# Patient Record
Sex: FEMALE | Race: WHITE | NOT HISPANIC OR LATINO | Employment: OTHER | URBAN - METROPOLITAN AREA
[De-identification: names, ages, dates, MRNs, and addresses within clinical notes are randomized per-mention and may not be internally consistent; named-entity substitution may affect disease eponyms.]

---

## 2018-07-07 ENCOUNTER — APPOINTMENT (EMERGENCY)
Dept: RADIOLOGY | Facility: HOSPITAL | Age: 68
DRG: 392 | End: 2018-07-07
Attending: EMERGENCY MEDICINE
Payer: COMMERCIAL

## 2018-07-07 ENCOUNTER — HOSPITAL ENCOUNTER (INPATIENT)
Facility: HOSPITAL | Age: 68
LOS: 11 days | Discharge: HOME HEALTH | DRG: 392 | End: 2018-07-18
Attending: EMERGENCY MEDICINE | Admitting: SURGERY
Payer: COMMERCIAL

## 2018-07-07 DIAGNOSIS — K57.20 DIVERTICULITIS OF LARGE INTESTINE WITH ABSCESS WITHOUT BLEEDING: Primary | ICD-10-CM

## 2018-07-07 LAB
ALBUMIN SERPL-MCNC: 4.2 G/DL (ref 3.4–5)
ALP SERPL-CCNC: 50 IU/L (ref 35–126)
ALT SERPL-CCNC: 21 IU/L (ref 11–54)
ANION GAP SERPL CALC-SCNC: 9 MEQ/L (ref 3–15)
AST SERPL-CCNC: 26 IU/L (ref 15–41)
BASOPHILS # BLD: 0.04 K/UL (ref 0.01–0.1)
BASOPHILS NFR BLD: 0.2 %
BILIRUB SERPL-MCNC: 1.3 MG/DL (ref 0.3–1.2)
BUN SERPL-MCNC: 24 MG/DL (ref 8–20)
CALCIUM SERPL-MCNC: 9.4 MG/DL (ref 8.9–10.3)
CHLORIDE SERPL-SCNC: 102 MMOL/L (ref 98–109)
CO2 SERPL-SCNC: 24 MMOL/L (ref 22–32)
CREAT SERPL-MCNC: 0.9 MG/DL (ref 0.6–1.1)
DIFFERENTIAL METHOD BLD: ABNORMAL
EOSINOPHIL # BLD: 0 K/UL (ref 0.04–0.36)
EOSINOPHIL NFR BLD: 0 %
ERYTHROCYTE [DISTWIDTH] IN BLOOD BY AUTOMATED COUNT: 14 % (ref 11.7–14.4)
GFR SERPL CREATININE-BSD FRML MDRD: >60 ML/MIN/1.73M*2
GLUCOSE SERPL-MCNC: 157 MG/DL (ref 70–99)
HCT VFR BLDCO AUTO: 36.6 % (ref 35–45)
HGB BLD-MCNC: 12.4 G/DL (ref 11.8–15.7)
IMM GRANULOCYTES # BLD AUTO: 0.1 K/UL (ref 0–0.08)
IMM GRANULOCYTES NFR BLD AUTO: 0.5 %
LIPASE SERPL-CCNC: 26 U/L (ref 20–51)
LYMPHOCYTES # BLD: 0.68 K/UL (ref 1.2–3.5)
LYMPHOCYTES NFR BLD: 3.7 %
MCH RBC QN AUTO: 29.9 PG (ref 28–33.2)
MCHC RBC AUTO-ENTMCNC: 33.9 G/DL (ref 32.2–35.5)
MCV RBC AUTO: 88.2 FL (ref 83–98)
MONOCYTES # BLD: 0.76 K/UL (ref 0.28–0.8)
MONOCYTES NFR BLD: 4.2 %
NEUTROPHILS # BLD: 16.73 K/UL (ref 1.7–7)
NEUTS SEG NFR BLD: 91.4 %
NRBC BLD-RTO: 0 %
PDW BLD AUTO: 10.6 FL (ref 9.4–12.3)
PLATELET # BLD AUTO: 296 K/UL (ref 150–369)
POTASSIUM SERPL-SCNC: 3 MMOL/L (ref 3.6–5.1)
PROT SERPL-MCNC: 7 G/DL (ref 6–8.2)
RAINBOW HOLD SPECIMEN: NORMAL
RBC # BLD AUTO: 4.15 M/UL (ref 3.93–5.22)
SODIUM SERPL-SCNC: 135 MMOL/L (ref 136–144)
WBC # BLD AUTO: 18.31 K/UL (ref 3.8–10.5)

## 2018-07-07 PROCEDURE — 63700000 HC SELF-ADMINISTRABLE DRUG: Performed by: EMERGENCY MEDICINE

## 2018-07-07 PROCEDURE — 25800000 HC PHARMACY IV SOLUTIONS: Performed by: SURGERY

## 2018-07-07 PROCEDURE — 83690 ASSAY OF LIPASE: CPT | Performed by: EMERGENCY MEDICINE

## 2018-07-07 PROCEDURE — 96365 THER/PROPH/DIAG IV INF INIT: CPT | Mod: 59,XP

## 2018-07-07 PROCEDURE — 85025 COMPLETE CBC W/AUTO DIFF WBC: CPT | Performed by: EMERGENCY MEDICINE

## 2018-07-07 PROCEDURE — 25000000 HC PHARMACY GENERAL: Performed by: SURGERY

## 2018-07-07 PROCEDURE — 87040 BLOOD CULTURE FOR BACTERIA: CPT | Performed by: EMERGENCY MEDICINE

## 2018-07-07 PROCEDURE — 96376 TX/PRO/DX INJ SAME DRUG ADON: CPT | Mod: XP

## 2018-07-07 PROCEDURE — 63600000 HC DRUGS/DETAIL CODE: Performed by: SURGERY

## 2018-07-07 PROCEDURE — 36415 COLL VENOUS BLD VENIPUNCTURE: CPT | Performed by: EMERGENCY MEDICINE

## 2018-07-07 PROCEDURE — 63600105 HC IODINE BASED CONTRAST: Performed by: EMERGENCY MEDICINE

## 2018-07-07 PROCEDURE — 12000000 HC ROOM AND CARE MED/SURG

## 2018-07-07 PROCEDURE — 74177 CT ABD & PELVIS W/CONTRAST: CPT

## 2018-07-07 PROCEDURE — 96375 TX/PRO/DX INJ NEW DRUG ADDON: CPT | Mod: XP

## 2018-07-07 PROCEDURE — 99285 EMERGENCY DEPT VISIT HI MDM: CPT | Mod: 25

## 2018-07-07 PROCEDURE — 25800000 HC PHARMACY IV SOLUTIONS: Performed by: EMERGENCY MEDICINE

## 2018-07-07 PROCEDURE — 80053 COMPREHEN METABOLIC PANEL: CPT | Performed by: EMERGENCY MEDICINE

## 2018-07-07 PROCEDURE — 25000000 HC PHARMACY GENERAL: Performed by: EMERGENCY MEDICINE

## 2018-07-07 PROCEDURE — 63600000 HC DRUGS/DETAIL CODE: Performed by: EMERGENCY MEDICINE

## 2018-07-07 RX ORDER — ASPIRIN 81 MG/1
81 TABLET ORAL DAILY
COMMUNITY
End: 2020-10-23 | Stop reason: ALTCHOICE

## 2018-07-07 RX ORDER — METRONIDAZOLE 500 MG/100ML
500 INJECTION, SOLUTION INTRAVENOUS ONCE
Status: COMPLETED | OUTPATIENT
Start: 2018-07-07 | End: 2018-07-07

## 2018-07-07 RX ORDER — ACETAMINOPHEN 650 MG/1
650 SUPPOSITORY RECTAL ONCE
Status: COMPLETED | OUTPATIENT
Start: 2018-07-07 | End: 2018-07-07

## 2018-07-07 RX ORDER — HYDROMORPHONE HYDROCHLORIDE 1 MG/ML
INJECTION, SOLUTION INTRAMUSCULAR; INTRAVENOUS; SUBCUTANEOUS
Status: DISPENSED
Start: 2018-07-07 | End: 2018-07-07

## 2018-07-07 RX ORDER — ONDANSETRON HYDROCHLORIDE 2 MG/ML
INJECTION, SOLUTION INTRAVENOUS
Status: DISPENSED
Start: 2018-07-07 | End: 2018-07-07

## 2018-07-07 RX ORDER — SODIUM CHLORIDE, SODIUM LACTATE, POTASSIUM CHLORIDE, CALCIUM CHLORIDE 600; 310; 30; 20 MG/100ML; MG/100ML; MG/100ML; MG/100ML
INJECTION, SOLUTION INTRAVENOUS CONTINUOUS
Status: DISCONTINUED | OUTPATIENT
Start: 2018-07-07 | End: 2018-07-12

## 2018-07-07 RX ORDER — PANTOPRAZOLE SODIUM 40 MG/10ML
40 INJECTION, POWDER, LYOPHILIZED, FOR SOLUTION INTRAVENOUS EVERY 24 HOURS
Status: DISCONTINUED | OUTPATIENT
Start: 2018-07-08 | End: 2018-07-09 | Stop reason: ALTCHOICE

## 2018-07-07 RX ORDER — ONDANSETRON HYDROCHLORIDE 2 MG/ML
4 INJECTION, SOLUTION INTRAVENOUS EVERY 6 HOURS PRN
Status: DISCONTINUED | OUTPATIENT
Start: 2018-07-07 | End: 2018-07-18 | Stop reason: HOSPADM

## 2018-07-07 RX ORDER — HYDROMORPHONE HYDROCHLORIDE 1 MG/ML
0.5 INJECTION, SOLUTION INTRAMUSCULAR; INTRAVENOUS; SUBCUTANEOUS ONCE
Status: COMPLETED | OUTPATIENT
Start: 2018-07-07 | End: 2018-07-07

## 2018-07-07 RX ORDER — MORPHINE SULFATE 2 MG/ML
2 INJECTION, SOLUTION INTRAMUSCULAR; INTRAVENOUS ONCE
Status: COMPLETED | OUTPATIENT
Start: 2018-07-07 | End: 2018-07-07

## 2018-07-07 RX ORDER — ONDANSETRON HYDROCHLORIDE 2 MG/ML
4 INJECTION, SOLUTION INTRAVENOUS ONCE
Status: COMPLETED | OUTPATIENT
Start: 2018-07-07 | End: 2018-07-07

## 2018-07-07 RX ORDER — LEVOFLOXACIN 5 MG/ML
500 INJECTION, SOLUTION INTRAVENOUS ONCE
Status: COMPLETED | OUTPATIENT
Start: 2018-07-07 | End: 2018-07-07

## 2018-07-07 RX ORDER — SERTRALINE HYDROCHLORIDE 50 MG/1
50 TABLET, FILM COATED ORAL EVERY MORNING
COMMUNITY
End: 2022-01-24 | Stop reason: ALTCHOICE

## 2018-07-07 RX ORDER — FENOFIBRATE 48 MG/1
48 TABLET, FILM COATED ORAL DAILY
COMMUNITY
End: 2018-10-09

## 2018-07-07 RX ORDER — ESOMEPRAZOLE MAGNESIUM 40 MG/1
40 CAPSULE, DELAYED RELEASE ORAL
COMMUNITY
End: 2018-08-01 | Stop reason: HOSPADM

## 2018-07-07 RX ORDER — LEVOFLOXACIN 5 MG/ML
500 INJECTION, SOLUTION INTRAVENOUS
Status: DISCONTINUED | OUTPATIENT
Start: 2018-07-08 | End: 2018-07-09 | Stop reason: ALTCHOICE

## 2018-07-07 RX ORDER — ROSUVASTATIN CALCIUM 20 MG/1
20 TABLET, COATED ORAL DAILY
COMMUNITY
End: 2018-10-09 | Stop reason: CLARIF

## 2018-07-07 RX ORDER — METRONIDAZOLE 500 MG/100ML
500 INJECTION, SOLUTION INTRAVENOUS
Status: DISCONTINUED | OUTPATIENT
Start: 2018-07-07 | End: 2018-07-09 | Stop reason: ALTCHOICE

## 2018-07-07 RX ORDER — MORPHINE SULFATE 4 MG/ML
4 INJECTION, SOLUTION INTRAMUSCULAR; INTRAVENOUS
Status: DISCONTINUED | OUTPATIENT
Start: 2018-07-07 | End: 2018-07-09

## 2018-07-07 RX ORDER — PANTOPRAZOLE SODIUM 40 MG/10ML
INJECTION, POWDER, LYOPHILIZED, FOR SOLUTION INTRAVENOUS
Status: DISCONTINUED
Start: 2018-07-07 | End: 2018-07-07 | Stop reason: WASHOUT

## 2018-07-07 RX ORDER — HYDROMORPHONE HYDROCHLORIDE 1 MG/ML
1 INJECTION, SOLUTION INTRAMUSCULAR; INTRAVENOUS; SUBCUTANEOUS
Status: DISCONTINUED | OUTPATIENT
Start: 2018-07-07 | End: 2018-07-18 | Stop reason: HOSPADM

## 2018-07-07 RX ADMIN — METRONIDAZOLE 500 MG: 500 INJECTION, SOLUTION INTRAVENOUS at 12:10

## 2018-07-07 RX ADMIN — METRONIDAZOLE 500 MG: 500 INJECTION, SOLUTION INTRAVENOUS at 18:38

## 2018-07-07 RX ADMIN — ONDANSETRON 4 MG: 2 INJECTION, SOLUTION INTRAMUSCULAR; INTRAVENOUS at 07:43

## 2018-07-07 RX ADMIN — SODIUM CHLORIDE, POTASSIUM CHLORIDE, SODIUM LACTATE AND CALCIUM CHLORIDE: 600; 310; 30; 20 INJECTION, SOLUTION INTRAVENOUS at 21:00

## 2018-07-07 RX ADMIN — POTASSIUM CHLORIDE 40 MEQ: 2 INJECTION, SOLUTION, CONCENTRATE INTRAVENOUS at 14:06

## 2018-07-07 RX ADMIN — HYDROMORPHONE HYDROCHLORIDE 1 MG: 1 INJECTION, SOLUTION INTRAMUSCULAR; INTRAVENOUS; SUBCUTANEOUS at 23:47

## 2018-07-07 RX ADMIN — HYDROMORPHONE HYDROCHLORIDE 1 MG: 1 INJECTION, SOLUTION INTRAMUSCULAR; INTRAVENOUS; SUBCUTANEOUS at 17:20

## 2018-07-07 RX ADMIN — ACETAMINOPHEN 650 MG: 650 SUPPOSITORY RECTAL at 11:39

## 2018-07-07 RX ADMIN — HYDROMORPHONE HYDROCHLORIDE 0.5 MG: 1 INJECTION, SOLUTION INTRAMUSCULAR; INTRAVENOUS; SUBCUTANEOUS at 11:22

## 2018-07-07 RX ADMIN — ONDANSETRON 4 MG: 2 INJECTION INTRAMUSCULAR; INTRAVENOUS at 23:48

## 2018-07-07 RX ADMIN — MORPHINE SULFATE 2 MG: 2 INJECTION, SOLUTION INTRAMUSCULAR; INTRAVENOUS at 07:45

## 2018-07-07 RX ADMIN — MORPHINE SULFATE 2 MG: 2 INJECTION, SOLUTION INTRAMUSCULAR; INTRAVENOUS at 09:30

## 2018-07-07 RX ADMIN — HYDROMORPHONE HYDROCHLORIDE 1 MG: 1 INJECTION, SOLUTION INTRAMUSCULAR; INTRAVENOUS; SUBCUTANEOUS at 20:37

## 2018-07-07 RX ADMIN — ONDANSETRON 4 MG: 2 INJECTION INTRAMUSCULAR; INTRAVENOUS at 16:35

## 2018-07-07 RX ADMIN — PROMETHAZINE HYDROCHLORIDE 12.5 MG: 25 INJECTION INTRAMUSCULAR; INTRAVENOUS at 21:00

## 2018-07-07 RX ADMIN — SODIUM CHLORIDE 1000 ML: 9 INJECTION, SOLUTION INTRAVENOUS at 07:45

## 2018-07-07 RX ADMIN — SODIUM CHLORIDE, POTASSIUM CHLORIDE, SODIUM LACTATE AND CALCIUM CHLORIDE: 600; 310; 30; 20 INJECTION, SOLUTION INTRAVENOUS at 13:52

## 2018-07-07 RX ADMIN — ONDANSETRON 4 MG: 2 INJECTION, SOLUTION INTRAMUSCULAR; INTRAVENOUS at 11:21

## 2018-07-07 RX ADMIN — IOHEXOL 140 ML: 350 INJECTION, SOLUTION INTRAVENOUS at 10:08

## 2018-07-07 RX ADMIN — LEVOFLOXACIN 500 MG: 5 INJECTION, SOLUTION INTRAVENOUS at 13:15

## 2018-07-07 ASSESSMENT — COGNITIVE AND FUNCTIONAL STATUS - GENERAL
HELP NEEDED FOR BATHING: 4 - NONE
DRESSING REGULAR LOWER BODY CLOTHING: 4 - NONE
HELP NEEDED FOR PERSONAL GROOMING: 4 - NONE
WALKING IN HOSPITAL ROOM: 4 - NONE
CLIMB 3 TO 5 STEPS WITH RAILING: 4 - NONE
EATING MEALS: 4 - NONE
DO YOU HAVE SERIOUS DIFFICULTY WALKING OR CLIMBING STAIRS: OTHER (SEE COMMENTS)
DRESSING REGULAR UPPER BODY CLOTHING: 4 - NONE
MOVING TO AND FROM BED TO CHAIR: 4 - NONE
TOILETING: 4 - NONE
STANDING UP FROM CHAIR USING ARMS: 4 - NONE

## 2018-07-07 ASSESSMENT — ENCOUNTER SYMPTOMS
DIFFICULTY URINATING: 0
DIARRHEA: 1
ABDOMINAL PAIN: 1
ACTIVITY CHANGE: 0
NECK PAIN: 0
BACK PAIN: 1
CHEST TIGHTNESS: 0
WEAKNESS: 0
VOMITING: 1
TROUBLE SWALLOWING: 0
COLOR CHANGE: 0
NERVOUS/ANXIOUS: 1

## 2018-07-07 NOTE — PROGRESS NOTES
CM met with the patient, , son and daughter.  Pt does not anticipate any home care issues at this time.

## 2018-07-07 NOTE — H&P
General Surgery History and Physical    HPI     Patient is a 68 y.o. female who presents with 2 days of abdominal pain, nausea, vomiting, and diarrhea.  The pain began in the left lower quadrant and spread to the remainder of the abdomen.  Positive subjective fever and chills.  No dysuria or frequency.  Patient had a diverticulitis episode 10 years ago.  Last colonoscopy was 2 years ago which time diverticulosis and colon polyps were found.  Has a history of Lopez's esophagus and a Tran-Juan tear.    Medical History:   Past Medical History:   Diagnosis Date   • Lopez's esophagus    • Diverticulitis    • Hypertension    • Tran-Juan tear    • Pleural effusion        Surgical History:   Past Surgical History:   Procedure Laterality Date   • CHOLECYSTECTOMY     • KNEE ARTHROSCOPY     • TRANSUMBILICAL AUGMENTATION MAMMAPLASTY         Social History:   Social History     Social History Narrative   • No narrative on file       Family History: History reviewed. No pertinent family history.    Allergies: Nsaids (non-steroidal anti-inflammatory drug)    Home Medications:  Not in a hospital admission.    Current Medications:  •  lactated ringer's, , intravenous, Continuous  •  levofloxacin, 500 mg, intravenous, Once  •  [START ON 7/8/2018] levofloxacin, 500 mg, intravenous, q24h INT  •  metronidazole, 500 mg, intravenous, q8h INT  •  morphine, 4 mg, intravenous, q3h PRN  •  ondansetron, 4 mg, intravenous, q6h PRN  •  pantoprazole, 40 mg, intravenous, q24h  •  potassium chloride, 40 mEq, intravenous, Once  •  aspirin  •  esomeprazole  •  fenofibrate  •  multivitamin  •  rosuvastatin  •  sertraline  •  valsartan (DIOVAN ORAL)    Review of Systems  All other systems reviewed and negative except as noted in the HPI.    Objective     Vital Signs for the last 24 hours:  Temp:  [37.8 °C (100 °F)-39 °C (102.2 °F)] 38.5 °C (101.3 °F)  Heart Rate:  [82-95] 90  Resp:  [18] 18  BP: (104-175)/(64-78) 116/64    Physicial  Exam    General appearance: alert, appears stated age, cooperative and morbidly obese  Head: normocephalic, without obvious abnormality, atraumatic  Eyes: conjunctivae/corneas clear. PERRL, EOM's intact. Fundi benign.  Throat: lips, mucosa, and tongue normal; teeth and gums normal  Neck: no adenopathy, no carotid bruit, no JVD, supple, symmetrical, trachea midline and thyroid not enlarged, symmetric, no tenderness/mass/nodules  Lungs: clear to auscultation bilaterally  Heart: regular rate and rhythm, S1, S2 normal, no murmur, click, rub or gallop  Abdomen: Obese, soft, moderate left lower quadrant greater than generalized tenderness to palpation, no masses, no hernias  Rectal: deferred  Extremities: extremities normal, warm and well-perfused; no cyanosis, clubbing, or edema  Neurologic: Alert and oriented X 3, normal strength and tone. Normal symmetric reflexes. Normal coordination and gait      Labs    Results from last 7 days  Lab Units 07/07/18  0722   WBC K/uL 18.31*   HEMOGLOBIN g/dL 12.4   HEMATOCRIT % 36.6   PLATELETS K/uL 296       Results from last 7 days  Lab Units 07/07/18  0722   SODIUM mmol/L 135*   POTASSIUM mmol/L 3.0*   CHLORIDE mmol/L 102   CO2 mmol/L 24   BUN mg/dL 24*   CREATININE mg/dL 0.9   CALCIUM mg/dL 9.4   ALBUMIN g/dL 4.2   BILIRUBIN TOTAL mg/dL 1.3*   ALK PHOS IU/L 50   ALT IU/L 21   AST IU/L 26   GLUCOSE mg/dL 157*           0  Lab Value Date/Time   LIPASE 26 07/07/2018 0722       Results from last 7 days  Lab Units 07/07/18  0722   SODIUM mmol/L 135*   POTASSIUM mmol/L 3.0*   CHLORIDE mmol/L 102   CO2 mmol/L 24   BUN mg/dL 24*   CREATININE mg/dL 0.9   GLUCOSE mg/dL 157*   CALCIUM mg/dL 9.4       Imaging  Ct Abdomen Pelvis With Iv Contrast    Result Date: 7/7/2018  IMPRESSION: Findings most concerning for acute significant sigmoid diverticulitis with complex probably loculated fluid along the right hemipelvis, which may represent an early developing abscess measuring up to 3.5 x 1.2 cm..   There is a adjacent inflammation in these small bowel loops probably secondary to sigmoid inflammation.  Primary small bowel or right adnexal pathology is felt less likely the etiology of inflammation.. There is secondary to diffuse small bowel and colonic ileus.      Assessment: Diverticulitis with abscess  Morbid obesity  Hypertension  Hypercholesterolemia    Plan: N.p.o., IV fluids, and IV antibiotics.  Likely repeat CT in 3-4 days.  Check a.m. labs.    Code Status: Full Code

## 2018-07-07 NOTE — PLAN OF CARE
Problem: Nausea/Vomiting (Adult)  Goal: Symptom Relief  Outcome: Ongoing (interventions implemented as appropriate)

## 2018-07-07 NOTE — ED PROVIDER NOTES
HPI     Chief Complaint   Patient presents with   • Abdominal Pain   • Vomiting   • Diarrhea       68-year-old female comes emergency room complaining of generalized abdominal pain.  She has had the symptoms for the past few days.  They are associated with nausea vomiting.  She has had several watery stools.  The pain radiates to her back.  No fever chills.  No urinary symptoms.  She does not think she ate anything unusual.  She did see her doctor yesterday who started her on 2 antibiotics for suspected diverticulitis.  She states this does not feel like prior diverticulitis.        Abdominal Pain   Associated symptoms: diarrhea and vomiting    Associated symptoms: no chest pain    Vomiting   Associated symptoms: abdominal pain and diarrhea    Diarrhea   Associated symptoms: abdominal pain and vomiting         Patient History     Past Medical History:   Diagnosis Date   • Lopez's esophagus    • Diverticulitis    • Hypertension    • Tran-Juan tear    • Pleural effusion        Past Surgical History:   Procedure Laterality Date   • CHOLECYSTECTOMY     • KNEE ARTHROSCOPY     • TRANSUMBILICAL AUGMENTATION MAMMAPLASTY         History reviewed. No pertinent family history.    Social History   Substance Use Topics   • Smoking status: Former Smoker   • Smokeless tobacco: Never Used      Comment: quit 18 years ago   • Alcohol use Yes      Comment: occasional       Systems Reviewed from Nursing Triage:          Review of Systems     Review of Systems   Constitutional: Negative for activity change.   HENT: Negative for trouble swallowing.    Eyes: Negative for visual disturbance.   Respiratory: Negative for chest tightness.    Cardiovascular: Negative for chest pain.   Gastrointestinal: Positive for abdominal pain, diarrhea and vomiting.   Genitourinary: Negative for difficulty urinating.   Musculoskeletal: Positive for back pain. Negative for neck pain.   Skin: Negative for color change.   Neurological: Negative for  "weakness.   Psychiatric/Behavioral: The patient is nervous/anxious.         Physical Exam     ED Triage Vitals   Temp Heart Rate Resp BP SpO2   07/07/18 0713 07/07/18 0713 07/07/18 0713 07/07/18 0713 07/07/18 0713   37.8 °C (100 °F) 90 18 (!) 154/72 98 %      Temp Source Heart Rate Source Patient Position BP Location FiO2 (%) (Set)   07/07/18 0713 07/07/18 0929 -- -- --   Temporal Monitor                        Patient Vitals for the past 24 hrs:   BP Temp Temp src Pulse Resp SpO2 Height Weight   07/07/18 1153 104/64 - - 95 18 97 % - -   07/07/18 1124 - (!) 39 °C (102.2 °F) Temporal - - - - -   07/07/18 1048 (!) 175/78 - - 91 18 96 % - -   07/07/18 0929 (!) 158/72 - - 82 18 98 % - -   07/07/18 0713 (!) 154/72 37.8 °C (100 °F) Temporal 90 18 98 % 1.575 m (5' 2\") 99.8 kg (220 lb)           Physical Exam   Constitutional: She is oriented to person, place, and time. She appears well-developed and well-nourished. She appears distressed (anxious).   HENT:   Head: Normocephalic.   Mouth/Throat: Oropharynx is clear and moist.   Eyes: EOM are normal. Pupils are equal, round, and reactive to light.   Neck: Normal range of motion. Neck supple.   Cardiovascular: Normal rate, regular rhythm and normal heart sounds.    Pulmonary/Chest: No respiratory distress.   Abdominal: Soft. She exhibits no mass. There is tenderness (mild diffuse). There is no rebound and no guarding.   Musculoskeletal: Normal range of motion.   Neurological: She is alert and oriented to person, place, and time.   Skin: Skin is warm and dry.   Psychiatric: She has a normal mood and affect.            Procedures    ED Course & MDM     Labs Reviewed   COMPREHENSIVE METABOLIC PANEL - Abnormal        Result Value    Sodium 135 (*)     Potassium 3.0 (*)     BUN 24 (*)     Glucose 157 (*)     Bilirubin, Total 1.3 (*)     Chloride 102      CO2 24      Creatinine 0.9      Calcium 9.4      AST (SGOT) 26      ALT (SGPT) 21      Alkaline Phosphatase 50      Total " Protein 7.0      Albumin 4.2      eGFR >60.0      Anion Gap 9      Narrative:     Order only if pain is above umbilicus   CBC - Abnormal     WBC 18.31 (*)     RBC 4.15      Hemoglobin 12.4      Hematocrit 36.6      MCV 88.2      MCH 29.9      MCHC 33.9      RDW 14.0      Platelets 296      MPV 10.6     DIFF COUNT - Abnormal     Immature Granulocytes, Absolute 0.10 (*)     Neutrophils, Absolute 16.73 (*)     Lymphocytes, Absolute 0.68 (*)     Eosinophils, Absolute 0.00 (*)     Differential Type Auto      nRBC 0.0      Immature Granulocytes 0.5      Neutrophils 91.4      Lymphocytes 3.7      Monocytes 4.2      Eosinophils 0.0      Basophils 0.2      Monocytes, Absolute 0.76      Basophils, Absolute 0.04     LIPASE - Normal    Lipase 26      Narrative:     Order only if pain is above umbilicus   BLOOD CULTURE    Narrative:     The following orders were created for panel order Blood culture.  Procedure                               Abnormality         Status                     ---------                               -----------         ------                     Blood Culture[[id number]]                                                                  Please view results for these tests on the individual orders.   BLOOD CULTURE    Narrative:     The following orders were created for panel order Blood culture.  Procedure                               Abnormality         Status                     ---------                               -----------         ------                     Blood Culture[[id number]]                                                                  Please view results for these tests on the individual orders.   BLOOD CULTURE   BLOOD CULTURE   CBC AND DIFFERENTIAL    Narrative:     The following orders were created for panel order CBC and differential.  Procedure                               Abnormality         Status                     ---------                               -----------          ------                     CBC[[id number]]                           Abnormal            Final result               Diff Count[[id number]]                    Abnormal            Final result                 Please view results for these tests on the individual orders.   RAINBOW DRAW PANEL    Narrative:     The following orders were created for panel order [facility] Draw Panel.  Procedure                               Abnormality         Status                     ---------                               -----------         ------                     RAINBOW RED[[id number]]                                       In process                 RAINBOW LT BLUE[[id number]]                                   In process                 RAINBOW LT GREEN[[id number]]                                  In process                 RAINBOW GOLD[[id number]]                                      In process                 [facility] Blood Culture[[id number]]                                                        [facility] Blood Culture[[id number]]                                                          Please view results for these tests on the individual orders.   RAINBOW RED   RAINBOW LT BLUE   RAINBOW LT GREEN   RAINBOW GOLD   RAINBOW BLOOD CULTURE   RAINBOW BLOOD CULTURE       CT ABDOMEN PELVIS WITH IV CONTRAST   Final Result   IMPRESSION:      Findings most concerning for acute significant sigmoid diverticulitis with   complex probably loculated fluid along the right hemipelvis, which may represent   an early developing abscess measuring up to 3.5 x 1.2 cm..  There is a adjacent   inflammation in these small bowel loops probably secondary to sigmoid   inflammation.  Primary small bowel or right adnexal pathology is felt less   likely the etiology of inflammation..      There is secondary to diffuse small bowel and colonic ileus.              MDM         ED Course as of Jul 07 1210   Sat Jul 07, 2018   1126 She does have an elevated white count and CT  indicates a diverticulitis with complications.  Put out a call to general surgery and will check with them to see if they would like to admit the patient for IV antibiotics.  [DN]   1209 I did discuss the case with Dr. Vann who will be over to see the patient however he is currently in another hospital.  In the meantime he did suggest a dose of IV Levaquin and IV Flagyl.  [DN]      ED Course User Index  [DN] Dwayne Conte MD         Clinical Impressions as of Jul 07 1210   Diverticulitis of large intestine with abscess without bleeding        Dwayne Conte MD  07/07/18 1210

## 2018-07-08 LAB
ANION GAP SERPL CALC-SCNC: 10 MEQ/L (ref 3–15)
BUN SERPL-MCNC: 18 MG/DL (ref 8–20)
C DIFF STL QL: NEGATIVE
CALCIUM SERPL-MCNC: 8.8 MG/DL (ref 8.9–10.3)
CHLORIDE SERPL-SCNC: 106 MMOL/L (ref 98–109)
CO2 SERPL-SCNC: 21 MMOL/L (ref 22–32)
CREAT SERPL-MCNC: 0.7 MG/DL (ref 0.6–1.1)
ERYTHROCYTE [DISTWIDTH] IN BLOOD BY AUTOMATED COUNT: 14.1 % (ref 11.7–14.4)
GFR SERPL CREATININE-BSD FRML MDRD: >60 ML/MIN/1.73M*2
GLUCOSE SERPL-MCNC: 122 MG/DL (ref 70–99)
HCT VFR BLDCO AUTO: 33.6 % (ref 35–45)
HGB BLD-MCNC: 11.1 G/DL (ref 11.8–15.7)
MAGNESIUM SERPL-MCNC: 1.7 MG/DL (ref 1.8–2.5)
MCH RBC QN AUTO: 29.7 PG (ref 28–33.2)
MCHC RBC AUTO-ENTMCNC: 33 G/DL (ref 32.2–35.5)
MCV RBC AUTO: 89.8 FL (ref 83–98)
PDW BLD AUTO: 10.5 FL (ref 9.4–12.3)
PHOSPHATE SERPL-MCNC: 2.1 MG/DL (ref 2.4–4.7)
PLATELET # BLD AUTO: 277 K/UL (ref 150–369)
POTASSIUM SERPL-SCNC: 3.3 MMOL/L (ref 3.6–5.1)
RBC # BLD AUTO: 3.74 M/UL (ref 3.93–5.22)
SODIUM SERPL-SCNC: 137 MMOL/L (ref 136–144)
WBC # BLD AUTO: 17.17 K/UL (ref 3.8–10.5)

## 2018-07-08 PROCEDURE — 63600000 HC DRUGS/DETAIL CODE: Performed by: SURGERY

## 2018-07-08 PROCEDURE — 80048 BASIC METABOLIC PNL TOTAL CA: CPT | Performed by: SURGERY

## 2018-07-08 PROCEDURE — 63700000 HC SELF-ADMINISTRABLE DRUG: Performed by: NURSE PRACTITIONER

## 2018-07-08 PROCEDURE — 12000000 HC ROOM AND CARE MED/SURG

## 2018-07-08 PROCEDURE — 83735 ASSAY OF MAGNESIUM: CPT | Performed by: SURGERY

## 2018-07-08 PROCEDURE — 63600000 HC DRUGS/DETAIL CODE: Performed by: NURSE PRACTITIONER

## 2018-07-08 PROCEDURE — 25000000 HC PHARMACY GENERAL: Performed by: SURGERY

## 2018-07-08 PROCEDURE — 36415 COLL VENOUS BLD VENIPUNCTURE: CPT | Performed by: SURGERY

## 2018-07-08 PROCEDURE — 25800000 HC PHARMACY IV SOLUTIONS: Performed by: SURGERY

## 2018-07-08 PROCEDURE — 84100 ASSAY OF PHOSPHORUS: CPT | Performed by: SURGERY

## 2018-07-08 PROCEDURE — 85027 COMPLETE CBC AUTOMATED: CPT | Performed by: SURGERY

## 2018-07-08 PROCEDURE — 87449 NOS EACH ORGANISM AG IA: CPT | Performed by: SURGERY

## 2018-07-08 RX ORDER — HYDROMORPHONE HYDROCHLORIDE 1 MG/ML
0.5 INJECTION, SOLUTION INTRAMUSCULAR; INTRAVENOUS; SUBCUTANEOUS ONCE
Status: COMPLETED | OUTPATIENT
Start: 2018-07-08 | End: 2018-07-08

## 2018-07-08 RX ORDER — MAGNESIUM SULFATE HEPTAHYDRATE 40 MG/ML
2 INJECTION, SOLUTION INTRAVENOUS ONCE
Status: DISPENSED | OUTPATIENT
Start: 2018-07-08 | End: 2018-07-08

## 2018-07-08 RX ORDER — KETOROLAC TROMETHAMINE 15 MG/ML
15 INJECTION, SOLUTION INTRAMUSCULAR; INTRAVENOUS EVERY 8 HOURS PRN
Status: COMPLETED | OUTPATIENT
Start: 2018-07-08 | End: 2018-07-11

## 2018-07-08 RX ORDER — ACETAMINOPHEN 650 MG/1
650 SUPPOSITORY RECTAL EVERY 6 HOURS PRN
Status: DISCONTINUED | OUTPATIENT
Start: 2018-07-08 | End: 2018-07-09

## 2018-07-08 RX ADMIN — METRONIDAZOLE 500 MG: 500 INJECTION, SOLUTION INTRAVENOUS at 18:32

## 2018-07-08 RX ADMIN — POTASSIUM PHOSPHATE, MONOBASIC AND POTASSIUM PHOSPHATE, DIBASIC 20 MMOL: 224; 236 INJECTION, SOLUTION INTRAVENOUS at 09:30

## 2018-07-08 RX ADMIN — HYDROMORPHONE HYDROCHLORIDE 1 MG: 1 INJECTION, SOLUTION INTRAMUSCULAR; INTRAVENOUS; SUBCUTANEOUS at 13:48

## 2018-07-08 RX ADMIN — SODIUM CHLORIDE, POTASSIUM CHLORIDE, SODIUM LACTATE AND CALCIUM CHLORIDE: 600; 310; 30; 20 INJECTION, SOLUTION INTRAVENOUS at 18:25

## 2018-07-08 RX ADMIN — LEVOFLOXACIN 500 MG: 5 INJECTION, SOLUTION INTRAVENOUS at 13:45

## 2018-07-08 RX ADMIN — METRONIDAZOLE 500 MG: 500 INJECTION, SOLUTION INTRAVENOUS at 12:13

## 2018-07-08 RX ADMIN — SODIUM CHLORIDE, POTASSIUM CHLORIDE, SODIUM LACTATE AND CALCIUM CHLORIDE: 600; 310; 30; 20 INJECTION, SOLUTION INTRAVENOUS at 05:30

## 2018-07-08 RX ADMIN — METRONIDAZOLE 500 MG: 500 INJECTION, SOLUTION INTRAVENOUS at 02:05

## 2018-07-08 RX ADMIN — HYDROMORPHONE HYDROCHLORIDE 1 MG: 1 INJECTION, SOLUTION INTRAMUSCULAR; INTRAVENOUS; SUBCUTANEOUS at 04:49

## 2018-07-08 RX ADMIN — HYDROMORPHONE HYDROCHLORIDE 0.5 MG: 1 INJECTION, SOLUTION INTRAMUSCULAR; INTRAVENOUS; SUBCUTANEOUS at 02:08

## 2018-07-08 RX ADMIN — HYDROMORPHONE HYDROCHLORIDE 1 MG: 1 INJECTION, SOLUTION INTRAMUSCULAR; INTRAVENOUS; SUBCUTANEOUS at 22:15

## 2018-07-08 RX ADMIN — ACETAMINOPHEN 650 MG: 650 SUPPOSITORY RECTAL at 01:58

## 2018-07-08 RX ADMIN — ONDANSETRON 4 MG: 2 INJECTION INTRAMUSCULAR; INTRAVENOUS at 09:16

## 2018-07-08 RX ADMIN — PROMETHAZINE HYDROCHLORIDE 12.5 MG: 25 INJECTION INTRAMUSCULAR; INTRAVENOUS at 04:49

## 2018-07-08 RX ADMIN — ONDANSETRON 4 MG: 2 INJECTION INTRAMUSCULAR; INTRAVENOUS at 22:19

## 2018-07-08 RX ADMIN — PANTOPRAZOLE SODIUM 40 MG: 40 INJECTION, POWDER, FOR SOLUTION INTRAVENOUS at 09:16

## 2018-07-08 RX ADMIN — HYDROMORPHONE HYDROCHLORIDE 1 MG: 1 INJECTION, SOLUTION INTRAMUSCULAR; INTRAVENOUS; SUBCUTANEOUS at 09:16

## 2018-07-08 RX ADMIN — HYDROMORPHONE HYDROCHLORIDE 1 MG: 1 INJECTION, SOLUTION INTRAMUSCULAR; INTRAVENOUS; SUBCUTANEOUS at 17:16

## 2018-07-08 RX ADMIN — POTASSIUM CHLORIDE 40 MEQ: 2 INJECTION, SOLUTION, CONCENTRATE INTRAVENOUS at 21:56

## 2018-07-08 NOTE — NURSING NOTE
Pt continues to have intermittent nausea, dry heaves, and diarrhea, as well 9/10 lower abdominal pain/pelvic pain. Same pain pt has been having, discussed with Dr. Vann this am. PRN meds given per chart. Medications help pt rest nd sleep in between doses. Family at bedside. Emotional support provided. Call bell in reach.

## 2018-07-08 NOTE — PROGRESS NOTES
"General Surgery Daily Progress Note    Subjective: Pt seen and examined.  Feels better than yesterday.  No continued nausea.  She has had some dry heaves along with her episodes of diarrhea which occur every 4 hours or so.  Diarrhea is still liquid.  Abdominal pain improved.    Vital signs in last 24 hours:  Temp:  [37.2 °C (99 °F)-39 °C (102.2 °F)] 37.3 °C (99.1 °F)  Heart Rate:  [81-99] 81  Resp:  [16-18] 16  BP: (104-175)/(56-78) 131/75    Intake/Output this shift:    Intake/Output Summary (Last 24 hours) at 07/08/18 0801  Last data filed at 07/07/18 1454   Gross per 24 hour   Intake              100 ml   Output                0 ml   Net              100 ml       Physical Exam    General appearance: alert, appears stated age and cooperative    Abdomen: soft, minimal left lower quadrant greater than generalized tenderness to palpation; bowel sounds normal; no masses, no organomegaly\"      Labs    Results from last 7 days  Lab Units 07/08/18  0228   SODIUM mmol/L 137   POTASSIUM mmol/L 3.3*   CHLORIDE mmol/L 106   CO2 mmol/L 21*   BUN mg/dL 18   CREATININE mg/dL 0.7   GLUCOSE mg/dL 122*   CALCIUM mg/dL 8.8*       Results from last 7 days  Lab Units 07/08/18  0228   WBC K/uL 17.17*   HEMOGLOBIN g/dL 11.1*   HEMATOCRIT % 33.6*   PLATELETS K/uL 277           Results from last 7 days  Lab Units 07/08/18 0228 07/07/18 0722   SODIUM mmol/L 137 135*   POTASSIUM mmol/L 3.3* 3.0*   CHLORIDE mmol/L 106 102   CO2 mmol/L 21* 24   BUN mg/dL 18 24*   CREATININE mg/dL 0.7 0.9   CALCIUM mg/dL 8.8* 9.4   ALBUMIN g/dL  --  4.2   BILIRUBIN TOTAL mg/dL  --  1.3*   ALK PHOS IU/L  --  50   ALT IU/L  --  21   AST IU/L  --  26   GLUCOSE mg/dL 122* 157*       Results from last 7 days  Lab Units 07/08/18  0228   HEMOGLOBIN g/dL 11.1*   HEMATOCRIT % 33.6*       0  Lab Value Date/Time   LIPASE 26 07/07/2018 0722     Pathology Results     ** No results found for the last 720 hours. **              Imaging  Ct Abdomen Pelvis With Iv " Contrast    Result Date: 7/7/2018  IMPRESSION: Findings most concerning for acute significant sigmoid diverticulitis with complex probably loculated fluid along the right hemipelvis, which may represent an early developing abscess measuring up to 3.5 x 1.2 cm..  There is a adjacent inflammation in these small bowel loops probably secondary to sigmoid inflammation.  Primary small bowel or right adnexal pathology is felt less likely the etiology of inflammation.. There is secondary to diffuse small bowel and colonic ileus.        Assessment   Diverticulitis with small pelvic abscess  Hypokalemia  Hypomagnesemia  Hypophosphatemia    Plan   Continue intravenous antibiotics.  Continue antiemetics and antipyretics.  Replete electrolytes.  Check a.m. labs.  Check stool for C. difficile.        Dwayne Vann, DO

## 2018-07-08 NOTE — NURSING NOTE
"After administration of toradol pt states \"this is the best I have felt in 4 days\" Pt currently rating discomfort at a 3/10. Family remains at bedside. Call bell in reach   "

## 2018-07-08 NOTE — PLAN OF CARE
Problem: Nausea/Vomiting (Adult)  Intervention: Promote/Maintain Hydration  IV fluids infusing; K+ rider complete    Goal: Identify Related Risk Factors and Signs and Symptoms  Outcome: Ongoing (interventions implemented as appropriate)    Goal: Symptom Relief  Outcome: Ongoing (interventions implemented as appropriate)    Goal: Adequate Hydration  Outcome: Ongoing (interventions implemented as appropriate)

## 2018-07-09 LAB
ANION GAP SERPL CALC-SCNC: 6 MEQ/L (ref 3–15)
BUN SERPL-MCNC: 20 MG/DL (ref 8–20)
CALCIUM SERPL-MCNC: 8.4 MG/DL (ref 8.9–10.3)
CHLORIDE SERPL-SCNC: 107 MMOL/L (ref 98–109)
CO2 SERPL-SCNC: 24 MMOL/L (ref 22–32)
CREAT SERPL-MCNC: 0.7 MG/DL (ref 0.6–1.1)
ERYTHROCYTE [DISTWIDTH] IN BLOOD BY AUTOMATED COUNT: 14 % (ref 11.7–14.4)
GFR SERPL CREATININE-BSD FRML MDRD: >60 ML/MIN/1.73M*2
GLUCOSE SERPL-MCNC: 99 MG/DL (ref 70–99)
HCT VFR BLDCO AUTO: 29.9 % (ref 35–45)
HGB BLD-MCNC: 9.9 G/DL (ref 11.8–15.7)
MAGNESIUM SERPL-MCNC: 2 MG/DL (ref 1.8–2.5)
MCH RBC QN AUTO: 29.6 PG (ref 28–33.2)
MCHC RBC AUTO-ENTMCNC: 33.1 G/DL (ref 32.2–35.5)
MCV RBC AUTO: 89.5 FL (ref 83–98)
PDW BLD AUTO: 10.9 FL (ref 9.4–12.3)
PHOSPHATE SERPL-MCNC: 2 MG/DL (ref 2.4–4.7)
PLATELET # BLD AUTO: 260 K/UL (ref 150–369)
POTASSIUM SERPL-SCNC: 3.5 MMOL/L (ref 3.6–5.1)
RBC # BLD AUTO: 3.34 M/UL (ref 3.93–5.22)
SODIUM SERPL-SCNC: 137 MMOL/L (ref 136–144)
WBC # BLD AUTO: 12.16 K/UL (ref 3.8–10.5)

## 2018-07-09 PROCEDURE — 82310 ASSAY OF CALCIUM: CPT | Performed by: SURGERY

## 2018-07-09 PROCEDURE — 12000000 HC ROOM AND CARE MED/SURG

## 2018-07-09 PROCEDURE — 84100 ASSAY OF PHOSPHORUS: CPT | Performed by: SURGERY

## 2018-07-09 PROCEDURE — 85027 COMPLETE CBC AUTOMATED: CPT | Performed by: SURGERY

## 2018-07-09 PROCEDURE — 83735 ASSAY OF MAGNESIUM: CPT | Performed by: SURGERY

## 2018-07-09 PROCEDURE — 25000000 HC PHARMACY GENERAL: Performed by: SURGERY

## 2018-07-09 PROCEDURE — 63700000 HC SELF-ADMINISTRABLE DRUG: Performed by: SURGERY

## 2018-07-09 PROCEDURE — 36415 COLL VENOUS BLD VENIPUNCTURE: CPT | Performed by: SURGERY

## 2018-07-09 PROCEDURE — 63600000 HC DRUGS/DETAIL CODE: Performed by: SURGERY

## 2018-07-09 RX ORDER — LEVOFLOXACIN 500 MG/1
500 TABLET, FILM COATED ORAL DAILY
Status: DISCONTINUED | OUTPATIENT
Start: 2018-07-10 | End: 2018-07-10

## 2018-07-09 RX ORDER — ACETAMINOPHEN 325 MG/1
650 TABLET ORAL EVERY 6 HOURS PRN
Status: DISCONTINUED | OUTPATIENT
Start: 2018-07-09 | End: 2018-07-18 | Stop reason: HOSPADM

## 2018-07-09 RX ORDER — FENOFIBRATE 67 MG/1
67 CAPSULE ORAL
Status: DISCONTINUED | OUTPATIENT
Start: 2018-07-10 | End: 2018-07-14

## 2018-07-09 RX ORDER — PANTOPRAZOLE SODIUM 40 MG/1
40 TABLET, DELAYED RELEASE ORAL DAILY
Status: DISCONTINUED | OUTPATIENT
Start: 2018-07-10 | End: 2018-07-10 | Stop reason: SDUPTHER

## 2018-07-09 RX ORDER — OXYCODONE AND ACETAMINOPHEN 5; 325 MG/1; MG/1
1 TABLET ORAL EVERY 6 HOURS PRN
Status: DISCONTINUED | OUTPATIENT
Start: 2018-07-09 | End: 2018-07-18 | Stop reason: HOSPADM

## 2018-07-09 RX ORDER — VALSARTAN 80 MG/1
80 TABLET ORAL DAILY
Status: DISCONTINUED | OUTPATIENT
Start: 2018-07-09 | End: 2018-07-09

## 2018-07-09 RX ORDER — ASPIRIN 81 MG/1
81 TABLET ORAL DAILY
Status: DISCONTINUED | OUTPATIENT
Start: 2018-07-09 | End: 2018-07-18 | Stop reason: HOSPADM

## 2018-07-09 RX ORDER — SERTRALINE HYDROCHLORIDE 50 MG/1
50 TABLET, FILM COATED ORAL DAILY
Status: DISCONTINUED | OUTPATIENT
Start: 2018-07-09 | End: 2018-07-18 | Stop reason: HOSPADM

## 2018-07-09 RX ORDER — VALSARTAN 160 MG/1
160 TABLET ORAL DAILY
Status: DISCONTINUED | OUTPATIENT
Start: 2018-07-10 | End: 2018-07-18 | Stop reason: HOSPADM

## 2018-07-09 RX ORDER — ROSUVASTATIN CALCIUM 20 MG/1
20 TABLET, COATED ORAL DAILY
Status: DISCONTINUED | OUTPATIENT
Start: 2018-07-09 | End: 2018-07-18 | Stop reason: HOSPADM

## 2018-07-09 RX ORDER — OXYCODONE AND ACETAMINOPHEN 5; 325 MG/1; MG/1
2 TABLET ORAL EVERY 6 HOURS PRN
Status: DISCONTINUED | OUTPATIENT
Start: 2018-07-09 | End: 2018-07-18 | Stop reason: HOSPADM

## 2018-07-09 RX ORDER — PANTOPRAZOLE SODIUM 40 MG/1
40 TABLET, DELAYED RELEASE ORAL DAILY
Status: DISCONTINUED | OUTPATIENT
Start: 2018-07-10 | End: 2018-07-18 | Stop reason: HOSPADM

## 2018-07-09 RX ORDER — MAGNESIUM SULFATE HEPTAHYDRATE 40 MG/ML
2 INJECTION, SOLUTION INTRAVENOUS ONCE
Status: COMPLETED | OUTPATIENT
Start: 2018-07-09 | End: 2018-07-09

## 2018-07-09 RX ORDER — POTASSIUM CHLORIDE 750 MG/1
40 TABLET, FILM COATED, EXTENDED RELEASE ORAL ONCE
Status: COMPLETED | OUTPATIENT
Start: 2018-07-09 | End: 2018-07-09

## 2018-07-09 RX ORDER — METRONIDAZOLE 500 MG/1
500 TABLET ORAL EVERY 8 HOURS
Status: DISCONTINUED | OUTPATIENT
Start: 2018-07-09 | End: 2018-07-10

## 2018-07-09 RX ADMIN — ROSUVASTATIN CALCIUM 20 MG: 20 TABLET, FILM COATED ORAL at 16:52

## 2018-07-09 RX ADMIN — ONDANSETRON 4 MG: 2 INJECTION INTRAMUSCULAR; INTRAVENOUS at 15:57

## 2018-07-09 RX ADMIN — KETOROLAC TROMETHAMINE 15 MG: 15 INJECTION, SOLUTION INTRAMUSCULAR; INTRAVENOUS at 14:13

## 2018-07-09 RX ADMIN — PANTOPRAZOLE SODIUM 40 MG: 40 INJECTION, POWDER, FOR SOLUTION INTRAVENOUS at 10:30

## 2018-07-09 RX ADMIN — MAGNESIUM SULFATE HEPTAHYDRATE 2 G: 40 INJECTION, SOLUTION INTRAVENOUS at 02:07

## 2018-07-09 RX ADMIN — HYDROMORPHONE HYDROCHLORIDE 1 MG: 1 INJECTION, SOLUTION INTRAMUSCULAR; INTRAVENOUS; SUBCUTANEOUS at 15:47

## 2018-07-09 RX ADMIN — SODIUM CHLORIDE, POTASSIUM CHLORIDE, SODIUM LACTATE AND CALCIUM CHLORIDE: 600; 310; 30; 20 INJECTION, SOLUTION INTRAVENOUS at 16:51

## 2018-07-09 RX ADMIN — POTASSIUM CHLORIDE 40 MEQ: 750 TABLET, EXTENDED RELEASE ORAL at 15:47

## 2018-07-09 RX ADMIN — METRONIDAZOLE 500 MG: 500 TABLET ORAL at 18:34

## 2018-07-09 RX ADMIN — KETOROLAC TROMETHAMINE 15 MG: 15 INJECTION, SOLUTION INTRAMUSCULAR; INTRAVENOUS at 04:38

## 2018-07-09 RX ADMIN — METRONIDAZOLE 500 MG: 500 INJECTION, SOLUTION INTRAVENOUS at 04:38

## 2018-07-09 RX ADMIN — HYDROMORPHONE HYDROCHLORIDE 1 MG: 1 INJECTION, SOLUTION INTRAMUSCULAR; INTRAVENOUS; SUBCUTANEOUS at 02:07

## 2018-07-09 RX ADMIN — SODIUM CHLORIDE, POTASSIUM CHLORIDE, SODIUM LACTATE AND CALCIUM CHLORIDE: 600; 310; 30; 20 INJECTION, SOLUTION INTRAVENOUS at 02:07

## 2018-07-09 RX ADMIN — MULTIPLE VITAMINS W/ MINERALS TAB 1 TABLET: TAB at 16:53

## 2018-07-09 RX ADMIN — SERTRALINE HYDROCHLORIDE 50 MG: 50 TABLET ORAL at 16:51

## 2018-07-09 RX ADMIN — HYDROMORPHONE HYDROCHLORIDE 1 MG: 1 INJECTION, SOLUTION INTRAMUSCULAR; INTRAVENOUS; SUBCUTANEOUS at 10:30

## 2018-07-09 RX ADMIN — ONDANSETRON 4 MG: 2 INJECTION INTRAMUSCULAR; INTRAVENOUS at 10:30

## 2018-07-09 RX ADMIN — METRONIDAZOLE 500 MG: 500 INJECTION, SOLUTION INTRAVENOUS at 11:52

## 2018-07-09 RX ADMIN — ASPIRIN 81 MG: 81 TABLET, COATED ORAL at 16:51

## 2018-07-09 RX ADMIN — LEVOFLOXACIN 500 MG: 5 INJECTION, SOLUTION INTRAVENOUS at 13:18

## 2018-07-09 RX ADMIN — KETOROLAC TROMETHAMINE 15 MG: 15 INJECTION, SOLUTION INTRAMUSCULAR; INTRAVENOUS at 20:40

## 2018-07-09 RX ADMIN — HYDROMORPHONE HYDROCHLORIDE 1 MG: 1 INJECTION, SOLUTION INTRAMUSCULAR; INTRAVENOUS; SUBCUTANEOUS at 20:24

## 2018-07-09 NOTE — NURSING NOTE
Patient tolerating small amount of clear liquid diet. Patient has had at least 3 episodes of diarrhea. Patient reported that the frequency has reduced since this weekend. Patient refusing bed alarm and chair alarm. Patient ambulating around the unit. Potassium was replaced today. Patient's  at the bedside. Paged Dr. Vann to request PO tylenol and patient's home medications to be ordered.

## 2018-07-09 NOTE — PLAN OF CARE
Problem: Patient Care Overview  Goal: Discharge Needs Assessment  Outcome: Ongoing (interventions implemented as appropriate)   07/09/18 1312   DC Needs Assessment   Concerns To Be Addressed denies needs/concerns at this time   Readmission Within The Last 30 Days no previous admission in last 30 days   Anticipated Discharge Disposition home without services   Equipment Needed After Discharge none   Current Health   Anticipated Changes Related to Illness none   Activity/Self Care ROS   Equipment Currently Used at Home none   Reviewed chart, pt was seen in ED and spoke to ED CC RN and denied any dc needs at this time.

## 2018-07-09 NOTE — PLAN OF CARE
Problem: Patient Care Overview  Goal: Plan of Care Review  Outcome: Ongoing (interventions implemented as appropriate)   07/09/18 2194   Coping/Psychosocial   Plan Of Care Reviewed With patient   Plan of Care Review   Progress progress towards functional goals is fair   Outcome Summary reviewed pain medication and new clear liquid diet with patient, patient ambulating around the unit, tolerating small amounts of clear liquid diet. no vomiting and only slight nausea this morning which was resolved with zofran

## 2018-07-09 NOTE — PLAN OF CARE
Problem: Pain, Acute (Adult)  Goal: Acceptable Pain Control/Comfort Level  Outcome: Ongoing (interventions implemented as appropriate)   07/09/18 0314   Pain, Acute (Adult)   Acceptable Pain Control/Comfort Level making progress toward outcome

## 2018-07-09 NOTE — PROGRESS NOTES
"General Surgery Daily Progress Note    Subjective: Pt seen and examined.  Patient feels much better.  No nausea or vomiting.  Abdominal pain is gone.    Vital signs in last 24 hours:  Temp:  [37.1 °C (98.8 °F)-37.5 °C (99.5 °F)] 37.3 °C (99.2 °F)  Heart Rate:  [79-86] 79  Resp:  [16-17] 17  BP: (131-160)/(62-78) 132/62    Intake/Output this shift:  No intake or output data in the 24 hours ending 07/09/18 0715    Physical Exam    General appearance: alert, appears stated age and cooperative    Abdomen: soft, minimal bilateral lower quadrant tenderness to palpation; bowel sounds normal; no masses, no organomegaly\"      Labs    Results from last 7 days  Lab Units 07/09/18  0602   SODIUM mmol/L 137   POTASSIUM mmol/L 3.5*   CHLORIDE mmol/L 107   CO2 mmol/L 24   BUN mg/dL 20   CREATININE mg/dL 0.7   GLUCOSE mg/dL 99   CALCIUM mg/dL 8.4*       Results from last 7 days  Lab Units 07/09/18  0602   WBC K/uL 12.16*   HEMOGLOBIN g/dL 9.9*   HEMATOCRIT % 29.9*   PLATELETS K/uL 260           Results from last 7 days  Lab Units 07/09/18  0602  07/07/18  0722   SODIUM mmol/L 137  < > 135*   POTASSIUM mmol/L 3.5*  < > 3.0*   CHLORIDE mmol/L 107  < > 102   CO2 mmol/L 24  < > 24   BUN mg/dL 20  < > 24*   CREATININE mg/dL 0.7  < > 0.9   CALCIUM mg/dL 8.4*  < > 9.4   ALBUMIN g/dL  --   --  4.2   BILIRUBIN TOTAL mg/dL  --   --  1.3*   ALK PHOS IU/L  --   --  50   ALT IU/L  --   --  21   AST IU/L  --   --  26   GLUCOSE mg/dL 99  < > 157*   < > = values in this interval not displayed.    Results from last 7 days  Lab Units 07/09/18  0602   HEMOGLOBIN g/dL 9.9*   HEMATOCRIT % 29.9*       0  Lab Value Date/Time   LIPASE 26 07/07/2018 0722     Pathology Results     ** No results found for the last 720 hours. **              Imaging  Ct Abdomen Pelvis With Iv Contrast    Result Date: 7/7/2018  IMPRESSION: Findings most concerning for acute significant sigmoid diverticulitis with complex probably loculated fluid along the right hemipelvis, " which may represent an early developing abscess measuring up to 3.5 x 1.2 cm..  There is a adjacent inflammation in these small bowel loops probably secondary to sigmoid inflammation.  Primary small bowel or right adnexal pathology is felt less likely the etiology of inflammation.. There is secondary to diffuse small bowel and colonic ileus.        Assessment   Diverticulitis with abscess    Plan   Continue intravenous antibiotics, clear liquid diet, ambulation, incentive spirometry, likely repeat CAT scan tomorrow        Dwayne Vann, DO

## 2018-07-09 NOTE — NURSING NOTE
Paged Dr. Vann and requested order for PO potassium replacement since patient's morning lab showed K+ is 3.5.

## 2018-07-10 ENCOUNTER — APPOINTMENT (INPATIENT)
Dept: RADIOLOGY | Facility: HOSPITAL | Age: 68
DRG: 392 | End: 2018-07-10
Attending: SURGERY
Payer: COMMERCIAL

## 2018-07-10 LAB
INR PPP: 1.2 INR
PROTHROMBIN TIME: 14.8 SEC. (ref 12.2–14.5)

## 2018-07-10 PROCEDURE — 25500000 HC DRUGS/INCIDENT RAD: Performed by: SURGERY

## 2018-07-10 PROCEDURE — 36100360 CT GUIDED ABSCESS DRAIN

## 2018-07-10 PROCEDURE — 25000000 HC PHARMACY GENERAL: Performed by: SURGERY

## 2018-07-10 PROCEDURE — 63600000 HC DRUGS/DETAIL CODE: Performed by: RADIOLOGY

## 2018-07-10 PROCEDURE — 63700000 HC SELF-ADMINISTRABLE DRUG: Performed by: SURGERY

## 2018-07-10 PROCEDURE — 87106 FUNGI IDENTIFICATION YEAST: CPT | Performed by: SURGERY

## 2018-07-10 PROCEDURE — 36415 COLL VENOUS BLD VENIPUNCTURE: CPT | Performed by: SURGERY

## 2018-07-10 PROCEDURE — 27200000 IR TECHNICAL ASSISTANCE

## 2018-07-10 PROCEDURE — 63600000 HC DRUGS/DETAIL CODE: Performed by: SURGERY

## 2018-07-10 PROCEDURE — 63600105 HC IODINE BASED CONTRAST: Performed by: SURGERY

## 2018-07-10 PROCEDURE — 63700000 HC SELF-ADMINISTRABLE DRUG: Performed by: PHYSICIAN ASSISTANT

## 2018-07-10 PROCEDURE — 74177 CT ABD & PELVIS W/CONTRAST: CPT

## 2018-07-10 PROCEDURE — 87206 SMEAR FLUORESCENT/ACID STAI: CPT | Performed by: SURGERY

## 2018-07-10 PROCEDURE — 0J9C30Z DRAINAGE OF PELVIC REGION SUBCUTANEOUS TISSUE AND FASCIA WITH DRAINAGE DEVICE, PERCUTANEOUS APPROACH: ICD-10-PCS | Performed by: RADIOLOGY

## 2018-07-10 PROCEDURE — 87075 CULTR BACTERIA EXCEPT BLOOD: CPT | Performed by: SURGERY

## 2018-07-10 PROCEDURE — 85610 PROTHROMBIN TIME: CPT | Performed by: SURGERY

## 2018-07-10 PROCEDURE — 12000000 HC ROOM AND CARE MED/SURG

## 2018-07-10 PROCEDURE — 87015 SPECIMEN INFECT AGNT CONCNTJ: CPT | Performed by: SURGERY

## 2018-07-10 RX ORDER — FENTANYL CITRATE 50 UG/ML
INJECTION, SOLUTION INTRAMUSCULAR; INTRAVENOUS
Status: COMPLETED | OUTPATIENT
Start: 2018-07-10 | End: 2018-07-10

## 2018-07-10 RX ORDER — LEVOFLOXACIN 5 MG/ML
500 INJECTION, SOLUTION INTRAVENOUS
Status: DISCONTINUED | OUTPATIENT
Start: 2018-07-10 | End: 2018-07-10

## 2018-07-10 RX ORDER — METRONIDAZOLE 500 MG/100ML
500 INJECTION, SOLUTION INTRAVENOUS
Status: DISCONTINUED | OUTPATIENT
Start: 2018-07-10 | End: 2018-07-18 | Stop reason: HOSPADM

## 2018-07-10 RX ORDER — LOPERAMIDE HYDROCHLORIDE 2 MG/1
2 CAPSULE ORAL AS NEEDED
Status: DISCONTINUED | OUTPATIENT
Start: 2018-07-10 | End: 2018-07-18 | Stop reason: HOSPADM

## 2018-07-10 RX ADMIN — VALSARTAN 160 MG: 160 TABLET, FILM COATED ORAL at 10:49

## 2018-07-10 RX ADMIN — PANTOPRAZOLE SODIUM 40 MG: 40 TABLET, DELAYED RELEASE ORAL at 10:50

## 2018-07-10 RX ADMIN — HYDROMORPHONE HYDROCHLORIDE 1 MG: 1 INJECTION, SOLUTION INTRAMUSCULAR; INTRAVENOUS; SUBCUTANEOUS at 08:56

## 2018-07-10 RX ADMIN — KETOROLAC TROMETHAMINE 15 MG: 15 INJECTION, SOLUTION INTRAMUSCULAR; INTRAVENOUS at 21:12

## 2018-07-10 RX ADMIN — MULTIPLE VITAMINS W/ MINERALS TAB 1 TABLET: TAB at 10:49

## 2018-07-10 RX ADMIN — IOHEXOL 125 ML: 300 INJECTION, SOLUTION INTRAVENOUS at 09:28

## 2018-07-10 RX ADMIN — KETOROLAC TROMETHAMINE 15 MG: 15 INJECTION, SOLUTION INTRAMUSCULAR; INTRAVENOUS at 04:18

## 2018-07-10 RX ADMIN — FENTANYL CITRATE 50 MCG: 50 INJECTION INTRAMUSCULAR; INTRAVENOUS at 15:28

## 2018-07-10 RX ADMIN — SERTRALINE HYDROCHLORIDE 50 MG: 50 TABLET ORAL at 10:49

## 2018-07-10 RX ADMIN — HYDROMORPHONE HYDROCHLORIDE 1 MG: 1 INJECTION, SOLUTION INTRAMUSCULAR; INTRAVENOUS; SUBCUTANEOUS at 12:17

## 2018-07-10 RX ADMIN — ONDANSETRON 4 MG: 2 INJECTION INTRAMUSCULAR; INTRAVENOUS at 08:54

## 2018-07-10 RX ADMIN — FENOFIBRATE 67 MG: 67 CAPSULE ORAL at 10:49

## 2018-07-10 RX ADMIN — ROSUVASTATIN CALCIUM 20 MG: 20 TABLET, FILM COATED ORAL at 10:49

## 2018-07-10 RX ADMIN — ONDANSETRON 4 MG: 2 INJECTION INTRAMUSCULAR; INTRAVENOUS at 17:21

## 2018-07-10 RX ADMIN — HYDROMORPHONE HYDROCHLORIDE 1 MG: 1 INJECTION, SOLUTION INTRAMUSCULAR; INTRAVENOUS; SUBCUTANEOUS at 00:27

## 2018-07-10 RX ADMIN — CEFTRIAXONE SODIUM 1 G: 1 INJECTION, POWDER, FOR SOLUTION INTRAVENOUS at 17:17

## 2018-07-10 RX ADMIN — HYDROMORPHONE HYDROCHLORIDE 1 MG: 1 INJECTION, SOLUTION INTRAMUSCULAR; INTRAVENOUS; SUBCUTANEOUS at 22:47

## 2018-07-10 RX ADMIN — SODIUM CHLORIDE, POTASSIUM CHLORIDE, SODIUM LACTATE AND CALCIUM CHLORIDE: 600; 310; 30; 20 INJECTION, SOLUTION INTRAVENOUS at 21:04

## 2018-07-10 RX ADMIN — HYDROMORPHONE HYDROCHLORIDE 1 MG: 1 INJECTION, SOLUTION INTRAMUSCULAR; INTRAVENOUS; SUBCUTANEOUS at 17:22

## 2018-07-10 RX ADMIN — METRONIDAZOLE 500 MG: 500 INJECTION, SOLUTION INTRAVENOUS at 21:04

## 2018-07-10 RX ADMIN — ASPIRIN 81 MG: 81 TABLET, COATED ORAL at 10:48

## 2018-07-10 RX ADMIN — BARIUM SULFATE 900 ML: 20 SUSPENSION ORAL at 06:57

## 2018-07-10 RX ADMIN — HYDROMORPHONE HYDROCHLORIDE 1 MG: 1 INJECTION, SOLUTION INTRAMUSCULAR; INTRAVENOUS; SUBCUTANEOUS at 04:18

## 2018-07-10 RX ADMIN — METRONIDAZOLE 500 MG: 500 INJECTION, SOLUTION INTRAVENOUS at 13:31

## 2018-07-10 RX ADMIN — FENTANYL CITRATE 50 MCG: 50 INJECTION INTRAMUSCULAR; INTRAVENOUS at 15:40

## 2018-07-10 RX ADMIN — METRONIDAZOLE 500 MG: 500 TABLET ORAL at 06:57

## 2018-07-10 RX ADMIN — ONDANSETRON 4 MG: 2 INJECTION INTRAMUSCULAR; INTRAVENOUS at 12:17

## 2018-07-10 RX ADMIN — ACETAMINOPHEN 650 MG: 325 TABLET, FILM COATED ORAL at 17:18

## 2018-07-10 NOTE — PROGRESS NOTES
"General Surgery Daily Progress Note    Subjective: Pt seen and examined.  Feels worse w/ more abd pain, cramping, diarrhea, N, and dry heaves.    Vital signs in last 24 hours:  Temp:  [36.4 °C (97.5 °F)-37.8 °C (100 °F)] 36.8 °C (98.3 °F)  Heart Rate:  [68-79] 68  Resp:  [18] 18  BP: (135-167)/(60-78) 152/77    Intake/Output this shift:  No intake or output data in the 24 hours ending 07/10/18 1233    Physical Exam    General appearance: alert, appears stated age and cooperative    Abdomen: soft, non-tender; bowel sounds normal; no masses, no organomegaly\"      Labs    Results from last 7 days  Lab Units 07/09/18  0602   SODIUM mmol/L 137   POTASSIUM mmol/L 3.5*   CHLORIDE mmol/L 107   CO2 mmol/L 24   BUN mg/dL 20   CREATININE mg/dL 0.7   GLUCOSE mg/dL 99   CALCIUM mg/dL 8.4*       Results from last 7 days  Lab Units 07/09/18  0602   WBC K/uL 12.16*   HEMOGLOBIN g/dL 9.9*   HEMATOCRIT % 29.9*   PLATELETS K/uL 260           Results from last 7 days  Lab Units 07/09/18  0602  07/07/18  0722   SODIUM mmol/L 137  < > 135*   POTASSIUM mmol/L 3.5*  < > 3.0*   CHLORIDE mmol/L 107  < > 102   CO2 mmol/L 24  < > 24   BUN mg/dL 20  < > 24*   CREATININE mg/dL 0.7  < > 0.9   CALCIUM mg/dL 8.4*  < > 9.4   ALBUMIN g/dL  --   --  4.2   BILIRUBIN TOTAL mg/dL  --   --  1.3*   ALK PHOS IU/L  --   --  50   ALT IU/L  --   --  21   AST IU/L  --   --  26   GLUCOSE mg/dL 99  < > 157*   < > = values in this interval not displayed.    Results from last 7 days  Lab Units 07/09/18  0602   HEMOGLOBIN g/dL 9.9*   HEMATOCRIT % 29.9*       0  Lab Value Date/Time   LIPASE 26 07/07/2018 0722     Pathology Results     ** No results found for the last 720 hours. **              Imaging  Ct Abdomen Pelvis With Iv Contrast    Result Date: 7/10/2018  IMPRESSION: 1.  Previously identified sigmoid diverticulitis appears worse, now with a probable 4.5 cm early abscess between a loop of ileum and the sigmoid colon. 2.  Chronic gastro-gastric intussusception " in a moderate size hiatal hernia. This intussusception is similar compared with the 7/7/2018 study, but improved compared with the 8/3/2017 calcium scoring study. COMMENT: Transaxial CAT scan imaging of the abdomen and pelvis was performed following administration of oral contrast and during intravenous infusion of 125 cc of Omnipaque 350. No adverse sequela were reported. CT DOSE:  One or more dose reduction techniques (e.g. automated exposure control, adjustment of the mA and/or kV according to patient size, use of iterative reconstruction technique) utilized for this examination. As on the 7/7/2018 study, there are severe inflammatory changes around the distal sigmoid colon consistent most likely due to diverticulitis.  The inflammatory changes are worse than on the previous exam.  There are now a few small contained perforations as well as a presumed developing abscess between a loop of ileum and the sigmoid colon measuring approximately 4 x 4 by 4.5 cm and containing an air-fluid level.  There may be a second tiny early abscess just posterior to the larger abscess.  Secondary enteritis is seen in a few loops of ileum in the low pelvis.  There is no free intraperitoneal air. A moderate size hiatal hernia is present.  There is a chronic gastro-gastric intussusception within the herniated stomach.  This measures approximately 2.5 x 4 cm and is similar in size compared with the 7/7/2018 CAT scan study.  This is improved in size compared with the 8/3/2017 coronary artery calcium scoring study when this measured 4 x 4 0.5 cm.  The liver is possibly mildly fatty infiltrated.  The spleen contains scattered calcified granulomas.  The pancreas is somewhat atrophic.  The kidneys and adrenal glands appear normal. Cholecystectomy has been performed.  A small left pleural effusion is new compared with 7/7/2018 CAT scan.    Ct Abdomen Pelvis With Iv Contrast    Result Date: 7/7/2018  IMPRESSION: Findings most concerning for  acute significant sigmoid diverticulitis with complex probably loculated fluid along the right hemipelvis, which may represent an early developing abscess measuring up to 3.5 x 1.2 cm..  There is a adjacent inflammation in these small bowel loops probably secondary to sigmoid inflammation.  Primary small bowel or right adnexal pathology is felt less likely the etiology of inflammation.. There is secondary to diffuse small bowel and colonic ileus.    CT 10 Jul: worsened sigmoid inflammation and abscess now 4.5 cm      Assessment   Diverticulitis w/ abscess-worsened    Plan   IR consult for drng, back to IV abx, recheck labs AM        Dwayne Vann, DO

## 2018-07-10 NOTE — POST-PROCEDURE NOTE
Interventional Radiology Brief Postprocedure Note    Reyna Castillo     Attending: Javier    Assistant:      Diagnosis: Pelvic diverticular abscess    Description of procedure: CT guided percutaneous drainage of pelvic abscess    Contrast:       Anesthesia:  Local    Medications: Fentanyl 100 mcg IV     Complications: None      Estimated Blood Loss: Estimated Blood Loss: 0-10 ml    Anticoagulation:      Specimens: 15 cc purulent sanguineous aspirate    Findings: Successful CT guided 10 F percutaneous drainage of right pelvic abscess via right transgluteal approach. 15 cc purulent sanguineous aspirate obtained and submitted for C&S.     7/10/2018 4:06 PM

## 2018-07-11 LAB
ANION GAP SERPL CALC-SCNC: 8 MEQ/L (ref 3–15)
BUN SERPL-MCNC: 15 MG/DL (ref 8–20)
CALCIUM SERPL-MCNC: 7.9 MG/DL (ref 8.9–10.3)
CHLORIDE SERPL-SCNC: 100 MMOL/L (ref 98–109)
CO2 SERPL-SCNC: 24 MMOL/L (ref 22–32)
CREAT SERPL-MCNC: 0.6 MG/DL (ref 0.6–1.1)
ERYTHROCYTE [DISTWIDTH] IN BLOOD BY AUTOMATED COUNT: 13.9 % (ref 11.7–14.4)
FUNGUS STAIN: NORMAL
GFR SERPL CREATININE-BSD FRML MDRD: >60 ML/MIN/1.73M*2
GLUCOSE SERPL-MCNC: 109 MG/DL (ref 70–99)
HCT VFR BLDCO AUTO: 28.7 % (ref 35–45)
HGB BLD-MCNC: 9.8 G/DL (ref 11.8–15.7)
MAGNESIUM SERPL-MCNC: 1.6 MG/DL (ref 1.8–2.5)
MCH RBC QN AUTO: 29.8 PG (ref 28–33.2)
MCHC RBC AUTO-ENTMCNC: 34.1 G/DL (ref 32.2–35.5)
MCV RBC AUTO: 87.2 FL (ref 83–98)
PDW BLD AUTO: 10.6 FL (ref 9.4–12.3)
PHOSPHATE SERPL-MCNC: 3.8 MG/DL (ref 2.4–4.7)
PLATELET # BLD AUTO: 374 K/UL (ref 150–369)
POTASSIUM SERPL-SCNC: 3.5 MMOL/L (ref 3.6–5.1)
RBC # BLD AUTO: 3.29 M/UL (ref 3.93–5.22)
RHODAMINE-AURAMINE STN SPEC: NORMAL
SODIUM SERPL-SCNC: 132 MMOL/L (ref 136–144)
WBC # BLD AUTO: 9.54 K/UL (ref 3.8–10.5)

## 2018-07-11 PROCEDURE — 36415 COLL VENOUS BLD VENIPUNCTURE: CPT | Performed by: SURGERY

## 2018-07-11 PROCEDURE — 63600000 HC DRUGS/DETAIL CODE: Performed by: SURGERY

## 2018-07-11 PROCEDURE — 84100 ASSAY OF PHOSPHORUS: CPT | Performed by: SURGERY

## 2018-07-11 PROCEDURE — 25000000 HC PHARMACY GENERAL: Performed by: SURGERY

## 2018-07-11 PROCEDURE — 63600000 HC DRUGS/DETAIL CODE

## 2018-07-11 PROCEDURE — 83735 ASSAY OF MAGNESIUM: CPT | Performed by: SURGERY

## 2018-07-11 PROCEDURE — 63700000 HC SELF-ADMINISTRABLE DRUG: Performed by: PHYSICIAN ASSISTANT

## 2018-07-11 PROCEDURE — 12000000 HC ROOM AND CARE MED/SURG

## 2018-07-11 PROCEDURE — 63700000 HC SELF-ADMINISTRABLE DRUG: Performed by: SURGERY

## 2018-07-11 PROCEDURE — 85027 COMPLETE CBC AUTOMATED: CPT | Performed by: SURGERY

## 2018-07-11 PROCEDURE — 80048 BASIC METABOLIC PNL TOTAL CA: CPT | Performed by: SURGERY

## 2018-07-11 RX ORDER — KETOROLAC TROMETHAMINE 15 MG/ML
INJECTION, SOLUTION INTRAMUSCULAR; INTRAVENOUS
Status: COMPLETED
Start: 2018-07-11 | End: 2018-07-11

## 2018-07-11 RX ORDER — POTASSIUM CHLORIDE 750 MG/1
40 TABLET, FILM COATED, EXTENDED RELEASE ORAL ONCE
Status: COMPLETED | OUTPATIENT
Start: 2018-07-11 | End: 2018-07-11

## 2018-07-11 RX ORDER — POTASSIUM CHLORIDE 750 MG/1
20 TABLET, FILM COATED, EXTENDED RELEASE ORAL DAILY
Status: DISCONTINUED | OUTPATIENT
Start: 2018-07-11 | End: 2018-07-12

## 2018-07-11 RX ADMIN — VALSARTAN 160 MG: 160 TABLET, FILM COATED ORAL at 09:22

## 2018-07-11 RX ADMIN — HYDROMORPHONE HYDROCHLORIDE 1 MG: 1 INJECTION, SOLUTION INTRAMUSCULAR; INTRAVENOUS; SUBCUTANEOUS at 13:54

## 2018-07-11 RX ADMIN — CEFTRIAXONE SODIUM 1 G: 1 INJECTION, POWDER, FOR SOLUTION INTRAVENOUS at 15:49

## 2018-07-11 RX ADMIN — PANTOPRAZOLE SODIUM 40 MG: 40 TABLET, DELAYED RELEASE ORAL at 09:21

## 2018-07-11 RX ADMIN — HYDROMORPHONE HYDROCHLORIDE 1 MG: 1 INJECTION, SOLUTION INTRAMUSCULAR; INTRAVENOUS; SUBCUTANEOUS at 18:09

## 2018-07-11 RX ADMIN — METRONIDAZOLE 500 MG: 500 INJECTION, SOLUTION INTRAVENOUS at 21:25

## 2018-07-11 RX ADMIN — ASPIRIN 81 MG: 81 TABLET, COATED ORAL at 09:21

## 2018-07-11 RX ADMIN — KETOROLAC TROMETHAMINE 15 MG: 15 INJECTION, SOLUTION INTRAMUSCULAR; INTRAVENOUS at 18:58

## 2018-07-11 RX ADMIN — FENOFIBRATE 67 MG: 67 CAPSULE ORAL at 11:24

## 2018-07-11 RX ADMIN — MULTIPLE VITAMINS W/ MINERALS TAB 1 TABLET: TAB at 09:21

## 2018-07-11 RX ADMIN — HYDROMORPHONE HYDROCHLORIDE 1 MG: 1 INJECTION, SOLUTION INTRAMUSCULAR; INTRAVENOUS; SUBCUTANEOUS at 03:35

## 2018-07-11 RX ADMIN — METRONIDAZOLE 500 MG: 500 INJECTION, SOLUTION INTRAVENOUS at 05:13

## 2018-07-11 RX ADMIN — SERTRALINE HYDROCHLORIDE 50 MG: 50 TABLET ORAL at 09:22

## 2018-07-11 RX ADMIN — ROSUVASTATIN CALCIUM 20 MG: 20 TABLET, FILM COATED ORAL at 11:24

## 2018-07-11 RX ADMIN — SODIUM CHLORIDE, POTASSIUM CHLORIDE, SODIUM LACTATE AND CALCIUM CHLORIDE: 600; 310; 30; 20 INJECTION, SOLUTION INTRAVENOUS at 03:34

## 2018-07-11 RX ADMIN — METRONIDAZOLE 500 MG: 500 INJECTION, SOLUTION INTRAVENOUS at 13:54

## 2018-07-11 RX ADMIN — POTASSIUM CHLORIDE 40 MEQ: 750 TABLET, EXTENDED RELEASE ORAL at 09:22

## 2018-07-11 RX ADMIN — POTASSIUM CHLORIDE 20 MEQ: 750 TABLET, EXTENDED RELEASE ORAL at 09:21

## 2018-07-11 RX ADMIN — HYDROMORPHONE HYDROCHLORIDE 1 MG: 1 INJECTION, SOLUTION INTRAMUSCULAR; INTRAVENOUS; SUBCUTANEOUS at 09:24

## 2018-07-11 NOTE — PLAN OF CARE
Problem: Patient Care Overview  Goal: Plan of Care Review  Outcome: Ongoing (interventions implemented as appropriate)   07/11/18 0032   Coping/Psychosocial   Plan Of Care Reviewed With patient   Plan of Care Review   Progress improving   Outcome Summary pain to abd/groin and back with dilaudid and toradol.percutaneous drain draining without difficulty.

## 2018-07-11 NOTE — PROGRESS NOTES
7/11/20189:52 AM reviewed chart, pt with percutaneous drain, pt currently on clear liquid diet, slow to advance, sw will continue to follow for dc planning needs. Sury Waller LSW [id number]

## 2018-07-11 NOTE — PLAN OF CARE
Problem: Patient Care Overview  Goal: Plan of Care Review  Outcome: Ongoing (interventions implemented as appropriate)   07/10/18 9494   Coping/Psychosocial   Plan Of Care Reviewed With patient   Plan of Care Review   Progress progress towards functional goals is fair   Outcome Summary reviewed plan for pain medication and zofran, patient tolerating clears for dinner

## 2018-07-11 NOTE — PROGRESS NOTES
"General Surgery Daily Progress Note    Subjective: Pt seen and examined.  Feels better than yesterday.  Still with some abdominal pain.  Still having some urgency with bowel movements but they are more solid.  Minimal nausea, taking few liquids    Vital signs in last 24 hours:  Temp:  [36.8 °C (98.2 °F)-38.1 °C (100.6 °F)] 36.8 °C (98.2 °F)  Heart Rate:  [] 91  Resp:  [16-18] 17  BP: (110-166)/(50-74) 128/50    Intake/Output this shift:    Intake/Output Summary (Last 24 hours) at 07/11/18 0900  Last data filed at 07/11/18 0539   Gross per 24 hour   Intake                0 ml   Output               80 ml   Net              -80 ml       Physical Exam    General appearance: alert, appears stated age and cooperative    Abdomen: soft, minimal left lower quadrant tenderness to palpation; bowel sounds normal; no masses, no organomegaly\"      Labs    Results from last 7 days  Lab Units 07/11/18  0335   SODIUM mmol/L 132*   POTASSIUM mmol/L 3.5*   CHLORIDE mmol/L 100   CO2 mmol/L 24   BUN mg/dL 15   CREATININE mg/dL 0.6   GLUCOSE mg/dL 109*   CALCIUM mg/dL 7.9*       Results from last 7 days  Lab Units 07/11/18  0335   WBC K/uL 9.54   HEMOGLOBIN g/dL 9.8*   HEMATOCRIT % 28.7*   PLATELETS K/uL 374*       Results from last 7 days  Lab Units 07/10/18  1356   INR INR 1.2       Results from last 7 days  Lab Units 07/11/18  0335  07/07/18  0722   SODIUM mmol/L 132*  < > 135*   POTASSIUM mmol/L 3.5*  < > 3.0*   CHLORIDE mmol/L 100  < > 102   CO2 mmol/L 24  < > 24   BUN mg/dL 15  < > 24*   CREATININE mg/dL 0.6  < > 0.9   CALCIUM mg/dL 7.9*  < > 9.4   ALBUMIN g/dL  --   --  4.2   BILIRUBIN TOTAL mg/dL  --   --  1.3*   ALK PHOS IU/L  --   --  50   ALT IU/L  --   --  21   AST IU/L  --   --  26   GLUCOSE mg/dL 109*  < > 157*   < > = values in this interval not displayed.    Results from last 7 days  Lab Units 07/11/18  0335   HEMOGLOBIN g/dL 9.8*   HEMATOCRIT % 28.7*       0  Lab Value Date/Time   LIPASE 26 07/07/2018 0722 "     Pathology Results     ** No results found for the last 720 hours. **              Imaging  Ct Guided Abscess Drain    Result Date: 7/10/2018  IMPRESSION:   Successful CT-guided 10-Macanese percutaneous drainage of a right pelvic abscess.  15 cc of purulent sanguinous aspirate was obtained.  Specimen was submitted for culture and sensitivity. COMMENT: PROCEDURE:  CT-guided percutaneous drainage of a pelvic abscess. RADIOLOGIST: Artis Herrera MD ANESTHESIA:  Local 1% lidocaine. CONTRAST:   None MEDICATIONS:  Fentanyl 100 mcg IV DESCRIPTION OF PROCEDURE: CT DOSE:  One or more dose reduction techniques (e.g. automated exposure control, adjustment of the mA and/or kV according to patient size, use of iterative reconstruction technique) utilized for this examination. Informed consent was obtained following explanation of risks and benefits of the procedure.  The patient was placed prone on the CT procedure table.  Noncontrast CT evaluation of the pelvis was performed.  A complex fluid collection was noted along the right hemipelvis, along the right pelvic sidewall, similar to recent CT.  However, the thickness of the collection within the lower pelvis was relatively decreased in prone position, as opposed to previous CT in supine position, likely related to fluid redistribution.  A right transgluteal approach was localized and marked.  The area was prepped and draped in the usual sterile fashion.  Anesthesia was provided with 1% lidocaine.  Under CT guidance, a 5-Macanese Yueh needle/catheter was advanced into the collection.  Purulent fluid was aspirated.  An 035 Amplatz wire was advanced and coiled within the complex collection.  Sequential tract dilatation was performed.  A 10-Macanese Resolve percutaneous drainage catheter was then successfully advanced, coiled, and secured within the collection.  15 cc of purulent sanguinous aspirate was obtained.  Specimen was submitted for culture and sensitivity.  The catheter was  secured with a StatLock device and placed to gravity drainage.  Dry sterile dressing was applied.  The patient tolerated the procedure well.     Ct Abdomen Pelvis With Iv Contrast    Result Date: 7/10/2018  IMPRESSION: 1.  Previously identified sigmoid diverticulitis appears worse, now with a probable 4.5 cm early abscess between a loop of ileum and the sigmoid colon. 2.  Chronic gastro-gastric intussusception in a moderate size hiatal hernia. This intussusception is similar compared with the 7/7/2018 study, but improved compared with the 8/3/2017 calcium scoring study. COMMENT: Transaxial CAT scan imaging of the abdomen and pelvis was performed following administration of oral contrast and during intravenous infusion of 125 cc of Omnipaque 350. No adverse sequela were reported. CT DOSE:  One or more dose reduction techniques (e.g. automated exposure control, adjustment of the mA and/or kV according to patient size, use of iterative reconstruction technique) utilized for this examination. As on the 7/7/2018 study, there are severe inflammatory changes around the distal sigmoid colon consistent most likely due to diverticulitis.  The inflammatory changes are worse than on the previous exam.  There are now a few small contained perforations as well as a presumed developing abscess between a loop of ileum and the sigmoid colon measuring approximately 4 x 4 by 4.5 cm and containing an air-fluid level.  There may be a second tiny early abscess just posterior to the larger abscess.  Secondary enteritis is seen in a few loops of ileum in the low pelvis.  There is no free intraperitoneal air. A moderate size hiatal hernia is present.  There is a chronic gastro-gastric intussusception within the herniated stomach.  This measures approximately 2.5 x 4 cm and is similar in size compared with the 7/7/2018 CAT scan study.  This is improved in size compared with the 8/3/2017 coronary artery calcium scoring study when this  measured 4 x 4 0.5 cm.  The liver is possibly mildly fatty infiltrated.  The spleen contains scattered calcified granulomas.  The pancreas is somewhat atrophic.  The kidneys and adrenal glands appear normal. Cholecystectomy has been performed.  A small left pleural effusion is new compared with 7/7/2018 CAT scan.    Ct Abdomen Pelvis With Iv Contrast    Result Date: 7/7/2018  IMPRESSION: Findings most concerning for acute significant sigmoid diverticulitis with complex probably loculated fluid along the right hemipelvis, which may represent an early developing abscess measuring up to 3.5 x 1.2 cm..  There is a adjacent inflammation in these small bowel loops probably secondary to sigmoid inflammation.  Primary small bowel or right adnexal pathology is felt less likely the etiology of inflammation.. There is secondary to diffuse small bowel and colonic ileus.        Assessment   Diverticulitis with abscess status post percutaneous drain    Plan   Continue drain, continue ceftriaxone and Flagyl, out of bed to chair, incentive spirometry, ambulation.  Hopefully can advance diet to low residue tomorrow.        Dwayne Vann, DO

## 2018-07-11 NOTE — CONSULTS
Nutrition Brief Assessment    Clinical Course: Patient is a 68 y.o. female who was admitted on 7/7/2018 with a diagnosis of Diverticulitis of large intestine with abscess without bleeding [K57.20].     Past Medical History:   Diagnosis Date   • Lopez's esophagus    • Diverticulitis    • Hypertension    • Knee pain, bilateral    • Left ankle pain    • Tran-Juan tear    • Pleural effusion      Past Surgical History:   Procedure Laterality Date   • CHOLECYSTECTOMY     • KNEE ARTHROSCOPY     • TRANSUMBILICAL AUGMENTATION MAMMAPLASTY               Adult Nutrition Assessment - 07/11/18 1200        General Information    Reason for Consult RD screening   extended npo/clears       Nutrition/Diet History    Patient Reported Diet general   healthier choices pta    Appetite Prior to Admission Good-50-75%    Intake (%) 50%    Food Preferences --   NKFA    Cultural/Baptism Preferences None    Factors Affecting Nutritional Intake abdominal pain   diarrhea       Physical Appearance    Overall Physical Appearance obese    Gastrointestinal diarrhea   decreasing    Tubes --   percutaeous drain    Oral/Mouth Cavity --   WDL    Skin --   WDL       Nutrition Order Review    Nutrition Order Review does not meet nutritional requirements       Usual Body Weight (UBW)    Usual Body Weight --   218 lbs       Body Mass Index (BMI)    BMI Assessment BMI greater than 40: obesity grade III    BMI/Nutritional Status Morbid Obesity (BMI 40-44.9)       Lab Results    Lab Results Reviewed reviewed       Pertinent Medications    Pertinent Medications Reviewed reviewed          CMP Results       07/11/18 07/09/18 07/08/18                    0335 0602 0228          (L) 137 137         K 3.5 (L) 3.5 (L) 3.3 (L)         Cl 100 107 106         CO2 24 24 21 (L)         Glucose 109 (H) 99 122 (H)         BUN 15 20 18         Creatinine 0.6 0.7 0.7         Calcium 7.9 (L) 8.4 (L) 8.8 (L)         Anion Gap 8 6 10         EGFR &gt;60.0 &gt;60.0  &gt;60.0                     Lab Results   Component Value Date    ALT 21 07/07/2018    AST 26 07/07/2018    ALKPHOS 50 07/07/2018    BILITOT 1.3 (H) 07/07/2018     No results found for: [id number]  Lab Results   Component Value Date    WBC 9.54 07/11/2018    HGB 9.8 (L) 07/11/2018    HCT 28.7 (L) 07/11/2018    MCV 87.2 07/11/2018     (H) 07/11/2018     No results found for: CHOL  No results found for: HDL  No results found for: LDLCALC  No results found for: TRIG  No results found for: CHOLHDL  Lab Results   Component Value Date    CALCIUM 7.9 (L) 07/11/2018    PHOS 3.8 07/11/2018          aspirin 81 mg oral Daily   cefTRIAXone 1 g intravenous q24h INT   fenofibrate micronized 67 mg oral Daily with breakfast   metronidazole 500 mg intravenous q8h INT   multivitamin 1 tablet oral Daily   pantoprazole 40 mg oral Daily   potassium chloride 20 mEq oral Daily   rosuvastatin 20 mg oral Daily   sertraline 50 mg oral Daily   valsartan 160 mg oral Daily       lactated ringer's  Last Rate: 80 mL/hr at 07/11/18 0334            Dietary Orders            Start     Ordered    07/09/18 0715  Adult Diet RD/LDN may adjust order. Diet orders written by PA/CRNPs may not be adjusted by RD/LDNs.; Clear Liquids  Diet effective now     Question Answer Comment   Delegation of Authority. Diet orders written by PA/CRNPs may not be adjusted by RD/LDNs. RD/LDN may adjust order. Diet orders written by PA/CRNPs may not be adjusted by RD/LDNs.    Diet Texture Clear Liquids        07/09/18 0714          Weights (last 7 days)     Date/Time   Weight    07/11/18 0540  103 kg (227 lb 6 oz)    07/10/18 0600  106 kg (234 lb 8 oz)    07/07/18 0713  99.8 kg (220 lb)              Clinical Comments: Chart reviewed.  Pt at nutrition risk r/t pmh, obesity, dx, and npo/clears diet x 4 days.  Pt admitted w/abdominal pain, cramping, and N/V/D for 2 days pta.  Found to have diverticulitis and a pelvic diverticular abscess.  She is s/p IR drainage of the  abscess 7/10 and placement of a percutaneous drain.  Diet advanced on 7/9, but has only been taking small amounts of clears due to diarrhea (15 BM's recorded past 24 hrs?).  C-diff (-) thus far and on IV antibiotics.  Labs reviewed, low K+ likely related to GI losses, replace as indicated.  At visit the patient reports NKFA, good appetite prior to the onset of her symptoms, and that she requested to stay on clears for 1 more day.  Plan is to advance to soft solids 7/12.  She is still having abdominal pain, but is overall feeling better.  No need for nutrition support or supplementation at this point.  Will continue to monitor diet advance and tolerance for need of.       Nutrition Interventions/Recommendations:   1. ADAT to Fulls--> Low Residue goal   2. Monitor intakes and tolerance  3. ? Consider starting Imodium to help w/diarrhea if continues  4. ? Consider addition of Bacid for diarrhea  5. Treat labs/lytes: replete K+ to WNL  6. Weekly weight  7. Monitor I/O    Date: 07/11/18  Signature: Bharti Piper, MARCEN

## 2018-07-12 LAB
BACTERIA BLD CULT: NORMAL
BACTERIA BLD CULT: NORMAL

## 2018-07-12 PROCEDURE — 63700000 HC SELF-ADMINISTRABLE DRUG: Performed by: PHYSICIAN ASSISTANT

## 2018-07-12 PROCEDURE — 63700000 HC SELF-ADMINISTRABLE DRUG: Performed by: SURGERY

## 2018-07-12 PROCEDURE — 25000000 HC PHARMACY GENERAL: Performed by: SURGERY

## 2018-07-12 PROCEDURE — 12000000 HC ROOM AND CARE MED/SURG

## 2018-07-12 PROCEDURE — 63600000 HC DRUGS/DETAIL CODE: Performed by: SURGERY

## 2018-07-12 RX ORDER — POTASSIUM CHLORIDE 750 MG/1
20 TABLET, FILM COATED, EXTENDED RELEASE ORAL 2 TIMES DAILY
Status: DISCONTINUED | OUTPATIENT
Start: 2018-07-12 | End: 2018-07-18 | Stop reason: HOSPADM

## 2018-07-12 RX ORDER — POTASSIUM CHLORIDE 750 MG/1
TABLET, FILM COATED, EXTENDED RELEASE ORAL
Status: DISPENSED
Start: 2018-07-12 | End: 2018-07-13

## 2018-07-12 RX ADMIN — ASPIRIN 81 MG: 81 TABLET, COATED ORAL at 11:17

## 2018-07-12 RX ADMIN — SERTRALINE HYDROCHLORIDE 50 MG: 50 TABLET ORAL at 11:18

## 2018-07-12 RX ADMIN — METRONIDAZOLE 500 MG: 500 INJECTION, SOLUTION INTRAVENOUS at 05:02

## 2018-07-12 RX ADMIN — OXYCODONE HYDROCHLORIDE AND ACETAMINOPHEN 2 TABLET: 5; 325 TABLET ORAL at 14:52

## 2018-07-12 RX ADMIN — PANTOPRAZOLE SODIUM 40 MG: 40 TABLET, DELAYED RELEASE ORAL at 11:18

## 2018-07-12 RX ADMIN — METRONIDAZOLE 500 MG: 500 INJECTION, SOLUTION INTRAVENOUS at 20:47

## 2018-07-12 RX ADMIN — VALSARTAN 160 MG: 160 TABLET, FILM COATED ORAL at 11:17

## 2018-07-12 RX ADMIN — HYDROMORPHONE HYDROCHLORIDE 1 MG: 1 INJECTION, SOLUTION INTRAMUSCULAR; INTRAVENOUS; SUBCUTANEOUS at 07:05

## 2018-07-12 RX ADMIN — ACETAMINOPHEN 650 MG: 325 TABLET, FILM COATED ORAL at 02:09

## 2018-07-12 RX ADMIN — OXYCODONE HYDROCHLORIDE AND ACETAMINOPHEN 2 TABLET: 5; 325 TABLET ORAL at 20:41

## 2018-07-12 RX ADMIN — CEFTRIAXONE SODIUM 1 G: 1 INJECTION, POWDER, FOR SOLUTION INTRAVENOUS at 14:38

## 2018-07-12 RX ADMIN — METRONIDAZOLE 500 MG: 500 INJECTION, SOLUTION INTRAVENOUS at 12:39

## 2018-07-12 RX ADMIN — POTASSIUM CHLORIDE 20 MEQ: 750 TABLET, EXTENDED RELEASE ORAL at 20:41

## 2018-07-12 RX ADMIN — ONDANSETRON 4 MG: 2 INJECTION INTRAMUSCULAR; INTRAVENOUS at 06:59

## 2018-07-12 RX ADMIN — SODIUM CHLORIDE, POTASSIUM CHLORIDE, SODIUM LACTATE AND CALCIUM CHLORIDE: 600; 310; 30; 20 INJECTION, SOLUTION INTRAVENOUS at 02:09

## 2018-07-12 RX ADMIN — MULTIPLE VITAMINS W/ MINERALS TAB 1 TABLET: TAB at 11:22

## 2018-07-12 RX ADMIN — ROSUVASTATIN CALCIUM 20 MG: 20 TABLET, FILM COATED ORAL at 11:22

## 2018-07-12 RX ADMIN — HYDROMORPHONE HYDROCHLORIDE 1 MG: 1 INJECTION, SOLUTION INTRAMUSCULAR; INTRAVENOUS; SUBCUTANEOUS at 11:07

## 2018-07-12 NOTE — PROGRESS NOTES
"General Surgery Daily Progress Note    Subjective: Pt seen and examined.  No new complaints.  Had some nausea this morning but has been tolerating liquids and feeling well since then.  Still with liquid bowel movements but not as much urgency.  Abdominal pain is very minimal.    Vital signs in last 24 hours:  Temp:  [36.8 °C (98.3 °F)-37.7 °C (99.8 °F)] 36.9 °C (98.5 °F)  Heart Rate:  [71-85] 72  Resp:  [16-18] 16  BP: (113-135)/(62-69) 134/69    Intake/Output this shift:    Intake/Output Summary (Last 24 hours) at 07/12/18 1752  Last data filed at 07/12/18 1500   Gross per 24 hour   Intake              360 ml   Output              120 ml   Net              240 ml       Physical Exam    General appearance: alert, appears stated age and cooperative    Abdomen: soft, obese, non-tender; bowel sounds normal; no masses, no organomegaly\"      Labs    Results from last 7 days  Lab Units 07/11/18  0335   SODIUM mmol/L 132*   POTASSIUM mmol/L 3.5*   CHLORIDE mmol/L 100   CO2 mmol/L 24   BUN mg/dL 15   CREATININE mg/dL 0.6   GLUCOSE mg/dL 109*   CALCIUM mg/dL 7.9*       Results from last 7 days  Lab Units 07/11/18  0335   WBC K/uL 9.54   HEMOGLOBIN g/dL 9.8*   HEMATOCRIT % 28.7*   PLATELETS K/uL 374*       Results from last 7 days  Lab Units 07/10/18  1356   INR INR 1.2       Results from last 7 days  Lab Units 07/11/18  0335  07/07/18  0722   SODIUM mmol/L 132*  < > 135*   POTASSIUM mmol/L 3.5*  < > 3.0*   CHLORIDE mmol/L 100  < > 102   CO2 mmol/L 24  < > 24   BUN mg/dL 15  < > 24*   CREATININE mg/dL 0.6  < > 0.9   CALCIUM mg/dL 7.9*  < > 9.4   ALBUMIN g/dL  --   --  4.2   BILIRUBIN TOTAL mg/dL  --   --  1.3*   ALK PHOS IU/L  --   --  50   ALT IU/L  --   --  21   AST IU/L  --   --  26   GLUCOSE mg/dL 109*  < > 157*   < > = values in this interval not displayed.    Results from last 7 days  Lab Units 07/11/18  0335   HEMOGLOBIN g/dL 9.8*   HEMATOCRIT % 28.7*       0  Lab Value Date/Time   LIPASE 26 07/07/2018 0722 "     Pathology Results     ** No results found for the last 720 hours. **              Imaging  Ct Guided Abscess Drain    Result Date: 7/10/2018  IMPRESSION:   Successful CT-guided 10-Jamaican percutaneous drainage of a right pelvic abscess.  15 cc of purulent sanguinous aspirate was obtained.  Specimen was submitted for culture and sensitivity. COMMENT: PROCEDURE:  CT-guided percutaneous drainage of a pelvic abscess. RADIOLOGIST: Artis Herrera MD ANESTHESIA:  Local 1% lidocaine. CONTRAST:   None MEDICATIONS:  Fentanyl 100 mcg IV DESCRIPTION OF PROCEDURE: CT DOSE:  One or more dose reduction techniques (e.g. automated exposure control, adjustment of the mA and/or kV according to patient size, use of iterative reconstruction technique) utilized for this examination. Informed consent was obtained following explanation of risks and benefits of the procedure.  The patient was placed prone on the CT procedure table.  Noncontrast CT evaluation of the pelvis was performed.  A complex fluid collection was noted along the right hemipelvis, along the right pelvic sidewall, similar to recent CT.  However, the thickness of the collection within the lower pelvis was relatively decreased in prone position, as opposed to previous CT in supine position, likely related to fluid redistribution.  A right transgluteal approach was localized and marked.  The area was prepped and draped in the usual sterile fashion.  Anesthesia was provided with 1% lidocaine.  Under CT guidance, a 5-Jamaican Yueh needle/catheter was advanced into the collection.  Purulent fluid was aspirated.  An 035 Amplatz wire was advanced and coiled within the complex collection.  Sequential tract dilatation was performed.  A 10-Jamaican Resolve percutaneous drainage catheter was then successfully advanced, coiled, and secured within the collection.  15 cc of purulent sanguinous aspirate was obtained.  Specimen was submitted for culture and sensitivity.  The catheter was  secured with a StatLock device and placed to gravity drainage.  Dry sterile dressing was applied.  The patient tolerated the procedure well.     Ct Abdomen Pelvis With Iv Contrast    Result Date: 7/10/2018  IMPRESSION: 1.  Previously identified sigmoid diverticulitis appears worse, now with a probable 4.5 cm early abscess between a loop of ileum and the sigmoid colon. 2.  Chronic gastro-gastric intussusception in a moderate size hiatal hernia. This intussusception is similar compared with the 7/7/2018 study, but improved compared with the 8/3/2017 calcium scoring study. COMMENT: Transaxial CAT scan imaging of the abdomen and pelvis was performed following administration of oral contrast and during intravenous infusion of 125 cc of Omnipaque 350. No adverse sequela were reported. CT DOSE:  One or more dose reduction techniques (e.g. automated exposure control, adjustment of the mA and/or kV according to patient size, use of iterative reconstruction technique) utilized for this examination. As on the 7/7/2018 study, there are severe inflammatory changes around the distal sigmoid colon consistent most likely due to diverticulitis.  The inflammatory changes are worse than on the previous exam.  There are now a few small contained perforations as well as a presumed developing abscess between a loop of ileum and the sigmoid colon measuring approximately 4 x 4 by 4.5 cm and containing an air-fluid level.  There may be a second tiny early abscess just posterior to the larger abscess.  Secondary enteritis is seen in a few loops of ileum in the low pelvis.  There is no free intraperitoneal air. A moderate size hiatal hernia is present.  There is a chronic gastro-gastric intussusception within the herniated stomach.  This measures approximately 2.5 x 4 cm and is similar in size compared with the 7/7/2018 CAT scan study.  This is improved in size compared with the 8/3/2017 coronary artery calcium scoring study when this  measured 4 x 4 0.5 cm.  The liver is possibly mildly fatty infiltrated.  The spleen contains scattered calcified granulomas.  The pancreas is somewhat atrophic.  The kidneys and adrenal glands appear normal. Cholecystectomy has been performed.  A small left pleural effusion is new compared with 7/7/2018 CAT scan.    Ct Abdomen Pelvis With Iv Contrast    Result Date: 7/7/2018  IMPRESSION: Findings most concerning for acute significant sigmoid diverticulitis with complex probably loculated fluid along the right hemipelvis, which may represent an early developing abscess measuring up to 3.5 x 1.2 cm..  There is a adjacent inflammation in these small bowel loops probably secondary to sigmoid inflammation.  Primary small bowel or right adnexal pathology is felt less likely the etiology of inflammation.. There is secondary to diffuse small bowel and colonic ileus.    Ir Technical Assistance    Result Date: 7/11/2018  IMPRESSION:  Technical assistance was provided to Dr. Herrera for CT guided abscess drainage.  Please see his report for details.         Assessment   Diverticulitis with abscess status post IR drainage    Plan   We will likely get tube check tomorrow and IR, continue intravenous antibiotics, low residue diet.  Discharge planning pending findings of radiologic study tomorrow        Dwayne Vann,

## 2018-07-12 NOTE — PLAN OF CARE
Problem: Patient Care Overview  Goal: Plan of Care Review  Outcome: Ongoing (interventions implemented as appropriate)   07/12/18 0433   Coping/Psychosocial   Plan Of Care Reviewed With patient   Plan of Care Review   Progress improving   Outcome Summary pain only 3/10 to abd/pelvic/and lower back. having slight temps throughout night. with cold sweats. but otherwise, more stronger and walking around more. less diarrhea episodes and less explosive       Problem: Pain, Acute (Adult)  Goal: Acceptable Pain Control/Comfort Level  Outcome: Ongoing (interventions implemented as appropriate)      Problem: Nausea/Vomiting (Adult)  Goal: Symptom Relief  Outcome: Outcome(s) Achieved Date Met: 07/12/18    Goal: Adequate Hydration  Outcome: Ongoing (interventions implemented as appropriate)      Problem: Fall Risk (Adult)  Goal: Absence of Falls  Outcome: Ongoing (interventions implemented as appropriate)

## 2018-07-13 PROCEDURE — 63600000 HC DRUGS/DETAIL CODE: Performed by: SURGERY

## 2018-07-13 PROCEDURE — 63700000 HC SELF-ADMINISTRABLE DRUG: Performed by: SURGERY

## 2018-07-13 PROCEDURE — 25000000 HC PHARMACY GENERAL: Performed by: SURGERY

## 2018-07-13 PROCEDURE — 63700000 HC SELF-ADMINISTRABLE DRUG: Performed by: PHYSICIAN ASSISTANT

## 2018-07-13 PROCEDURE — 12000000 HC ROOM AND CARE MED/SURG

## 2018-07-13 RX ADMIN — POTASSIUM CHLORIDE 20 MEQ: 750 TABLET, EXTENDED RELEASE ORAL at 20:07

## 2018-07-13 RX ADMIN — OXYCODONE HYDROCHLORIDE AND ACETAMINOPHEN 2 TABLET: 5; 325 TABLET ORAL at 02:30

## 2018-07-13 RX ADMIN — VALSARTAN 160 MG: 160 TABLET, FILM COATED ORAL at 08:48

## 2018-07-13 RX ADMIN — OXYCODONE HYDROCHLORIDE AND ACETAMINOPHEN 2 TABLET: 5; 325 TABLET ORAL at 21:45

## 2018-07-13 RX ADMIN — ROSUVASTATIN CALCIUM 20 MG: 20 TABLET, FILM COATED ORAL at 08:55

## 2018-07-13 RX ADMIN — METRONIDAZOLE 500 MG: 500 INJECTION, SOLUTION INTRAVENOUS at 05:18

## 2018-07-13 RX ADMIN — CEFTRIAXONE SODIUM 1 G: 1 INJECTION, POWDER, FOR SOLUTION INTRAVENOUS at 14:24

## 2018-07-13 RX ADMIN — METRONIDAZOLE 500 MG: 500 INJECTION, SOLUTION INTRAVENOUS at 13:06

## 2018-07-13 RX ADMIN — MULTIPLE VITAMINS W/ MINERALS TAB 1 TABLET: TAB at 08:48

## 2018-07-13 RX ADMIN — ASPIRIN 81 MG: 81 TABLET, COATED ORAL at 08:47

## 2018-07-13 RX ADMIN — OXYCODONE HYDROCHLORIDE AND ACETAMINOPHEN 2 TABLET: 5; 325 TABLET ORAL at 14:22

## 2018-07-13 RX ADMIN — PANTOPRAZOLE SODIUM 40 MG: 40 TABLET, DELAYED RELEASE ORAL at 08:48

## 2018-07-13 RX ADMIN — SERTRALINE HYDROCHLORIDE 50 MG: 50 TABLET ORAL at 08:48

## 2018-07-13 RX ADMIN — POTASSIUM CHLORIDE 20 MEQ: 750 TABLET, EXTENDED RELEASE ORAL at 08:55

## 2018-07-13 RX ADMIN — OXYCODONE HYDROCHLORIDE AND ACETAMINOPHEN 2 TABLET: 5; 325 TABLET ORAL at 08:34

## 2018-07-13 RX ADMIN — METRONIDAZOLE 500 MG: 500 INJECTION, SOLUTION INTRAVENOUS at 21:40

## 2018-07-13 NOTE — PLAN OF CARE
Problem: Pain, Acute (Adult)  Goal: Acceptable Pain Control/Comfort Level  Outcome: Ongoing (interventions implemented as appropriate)   07/12/18 2677   Pain, Acute (Adult)   Acceptable Pain Control/Comfort Level making progress toward outcome     Goal of the night: Patient will state pain to be <5/10 throughout shift.

## 2018-07-13 NOTE — PROGRESS NOTES
"General Surgery Daily Progress Note    Subjective: Pt seen and examined.  No new complaints.  No abdominal pain.  No nausea and vomiting.    Vital signs in last 24 hours:  Temp:  [36.7 °C (98 °F)-36.9 °C (98.5 °F)] 36.7 °C (98 °F)  Heart Rate:  [72-73] 73  Resp:  [16-18] 18  BP: (134-148)/(69-70) 148/70    Intake/Output this shift:    Intake/Output Summary (Last 24 hours) at 07/13/18 0754  Last data filed at 07/13/18 0544   Gross per 24 hour   Intake              380 ml   Output               70 ml   Net              310 ml       Physical Exam    General appearance: alert, appears stated age and cooperative    Abdomen: soft, non-tender; bowel sounds normal; no masses, no organomegaly\"      Labs    Results from last 7 days  Lab Units 07/11/18  0335   SODIUM mmol/L 132*   POTASSIUM mmol/L 3.5*   CHLORIDE mmol/L 100   CO2 mmol/L 24   BUN mg/dL 15   CREATININE mg/dL 0.6   GLUCOSE mg/dL 109*   CALCIUM mg/dL 7.9*       Results from last 7 days  Lab Units 07/11/18  0335   WBC K/uL 9.54   HEMOGLOBIN g/dL 9.8*   HEMATOCRIT % 28.7*   PLATELETS K/uL 374*       Results from last 7 days  Lab Units 07/10/18  1356   INR INR 1.2       Results from last 7 days  Lab Units 07/11/18  0335  07/07/18  0722   SODIUM mmol/L 132*  < > 135*   POTASSIUM mmol/L 3.5*  < > 3.0*   CHLORIDE mmol/L 100  < > 102   CO2 mmol/L 24  < > 24   BUN mg/dL 15  < > 24*   CREATININE mg/dL 0.6  < > 0.9   CALCIUM mg/dL 7.9*  < > 9.4   ALBUMIN g/dL  --   --  4.2   BILIRUBIN TOTAL mg/dL  --   --  1.3*   ALK PHOS IU/L  --   --  50   ALT IU/L  --   --  21   AST IU/L  --   --  26   GLUCOSE mg/dL 109*  < > 157*   < > = values in this interval not displayed.    Results from last 7 days  Lab Units 07/11/18  0335   HEMOGLOBIN g/dL 9.8*   HEMATOCRIT % 28.7*       0  Lab Value Date/Time   LIPASE 26 07/07/2018 0722     Pathology Results     ** No results found for the last 720 hours. **              Imaging  Ct Guided Abscess Drain    Result Date: 7/10/2018  IMPRESSION:   " Successful CT-guided 10-Moldovan percutaneous drainage of a right pelvic abscess.  15 cc of purulent sanguinous aspirate was obtained.  Specimen was submitted for culture and sensitivity. COMMENT: PROCEDURE:  CT-guided percutaneous drainage of a pelvic abscess. RADIOLOGIST: Artis Herrera MD ANESTHESIA:  Local 1% lidocaine. CONTRAST:   None MEDICATIONS:  Fentanyl 100 mcg IV DESCRIPTION OF PROCEDURE: CT DOSE:  One or more dose reduction techniques (e.g. automated exposure control, adjustment of the mA and/or kV according to patient size, use of iterative reconstruction technique) utilized for this examination. Informed consent was obtained following explanation of risks and benefits of the procedure.  The patient was placed prone on the CT procedure table.  Noncontrast CT evaluation of the pelvis was performed.  A complex fluid collection was noted along the right hemipelvis, along the right pelvic sidewall, similar to recent CT.  However, the thickness of the collection within the lower pelvis was relatively decreased in prone position, as opposed to previous CT in supine position, likely related to fluid redistribution.  A right transgluteal approach was localized and marked.  The area was prepped and draped in the usual sterile fashion.  Anesthesia was provided with 1% lidocaine.  Under CT guidance, a 5-Moldovan Yueh needle/catheter was advanced into the collection.  Purulent fluid was aspirated.  An 035 Amplatz wire was advanced and coiled within the complex collection.  Sequential tract dilatation was performed.  A 10-Moldovan Resolve percutaneous drainage catheter was then successfully advanced, coiled, and secured within the collection.  15 cc of purulent sanguinous aspirate was obtained.  Specimen was submitted for culture and sensitivity.  The catheter was secured with a StatLock device and placed to gravity drainage.  Dry sterile dressing was applied.  The patient tolerated the procedure well.     Ct Abdomen  Pelvis With Iv Contrast    Result Date: 7/10/2018  IMPRESSION: 1.  Previously identified sigmoid diverticulitis appears worse, now with a probable 4.5 cm early abscess between a loop of ileum and the sigmoid colon. 2.  Chronic gastro-gastric intussusception in a moderate size hiatal hernia. This intussusception is similar compared with the 7/7/2018 study, but improved compared with the 8/3/2017 calcium scoring study. COMMENT: Transaxial CAT scan imaging of the abdomen and pelvis was performed following administration of oral contrast and during intravenous infusion of 125 cc of Omnipaque 350. No adverse sequela were reported. CT DOSE:  One or more dose reduction techniques (e.g. automated exposure control, adjustment of the mA and/or kV according to patient size, use of iterative reconstruction technique) utilized for this examination. As on the 7/7/2018 study, there are severe inflammatory changes around the distal sigmoid colon consistent most likely due to diverticulitis.  The inflammatory changes are worse than on the previous exam.  There are now a few small contained perforations as well as a presumed developing abscess between a loop of ileum and the sigmoid colon measuring approximately 4 x 4 by 4.5 cm and containing an air-fluid level.  There may be a second tiny early abscess just posterior to the larger abscess.  Secondary enteritis is seen in a few loops of ileum in the low pelvis.  There is no free intraperitoneal air. A moderate size hiatal hernia is present.  There is a chronic gastro-gastric intussusception within the herniated stomach.  This measures approximately 2.5 x 4 cm and is similar in size compared with the 7/7/2018 CAT scan study.  This is improved in size compared with the 8/3/2017 coronary artery calcium scoring study when this measured 4 x 4 0.5 cm.  The liver is possibly mildly fatty infiltrated.  The spleen contains scattered calcified granulomas.  The pancreas is somewhat atrophic.   The kidneys and adrenal glands appear normal. Cholecystectomy has been performed.  A small left pleural effusion is new compared with 7/7/2018 CAT scan.    Ct Abdomen Pelvis With Iv Contrast    Result Date: 7/7/2018  IMPRESSION: Findings most concerning for acute significant sigmoid diverticulitis with complex probably loculated fluid along the right hemipelvis, which may represent an early developing abscess measuring up to 3.5 x 1.2 cm..  There is a adjacent inflammation in these small bowel loops probably secondary to sigmoid inflammation.  Primary small bowel or right adnexal pathology is felt less likely the etiology of inflammation.. There is secondary to diffuse small bowel and colonic ileus.    Ir Technical Assistance    Result Date: 7/11/2018  IMPRESSION:  Technical assistance was provided to Dr. Herrera for CT guided abscess drainage.  Please see his report for details.         Assessment   Diverticulitis with abscess status post IR drainage    Plan   For fistulogram and IR today, continue intravenous antibiotics.  If patient demonstrated to have colonic fistula can either wait this out or contemplate colectomy.        Dwayne Vann, DO

## 2018-07-13 NOTE — PROGRESS NOTES
7/13/201811:38 AM pt discussed in care progression rounds, pt to go to IR today for fistulogram, pt advanced to low fiber diet, sw will continue to follow for dc planning needs. Sury Waller LSW [id number]

## 2018-07-13 NOTE — PROGRESS NOTES
Continuous fluids , patient tolerating diet with no nausea or additional pain, aside from percutaneous drain site.  Verified with evan Booth patient.

## 2018-07-13 NOTE — UM PHYSICIAN REVIEW NOTE
Utilization Secondary Review Note      Patient Name: Reyna Castillo    MRN: [id number]    Insurance:  [facility]  Date of Admission:  7/7/2018  Initial order:    Inpatient  Planned admission: No  Post Discharge Review: No            Inpatient services are appropriate for this 68 y.o. year old female who presents with Diverticulitis of large intestine with abscess without bleeding.  She developed a diverticular abscess and had an IR drain placed.  She is still in the hospital.    Jo Wooten,   07/13/18       A phone call will be placed to payor to request Peer to Peer phone appeal.

## 2018-07-13 NOTE — PLAN OF CARE
Problem: Patient Care Overview  Goal: Plan of Care Review  Outcome: Ongoing (interventions implemented as appropriate)   07/13/18 1800   Coping/Psychosocial   Plan Of Care Reviewed With patient   Plan of Care Review   Progress improving   Outcome Summary patient pain improving. vs stale. no nausea       Problem: Pain, Acute (Adult)  Goal: Acceptable Pain Control/Comfort Level  Outcome: Ongoing (interventions implemented as appropriate)      Problem: Nausea/Vomiting (Adult)  Goal: Adequate Hydration  Outcome: Ongoing (interventions implemented as appropriate)      Problem: Fall Risk (Adult)  Goal: Absence of Falls  Outcome: Ongoing (interventions implemented as appropriate)

## 2018-07-14 LAB
ANION GAP SERPL CALC-SCNC: 6 MEQ/L (ref 3–15)
BUN SERPL-MCNC: 8 MG/DL (ref 8–20)
CALCIUM SERPL-MCNC: 8 MG/DL (ref 8.9–10.3)
CHLORIDE SERPL-SCNC: 105 MMOL/L (ref 98–109)
CO2 SERPL-SCNC: 26 MMOL/L (ref 22–32)
CREAT SERPL-MCNC: 0.5 MG/DL (ref 0.6–1.1)
ERYTHROCYTE [DISTWIDTH] IN BLOOD BY AUTOMATED COUNT: 14.5 % (ref 11.7–14.4)
GFR SERPL CREATININE-BSD FRML MDRD: >60 ML/MIN/1.73M*2
GLUCOSE SERPL-MCNC: 108 MG/DL (ref 70–99)
HCT VFR BLDCO AUTO: 30.6 % (ref 35–45)
HGB BLD-MCNC: 9.9 G/DL (ref 11.8–15.7)
MAGNESIUM SERPL-MCNC: 1.6 MG/DL (ref 1.8–2.5)
MCH RBC QN AUTO: 28.4 PG (ref 28–33.2)
MCHC RBC AUTO-ENTMCNC: 32.4 G/DL (ref 32.2–35.5)
MCV RBC AUTO: 87.9 FL (ref 83–98)
PDW BLD AUTO: 10.4 FL (ref 9.4–12.3)
PHOSPHATE SERPL-MCNC: 4.2 MG/DL (ref 2.4–4.7)
PLATELET # BLD AUTO: 193 K/UL (ref 150–369)
POTASSIUM SERPL-SCNC: 3.9 MMOL/L (ref 3.6–5.1)
RBC # BLD AUTO: 3.48 M/UL (ref 3.93–5.22)
SODIUM SERPL-SCNC: 137 MMOL/L (ref 136–144)
WBC # BLD AUTO: 12.61 K/UL (ref 3.8–10.5)

## 2018-07-14 PROCEDURE — 85027 COMPLETE CBC AUTOMATED: CPT | Performed by: SURGERY

## 2018-07-14 PROCEDURE — 63700000 HC SELF-ADMINISTRABLE DRUG: Performed by: INTERNAL MEDICINE

## 2018-07-14 PROCEDURE — 25000000 HC PHARMACY GENERAL: Performed by: SURGERY

## 2018-07-14 PROCEDURE — 63700000 HC SELF-ADMINISTRABLE DRUG: Performed by: SURGERY

## 2018-07-14 PROCEDURE — 84100 ASSAY OF PHOSPHORUS: CPT | Performed by: SURGERY

## 2018-07-14 PROCEDURE — 36415 COLL VENOUS BLD VENIPUNCTURE: CPT | Performed by: SURGERY

## 2018-07-14 PROCEDURE — 12000000 HC ROOM AND CARE MED/SURG

## 2018-07-14 PROCEDURE — 83735 ASSAY OF MAGNESIUM: CPT | Performed by: SURGERY

## 2018-07-14 PROCEDURE — 63600000 HC DRUGS/DETAIL CODE: Performed by: SURGERY

## 2018-07-14 PROCEDURE — 84132 ASSAY OF SERUM POTASSIUM: CPT | Performed by: SURGERY

## 2018-07-14 PROCEDURE — 63700000 HC SELF-ADMINISTRABLE DRUG: Performed by: PHYSICIAN ASSISTANT

## 2018-07-14 RX ORDER — FENOFIBRATE 67 MG/1
67 CAPSULE ORAL
Status: DISCONTINUED | OUTPATIENT
Start: 2018-07-14 | End: 2018-07-18 | Stop reason: HOSPADM

## 2018-07-14 RX ADMIN — OXYCODONE HYDROCHLORIDE AND ACETAMINOPHEN 2 TABLET: 5; 325 TABLET ORAL at 11:49

## 2018-07-14 RX ADMIN — POTASSIUM CHLORIDE 20 MEQ: 750 TABLET, EXTENDED RELEASE ORAL at 20:58

## 2018-07-14 RX ADMIN — VALSARTAN 160 MG: 160 TABLET, FILM COATED ORAL at 08:59

## 2018-07-14 RX ADMIN — METRONIDAZOLE 500 MG: 500 INJECTION, SOLUTION INTRAVENOUS at 21:00

## 2018-07-14 RX ADMIN — POTASSIUM CHLORIDE 20 MEQ: 750 TABLET, EXTENDED RELEASE ORAL at 08:58

## 2018-07-14 RX ADMIN — ASPIRIN 81 MG: 81 TABLET, COATED ORAL at 08:58

## 2018-07-14 RX ADMIN — METRONIDAZOLE 500 MG: 500 INJECTION, SOLUTION INTRAVENOUS at 04:57

## 2018-07-14 RX ADMIN — OXYCODONE HYDROCHLORIDE AND ACETAMINOPHEN 2 TABLET: 5; 325 TABLET ORAL at 17:32

## 2018-07-14 RX ADMIN — SERTRALINE HYDROCHLORIDE 50 MG: 50 TABLET ORAL at 08:59

## 2018-07-14 RX ADMIN — ROSUVASTATIN CALCIUM 20 MG: 20 TABLET, FILM COATED ORAL at 09:04

## 2018-07-14 RX ADMIN — PANTOPRAZOLE SODIUM 40 MG: 40 TABLET, DELAYED RELEASE ORAL at 08:58

## 2018-07-14 RX ADMIN — OXYCODONE HYDROCHLORIDE AND ACETAMINOPHEN 2 TABLET: 5; 325 TABLET ORAL at 05:00

## 2018-07-14 RX ADMIN — FENOFIBRATE 67 MG: 67 CAPSULE ORAL at 09:03

## 2018-07-14 RX ADMIN — CEFTRIAXONE SODIUM 1 G: 1 INJECTION, POWDER, FOR SOLUTION INTRAVENOUS at 16:06

## 2018-07-14 RX ADMIN — MULTIPLE VITAMINS W/ MINERALS TAB 1 TABLET: TAB at 08:58

## 2018-07-14 RX ADMIN — METRONIDAZOLE 500 MG: 500 INJECTION, SOLUTION INTRAVENOUS at 14:21

## 2018-07-14 NOTE — NURSING NOTE
Drain irrigated with nss as ordered, small amout of greenish drainage, noted with occasional specs in drainage, as before.  Pt ambulating in [place].

## 2018-07-14 NOTE — PROGRESS NOTES
AVSS  WBC up to 12  abd soft nontender  Pt feels great, no pain, best day yet  PLAN to recheck fistulagram/CT in two days on 7/16; if no fistula, can d/c drain and d/c on poabx.  Recheck CBC in am  Cont iv abx

## 2018-07-14 NOTE — PLAN OF CARE
Problem: Pain, Acute (Adult)  Intervention: Support/Optimize Psychosocial Response to Acute Pain   07/14/18 8514   Coping/Psychosocial Interventions   Supportive Measures active listening utilized   Psychosocial Support   Trust Relationship/Rapport care explained;choices provided;emotional support provided

## 2018-07-14 NOTE — PLAN OF CARE
Problem: Pain, Acute (Adult)  Goal: Acceptable Pain Control/Comfort Level  Outcome: Ongoing (interventions implemented as appropriate)   07/14/18 0016   Pain, Acute (Adult)   Acceptable Pain Control/Comfort Level making progress toward outcome

## 2018-07-15 LAB
ANION GAP SERPL CALC-SCNC: 4 MEQ/L (ref 3–15)
BASOPHILS # BLD: 0.07 K/UL (ref 0.01–0.1)
BASOPHILS NFR BLD: 0.6 %
BUN SERPL-MCNC: 6 MG/DL (ref 8–20)
CALCIUM SERPL-MCNC: 8.1 MG/DL (ref 8.9–10.3)
CHLORIDE SERPL-SCNC: 105 MMOL/L (ref 98–109)
CO2 SERPL-SCNC: 27 MMOL/L (ref 22–32)
CREAT SERPL-MCNC: 0.5 MG/DL (ref 0.6–1.1)
DIFFERENTIAL METHOD BLD: ABNORMAL
EOSINOPHIL # BLD: 0.38 K/UL (ref 0.04–0.36)
EOSINOPHIL NFR BLD: 3 %
ERYTHROCYTE [DISTWIDTH] IN BLOOD BY AUTOMATED COUNT: 14.6 % (ref 11.7–14.4)
GFR SERPL CREATININE-BSD FRML MDRD: >60 ML/MIN/1.73M*2
GLUCOSE SERPL-MCNC: 102 MG/DL (ref 70–99)
GRAM STN SPEC: ABNORMAL
GRAM STN SPEC: ABNORMAL
HCT VFR BLDCO AUTO: 29.9 % (ref 35–45)
HGB BLD-MCNC: 9.9 G/DL (ref 11.8–15.7)
IMM GRANULOCYTES # BLD AUTO: 0.67 K/UL (ref 0–0.08)
IMM GRANULOCYTES NFR BLD AUTO: 5.3 %
LYMPHOCYTES # BLD: 1.33 K/UL (ref 1.2–3.5)
LYMPHOCYTES NFR BLD: 10.6 %
MCH RBC QN AUTO: 29.3 PG (ref 28–33.2)
MCHC RBC AUTO-ENTMCNC: 33.1 G/DL (ref 32.2–35.5)
MCV RBC AUTO: 88.5 FL (ref 83–98)
MICROORGANISM SPEC CULT: ABNORMAL
MONOCYTES # BLD: 0.91 K/UL (ref 0.28–0.8)
MONOCYTES NFR BLD: 7.3 %
NEUTROPHILS # BLD: 9.17 K/UL (ref 1.7–7)
NEUTS SEG NFR BLD: 73.2 %
NRBC BLD-RTO: 0.2 %
PDW BLD AUTO: 11 FL (ref 9.4–12.3)
PLATELET # BLD AUTO: 231 K/UL (ref 150–369)
POTASSIUM SERPL-SCNC: 4 MMOL/L (ref 3.6–5.1)
RBC # BLD AUTO: 3.38 M/UL (ref 3.93–5.22)
SODIUM SERPL-SCNC: 136 MMOL/L (ref 136–144)
WBC # BLD AUTO: 12.53 K/UL (ref 3.8–10.5)

## 2018-07-15 PROCEDURE — 36415 COLL VENOUS BLD VENIPUNCTURE: CPT | Performed by: SURGERY

## 2018-07-15 PROCEDURE — 12000000 HC ROOM AND CARE MED/SURG

## 2018-07-15 PROCEDURE — 85025 COMPLETE CBC W/AUTO DIFF WBC: CPT | Performed by: SURGERY

## 2018-07-15 PROCEDURE — 80048 BASIC METABOLIC PNL TOTAL CA: CPT | Performed by: SURGERY

## 2018-07-15 PROCEDURE — 63600000 HC DRUGS/DETAIL CODE: Performed by: SURGERY

## 2018-07-15 PROCEDURE — 63700000 HC SELF-ADMINISTRABLE DRUG: Performed by: PHYSICIAN ASSISTANT

## 2018-07-15 PROCEDURE — 63700000 HC SELF-ADMINISTRABLE DRUG: Performed by: SURGERY

## 2018-07-15 PROCEDURE — 63700000 HC SELF-ADMINISTRABLE DRUG: Performed by: INTERNAL MEDICINE

## 2018-07-15 PROCEDURE — 25000000 HC PHARMACY GENERAL: Performed by: SURGERY

## 2018-07-15 RX ADMIN — OXYCODONE HYDROCHLORIDE AND ACETAMINOPHEN 1 TABLET: 5; 325 TABLET ORAL at 15:33

## 2018-07-15 RX ADMIN — HYDROMORPHONE HYDROCHLORIDE 1 MG: 1 INJECTION, SOLUTION INTRAMUSCULAR; INTRAVENOUS; SUBCUTANEOUS at 01:04

## 2018-07-15 RX ADMIN — CEFTRIAXONE SODIUM 1 G: 1 INJECTION, POWDER, FOR SOLUTION INTRAVENOUS at 16:18

## 2018-07-15 RX ADMIN — SERTRALINE HYDROCHLORIDE 50 MG: 50 TABLET ORAL at 09:26

## 2018-07-15 RX ADMIN — OXYCODONE HYDROCHLORIDE AND ACETAMINOPHEN 2 TABLET: 5; 325 TABLET ORAL at 09:33

## 2018-07-15 RX ADMIN — PANTOPRAZOLE SODIUM 40 MG: 40 TABLET, DELAYED RELEASE ORAL at 09:26

## 2018-07-15 RX ADMIN — METRONIDAZOLE 500 MG: 500 INJECTION, SOLUTION INTRAVENOUS at 20:35

## 2018-07-15 RX ADMIN — METRONIDAZOLE 500 MG: 500 INJECTION, SOLUTION INTRAVENOUS at 05:52

## 2018-07-15 RX ADMIN — POTASSIUM CHLORIDE 20 MEQ: 750 TABLET, EXTENDED RELEASE ORAL at 20:12

## 2018-07-15 RX ADMIN — VALSARTAN 160 MG: 160 TABLET, FILM COATED ORAL at 09:26

## 2018-07-15 RX ADMIN — ASPIRIN 81 MG: 81 TABLET, COATED ORAL at 09:25

## 2018-07-15 RX ADMIN — ROSUVASTATIN CALCIUM 20 MG: 20 TABLET, FILM COATED ORAL at 14:59

## 2018-07-15 RX ADMIN — OXYCODONE HYDROCHLORIDE AND ACETAMINOPHEN 2 TABLET: 5; 325 TABLET ORAL at 00:06

## 2018-07-15 RX ADMIN — FENOFIBRATE 67 MG: 67 CAPSULE ORAL at 09:25

## 2018-07-15 RX ADMIN — METRONIDAZOLE 500 MG: 500 INJECTION, SOLUTION INTRAVENOUS at 14:57

## 2018-07-15 RX ADMIN — POTASSIUM CHLORIDE 20 MEQ: 750 TABLET, EXTENDED RELEASE ORAL at 09:26

## 2018-07-15 RX ADMIN — ONDANSETRON 4 MG: 2 INJECTION INTRAMUSCULAR; INTRAVENOUS at 20:12

## 2018-07-15 RX ADMIN — MULTIPLE VITAMINS W/ MINERALS TAB 1 TABLET: TAB at 09:26

## 2018-07-15 RX ADMIN — OXYCODONE HYDROCHLORIDE AND ACETAMINOPHEN 2 TABLET: 5; 325 TABLET ORAL at 20:15

## 2018-07-15 NOTE — PLAN OF CARE
Problem: Pain, Acute (Adult)  Intervention: Support/Optimize Psychosocial Response to Acute Pain   07/15/18 1502   Coping/Psychosocial Interventions   Supportive Measures active listening utilized   Psychosocial Support   Trust Relationship/Rapport care explained

## 2018-07-15 NOTE — PLAN OF CARE
Problem: Pain, Acute (Adult)  Intervention: Monitor/Manage Analgesia   07/15/18 1922   Manage Acute Burn Pain   Bowel Intervention adequate fluid intake promoted   Safety Interventions   Medication Review/Management medications reviewed

## 2018-07-16 ENCOUNTER — APPOINTMENT (INPATIENT)
Dept: RADIOLOGY | Facility: HOSPITAL | Age: 68
DRG: 392 | End: 2018-07-16
Attending: SURGERY
Payer: COMMERCIAL

## 2018-07-16 PROCEDURE — 63700000 HC SELF-ADMINISTRABLE DRUG: Performed by: PHYSICIAN ASSISTANT

## 2018-07-16 PROCEDURE — 12000000 HC ROOM AND CARE MED/SURG

## 2018-07-16 PROCEDURE — 63700000 HC SELF-ADMINISTRABLE DRUG: Performed by: INTERNAL MEDICINE

## 2018-07-16 PROCEDURE — 63600105 HC IODINE BASED CONTRAST: Performed by: SURGERY

## 2018-07-16 PROCEDURE — BW111ZZ FLUOROSCOPY OF ABDOMEN AND PELVIS USING LOW OSMOLAR CONTRAST: ICD-10-PCS | Performed by: RADIOLOGY

## 2018-07-16 PROCEDURE — 63700000 HC SELF-ADMINISTRABLE DRUG: Performed by: SURGERY

## 2018-07-16 PROCEDURE — 25000000 HC PHARMACY GENERAL: Performed by: SURGERY

## 2018-07-16 PROCEDURE — 27200000 IR FISTULAGRAM / TUBE CHECK

## 2018-07-16 PROCEDURE — 63600000 HC DRUGS/DETAIL CODE: Performed by: SURGERY

## 2018-07-16 RX ADMIN — METRONIDAZOLE 500 MG: 500 INJECTION, SOLUTION INTRAVENOUS at 05:09

## 2018-07-16 RX ADMIN — POTASSIUM CHLORIDE 20 MEQ: 750 TABLET, EXTENDED RELEASE ORAL at 20:31

## 2018-07-16 RX ADMIN — METRONIDAZOLE 500 MG: 500 INJECTION, SOLUTION INTRAVENOUS at 21:39

## 2018-07-16 RX ADMIN — ROSUVASTATIN CALCIUM 20 MG: 20 TABLET, FILM COATED ORAL at 07:49

## 2018-07-16 RX ADMIN — SERTRALINE HYDROCHLORIDE 50 MG: 50 TABLET ORAL at 07:50

## 2018-07-16 RX ADMIN — IOHEXOL 5 ML: 300 INJECTION, SOLUTION INTRAVENOUS at 16:09

## 2018-07-16 RX ADMIN — FENOFIBRATE 67 MG: 67 CAPSULE ORAL at 07:49

## 2018-07-16 RX ADMIN — POTASSIUM CHLORIDE 20 MEQ: 750 TABLET, EXTENDED RELEASE ORAL at 07:50

## 2018-07-16 RX ADMIN — OXYCODONE HYDROCHLORIDE AND ACETAMINOPHEN 2 TABLET: 5; 325 TABLET ORAL at 03:53

## 2018-07-16 RX ADMIN — PANTOPRAZOLE SODIUM 40 MG: 40 TABLET, DELAYED RELEASE ORAL at 07:49

## 2018-07-16 RX ADMIN — CEFTRIAXONE SODIUM 1 G: 1 INJECTION, POWDER, FOR SOLUTION INTRAVENOUS at 14:38

## 2018-07-16 RX ADMIN — VALSARTAN 160 MG: 160 TABLET, FILM COATED ORAL at 07:49

## 2018-07-16 RX ADMIN — OXYCODONE HYDROCHLORIDE AND ACETAMINOPHEN 2 TABLET: 5; 325 TABLET ORAL at 09:39

## 2018-07-16 RX ADMIN — MULTIPLE VITAMINS W/ MINERALS TAB 1 TABLET: TAB at 07:50

## 2018-07-16 RX ADMIN — ASPIRIN 81 MG: 81 TABLET, COATED ORAL at 07:49

## 2018-07-16 RX ADMIN — METRONIDAZOLE 500 MG: 500 INJECTION, SOLUTION INTRAVENOUS at 13:27

## 2018-07-16 RX ADMIN — OXYCODONE HYDROCHLORIDE AND ACETAMINOPHEN 2 TABLET: 5; 325 TABLET ORAL at 17:30

## 2018-07-16 NOTE — POST-PROCEDURE NOTE
Interventional Radiology Brief Postprocedure Note    Reyna Castillo     Attending: Concha    Assistant: none    Diagnosis: Diverticular abscess    Description of procedure: Tube check    Contrast: Omni 350      Anesthesia:  None    Medications: none     Complications: None      Estimated Blood Loss: Estimated Blood Loss: None    Anticoagulation: N/A    Specimens: none    Findings: Small residual collection. No fistula to bowel.  Flush once daily with sterile Ns. Monitor output q shift.    7/16/2018 4:00 PM

## 2018-07-17 PROCEDURE — 63700000 HC SELF-ADMINISTRABLE DRUG: Performed by: INTERNAL MEDICINE

## 2018-07-17 PROCEDURE — 63600000 HC DRUGS/DETAIL CODE: Performed by: SURGERY

## 2018-07-17 PROCEDURE — 12000000 HC ROOM AND CARE MED/SURG

## 2018-07-17 PROCEDURE — 25000000 HC PHARMACY GENERAL: Performed by: SURGERY

## 2018-07-17 PROCEDURE — 63700000 HC SELF-ADMINISTRABLE DRUG: Performed by: SURGERY

## 2018-07-17 PROCEDURE — 63700000 HC SELF-ADMINISTRABLE DRUG: Performed by: PHYSICIAN ASSISTANT

## 2018-07-17 RX ADMIN — FENOFIBRATE 67 MG: 67 CAPSULE ORAL at 09:21

## 2018-07-17 RX ADMIN — MULTIPLE VITAMINS W/ MINERALS TAB 1 TABLET: TAB at 03:19

## 2018-07-17 RX ADMIN — POTASSIUM CHLORIDE 20 MEQ: 750 TABLET, EXTENDED RELEASE ORAL at 20:55

## 2018-07-17 RX ADMIN — POTASSIUM CHLORIDE 20 MEQ: 750 TABLET, EXTENDED RELEASE ORAL at 09:17

## 2018-07-17 RX ADMIN — METRONIDAZOLE 500 MG: 500 INJECTION, SOLUTION INTRAVENOUS at 05:33

## 2018-07-17 RX ADMIN — OXYCODONE HYDROCHLORIDE AND ACETAMINOPHEN 2 TABLET: 5; 325 TABLET ORAL at 03:09

## 2018-07-17 RX ADMIN — VALSARTAN 160 MG: 160 TABLET, FILM COATED ORAL at 09:17

## 2018-07-17 RX ADMIN — SERTRALINE HYDROCHLORIDE 50 MG: 50 TABLET ORAL at 09:17

## 2018-07-17 RX ADMIN — ROSUVASTATIN CALCIUM 20 MG: 20 TABLET, FILM COATED ORAL at 09:17

## 2018-07-17 RX ADMIN — PANTOPRAZOLE SODIUM 40 MG: 40 TABLET, DELAYED RELEASE ORAL at 09:21

## 2018-07-17 RX ADMIN — CEFTRIAXONE SODIUM 1 G: 1 INJECTION, POWDER, FOR SOLUTION INTRAVENOUS at 13:12

## 2018-07-17 RX ADMIN — METRONIDAZOLE 500 MG: 500 INJECTION, SOLUTION INTRAVENOUS at 20:56

## 2018-07-17 RX ADMIN — OXYCODONE HYDROCHLORIDE AND ACETAMINOPHEN 2 TABLET: 5; 325 TABLET ORAL at 09:20

## 2018-07-17 RX ADMIN — METRONIDAZOLE 500 MG: 500 INJECTION, SOLUTION INTRAVENOUS at 14:28

## 2018-07-17 RX ADMIN — ASPIRIN 81 MG: 81 TABLET, COATED ORAL at 09:17

## 2018-07-17 RX ADMIN — OXYCODONE HYDROCHLORIDE AND ACETAMINOPHEN 1 TABLET: 5; 325 TABLET ORAL at 18:08

## 2018-07-17 NOTE — NURSING NOTE
"RN spoke with pt regarding drain care at home.  Pt stated to RN that she was unable to flush her own drain, Her daughter is a \"fainter\" and would not be able to flush drain and her spouse recently had carpel tunnel surgery and would also be unable to flush drain.  RN to follow up with CM as pt feels she could use outside support.  "

## 2018-07-17 NOTE — CONSULTS
Nutrition Brief Assessment    Clinical Course: Patient is a 68 y.o. female who was admitted on 7/7/2018 with a diagnosis of Diverticulitis of large intestine with abscess without bleeding [K57.20].     Past Medical History:   Diagnosis Date   • Lopez's esophagus    • Diverticulitis    • Hypertension    • Knee pain, bilateral    • Left ankle pain    • Tran-Juan tear    • Pleural effusion      Past Surgical History:   Procedure Laterality Date   • CHOLECYSTECTOMY     • KNEE ARTHROSCOPY     • TRANSUMBILICAL AUGMENTATION MAMMAPLASTY               Adult Nutrition Assessment - 07/17/18 1100        General Information    Reason for Consult --   follow up       Nutrition/Diet History    Intake (%) 75%    Factors Affecting Nutritional Intake --   none       Physical Appearance    Overall Physical Appearance obese    Gastrointestinal --   none    Tubes --   perc pelvic drain    Oral/Mouth Cavity --   WDL    Skin --   WDL       Nutrition Order Review    Nutrition Order Review meets nutritional requirements       Body Mass Index (BMI)    BMI Assessment BMI greater than 40: obesity grade III    BMI/Nutritional Status Morbid Obesity (BMI 40-44.9)       Lab Results    Lab Results Reviewed reviewed       Pertinent Medications    Pertinent Medications Reviewed reviewed          CMP Results       07/15/18 07/14/18 07/11/18                    [id number]          137 132 (L)         K 4.0 3.9 3.5 (L)         Cl 105 105 100         CO2 27 26 24         Glucose 102 (H) 108 (H) 109 (H)         BUN 6 (L) 8 15         Creatinine 0.5 (L) 0.5 (L) 0.6         Calcium 8.1 (L) 8.0 (L) 7.9 (L)         Anion Gap 4 6 8         EGFR &gt;60.0 &gt;60.0 &gt;60.0                     Lab Results   Component Value Date    ALT 21 07/07/2018    AST 26 07/07/2018    ALKPHOS 50 07/07/2018    BILITOT 1.3 (H) 07/07/2018     No results found for: [id number]  Lab Results   Component Value Date    WBC 12.53 (H) 07/15/2018    HGB 9.9 (L) 07/15/2018     HCT 29.9 (L) 07/15/2018    MCV 88.5 07/15/2018     07/15/2018     No results found for: CHOL  No results found for: HDL  No results found for: LDLCALC  No results found for: TRIG  No results found for: CHOLHDL  Lab Results   Component Value Date    CALCIUM 8.1 (L) 07/15/2018    PHOS 4.2 07/14/2018          aspirin 81 mg oral Daily   cefTRIAXone 1 g intravenous q24h INT   fenofibrate micronized 67 mg oral Daily with breakfast   metronidazole 500 mg intravenous q8h INT   multivitamin 1 tablet oral Daily   pantoprazole 40 mg oral Daily   potassium chloride 20 mEq oral BID   rosuvastatin 20 mg oral Daily   sertraline 50 mg oral Daily   valsartan 160 mg oral Daily               Dietary Orders            Start     Ordered    07/12/18 1751  Adult Diet RD/LDN may adjust order. Diet orders written by PA/CRNPs may not be adjusted by RD/LDNs.; Regular; Low Fiber/Residue  Diet effective now     Question Answer Comment   Delegation of Authority. Diet orders written by PA/CRNPs may not be adjusted by RD/LDNs. RD/LDN may adjust order. Diet orders written by PA/CRNPs may not be adjusted by RD/LDNs.    Diet Texture Regular    Other Restriction(s): Low Fiber/Residue        07/12/18 1752          Weights (last 7 days)     Date/Time   Weight    07/17/18 0600  108 kg (239 lb)    07/16/18 0943  108 kg (238 lb 3.2 oz)    07/13/18 0526  108 kg (238 lb 8 oz)    07/12/18 0714  108 kg (238 lb 3.2 oz)    07/11/18 0540  103 kg (227 lb 6 oz)    07/10/18 0600  106 kg (234 lb 8 oz)              Clinical Comments: Chart reviewed.  Pt remains at nutrition risk r/t pmh, obesity, dx, and lengthy hospitalization for diverticular abscess.  She is now s/p perc drain placement and is doing better.  No fistula seen on fistulagram.  Diet was advanced to low fiber on 7/12, which she has tolerated without GI complaint.  Intakes generally around 75%, feels she is eating enough and is anxious to go home.  Low fiber diet reviewed with patient, all  questions answered.  No further nutrition intervention indicated, will s/o.  Please call or c/s should the patient have any nutrition needs prior to d/c.    Nutrition Interventions/Recommendations:   1. Continue Low Fiber diet  2. Monitor intakes/diet tolerance  3. Trend/treat labs/ltyes prn  4. Weekly wt  5. Consult if any other nutrition needs prior to d/c    Date: 07/17/18  Signature: Bharti Piper, MARCEN

## 2018-07-17 NOTE — PROGRESS NOTES
7/17/201812:32 PM spoke to Dr. Trujillo, plan for possible dc later this evening, pt go to home with perc drain. sw met with pt at bedside, pt stated that she has strong support system and at this time feels she will be able to manage drain at home, pt stated she will let sw know if she changes her mind regarding the need for homecare. sw will continue to follow. Sury Waller LSW [phone]

## 2018-07-17 NOTE — PROGRESS NOTES
Patient reports drenching sweats overnight and possibly some chills.  She has been afebrile throughout however.  She has no significant nausea or vomiting but admits that she is not eating much and does not have much of an appetite.    Percutaneous drainage catheter study in IR shows a small residual cavity and the catheter is being flushed with very little in the way of clearish output.    She remains afebrile pulse is 87.    On examination today, patient is somewhat damp but not acutely diaphoretic.  She does not appear to be in acute distress.  Her color is good and the lungs are clear.  Heart shows regular rhythm without tachycardia.  The abdomen is soft with no significant tenderness or guarding and the drain output looks serous to me.  Extremities show no calf tenderness.    Drainage culture from 7/10 shows a resistant staph stimulans species-I reviewed this with Dr. Gonsales from infectious disease and he feels this is likely more of a skin contaminant than a true pathogen in a diverticular abscess.    Follow-up interventional radiology study of drainage catheter was reviewed and there is a small cavity without evidence of fistula.    Impression:    Patient does not appear terribly to me and her abdomen is soft without significant tenderness but, she complains of diaphoresis and anorexia and inability.  I have encouraged her to ambulate and improve her oral intake and is conceivably she can go home on oral antibiotics soon.  She can follow-up with interventional radiology as an outpatient and when the catheter fully collapses the drain can be removed.  I will obtain a repeat CBC for tomorrow since her last WBC on 7/15 was 12.5

## 2018-07-18 ENCOUNTER — DOCUMENTATION (OUTPATIENT)
Dept: SOCIAL WORK | Facility: HOSPITAL | Age: 68
End: 2018-07-18

## 2018-07-18 VITALS
OXYGEN SATURATION: 96 % | DIASTOLIC BLOOD PRESSURE: 81 MMHG | HEIGHT: 62 IN | WEIGHT: 233 LBS | TEMPERATURE: 98.3 F | BODY MASS INDEX: 42.88 KG/M2 | SYSTOLIC BLOOD PRESSURE: 181 MMHG | HEART RATE: 61 BPM | RESPIRATION RATE: 17 BRPM

## 2018-07-18 LAB
ERYTHROCYTE [DISTWIDTH] IN BLOOD BY AUTOMATED COUNT: 14.7 % (ref 11.7–14.4)
HCT VFR BLDCO AUTO: 30.5 % (ref 35–45)
HGB BLD-MCNC: 9.9 G/DL (ref 11.8–15.7)
MCH RBC QN AUTO: 29 PG (ref 28–33.2)
MCHC RBC AUTO-ENTMCNC: 32.5 G/DL (ref 32.2–35.5)
MCV RBC AUTO: 89.4 FL (ref 83–98)
PDW BLD AUTO: 10.4 FL (ref 9.4–12.3)
PLATELET # BLD AUTO: 103 K/UL (ref 150–369)
RBC # BLD AUTO: 3.41 M/UL (ref 3.93–5.22)
WBC # BLD AUTO: 12.02 K/UL (ref 3.8–10.5)

## 2018-07-18 PROCEDURE — 63700000 HC SELF-ADMINISTRABLE DRUG: Performed by: INTERNAL MEDICINE

## 2018-07-18 PROCEDURE — 85027 COMPLETE CBC AUTOMATED: CPT | Performed by: SURGERY

## 2018-07-18 PROCEDURE — 63700000 HC SELF-ADMINISTRABLE DRUG: Performed by: SURGERY

## 2018-07-18 PROCEDURE — 36415 COLL VENOUS BLD VENIPUNCTURE: CPT | Performed by: SURGERY

## 2018-07-18 PROCEDURE — 25000000 HC PHARMACY GENERAL: Performed by: SURGERY

## 2018-07-18 PROCEDURE — 63700000 HC SELF-ADMINISTRABLE DRUG: Performed by: PHYSICIAN ASSISTANT

## 2018-07-18 RX ADMIN — METRONIDAZOLE 500 MG: 500 INJECTION, SOLUTION INTRAVENOUS at 05:27

## 2018-07-18 RX ADMIN — SERTRALINE HYDROCHLORIDE 50 MG: 50 TABLET ORAL at 07:43

## 2018-07-18 RX ADMIN — ASPIRIN 81 MG: 81 TABLET, COATED ORAL at 07:42

## 2018-07-18 RX ADMIN — FENOFIBRATE 67 MG: 67 CAPSULE ORAL at 07:42

## 2018-07-18 RX ADMIN — MULTIPLE VITAMINS W/ MINERALS TAB 1 TABLET: TAB at 07:42

## 2018-07-18 RX ADMIN — POTASSIUM CHLORIDE 20 MEQ: 750 TABLET, EXTENDED RELEASE ORAL at 07:42

## 2018-07-18 RX ADMIN — OXYCODONE HYDROCHLORIDE AND ACETAMINOPHEN 2 TABLET: 5; 325 TABLET ORAL at 07:41

## 2018-07-18 RX ADMIN — PANTOPRAZOLE SODIUM 40 MG: 40 TABLET, DELAYED RELEASE ORAL at 07:43

## 2018-07-18 RX ADMIN — OXYCODONE HYDROCHLORIDE AND ACETAMINOPHEN 1 TABLET: 5; 325 TABLET ORAL at 01:40

## 2018-07-18 RX ADMIN — ROSUVASTATIN CALCIUM 20 MG: 20 TABLET, FILM COATED ORAL at 07:42

## 2018-07-18 RX ADMIN — VALSARTAN 160 MG: 160 TABLET, FILM COATED ORAL at 07:42

## 2018-07-18 NOTE — PROGRESS NOTES
Referral received and chart reviewed. Spoke with patient and agreeable to services with [facility]. Referral completed.

## 2018-07-18 NOTE — PROGRESS NOTES
7/18/20188:43 AM   reviewed chart, pt was to go home last night with perc drain but after RN provided teaching pt did not feel comfortable and requested homecare to be set up, pts drain to be flushed once a day. sw met with pt and pts daughter at bedside, discussed homecare options, pt would like referral to [facility], stated to please have homecare call her daughter Michelle's phone [phone] and the pts house phone [phone], pt would like to leave ASAP and is ok if [facility] liaison calls her verses meets pt in the room, updated RN . [facility] information. Sury Waller LSW [phone]

## 2018-07-18 NOTE — DISCHARGE INSTR - OTHER ORDERS
PLEASE CALL INTERVENTIONAL RADIOLOGY TO SCHEDULE YOUR FOLLOW UP APPOINTMENT FOR YOUR DRAIN!  [phone]

## 2018-07-18 NOTE — NURSING NOTE
D/c instructions reviewed with pt and daughter, all questions answered and verbalized understanding Scripts copied and provided Modesta HELTON updated on above

## 2018-07-18 NOTE — NURSING NOTE
Tube flushed without resistance with 10 ML NSS return of yellow drainage, pt tolerated procedure without any complaints

## 2018-07-18 NOTE — NURSING NOTE
XANDER talked to pt to set up drain care/flushing. Dr. Trujillo revised order for D/c to be today.Discharge done by Ros Rn. IV removed. Discharge instructions reviewed with pt and daughter at bedside. Prescriptions for abx provided. Drain flushed once and emptied before d/c by Ros Kerr. Escort notified to bring down to lobby.

## 2018-07-18 NOTE — PLAN OF CARE
Problem: Patient Care Overview  Goal: Plan of Care Review  Outcome: Ongoing (interventions implemented as appropriate)   07/17/18 2000   Coping/Psychosocial   Plan Of Care Reviewed With patient   Plan of Care Review   Progress progress toward functional goals as expected   Outcome Summary Goal: pt will verbalize understanding of POC during shift

## 2018-07-20 ENCOUNTER — HOSPITAL ENCOUNTER (OUTPATIENT)
Dept: RADIOLOGY | Facility: HOSPITAL | Age: 68
Discharge: HOME | End: 2018-07-20
Attending: SURGERY
Payer: COMMERCIAL

## 2018-07-20 VITALS
OXYGEN SATURATION: 96 % | HEART RATE: 71 BPM | SYSTOLIC BLOOD PRESSURE: 144 MMHG | RESPIRATION RATE: 18 BRPM | DIASTOLIC BLOOD PRESSURE: 70 MMHG

## 2018-07-20 DIAGNOSIS — K57.40 DIVERTICULITIS OF BOTH SMALL AND LARGE INTESTINE WITH PERFORATION AND ABSCESS: ICD-10-CM

## 2018-07-20 PROCEDURE — 3E0M3KZ INTRODUCTION OF OTHER DIAGNOSTIC SUBSTANCE INTO PERITONEAL CAVITY, PERCUTANEOUS APPROACH: ICD-10-PCS | Performed by: RADIOLOGY

## 2018-07-20 PROCEDURE — 36100330 HC PROCEDURE ROOM 30MIN

## 2018-07-20 PROCEDURE — 76080 X-RAY EXAM OF FISTULA: CPT

## 2018-07-20 PROCEDURE — 0WPJX0Z REMOVAL OF DRAINAGE DEVICE FROM PELVIC CAVITY, EXTERNAL APPROACH: ICD-10-PCS | Performed by: RADIOLOGY

## 2018-07-20 PROCEDURE — 63600105 HC IODINE BASED CONTRAST: Performed by: SURGERY

## 2018-07-20 RX ADMIN — IOHEXOL 5 ML: 300 INJECTION, SOLUTION INTRAVENOUS at 12:39

## 2018-07-20 NOTE — DISCHARGE INSTRUCTIONS
Drainage Check/Removal    DISCHARGE INSTRUCTIONS  You have had a percutaneous drainage catheter checked and removed today.  You may resume your normal diet.  You may resume your normal medications.    Showering is ok, avoid baths for 1 week    Notify your primary physician and/or Interventional Radiologist IMMEDIATELY if any of the following occur:  • Fever of 101 degrees F or chills   • Swelling or redness, pain or discharge at the previous catheter site      Physician Contact Information  • If you have a problem that you believe requires immediate attention, you should go to the Emergency Room, either at [facility] or the closest hospital. If you believe that your problem can safely wait until you speak to a physician, you should contact your doctor or Interventional Radiologist.   • It is usually easier to contact your own physician by calling the office, or after hours through the answering service. If you do not know the number, the hospital  can connect you by calling [phone].   • If you have concerns that need to be answered by the Interventional Radiologist, you can reach him/ her as follows:   o During regular weekday hours (8AM to 5PM), call [phone] and ask the technologist to connect you with the Interventional Radiologist.   o During off hours for emergencies call [phone] and ask the hospital  to contact the Interventional Radiologist on-call.

## 2018-07-20 NOTE — POST-PROCEDURE NOTE
Interventional Radiology Brief Postprocedure Note    Reyna Castillo     Attending: Javier    Assistant:      Diagnosis: s/p perc drainage of pelvic abscess    Description of procedure: tube check/fistulagram    Contrast:   3 cc, Omni 300    Anesthesia:  None    Medications:       Complications: None      Estimated Blood Loss: Estimated Blood Loss: None    Anticoagulation:      Specimens:      Findings: cavity < 3 cc, no fistula, drain removed     7/20/2018 11:11 AM

## 2018-07-27 ENCOUNTER — TRANSCRIBE ORDERS (OUTPATIENT)
Dept: LAB | Age: 68
End: 2018-07-27

## 2018-07-27 ENCOUNTER — APPOINTMENT (OUTPATIENT)
Dept: LAB | Age: 68
End: 2018-07-27
Attending: INTERNAL MEDICINE
Payer: COMMERCIAL

## 2018-07-27 DIAGNOSIS — I10 ESSENTIAL (PRIMARY) HYPERTENSION: ICD-10-CM

## 2018-07-27 DIAGNOSIS — I10 ESSENTIAL (PRIMARY) HYPERTENSION: Primary | ICD-10-CM

## 2018-07-27 LAB
ALBUMIN SERPL-MCNC: 4 G/DL (ref 3.4–5)
ALP SERPL-CCNC: 38 IU/L (ref 35–126)
ALT SERPL-CCNC: 18 IU/L (ref 11–54)
ANION GAP SERPL CALC-SCNC: 9 MEQ/L (ref 3–15)
AST SERPL-CCNC: 31 IU/L (ref 15–41)
BACTERIA URNS QL MICRO: ABNORMAL /UL
BASOPHILS # BLD: 0.14 K/UL (ref 0.01–0.1)
BASOPHILS NFR BLD: 1.1 %
BILIRUB SERPL-MCNC: 0.5 MG/DL (ref 0.3–1.2)
BILIRUB UR QL STRIP.AUTO: NEGATIVE MG/DL
BUN SERPL-MCNC: 16 MG/DL (ref 8–20)
CALCIUM SERPL-MCNC: 9.9 MG/DL (ref 8.9–10.3)
CHLORIDE SERPL-SCNC: 105 MMOL/L (ref 98–109)
CHOLEST SERPL-MCNC: 127 MG/DL
CLARITY UR REFRACT.AUTO: ABNORMAL
CO2 SERPL-SCNC: 24 MMOL/L (ref 22–32)
COLOR UR AUTO: ABNORMAL
CREAT SERPL-MCNC: 0.7 MG/DL (ref 0.6–1.1)
DIFFERENTIAL METHOD BLD: ABNORMAL
EOSINOPHIL # BLD: 0.58 K/UL (ref 0.04–0.36)
EOSINOPHIL NFR BLD: 4.7 %
ERYTHROCYTE [DISTWIDTH] IN BLOOD BY AUTOMATED COUNT: 15.1 % (ref 11.7–14.4)
GFR SERPL CREATININE-BSD FRML MDRD: >60 ML/MIN/1.73M*2
GLUCOSE SERPL-MCNC: 96 MG/DL (ref 70–99)
GLUCOSE UR STRIP.AUTO-MCNC: NEGATIVE MG/DL
HCT VFR BLDCO AUTO: 38 % (ref 35–45)
HDLC SERPL-MCNC: 37 MG/DL
HDLC SERPL: 3.4 {RATIO}
HGB BLD-MCNC: 12.1 G/DL (ref 11.8–15.7)
HGB UR QL STRIP.AUTO: NEGATIVE
HYALINE CASTS #/AREA URNS LPF: ABNORMAL /LPF
IMM GRANULOCYTES # BLD AUTO: 0.04 K/UL (ref 0–0.08)
IMM GRANULOCYTES NFR BLD AUTO: 0.3 %
KETONES UR STRIP.AUTO-MCNC: NEGATIVE MG/DL
LDLC SERPL CALC-MCNC: 66 MG/DL
LEUKOCYTE ESTERASE UR QL STRIP.AUTO: 2
LYMPHOCYTES # BLD: 1.51 K/UL (ref 1.2–3.5)
LYMPHOCYTES NFR BLD: 12.2 %
MCH RBC QN AUTO: 29.2 PG (ref 28–33.2)
MCHC RBC AUTO-ENTMCNC: 31.8 G/DL (ref 32.2–35.5)
MCV RBC AUTO: 91.6 FL (ref 83–98)
MONOCYTES # BLD: 0.85 K/UL (ref 0.28–0.8)
MONOCYTES NFR BLD: 6.9 %
MUCOUS THREADS URNS QL MICRO: ABNORMAL /LPF
NEUTROPHILS # BLD: 9.26 K/UL (ref 1.7–7)
NEUTS SEG NFR BLD: 74.8 %
NITRITE UR QL STRIP.AUTO: POSITIVE
NONHDLC SERPL-MCNC: 90 MG/DL
NRBC BLD-RTO: 0 %
PDW BLD AUTO: 10.6 FL (ref 9.4–12.3)
PH UR STRIP.AUTO: 5 [PH]
PLAT MORPH BLD: NORMAL
PLATELET # BLD AUTO: 461 K/UL (ref 150–369)
PLATELET # BLD EST: ABNORMAL 10*3/UL
POIKILOCYTOSIS BLD QL SMEAR: ABNORMAL
POLYCHROMASIA BLD QL SMEAR: ABNORMAL
POTASSIUM SERPL-SCNC: 4.4 MMOL/L (ref 3.6–5.1)
PROT SERPL-MCNC: 7.1 G/DL (ref 6–8.2)
PROT UR QL STRIP.AUTO: NEGATIVE
RBC # BLD AUTO: 4.15 M/UL (ref 3.93–5.22)
RBC #/AREA URNS HPF: ABNORMAL /HPF
SODIUM SERPL-SCNC: 138 MMOL/L (ref 136–144)
SP GR UR REFRACT.AUTO: 1.02
SQUAMOUS URNS QL MICRO: 2 /HPF
TRIGL SERPL-MCNC: 119 MG/DL (ref 30–149)
TSH SERPL DL<=0.05 MIU/L-ACNC: 1.96 MIU/ML (ref 0.34–5.6)
UROBILINOGEN UR STRIP-ACNC: 0.2 EU/DL
WBC # BLD AUTO: 12.38 K/UL (ref 3.8–10.5)
WBC #/AREA URNS HPF: ABNORMAL /HPF

## 2018-07-27 PROCEDURE — 84443 ASSAY THYROID STIM HORMONE: CPT

## 2018-07-27 PROCEDURE — 80061 LIPID PANEL: CPT

## 2018-07-27 PROCEDURE — 81001 URINALYSIS AUTO W/SCOPE: CPT

## 2018-07-27 PROCEDURE — 83036 HEMOGLOBIN GLYCOSYLATED A1C: CPT | Mod: GA

## 2018-07-27 PROCEDURE — 85025 COMPLETE CBC W/AUTO DIFF WBC: CPT

## 2018-07-27 PROCEDURE — 80053 COMPREHEN METABOLIC PANEL: CPT

## 2018-07-27 PROCEDURE — 36415 COLL VENOUS BLD VENIPUNCTURE: CPT

## 2018-07-28 LAB
EST. AVERAGE GLUCOSE BLD GHB EST-MCNC: 108 MG/DL
HBA1C MFR BLD HPLC: 5.4 %

## 2018-08-01 ENCOUNTER — TRANSCRIBE ORDERS (OUTPATIENT)
Dept: SCHEDULING | Age: 68
End: 2018-08-01

## 2018-08-01 ENCOUNTER — TELEPHONE (OUTPATIENT)
Dept: TRANSFER UNIT | Age: 68
End: 2018-08-01

## 2018-08-01 DIAGNOSIS — K57.92 DIVERTICULITIS OF INTESTINE WITHOUT PERFORATION OR ABSCESS WITHOUT BLEEDING: Primary | ICD-10-CM

## 2018-08-01 RX ORDER — OXYCODONE AND ACETAMINOPHEN 7.5; 325 MG/1; MG/1
1 TABLET ORAL EVERY 4 HOURS PRN
COMMUNITY
End: 2020-10-23 | Stop reason: ALTCHOICE

## 2018-08-01 RX ORDER — OMEPRAZOLE 40 MG/1
40 CAPSULE, DELAYED RELEASE ORAL
COMMUNITY
End: 2018-10-09

## 2018-08-01 NOTE — DISCHARGE SUMMARY
Discharge Summary    BRIEF OVERVIEW    Admitting Provider: Dwayne Vann DO  AttendingProvider: No att. providers found  Primary Care Physician at Discharge: Lubna Kerr [phone]   H&P Notes 7/2/2018 to 8/1/2018     Date of Service Author Author Type Status Note Type File Time    07/07/18 1340 Dwayne Vann DO Physician Signed H&P 07/07/18 1343        Admission Date: 7/7/2018     Discharge Date: 8/1/2018    Primary Discharge Diagnosis  No Principal Problem: There is no principal problem currently on the Problem List. Please update the Problem List and refresh.    Secondary Discharge Diagnosis  Patient Active Problem List    Diagnosis Date Noted   • Diverticulitis of large intestine with abscess without bleeding 07/07/2018       DETAILS OF HOSPITAL STAY    Operative Procedures Performed      Consults:   Consult Notes 7/2/2018 to 8/1/2018     Date of Service Author Author Type Status Note Type File Time    07/17/18 1139 RICH Dodson Registered Dietitian Signed Consults 07/17/18 1147    07/15/18 1355 Flora Vazquez  Signed Consults 07/15/18 1356    07/11/18 0845 RICH Dodson Registered Dietitian Signed Consults 07/11/18 1226          Consult Orders During Admission:  IP CONSULT TO SOCIAL WORK  IP CONSULT TO SPIRITUAL HEALTH AND EDUCATION     Procedures: Interventional radiology pelvic abscess drainage  Pertinent Test Results: CT scans    Imaging  Ct Guided Abscess Drain    Result Date: 7/10/2018  Narrative: CLINICAL HISTORY:    Enlarging pelvic diverticular abscess..     Impression: IMPRESSION:   Successful CT-guided 10-Turkmen percutaneous drainage of a right pelvic abscess.  15 cc of purulent sanguinous aspirate was obtained.  Specimen was submitted for culture and sensitivity. COMMENT: PROCEDURE:  CT-guided percutaneous drainage of a pelvic abscess. RADIOLOGIST: Artis Herrera MD ANESTHESIA:  Local 1% lidocaine. CONTRAST:   None MEDICATIONS:  Fentanyl 100 mcg IV DESCRIPTION OF  PROCEDURE: CT DOSE:  One or more dose reduction techniques (e.g. automated exposure control, adjustment of the mA and/or kV according to patient size, use of iterative reconstruction technique) utilized for this examination. Informed consent was obtained following explanation of risks and benefits of the procedure.  The patient was placed prone on the CT procedure table.  Noncontrast CT evaluation of the pelvis was performed.  A complex fluid collection was noted along the right hemipelvis, along the right pelvic sidewall, similar to recent CT.  However, the thickness of the collection within the lower pelvis was relatively decreased in prone position, as opposed to previous CT in supine position, likely related to fluid redistribution.  A right transgluteal approach was localized and marked.  The area was prepped and draped in the usual sterile fashion.  Anesthesia was provided with 1% lidocaine.  Under CT guidance, a 5-[place]dean [employer] needle/catheter was advanced into the collection.  Purulent fluid was aspirated.  An 035 Amplatz wire was advanced and coiled within the complex collection.  Sequential tract dilatation was performed.  A 10-C[place] Verdean Resolve percutaneous drainage catheter was then successfully advanced, coiled, and secured within the collection.  15 cc of purulent sanguinous aspirate was obtained.  Specimen was submitted for culture and sensitivity.  The catheter was secured with a StatLock device and placed to gravity drainage.  Dry sterile dressing was applied.  The patient tolerated the procedure well.     Ct Abdomen Pelvis With Iv Contrast    Result Date: 7/10/2018  Narrative: CLINICAL HISTORY: Sigmoid diverticulitis.     Impression: IMPRESSION: 1.  Previously identified sigmoid diverticulitis appears worse, now with a probable 4.5 cm early abscess between a loop of ileum and the sigmoid colon. 2.  Chronic gastro-gastric intussusception in a moderate size hiatal hernia. This intussusception is similar  compared with the 7/7/2018 study, but improved compared with the 8/3/2017 calcium scoring study. COMMENT: Transaxial CAT scan imaging of the abdomen and pelvis was performed following administration of oral contrast and during intravenous infusion of 125 cc of Omnipaque 350. No adverse sequela were reported. CT DOSE:  One or more dose reduction techniques (e.g. automated exposure control, adjustment of the mA and/or kV according to patient size, use of iterative reconstruction technique) utilized for this examination. As on the 7/7/2018 study, there are severe inflammatory changes around the distal sigmoid colon consistent most likely due to diverticulitis.  The inflammatory changes are worse than on the previous exam.  There are now a few small contained perforations as well as a presumed developing abscess between a loop of ileum and the sigmoid colon measuring approximately 4 x 4 by 4.5 cm and containing an air-fluid level.  There may be a second tiny early abscess just posterior to the larger abscess.  Secondary enteritis is seen in a few loops of ileum in the low pelvis.  There is no free intraperitoneal air. A moderate size hiatal hernia is present.  There is a chronic gastro-gastric intussusception within the herniated stomach.  This measures approximately 2.5 x 4 cm and is similar in size compared with the 7/7/2018 CAT scan study.  This is improved in size compared with the 8/3/2017 coronary artery calcium scoring study when this measured 4 x 4 0.5 cm.  The liver is possibly mildly fatty infiltrated.  The spleen contains scattered calcified granulomas.  The pancreas is somewhat atrophic.  The kidneys and adrenal glands appear normal. Cholecystectomy has been performed.  A small left pleural effusion is new compared with 7/7/2018 CAT scan.    Ct Abdomen Pelvis With Iv Contrast    Result Date: 7/7/2018  Narrative: CLINICAL HISTORY:      Abd pain Vomiting COMMENT: Serial axial images of the abdomen and pelvis  are obtained after the uneventful administration of   intravenous contrast.  140 cc Omnipaque 350 given without incident. CT DOSE:  One or more dose reduction techniques (e.g. automated exposure control, adjustment of the mA and/or kV according to patient size, use of iterative reconstruction technique) utilized for this examination. Comparison: None. LUNG BASES/HEART: Moderate size hiatal hernia present LIVER/BILIARY TREE: Small calcified granuloma in the left hepatic lobe. GALL BLADDER: Gallbladder is surgically absent. SPLEEN: Punctate calcified splenic granulomas. PANCREAS:  Normal. ADRENAL GLANDS:  Normal. KIDNEYS:  Normal. GI TRACT: There is scattered air-fluid levels in the small bowel and colon likely representing diffuse ileus. In addition, there is significant stranding noted in the pelvis, around the sigmoid colon, most consistent with the acute sigmoid diverticulitis. There is small amount of fluid seen along the lateral right pelvic wall, with some peripheral lower enhancement.  The fluid measures 3.5 cm AP dimension by 1.2 cm.  There is some enhancement of the peritoneum.  There is fluid tracking along the lateral pelvic wall.  Findings may represent early developing abscess in the lateral pelvic chain.  Close follow-up is recommended.  The likely source of infection appears to be the sigmoid colon though the pelvic small bowel loops appear to be involved probably secondary to the colonic inflammation. There are extensive diverticuli noted.  The appendix measures 6 mm top normal similar in enhancement pattern as the remainder of the colon likely from ileus. VESSELS:No aneurysm.  Significant atherosclerotic changes in the abdominal aorta. PELVIC STRUCTURES: No pelvic masses. ADENOPATHY: No significant adenopathy present. ASCITES: None. OSSEOUS STRUCTURES: No apparent acute findings.  Bilateral L5 pars defects. Degenerative changes throughout.  Grade 1 spondylolisthesis at L5-S1.     Impression:  IMPRESSION: Findings most concerning for acute significant sigmoid diverticulitis with complex probably loculated fluid along the right hemipelvis, which may represent an early developing abscess measuring up to 3.5 x 1.2 cm..  There is a adjacent inflammation in these small bowel loops probably secondary to sigmoid inflammation.  Primary small bowel or right adnexal pathology is felt less likely the etiology of inflammation.. There is secondary to diffuse small bowel and colonic ileus.    Ir Technical Assistance    Result Date: 7/11/2018  Narrative: CLINICAL HISTORY:  Enlarging pelvic diverticular abscess. COMMENT: Technical assistance was provided to Dr. Herrera for CT guided abscess drainage.  Please see his report for details.     Impression: IMPRESSION:  Technical assistance was provided to Dr. Herrera for CT guided abscess drainage.  Please see his report for details.     Ir Fistulagram / Tube Check    Result Date: 7/20/2018  Narrative: CLINICAL HISTORY:    History of pelvic diverticular abscess, status post percutaneous drainage.  Decreased output..     Impression: IMPRESSION:   Successful removal of a right transgluteal pelvic drainage catheter, following demonstration of only a small residual cavity (less than 3 cc) and no fistula. COMMENT: PROCEDURE:  1.  Fistulogram/tube check 2.  Percutaneous drainage catheter removal, under fluoroscopic guidance. RADIOLOGIST: Artis Herrera MD ANESTHESIA:  None.. FLUORO TIME: 8.8 seconds REFERENCE AIR KERMA: 3.24 mGy CONTRAST: 3  cc of Omnipaque 300 MEDICATIONS:  none DESCRIPTION OF PROCEDURE:    Written informed consent was obtained following explanation of risks and benefits of the procedure.  The patient was placed prone on the IR procedure table.  The percutaneous right transgluteal drainage catheter was accessed in a sterile fashion.  Contrast was administered and tube check and fistulogram was performed.  There is only a small residual cavity, measuring less than 3  cc.  No fistula is identified.  Given these findings, the catheter was transected and removed in entirety without complication, under fluoroscopic guidance.  Dry sterile dressing was applied.     Ir Fistulagram / Tube Check    Result Date: 7/16/2018  Narrative: CLINICAL HISTORY: Diverticular abscess.     Impression: IMPRESSION: Small residual abscess.  No communication with bowel. COMMENT: The patient presents for tube check.  Consent was obtained.  The patient is placed prone on the IR table.  The surveyed radiograph demonstrates a drainage catheter in place.  Contrast is injected.  There is a small residual abscess.  There is no fistulous communication to bowel.  A small amount of purulent material is seen in the drainage tubing and bag.  Given these findings we elected to continue external drainage to accurate daily drainage is documented.  The catheter is connected to a drainage catheter.  The each be flushed once daily with 10 cc normal saline.  Once the drainage is below 15 cc per day the catheter could be removed.. REFERENCE AIR KERMA: One mGy.          Presenting Problem/History of Present Illness  Diverticulitis of large intestine with abscess without bleeding     See H&P    Hospital Course  Patient seen and examined on day of discharge.  The patient was admitted with diverticulitis with abscess.  This is relatively small and antibiotic treatment was undertaken.  At 3 days a repeat CAT scan was obtained as the patient did appear clinically worsened.  The abscess had increased in size.  Interventional radiology was consulted and they placed a drain.  5 days after this a fistulogram was obtained which revealed a smaller cavity and no colonic fistula.  Her diet was advanced and she tolerated this.  Discharged on oral antibiotics.    Discharge Orders  Released Discharge Orders  Current as of patient's discharge on: 07/18/18    Order Details Provider Status    aspirin 81 mg enteric coated tablet Take 81 mg by  mouth daily. Evans Trujillo MD Resume at Discharge (Patient Reported)    esomeprazole (NexIUM) 40 mg capsule Take 40 mg by mouth daily before breakfast. Evans Trujillo MD Resume at Discharge (Patient Reported)    fenofibrate (TRICOR) 48 mg tablet Take 48 mg by mouth daily. Evans Trujillo MD Resume at Discharge (Patient Reported)    multivitamin capsule Take 1 capsule by mouth daily. Evans Trujillo MD Resume at Discharge (Patient Reported)    rosuvastatin (CRESTOR) 20 mg tablet Take 20 mg by mouth daily. Evans Trujillo MD Resume at Discharge (Patient Reported)    sertraline (ZOLOFT) 50 mg tablet Take 50 mg by mouth daily. Evans Trujillo MD Resume at Discharge (Patient Reported)    valsartan (DIOVAN ORAL) Take 160 mg by mouth daily with breakfast.   Evans Trujillo MD Resume at Discharge (Patient Reported)          Outpatient Follow-Ups            In 2 days [address] at [facility]        Referrals:  No orders of the defined types were placed in this encounter.      Active Issues Requiring Follow-up  Issue: Diverticulitis with abscess  What is Needed: Follow-up in office, complete oral antibiotic course      Test Results Pending at Discharge        Discharge Disposition  Home   Code Status at Discharge: Prior

## 2018-08-03 ENCOUNTER — TRANSCRIBE ORDERS (OUTPATIENT)
Dept: REGISTRATION | Age: 68
End: 2018-08-03

## 2018-08-03 ENCOUNTER — APPOINTMENT (OUTPATIENT)
Dept: LAB | Age: 68
End: 2018-08-03
Attending: INTERNAL MEDICINE
Payer: COMMERCIAL

## 2018-08-03 ENCOUNTER — HOSPITAL ENCOUNTER (OUTPATIENT)
Dept: RADIOLOGY | Age: 68
Discharge: HOME | End: 2018-08-03
Attending: INTERNAL MEDICINE
Payer: COMMERCIAL

## 2018-08-03 DIAGNOSIS — K57.92 DIVERTICULITIS OF INTESTINE WITHOUT PERFORATION OR ABSCESS WITHOUT BLEEDING: ICD-10-CM

## 2018-08-03 DIAGNOSIS — R79.89 OTHER SPECIFIED ABNORMAL FINDINGS OF BLOOD CHEMISTRY: ICD-10-CM

## 2018-08-03 DIAGNOSIS — R79.89 OTHER SPECIFIED ABNORMAL FINDINGS OF BLOOD CHEMISTRY: Primary | ICD-10-CM

## 2018-08-03 LAB
ANISOCYTOSIS BLD QL SMEAR: ABNORMAL
BASOPHILS # BLD: 0.09 K/UL (ref 0.01–0.1)
BASOPHILS NFR BLD: 1 %
DIFFERENTIAL METHOD BLD: ABNORMAL
EOSINOPHIL # BLD: 0.6 K/UL (ref 0.04–0.36)
EOSINOPHIL NFR BLD: 7 %
ERYTHROCYTE [DISTWIDTH] IN BLOOD BY AUTOMATED COUNT: 15.2 % (ref 11.7–14.4)
HCT VFR BLDCO AUTO: 37.2 % (ref 35–45)
HGB BLD-MCNC: 12.9 G/DL (ref 11.8–15.7)
LYMPHOCYTES # BLD: 1.63 K/UL (ref 1.2–3.5)
LYMPHOCYTES NFR BLD: 19 %
MCH RBC QN AUTO: 30.9 PG (ref 28–33.2)
MCHC RBC AUTO-ENTMCNC: 34.7 G/DL (ref 32.2–35.5)
MCV RBC AUTO: 89.2 FL (ref 83–98)
MONOCYTES # BLD: 0.26 K/UL (ref 0.28–0.8)
MONOCYTES NFR BLD: 3 %
NEUTS BAND # BLD: 6 K/UL (ref 1.7–7)
NEUTS SEG NFR BLD: 70 %
OVALOCYTES BLD QL SMEAR: ABNORMAL
PDW BLD AUTO: 11.7 FL (ref 9.4–12.3)
PLAT MORPH BLD: NORMAL
PLATELET # BLD AUTO: 418 K/UL (ref 150–369)
PLATELET # BLD EST: ABNORMAL 10*3/UL
RBC # BLD AUTO: 4.17 M/UL (ref 3.93–5.22)
WBC # BLD AUTO: 8.57 K/UL (ref 3.8–10.5)

## 2018-08-03 PROCEDURE — 85025 COMPLETE CBC W/AUTO DIFF WBC: CPT

## 2018-08-03 PROCEDURE — 63600105 HC IODINE BASED CONTRAST

## 2018-08-03 PROCEDURE — 74178 CT ABD&PLV WO CNTR FLWD CNTR: CPT

## 2018-08-03 PROCEDURE — 36415 COLL VENOUS BLD VENIPUNCTURE: CPT

## 2018-08-03 RX ADMIN — IOHEXOL 145 ML: 350 INJECTION, SOLUTION INTRAVENOUS at 14:46

## 2018-08-07 ENCOUNTER — TRANSCRIBE ORDERS (OUTPATIENT)
Dept: SCHEDULING | Age: 68
End: 2018-08-07

## 2018-08-07 DIAGNOSIS — K57.20 DIVERTICULITIS OF LARGE INTESTINE WITH PERFORATION AND ABSCESS WITHOUT BLEEDING: Primary | ICD-10-CM

## 2018-08-08 LAB
FUNGUS SPEC CULT: ABNORMAL
FUNGUS SPEC CULT: ABNORMAL

## 2018-08-21 ENCOUNTER — HOSPITAL ENCOUNTER (OUTPATIENT)
Dept: RADIOLOGY | Age: 68
Discharge: HOME | End: 2018-08-21
Attending: SURGERY
Payer: COMMERCIAL

## 2018-08-21 DIAGNOSIS — K57.20 DIVERTICULITIS OF LARGE INTESTINE WITH PERFORATION AND ABSCESS WITHOUT BLEEDING: ICD-10-CM

## 2018-08-21 PROCEDURE — 63600105 HC IODINE BASED CONTRAST: Performed by: SURGERY

## 2018-08-21 PROCEDURE — 74177 CT ABD & PELVIS W/CONTRAST: CPT

## 2018-08-21 PROCEDURE — 63600000 HC DRUGS/DETAIL CODE: Performed by: SURGERY

## 2018-08-21 RX ADMIN — DIATRIZOATE MEGLUMINE AND DIATRIZOATE SODIUM 30 ML: 660; 100 LIQUID ORAL; RECTAL at 11:15

## 2018-08-21 RX ADMIN — IOHEXOL 135 ML: 350 INJECTION, SOLUTION INTRAVENOUS at 10:39

## 2018-08-22 LAB — MYCOBACTERIUM SPEC CULT: NORMAL

## 2018-08-22 RX ORDER — SODIUM CHLORIDE 9 MG/ML
40 INJECTION, SOLUTION INTRAVENOUS CONTINUOUS
Status: CANCELLED | OUTPATIENT
Start: 2018-08-23 | End: 2018-08-30

## 2018-08-23 ENCOUNTER — HOSPITAL ENCOUNTER (INPATIENT)
Facility: HOSPITAL | Age: 68
LOS: 4 days | Discharge: HOME HEALTH | DRG: 392 | End: 2018-08-27
Attending: EMERGENCY MEDICINE | Admitting: SURGERY
Payer: COMMERCIAL

## 2018-08-23 ENCOUNTER — HOSPITAL ENCOUNTER (OUTPATIENT)
Dept: RADIOLOGY | Facility: HOSPITAL | Age: 68
Discharge: HOME | DRG: 392 | End: 2018-08-23
Attending: SURGERY | Admitting: RADIOLOGY
Payer: COMMERCIAL

## 2018-08-23 ENCOUNTER — APPOINTMENT (OUTPATIENT)
Dept: RADIOLOGY | Facility: HOSPITAL | Age: 68
DRG: 392 | End: 2018-08-23
Attending: SURGERY
Payer: COMMERCIAL

## 2018-08-23 VITALS
RESPIRATION RATE: 18 BRPM | DIASTOLIC BLOOD PRESSURE: 89 MMHG | TEMPERATURE: 100.5 F | BODY MASS INDEX: 39.56 KG/M2 | OXYGEN SATURATION: 97 % | SYSTOLIC BLOOD PRESSURE: 138 MMHG | HEIGHT: 62 IN | HEART RATE: 84 BPM | WEIGHT: 215 LBS

## 2018-08-23 DIAGNOSIS — K57.20 DIVERTICULITIS OF LARGE INTESTINE WITH ABSCESS WITHOUT BLEEDING: Primary | ICD-10-CM

## 2018-08-23 DIAGNOSIS — K57.20 ABSCESS OF SIGMOID COLON DUE TO DIVERTICULITIS: ICD-10-CM

## 2018-08-23 LAB
ALBUMIN SERPL-MCNC: 2.7 G/DL (ref 3.4–5)
ALP SERPL-CCNC: 54 IU/L (ref 35–126)
ALT SERPL-CCNC: 16 IU/L (ref 11–54)
ANION GAP SERPL CALC-SCNC: 10 MEQ/L (ref 3–15)
AST SERPL-CCNC: 21 IU/L (ref 15–41)
BASOPHILS # BLD: 0.04 K/UL (ref 0.01–0.1)
BASOPHILS NFR BLD: 0.3 %
BILIRUB SERPL-MCNC: 0.8 MG/DL (ref 0.3–1.2)
BUN SERPL-MCNC: 15 MG/DL (ref 8–20)
CALCIUM SERPL-MCNC: 8.5 MG/DL (ref 8.9–10.3)
CHLORIDE SERPL-SCNC: 103 MMOL/L (ref 98–109)
CO2 SERPL-SCNC: 22 MMOL/L (ref 22–32)
CREAT SERPL-MCNC: 0.5 MG/DL (ref 0.6–1.1)
DIFFERENTIAL METHOD BLD: ABNORMAL
EOSINOPHIL # BLD: 0.12 K/UL (ref 0.04–0.36)
EOSINOPHIL NFR BLD: 0.9 %
ERYTHROCYTE [DISTWIDTH] IN BLOOD BY AUTOMATED COUNT: 14.6 % (ref 11.7–14.4)
GFR SERPL CREATININE-BSD FRML MDRD: >60 ML/MIN/1.73M*2
GLUCOSE SERPL-MCNC: 120 MG/DL (ref 70–99)
HCT VFR BLDCO AUTO: 30.7 % (ref 35–45)
HGB BLD-MCNC: 10 G/DL (ref 11.8–15.7)
IMM GRANULOCYTES # BLD AUTO: 0.08 K/UL (ref 0–0.08)
IMM GRANULOCYTES NFR BLD AUTO: 0.6 %
INR PPP: 1.6 INR
LACTATE SERPL-SCNC: 0.8 MMOL/L (ref 0.4–2)
LYMPHOCYTES # BLD: 0.84 K/UL (ref 1.2–3.5)
LYMPHOCYTES NFR BLD: 6.2 %
MCH RBC QN AUTO: 28.2 PG (ref 28–33.2)
MCHC RBC AUTO-ENTMCNC: 32.6 G/DL (ref 32.2–35.5)
MCV RBC AUTO: 86.7 FL (ref 83–98)
MONOCYTES # BLD: 0.75 K/UL (ref 0.28–0.8)
MONOCYTES NFR BLD: 5.6 %
NEUTROPHILS # BLD: 11.63 K/UL (ref 1.7–7)
NEUTS SEG NFR BLD: 86.4 %
NRBC BLD-RTO: 0 %
PDW BLD AUTO: 9.3 FL (ref 9.4–12.3)
PLATELET # BLD AUTO: 581 K/UL (ref 150–369)
POTASSIUM SERPL-SCNC: 3.3 MMOL/L (ref 3.6–5.1)
PROT SERPL-MCNC: 6.6 G/DL (ref 6–8.2)
PROTHROMBIN TIME: 18.7 SEC. (ref 12.2–14.5)
RBC # BLD AUTO: 3.54 M/UL (ref 3.93–5.22)
SODIUM SERPL-SCNC: 135 MMOL/L (ref 136–144)
WBC # BLD AUTO: 13.46 K/UL (ref 3.8–10.5)

## 2018-08-23 PROCEDURE — 63600000 HC DRUGS/DETAIL CODE

## 2018-08-23 PROCEDURE — 85610 PROTHROMBIN TIME: CPT | Performed by: NURSE PRACTITIONER

## 2018-08-23 PROCEDURE — 63600000 HC DRUGS/DETAIL CODE: Performed by: PHYSICIAN ASSISTANT

## 2018-08-23 PROCEDURE — 83605 ASSAY OF LACTIC ACID: CPT | Performed by: PHYSICIAN ASSISTANT

## 2018-08-23 PROCEDURE — 25000000 HC PHARMACY GENERAL: Performed by: PHYSICIAN ASSISTANT

## 2018-08-23 PROCEDURE — 99285 EMERGENCY DEPT VISIT HI MDM: CPT | Mod: 25

## 2018-08-23 PROCEDURE — 87077 CULTURE AEROBIC IDENTIFY: CPT | Mod: 59 | Performed by: SURGERY

## 2018-08-23 PROCEDURE — 96375 TX/PRO/DX INJ NEW DRUG ADDON: CPT

## 2018-08-23 PROCEDURE — 87040 BLOOD CULTURE FOR BACTERIA: CPT | Performed by: PHYSICIAN ASSISTANT

## 2018-08-23 PROCEDURE — 96374 THER/PROPH/DIAG INJ IV PUSH: CPT

## 2018-08-23 PROCEDURE — 25800000 HC PHARMACY IV SOLUTIONS: Performed by: PHYSICIAN ASSISTANT

## 2018-08-23 PROCEDURE — 63600000 HC DRUGS/DETAIL CODE: Performed by: RADIOLOGY

## 2018-08-23 PROCEDURE — 25000000 HC PHARMACY GENERAL: Performed by: RADIOLOGY

## 2018-08-23 PROCEDURE — 12000000 HC ROOM AND CARE MED/SURG

## 2018-08-23 PROCEDURE — 0W9G3ZZ DRAINAGE OF PERITONEAL CAVITY, PERCUTANEOUS APPROACH: ICD-10-PCS | Performed by: RADIOLOGY

## 2018-08-23 PROCEDURE — 80053 COMPREHEN METABOLIC PANEL: CPT | Performed by: PHYSICIAN ASSISTANT

## 2018-08-23 PROCEDURE — 25800000 HC PHARMACY IV SOLUTIONS: Performed by: NURSE PRACTITIONER

## 2018-08-23 PROCEDURE — 63700000 HC SELF-ADMINISTRABLE DRUG: Performed by: SURGERY

## 2018-08-23 PROCEDURE — 27200000 IR TECHNICAL ASSISTANCE

## 2018-08-23 PROCEDURE — 36415 COLL VENOUS BLD VENIPUNCTURE: CPT | Performed by: NURSE PRACTITIONER

## 2018-08-23 PROCEDURE — 85025 COMPLETE CBC W/AUTO DIFF WBC: CPT | Performed by: PHYSICIAN ASSISTANT

## 2018-08-23 PROCEDURE — 36100360 CT GUIDED ABSCESS DRAIN

## 2018-08-23 RX ORDER — IBUPROFEN 200 MG
16-32 TABLET ORAL AS NEEDED
Status: DISCONTINUED | OUTPATIENT
Start: 2018-08-23 | End: 2018-08-27 | Stop reason: HOSPADM

## 2018-08-23 RX ORDER — HYDROMORPHONE HYDROCHLORIDE 1 MG/ML
.5-1 INJECTION, SOLUTION INTRAMUSCULAR; INTRAVENOUS; SUBCUTANEOUS
Status: DISCONTINUED | OUTPATIENT
Start: 2018-08-23 | End: 2018-08-27 | Stop reason: HOSPADM

## 2018-08-23 RX ORDER — DEXTROSE 40 %
15-30 GEL (GRAM) ORAL AS NEEDED
Status: DISCONTINUED | OUTPATIENT
Start: 2018-08-23 | End: 2018-08-27 | Stop reason: HOSPADM

## 2018-08-23 RX ORDER — METRONIDAZOLE 500 MG/100ML
500 INJECTION, SOLUTION INTRAVENOUS ONCE
Status: COMPLETED | OUTPATIENT
Start: 2018-08-23 | End: 2018-08-23

## 2018-08-23 RX ORDER — MEPERIDINE HYDROCHLORIDE 25 MG/ML
25 INJECTION INTRAMUSCULAR; INTRAVENOUS; SUBCUTANEOUS ONCE
Status: DISCONTINUED | OUTPATIENT
Start: 2018-08-23 | End: 2018-08-23

## 2018-08-23 RX ORDER — ONDANSETRON HYDROCHLORIDE 2 MG/ML
4 INJECTION, SOLUTION INTRAVENOUS ONCE
Status: COMPLETED | OUTPATIENT
Start: 2018-08-23 | End: 2018-08-23

## 2018-08-23 RX ORDER — ACETAMINOPHEN 650 MG/1
650 SUPPOSITORY RECTAL EVERY 4 HOURS PRN
Status: DISCONTINUED | OUTPATIENT
Start: 2018-08-23 | End: 2018-08-27 | Stop reason: HOSPADM

## 2018-08-23 RX ORDER — MAGNESIUM SULFATE HEPTAHYDRATE 40 MG/ML
2 INJECTION, SOLUTION INTRAVENOUS AS NEEDED
Status: DISCONTINUED | OUTPATIENT
Start: 2018-08-23 | End: 2018-08-27 | Stop reason: HOSPADM

## 2018-08-23 RX ORDER — POTASSIUM CHLORIDE 14.9 MG/ML
20 INJECTION INTRAVENOUS AS NEEDED
Status: DISCONTINUED | OUTPATIENT
Start: 2018-08-23 | End: 2018-08-27 | Stop reason: HOSPADM

## 2018-08-23 RX ORDER — HYDROMORPHONE HYDROCHLORIDE 1 MG/ML
.25-.5 INJECTION, SOLUTION INTRAMUSCULAR; INTRAVENOUS; SUBCUTANEOUS
Status: DISCONTINUED | OUTPATIENT
Start: 2018-08-23 | End: 2018-08-23

## 2018-08-23 RX ORDER — HYDROMORPHONE HYDROCHLORIDE 1 MG/ML
INJECTION, SOLUTION INTRAMUSCULAR; INTRAVENOUS; SUBCUTANEOUS
Status: COMPLETED
Start: 2018-08-23 | End: 2018-08-23

## 2018-08-23 RX ORDER — DEXTROSE 50 % IN WATER (D50W) INTRAVENOUS SYRINGE
25 AS NEEDED
Status: DISCONTINUED | OUTPATIENT
Start: 2018-08-23 | End: 2018-08-27 | Stop reason: HOSPADM

## 2018-08-23 RX ORDER — MORPHINE SULFATE 4 MG/ML
4 INJECTION, SOLUTION INTRAMUSCULAR; INTRAVENOUS ONCE
Status: COMPLETED | OUTPATIENT
Start: 2018-08-23 | End: 2018-08-23

## 2018-08-23 RX ORDER — LIDOCAINE HYDROCHLORIDE 10 MG/ML
INJECTION, SOLUTION INFILTRATION; PERINEURAL
Status: COMPLETED | OUTPATIENT
Start: 2018-08-23 | End: 2018-08-23

## 2018-08-23 RX ORDER — BISACODYL 10 MG/1
10 SUPPOSITORY RECTAL DAILY PRN
Status: DISCONTINUED | OUTPATIENT
Start: 2018-08-23 | End: 2018-08-27 | Stop reason: HOSPADM

## 2018-08-23 RX ORDER — IRBESARTAN 150 MG/1
150 TABLET ORAL 2 TIMES DAILY
COMMUNITY

## 2018-08-23 RX ORDER — DEXTROSE MONOHYDRATE, SODIUM CHLORIDE, AND POTASSIUM CHLORIDE 50; 1.49; 4.5 G/1000ML; G/1000ML; G/1000ML
INJECTION, SOLUTION INTRAVENOUS CONTINUOUS
Status: ACTIVE | OUTPATIENT
Start: 2018-08-23 | End: 2018-08-24

## 2018-08-23 RX ORDER — HYDROMORPHONE HYDROCHLORIDE 2 MG/1
2 TABLET ORAL
Status: DISCONTINUED | OUTPATIENT
Start: 2018-08-23 | End: 2018-08-23 | Stop reason: HOSPADM

## 2018-08-23 RX ORDER — SODIUM CHLORIDE 9 MG/ML
40 INJECTION, SOLUTION INTRAVENOUS CONTINUOUS
Status: DISCONTINUED | OUTPATIENT
Start: 2018-08-23 | End: 2018-08-23 | Stop reason: HOSPADM

## 2018-08-23 RX ORDER — POTASSIUM CHLORIDE 750 MG/1
20 TABLET, FILM COATED, EXTENDED RELEASE ORAL AS NEEDED
Status: DISCONTINUED | OUTPATIENT
Start: 2018-08-23 | End: 2018-08-27 | Stop reason: HOSPADM

## 2018-08-23 RX ORDER — CIPROFLOXACIN 2 MG/ML
400 INJECTION, SOLUTION INTRAVENOUS ONCE
Status: COMPLETED | OUTPATIENT
Start: 2018-08-23 | End: 2018-08-23

## 2018-08-23 RX ORDER — MEPERIDINE HYDROCHLORIDE 25 MG/ML
25 INJECTION INTRAMUSCULAR; INTRAVENOUS; SUBCUTANEOUS ONCE
Status: COMPLETED | OUTPATIENT
Start: 2018-08-23 | End: 2018-08-23

## 2018-08-23 RX ORDER — ONDANSETRON HYDROCHLORIDE 2 MG/ML
4 INJECTION, SOLUTION INTRAVENOUS EVERY 8 HOURS PRN
Status: DISCONTINUED | OUTPATIENT
Start: 2018-08-23 | End: 2018-08-27 | Stop reason: HOSPADM

## 2018-08-23 RX ORDER — DIPHENHYDRAMINE HYDROCHLORIDE 50 MG/ML
25 INJECTION INTRAMUSCULAR; INTRAVENOUS EVERY 6 HOURS PRN
Status: DISCONTINUED | OUTPATIENT
Start: 2018-08-23 | End: 2018-08-27 | Stop reason: HOSPADM

## 2018-08-23 RX ORDER — POTASSIUM CHLORIDE 750 MG/1
40 TABLET, FILM COATED, EXTENDED RELEASE ORAL AS NEEDED
Status: DISCONTINUED | OUTPATIENT
Start: 2018-08-23 | End: 2018-08-27 | Stop reason: HOSPADM

## 2018-08-23 RX ORDER — FENTANYL CITRATE 50 UG/ML
INJECTION, SOLUTION INTRAMUSCULAR; INTRAVENOUS
Status: COMPLETED | OUTPATIENT
Start: 2018-08-23 | End: 2018-08-23

## 2018-08-23 RX ADMIN — METRONIDAZOLE 500 MG: 500 INJECTION, SOLUTION INTRAVENOUS at 19:26

## 2018-08-23 RX ADMIN — ONDANSETRON 4 MG: 2 INJECTION INTRAMUSCULAR; INTRAVENOUS at 19:21

## 2018-08-23 RX ADMIN — FENTANYL CITRATE 50 MCG: 50 INJECTION INTRAMUSCULAR; INTRAVENOUS at 12:52

## 2018-08-23 RX ADMIN — LIDOCAINE HYDROCHLORIDE 5 ML: 10 INJECTION, SOLUTION INFILTRATION; PERINEURAL at 13:04

## 2018-08-23 RX ADMIN — MORPHINE SULFATE 4 MG: 4 INJECTION, SOLUTION INTRAMUSCULAR; INTRAVENOUS at 19:21

## 2018-08-23 RX ADMIN — FENTANYL CITRATE 50 MCG: 50 INJECTION INTRAMUSCULAR; INTRAVENOUS at 13:15

## 2018-08-23 RX ADMIN — SODIUM CHLORIDE 40 ML/HR: 9 INJECTION, SOLUTION INTRAVENOUS at 10:51

## 2018-08-23 RX ADMIN — PIPERACILLIN AND TAZOBACTAM 3.38 G: 3; .375 INJECTION, POWDER, LYOPHILIZED, FOR SOLUTION INTRAVENOUS; PARENTERAL at 22:42

## 2018-08-23 RX ADMIN — HYDROMORPHONE HYDROCHLORIDE 2 MG: 2 TABLET ORAL at 15:29

## 2018-08-23 RX ADMIN — CIPROFLOXACIN 400 MG: 2 INJECTION, SOLUTION INTRAVENOUS at 20:37

## 2018-08-23 RX ADMIN — MEPERIDINE HYDROCHLORIDE 25 MG: 25 INJECTION, SOLUTION INTRAMUSCULAR; INTRAVENOUS; SUBCUTANEOUS at 16:34

## 2018-08-23 RX ADMIN — HYDROMORPHONE HYDROCHLORIDE 0.5 MG: 1 INJECTION, SOLUTION INTRAMUSCULAR; INTRAVENOUS; SUBCUTANEOUS at 20:40

## 2018-08-23 RX ADMIN — LIDOCAINE HYDROCHLORIDE 10 ML: 10 INJECTION, SOLUTION INFILTRATION; PERINEURAL at 12:54

## 2018-08-23 RX ADMIN — HYDROMORPHONE HYDROCHLORIDE 1 MG: 1 INJECTION, SOLUTION INTRAMUSCULAR; INTRAVENOUS; SUBCUTANEOUS at 22:59

## 2018-08-23 RX ADMIN — SODIUM CHLORIDE 1000 ML: 9 INJECTION, SOLUTION INTRAVENOUS at 19:26

## 2018-08-23 RX ADMIN — FENTANYL CITRATE 50 MCG: 50 INJECTION INTRAMUSCULAR; INTRAVENOUS at 13:00

## 2018-08-23 RX ADMIN — POTASSIUM CHLORIDE, DEXTROSE MONOHYDRATE AND SODIUM CHLORIDE: 150; 5; 450 INJECTION, SOLUTION INTRAVENOUS at 22:42

## 2018-08-23 ASSESSMENT — PAIN SCALES - GENERAL: PAINLEVEL_OUTOF10: 7

## 2018-08-23 ASSESSMENT — ENCOUNTER SYMPTOMS
WEAKNESS: 0
LIGHT-HEADEDNESS: 0
SHORTNESS OF BREATH: 0
VOMITING: 0
DIZZINESS: 0
DIAPHORESIS: 1
COLOR CHANGE: 0
APPETITE CHANGE: 1
CONFUSION: 0
ABDOMINAL PAIN: 0
HEADACHES: 0
CHILLS: 1
NAUSEA: 1
FEVER: 1

## 2018-08-23 ASSESSMENT — COGNITIVE AND FUNCTIONAL STATUS - GENERAL
STANDING UP FROM CHAIR USING ARMS: 4 - NONE
DRESSING REGULAR LOWER BODY CLOTHING: 4 - NONE
MOVING TO AND FROM BED TO CHAIR: 3 - A LITTLE
EATING MEALS: 4 - NONE
CLIMB 3 TO 5 STEPS WITH RAILING: 4 - NONE
WALKING IN HOSPITAL ROOM: 4 - NONE
DRESSING REGULAR UPPER BODY CLOTHING: 4 - NONE
HELP NEEDED FOR BATHING: 4 - NONE
TOILETING: 4 - NONE
HELP NEEDED FOR PERSONAL GROOMING: 4 - NONE

## 2018-08-23 NOTE — PROGRESS NOTES
Pt seen bedside for fevers, chills, and rigors. Fevers ran up to 102 F, 3 hours post procedure. Demerol was given with mild relief however fevers and rigors returned. Given time frame of symptoms outside of normal window of transient bacteremia, without any antiobiotic course currently, and with risk for potential sepsis these findings were d/w primary Dr. Trujillo and plan was made to send pt to ED for admission and likely IV ABx treatment.

## 2018-08-23 NOTE — PERIOPERATIVE NURSING NOTE
Dr. Real in to see pt and will reassess in next half hour, will talk to Dr. Jeronimo Trujillo and discuss plan and option

## 2018-08-23 NOTE — NURSING NOTE
Dr. Real in to see patient having shaking chills, temp 102.5 Temporal, Demerol 12.5mg IV given slowly, will repeat 12.5mg IV slowly for a total of 25mg IV via left 20gml

## 2018-08-23 NOTE — ED PROVIDER NOTES
HPI     Chief Complaint   Patient presents with   • Fever     68 y.o. female presents to ED c/o fever x today.  Pt states she was in for diverticulitis 7/7/18 had drained placed but it was removed 7/21/18. Pt was constipated 5 days ago and had CT done 2 days ago. Pt  had drain placed today for  abscess that is draining pus. Dr. Trujillo wants pt to have IV abx. Pt states since  Surgery in July she gets night sweats and regularly has a fever between 100 and 102. Admits to nausea, fever, chills, and  decreased appetite. Pt last prescribed abx were Fluconazol and Doxy. Denies cp, SOB, diarrhea, back/neck pain, HA, dizziness, light headedness.    Surgeon - Dr. Trujillo          History provided by:  Patient   used: No    Fever   Severity:  Mild  Onset quality:  Sudden  Timing:  Constant  Progression:  Unchanged  Chronicity:  New  Relieved by:  Nothing  Worsened by:  Nothing  Ineffective treatments:  None tried  Associated symptoms: chills and nausea    Associated symptoms: no chest pain, no confusion, no congestion, no headaches, no rash and no vomiting         Patient History     Past Medical History:   Diagnosis Date   • Lopez's esophagus    • Diverticulitis    • Diverticulitis of colon     [facility], drain removed 7/22/19   • Hypertension    • Knee pain, bilateral    • Left ankle pain    • Tran-Juan tear    • Pleural effusion        Past Surgical History:   Procedure Laterality Date   • CHOLECYSTECTOMY     • KNEE ARTHROSCOPY     • TRANSUMBILICAL AUGMENTATION MAMMAPLASTY         History reviewed. No pertinent family history.    Social History   Substance Use Topics   • Smoking status: Former Smoker   • Smokeless tobacco: Never Used      Comment: quit 18 years ago   • Alcohol use No      Comment: occasional       Systems Reviewed from Nursing Triage:          Review of Systems     Review of Systems   Constitutional: Positive for appetite change, chills,  "diaphoresis and fever.   HENT: Negative for congestion.    Eyes: Negative for visual disturbance.   Respiratory: Negative for shortness of breath.    Cardiovascular: Negative for chest pain.   Gastrointestinal: Positive for nausea. Negative for abdominal pain and vomiting.   Skin: Negative for color change and rash.   Neurological: Negative for dizziness, weakness, light-headedness and headaches.   Psychiatric/Behavioral: Negative for confusion.        Physical Exam     ED Triage Vitals   Temp Heart Rate Resp BP SpO2   08/23/18 1814 08/23/18 1814 08/23/18 1814 08/23/18 1814 08/23/18 1814   37.8 °C (100 °F) 89 18 (!) 177/76 96 %      Temp Source Heart Rate Source Patient Position BP Location FiO2 (%) (Set)   08/23/18 1814 08/23/18 1814 08/23/18 1931 08/23/18 1931 --   Temporal Monitor Lying Right upper arm                      Patient Vitals for the past 24 hrs:   BP Temp Temp src Pulse Resp SpO2 Height Weight   08/23/18 1931 (!) 159/69 - - 84 18 99 % - -   08/23/18 1814 (!) 177/76 37.8 °C (100 °F) Temporal 89 18 96 % 1.575 m (5' 2\") 97.5 kg (215 lb)           Physical Exam   Constitutional: She is oriented to person, place, and time. She appears well-developed and well-nourished. No distress.   HENT:   Head: Normocephalic and atraumatic.   Eyes: Conjunctivae and EOM are normal.   Neck: Normal range of motion. Neck supple.   Cardiovascular: Normal rate and regular rhythm.    Pulmonary/Chest: Effort normal and breath sounds normal.   Abdominal: Soft. There is tenderness (generalized). There is no rebound and no guarding.   Musculoskeletal: Normal range of motion.   Neurological: She is alert and oriented to person, place, and time. No cranial nerve deficit.   Skin: Skin is warm and dry.   Psychiatric: She has a normal mood and affect. Her behavior is normal.   Nursing note and vitals reviewed.           Procedures    ED Course & MDM     Labs Reviewed   COMPREHENSIVE METABOLIC PANEL - Abnormal        Result Value    " Sodium 135 (*)     Potassium 3.3 (*)     Chloride 103      CO2 22      BUN 15      Creatinine 0.5 (*)     Glucose 120 (*)     Calcium 8.5 (*)     AST (SGOT) 21      ALT (SGPT) 16      Alkaline Phosphatase 54      Total Protein 6.6      Albumin 2.7 (*)     Bilirubin, Total 0.8      eGFR >60.0      Anion Gap 10     CBC - Abnormal     WBC 13.46 (*)     RBC 3.54 (*)     Hemoglobin 10.0 (*)     Hematocrit 30.7 (*)     MCV 86.7      MCH 28.2      MCHC 32.6      RDW 14.6 (*)     Platelets 581 (*)     MPV 9.3 (*)    DIFF COUNT - Abnormal     Differential Type Auto      nRBC 0.0      Immature Granulocytes 0.6      Neutrophils 86.4      Lymphocytes 6.2      Monocytes 5.6      Eosinophils 0.9      Basophils 0.3      Immature Granulocytes, Absolute 0.08      Neutrophils, Absolute 11.63 (*)     Lymphocytes, Absolute 0.84 (*)     Monocytes, Absolute 0.75      Eosinophils, Absolute 0.12      Basophils, Absolute 0.04     LACTATE, VENOUS - Normal    Lactate 0.8     BLOOD CULTURE    Narrative:     The following orders were created for panel order Blood culture.  Procedure                               Abnormality         Status                     ---------                               -----------         ------                     Blood Culture[[id number]]                                     In process                   Please view results for these tests on the individual orders.   BLOOD CULTURE    Narrative:     The following orders were created for panel order Blood culture.  Procedure                               Abnormality         Status                     ---------                               -----------         ------                     Blood Culture[[id number]]                                     In process                   Please view results for these tests on the individual orders.   BLOOD CULTURE   BLOOD CULTURE   CBC AND DIFFERENTIAL    Narrative:     The following orders were created for panel order CBC and  differential.  Procedure                               Abnormality         Status                     ---------                               -----------         ------                     CBC[[id number]]                           Abnormal            Final result               Diff Count[[id number]]                    Abnormal            Final result                 Please view results for these tests on the individual orders.   RAINBOW DRAW PANEL    Narrative:     The following orders were created for panel order [place] Draw Panel.  Procedure                               Abnormality         Status                     ---------                               -----------         ------                     RAINBOW RED[[id number]]                                       In process                 RAINBOW LT BLUE[[id number]]                                   In process                 RAINBOW GOLD[[id number]]                                      In process                   Please view results for these tests on the individual orders.   RAINBOW RED   RAINBOW LT BLUE   RAINBOW GOLD       No orders to display           MDM         ED Course as of Aug 23 2001   Thu Aug 23, 2018   1958 IV cipro and flagyl given in ED.  Dr. Brooks spoke with Dr. Sal who agrees with treatment plan. Discussed case with trauma PA for admission.  [KK]      ED Course User Index  [KK] Hannah Ramirez PA         Clinical Impressions as of Aug 23 2001   Diverticulitis of large intestine with abscess without bleeding                By signing my name below, ILilliana, attest that this documentation has been prepared under the direction and in the presence of BHARATH Ramirez PA-C.    8/23/2018 6:18 PM      Hannah VILLARREAL, personally performed the services described in this documentation, as documented by the scribe in my presence, and it is both accurate and complete.  8/23/2018 8:01 PM         Lilliana Coburn  08/23/18 1844       Lilliana Coburn  08/23/18  1848       Hannah Ramirez, PA  08/23/18 2003

## 2018-08-23 NOTE — NURSING NOTE
Dr. Real in to see patient. Pt to be admitted via ED. Report called to ED and given to Octavia, clinical lead, Dr. Real called and asked to call ED and give report to ED

## 2018-08-23 NOTE — ED PROVIDER NOTES
"HPI     Chief Complaint   Patient presents with   • Fever       HPI     Patient History     Past Medical History:   Diagnosis Date   • Lopez's esophagus    • Diverticulitis    • Diverticulitis of colon     [facility], drain removed 7/22/19   • Hypertension    • Knee pain, bilateral    • Left ankle pain    • Tran-Juan tear    • Pleural effusion        Past Surgical History:   Procedure Laterality Date   • CHOLECYSTECTOMY     • KNEE ARTHROSCOPY     • TRANSUMBILICAL AUGMENTATION MAMMAPLASTY         History reviewed. No pertinent family history.    Social History   Substance Use Topics   • Smoking status: Former Smoker   • Smokeless tobacco: Never Used      Comment: quit 18 years ago   • Alcohol use No      Comment: occasional       Systems Reviewed from Nursing Triage:          Review of Systems     Review of Systems     Physical Exam     ED Triage Vitals [08/23/18 1814]   Temp Heart Rate Resp BP SpO2   37.8 °C (100 °F) 89 18 (!) 177/76 96 %      Temp Source Heart Rate Source Patient Position BP Location FiO2 (%) (Set)   Temporal Monitor -- -- --                     Patient Vitals for the past 24 hrs:   BP Temp Temp src Pulse Resp SpO2 Height Weight   08/23/18 1814 (!) 177/76 37.8 °C (100 °F) Temporal 89 18 96 % 1.575 m (5' 2\") 97.5 kg (215 lb)           Physical Exam         Procedures    ED Course & MDM     Labs Reviewed   CBC - Abnormal        Result Value    WBC 13.46 (*)     RBC 3.54 (*)     Hemoglobin 10.0 (*)     Hematocrit 30.7 (*)     MCV 86.7      MCH 28.2      MCHC 32.6      RDW 14.6 (*)     Platelets 581 (*)     MPV 9.3 (*)    DIFF COUNT - Abnormal     Differential Type Auto      nRBC 0.0      Immature Granulocytes 0.6      Neutrophils 86.4      Lymphocytes 6.2      Monocytes 5.6      Eosinophils 0.9      Basophils 0.3      Immature Granulocytes, Absolute 0.08      Neutrophils, Absolute 11.63 (*)     Lymphocytes, Absolute 0.84 (*)     Monocytes, Absolute " 0.75      Eosinophils, Absolute 0.12      Basophils, Absolute 0.04     LACTATE, VENOUS - Normal    Lactate 0.8     BLOOD CULTURE    Narrative:     The following orders were created for panel order Blood culture.  Procedure                               Abnormality         Status                     ---------                               -----------         ------                     Blood Culture[[id number]]                                     In process                   Please view results for these tests on the individual orders.   BLOOD CULTURE    Narrative:     The following orders were created for panel order Blood culture.  Procedure                               Abnormality         Status                     ---------                               -----------         ------                     Blood Culture[[id number]]                                                                  Please view results for these tests on the individual orders.   BLOOD CULTURE   BLOOD CULTURE   CBC AND DIFFERENTIAL    Narrative:     The following orders were created for panel order CBC and differential.  Procedure                               Abnormality         Status                     ---------                               -----------         ------                     CBC[[id number]]                           Abnormal            Final result               Diff Count[[id number]]                    Abnormal            Final result                 Please view results for these tests on the individual orders.   COMPREHENSIVE METABOLIC PANEL   RAINBOW DRAW PANEL    Narrative:     The following orders were created for panel order [place] Draw Panel.  Procedure                               Abnormality         Status                     ---------                               -----------         ------                     RAINBOW RED[[id number]]                                       In process                 RAINBOW LT BLUE[[id number]]                                    In process                 RAINBOW GOLD[[id number]]                                      In process                   Please view results for these tests on the individual orders.   RAINBOW RED   RAINBOW LT BLUE   RAINBOW GOLD       No orders to display           MDM

## 2018-08-23 NOTE — ED ATTESTATION NOTE
Procedures  Physical Exam  Review of Systems    8/23/20186:53 PM  I have personally seen and examined the patient. I have reviewed and agree with the PA/NP/Resident's assessment and ED plan of care. My examination, assessment and plan of care of Reyna Castillo is as follows:    Patient is a 68-year-old female who presents with fever and abdominal pain, patient has a history of diverticulitis with abscess formation, patient was in IR today for placement of IR drainage catheter when she was noted to have a high fever, the catheter was able to be placed and the patient has no draining abscess fluid, patient reports she has been running fevers for the last several days    Exam:   Heart: RRR, normal S1/S2  Lungs: CTA bilaterally  Abdo: soft, mild diffuse tenderness    Plan: Patient is a 68-year-old female who presents with fever and abdominal discomfort, checking labs and will reevaluate afterwards          Godshall, Duane K, MD  08/23/18 7393

## 2018-08-24 LAB
ANION GAP SERPL CALC-SCNC: 8 MEQ/L (ref 3–15)
BASOPHILS # BLD: 0.04 K/UL (ref 0.01–0.1)
BASOPHILS NFR BLD: 0.3 %
BUN SERPL-MCNC: 11 MG/DL (ref 8–20)
CALCIUM SERPL-MCNC: 8.4 MG/DL (ref 8.9–10.3)
CHLORIDE SERPL-SCNC: 104 MMOL/L (ref 98–109)
CO2 SERPL-SCNC: 23 MMOL/L (ref 22–32)
CREAT SERPL-MCNC: 0.6 MG/DL (ref 0.6–1.1)
CRP SERPL-MCNC: 86.8 MG/DL
DIFFERENTIAL METHOD BLD: ABNORMAL
EOSINOPHIL # BLD: 0.21 K/UL (ref 0.04–0.36)
EOSINOPHIL NFR BLD: 1.7 %
ERYTHROCYTE [DISTWIDTH] IN BLOOD BY AUTOMATED COUNT: 14.6 % (ref 11.7–14.4)
ERYTHROCYTE [SEDIMENTATION RATE] IN BLOOD BY WESTERGREN METHOD: 35 MM/HR
GFR SERPL CREATININE-BSD FRML MDRD: >60 ML/MIN/1.73M*2
GLUCOSE SERPL-MCNC: 119 MG/DL (ref 70–99)
HCT VFR BLDCO AUTO: 27.5 % (ref 35–45)
HGB BLD-MCNC: 8.8 G/DL (ref 11.8–15.7)
IMM GRANULOCYTES # BLD AUTO: 0.06 K/UL (ref 0–0.08)
IMM GRANULOCYTES NFR BLD AUTO: 0.5 %
LYMPHOCYTES # BLD: 1.02 K/UL (ref 1.2–3.5)
LYMPHOCYTES NFR BLD: 8.4 %
MCH RBC QN AUTO: 28.2 PG (ref 28–33.2)
MCHC RBC AUTO-ENTMCNC: 32 G/DL (ref 32.2–35.5)
MCV RBC AUTO: 88.1 FL (ref 83–98)
MONOCYTES # BLD: 0.72 K/UL (ref 0.28–0.8)
MONOCYTES NFR BLD: 5.9 %
NEUTROPHILS # BLD: 10.09 K/UL (ref 1.7–7)
NEUTS SEG NFR BLD: 83.2 %
NRBC BLD-RTO: 0 %
PDW BLD AUTO: 9.3 FL (ref 9.4–12.3)
PLATELET # BLD AUTO: 492 K/UL (ref 150–369)
POTASSIUM SERPL-SCNC: 3.9 MMOL/L (ref 3.6–5.1)
RAINBOW HOLD SPECIMEN: NORMAL
RBC # BLD AUTO: 3.12 M/UL (ref 3.93–5.22)
SODIUM SERPL-SCNC: 135 MMOL/L (ref 136–144)
WBC # BLD AUTO: 12.14 K/UL (ref 3.8–10.5)

## 2018-08-24 PROCEDURE — 36415 COLL VENOUS BLD VENIPUNCTURE: CPT | Performed by: PHYSICIAN ASSISTANT

## 2018-08-24 PROCEDURE — 25000000 HC PHARMACY GENERAL: Performed by: SURGERY

## 2018-08-24 PROCEDURE — 63700000 HC SELF-ADMINISTRABLE DRUG: Performed by: SURGERY

## 2018-08-24 PROCEDURE — 80048 BASIC METABOLIC PNL TOTAL CA: CPT | Performed by: PHYSICIAN ASSISTANT

## 2018-08-24 PROCEDURE — 63600000 HC DRUGS/DETAIL CODE: Performed by: INTERNAL MEDICINE

## 2018-08-24 PROCEDURE — 85652 RBC SED RATE AUTOMATED: CPT | Performed by: INTERNAL MEDICINE

## 2018-08-24 PROCEDURE — 25800000 HC PHARMACY IV SOLUTIONS: Performed by: PHYSICIAN ASSISTANT

## 2018-08-24 PROCEDURE — 12000000 HC ROOM AND CARE MED/SURG

## 2018-08-24 PROCEDURE — 63600000 HC DRUGS/DETAIL CODE: Performed by: SURGERY

## 2018-08-24 PROCEDURE — 85025 COMPLETE CBC W/AUTO DIFF WBC: CPT | Performed by: PHYSICIAN ASSISTANT

## 2018-08-24 PROCEDURE — 63600000 HC DRUGS/DETAIL CODE: Performed by: PHYSICIAN ASSISTANT

## 2018-08-24 PROCEDURE — 86140 C-REACTIVE PROTEIN: CPT | Performed by: INTERNAL MEDICINE

## 2018-08-24 RX ORDER — ROSUVASTATIN CALCIUM 20 MG/1
20 TABLET, COATED ORAL DAILY
Status: DISCONTINUED | OUTPATIENT
Start: 2018-08-24 | End: 2018-08-27 | Stop reason: HOSPADM

## 2018-08-24 RX ORDER — VALSARTAN 80 MG/1
160 TABLET ORAL
Status: DISCONTINUED | OUTPATIENT
Start: 2018-08-24 | End: 2018-08-24

## 2018-08-24 RX ORDER — VANCOMYCIN HYDROCHLORIDE
1.25
Status: DISCONTINUED | OUTPATIENT
Start: 2018-08-24 | End: 2018-08-26

## 2018-08-24 RX ORDER — ASPIRIN 81 MG/1
81 TABLET ORAL DAILY
Status: DISCONTINUED | OUTPATIENT
Start: 2018-08-24 | End: 2018-08-27 | Stop reason: HOSPADM

## 2018-08-24 RX ORDER — FENOFIBRATE 67 MG/1
67 CAPSULE ORAL
Status: DISCONTINUED | OUTPATIENT
Start: 2018-08-24 | End: 2018-08-27 | Stop reason: HOSPADM

## 2018-08-24 RX ORDER — PANTOPRAZOLE SODIUM 40 MG/1
40 TABLET, DELAYED RELEASE ORAL DAILY
Status: DISCONTINUED | OUTPATIENT
Start: 2018-08-24 | End: 2018-08-27 | Stop reason: HOSPADM

## 2018-08-24 RX ORDER — LOSARTAN POTASSIUM 100 MG/1
100 TABLET ORAL DAILY
Status: DISCONTINUED | OUTPATIENT
Start: 2018-08-24 | End: 2018-08-24 | Stop reason: CLARIF

## 2018-08-24 RX ORDER — KETOROLAC TROMETHAMINE 30 MG/ML
30 INJECTION, SOLUTION INTRAMUSCULAR; INTRAVENOUS EVERY 6 HOURS PRN
Status: DISCONTINUED | OUTPATIENT
Start: 2018-08-24 | End: 2018-08-24 | Stop reason: DRUGHIGH

## 2018-08-24 RX ORDER — LOSARTAN POTASSIUM 50 MG/1
50 TABLET ORAL DAILY
Status: DISCONTINUED | OUTPATIENT
Start: 2018-08-24 | End: 2018-08-27 | Stop reason: HOSPADM

## 2018-08-24 RX ORDER — SERTRALINE HYDROCHLORIDE 50 MG/1
50 TABLET, FILM COATED ORAL DAILY
Status: DISCONTINUED | OUTPATIENT
Start: 2018-08-24 | End: 2018-08-27 | Stop reason: HOSPADM

## 2018-08-24 RX ORDER — KETOROLAC TROMETHAMINE 15 MG/ML
15 INJECTION, SOLUTION INTRAMUSCULAR; INTRAVENOUS EVERY 6 HOURS PRN
Status: DISPENSED | OUTPATIENT
Start: 2018-08-24 | End: 2018-08-25

## 2018-08-24 RX ADMIN — Medication 1250 MG: at 20:34

## 2018-08-24 RX ADMIN — PIPERACILLIN AND TAZOBACTAM 3.38 G: 3; .375 INJECTION, POWDER, LYOPHILIZED, FOR SOLUTION INTRAVENOUS; PARENTERAL at 10:27

## 2018-08-24 RX ADMIN — ONDANSETRON 4 MG: 2 INJECTION INTRAMUSCULAR; INTRAVENOUS at 09:33

## 2018-08-24 RX ADMIN — Medication 1250 MG: at 09:33

## 2018-08-24 RX ADMIN — HYDROMORPHONE HYDROCHLORIDE 1 MG: 1 INJECTION, SOLUTION INTRAMUSCULAR; INTRAVENOUS; SUBCUTANEOUS at 09:34

## 2018-08-24 RX ADMIN — ASPIRIN 81 MG: 81 TABLET, COATED ORAL at 10:29

## 2018-08-24 RX ADMIN — SERTRALINE HYDROCHLORIDE 50 MG: 50 TABLET ORAL at 10:29

## 2018-08-24 RX ADMIN — PIPERACILLIN AND TAZOBACTAM 3.38 G: 3; .375 INJECTION, POWDER, LYOPHILIZED, FOR SOLUTION INTRAVENOUS; PARENTERAL at 15:51

## 2018-08-24 RX ADMIN — HYDROMORPHONE HYDROCHLORIDE 1 MG: 1 INJECTION, SOLUTION INTRAMUSCULAR; INTRAVENOUS; SUBCUTANEOUS at 01:47

## 2018-08-24 RX ADMIN — KETOROLAC TROMETHAMINE 15 MG: 15 INJECTION, SOLUTION INTRAMUSCULAR; INTRAVENOUS at 12:06

## 2018-08-24 RX ADMIN — FENOFIBRATE 67 MG: 67 CAPSULE ORAL at 10:28

## 2018-08-24 RX ADMIN — ROSUVASTATIN CALCIUM 20 MG: 20 TABLET, FILM COATED ORAL at 10:28

## 2018-08-24 RX ADMIN — PIPERACILLIN AND TAZOBACTAM 3.38 G: 3; .375 INJECTION, POWDER, LYOPHILIZED, FOR SOLUTION INTRAVENOUS; PARENTERAL at 23:12

## 2018-08-24 RX ADMIN — POTASSIUM CHLORIDE, DEXTROSE MONOHYDRATE AND SODIUM CHLORIDE: 150; 5; 450 INJECTION, SOLUTION INTRAVENOUS at 15:51

## 2018-08-24 RX ADMIN — HYDROMORPHONE HYDROCHLORIDE 1 MG: 1 INJECTION, SOLUTION INTRAMUSCULAR; INTRAVENOUS; SUBCUTANEOUS at 06:23

## 2018-08-24 RX ADMIN — PIPERACILLIN AND TAZOBACTAM 3.38 G: 3; .375 INJECTION, POWDER, LYOPHILIZED, FOR SOLUTION INTRAVENOUS; PARENTERAL at 04:21

## 2018-08-24 RX ADMIN — HYDROMORPHONE HYDROCHLORIDE 1 MG: 1 INJECTION, SOLUTION INTRAMUSCULAR; INTRAVENOUS; SUBCUTANEOUS at 19:39

## 2018-08-24 RX ADMIN — MULTIPLE VITAMINS W/ MINERALS TAB 1 TABLET: TAB at 10:28

## 2018-08-24 RX ADMIN — LOSARTAN POTASSIUM 50 MG: 50 TABLET, FILM COATED ORAL at 10:29

## 2018-08-24 RX ADMIN — HYDROMORPHONE HYDROCHLORIDE 1 MG: 1 INJECTION, SOLUTION INTRAMUSCULAR; INTRAVENOUS; SUBCUTANEOUS at 15:56

## 2018-08-24 RX ADMIN — KETOROLAC TROMETHAMINE 15 MG: 15 INJECTION, SOLUTION INTRAMUSCULAR; INTRAVENOUS at 19:39

## 2018-08-24 RX ADMIN — HYDROMORPHONE HYDROCHLORIDE 1 MG: 1 INJECTION, SOLUTION INTRAMUSCULAR; INTRAVENOUS; SUBCUTANEOUS at 23:14

## 2018-08-24 RX ADMIN — PANTOPRAZOLE SODIUM 40 MG: 40 TABLET, DELAYED RELEASE ORAL at 10:28

## 2018-08-24 NOTE — PLAN OF CARE
Problem: Patient Care Overview  Goal: Plan of Care Review  Outcome: Ongoing (interventions implemented as appropriate)   08/24/18 5295   Coping/Psychosocial   Plan Of Care Reviewed With patient   Plan of Care Review   Progress progress toward functional goals as expected   Outcome Summary Pt reassessment done. Pt complains of 2/10 R buttock pain near drainage site and lower abdomen/groin pain. Pt comfortable at this time. Pt drain draining small amount of creamy brown and serosang drainage. Pt had a BM this shift formed and loose. pt has ambulated around unit with her . No other changes in previous assessment.. Call bell within reach. Will continue to monitor.        Problem: Pain, Acute (Adult)  Goal: Identify Related Risk Factors and Signs and Symptoms  Outcome: Outcome(s) Achieved Date Met: 08/24/18    Goal: Acceptable Pain Control/Comfort Level  Outcome: Ongoing (interventions implemented as appropriate)      Problem: Fall Risk (Adult)  Goal: Identify Related Risk Factors and Signs and Symptoms  Outcome: Outcome(s) Achieved Date Met: 08/24/18    Goal: Absence of Falls  Outcome: Ongoing (interventions implemented as appropriate)

## 2018-08-24 NOTE — PROGRESS NOTES
Patient arrived on floor from [facility].  Percutaneous drain in right glute site is CDI, with cloudy, serosanguinous output.  Pain rated at 4/10. In bed with alarm on and call bell within reach.  Will continue to monitor.

## 2018-08-24 NOTE — H&P
General Surgery History and Physical    HPI     Patient is a 68 y.o. female known well to me from prior admission. She was admitted 2 Jul w/ complicated diverticulitis and abscess requiring IR drng.  Drain removed 22 Jul, cont on po abx.  Had f/u CT earlier this week pursuant to continued fevers and night sweats that showed recurrent abscess.  Had rpt IR drain yesterday and brisk chills and rigors thereafter-admitted for same.  No fever since admission.  Min abd pain, some constipation.    Medical History:   Past Medical History:   Diagnosis Date   • Lopez's esophagus    • Diverticulitis    • Diverticulitis of colon     [facility], drain removed 7/22/19   • Hypertension    • Knee pain, bilateral    • Left ankle pain    • Tran-Juan tear    • Pleural effusion    Hiatal Hernia    Surgical History:   Past Surgical History:   Procedure Laterality Date   • CHOLECYSTECTOMY     • KNEE ARTHROSCOPY     • TRANSUMBILICAL AUGMENTATION MAMMAPLASTY         Social History:   Social History     Social History Narrative   • No narrative on file       Family History: History reviewed. No pertinent family history.    Allergies: Nsaids (non-steroidal anti-inflammatory drug); Adhesive tape-silicones; and Latex    Home Medications:  •  aspirin, Take 81 mg by mouth daily.  •  fenofibrate, Take 48 mg by mouth daily.  •  irbesartan, Take 150 mg by mouth daily.  •  multivitamin, Take 1 capsule by mouth daily.  •  omeprazole, Take 40 mg by mouth daily before breakfast.  •  oxyCODONE-acetaminophen, Take 1 tablet by mouth every 4 (four) hours as needed.  •  rosuvastatin, Take 20 mg by mouth daily.  •  sertraline, Take 50 mg by mouth daily.  •  valsartan (DIOVAN ORAL), Take 160 mg by mouth daily with breakfast.      Current Medications:  •  acetaminophen, 650 mg, rectal, q4h PRN  •  aspirin, 81 mg, oral, Daily  •  bisacodyl, 10 mg, rectal, Daily PRN  •  calcium gluconate, 1 g, intravenous, q6h  PRN  •  glucose, 16-32 g of dextrose, oral, PRN **OR** dextrose, 15-30 g of dextrose, oral, PRN **OR** glucagon, 1 mg, intramuscular, PRN **OR** dextrose in water, 25 mL, intravenous, PRN  •  potassium chloride-D5-0.45%NaCl, , intravenous, Continuous  •  diphenhydrAMINE, 25 mg, intravenous, q6h PRN  •  fenofibrate micronized, 67 mg, oral, Daily with breakfast  •  HYDROmorphone, 0.5-1 mg, intravenous, q3h PRN  •  losartan, 100 mg, oral, Daily  •  magnesium sulfate, 2 g, intravenous, PRN  •  multivitamin, 1 tablet, oral, Daily  •  ondansetron, 4 mg, intravenous, q8h PRN  •  pantoprazole, 40 mg, oral, Daily  •  piperacillin-tazobactam, 3.375 g, intravenous, q6h INT  •  potassium chloride, 20 mEq, oral, PRN **OR** potassium chloride, 40 mEq, oral, PRN **OR** potassium chloride, 20 mEq, intravenous, PRN **OR** potassium chloride, 40 mEq, intravenous, PRN  •  rosuvastatin, 20 mg, oral, Daily  •  sertraline, 50 mg, oral, Daily  •  valsartan, 160 mg, oral, Daily with breakfast  •  vancomycin, 1.25 g, intravenous, q12h INT    Review of Systems  All other systems reviewed and negative except as noted in the HPI.    Objective     Vital Signs for the last 24 hours:  Temp:  [36.3 °C (97.3 °F)-39.2 °C (102.5 °F)] 37.2 °C (99 °F)  Heart Rate:  [52-90] 52  Resp:  [16-18] 17  BP: (117-177)/(59-89) 132/59    Physicial Exam    General appearance: alert, appears stated age and cooperative  Head: normocephalic, without obvious abnormality, atraumatic  Eyes: conjunctivae/corneas clear. PERRL, EOM's intact. Fundi benign.  Throat: lips, mucosa, and tongue normal; teeth and gums normal  Neck: no adenopathy, no carotid bruit, no JVD, supple, symmetrical, trachea midline and thyroid not enlarged, symmetric, no tenderness/mass/nodules  Lungs: clear to auscultation bilaterally  Heart: regular rate and rhythm, S1, S2 normal, no murmur, click, rub or gallop  Abdomen: soft, obese, min epigastric ttp, no masses  Rectal: deferred  Extremities:  extremities normal, warm and well-perfused; no cyanosis, clubbing, or edema  Neurologic: Alert and oriented X 3, normal strength and tone. Normal symmetric reflexes. Normal coordination and gait      Labs    Results from last 7 days  Lab Units 08/24/18  0300   WBC K/uL 12.14*   HEMOGLOBIN g/dL 8.8*   HEMATOCRIT % 27.5*   PLATELETS K/uL 492*       Results from last 7 days  Lab Units 08/24/18  0300 08/23/18  1829   SODIUM mmol/L 135* 135*   POTASSIUM mmol/L 3.9 3.3*   CHLORIDE mmol/L 104 103   CO2 mmol/L 23 22   BUN mg/dL 11 15   CREATININE mg/dL 0.6 0.5*   CALCIUM mg/dL 8.4* 8.5*   ALBUMIN g/dL  --  2.7*   BILIRUBIN TOTAL mg/dL  --  0.8   ALK PHOS IU/L  --  54   ALT IU/L  --  16   AST IU/L  --  21   GLUCOSE mg/dL 119* 120*       Results from last 7 days  Lab Units 08/23/18  1048   INR INR 1.6       0  Lab Value Date/Time   LIPASE 26 07/07/2018 0722       Results from last 7 days  Lab Units 08/24/18  0300   SODIUM mmol/L 135*   POTASSIUM mmol/L 3.9   CHLORIDE mmol/L 104   CO2 mmol/L 23   BUN mg/dL 11   CREATININE mg/dL 0.6   GLUCOSE mg/dL 119*   CALCIUM mg/dL 8.4*       Imaging  Ct Abdomen Pelvis With And Without Iv Contrast    Result Date: 8/3/2018  IMPRESSION: There has been significant improvement in the inflammation in the pelvis.  Small collection with extraluminal air and/or trace amount of contrast still seen measuring up to 2 cm previously 4.5cm.  Please see above comments. Other incidental findings as discussed.    Ct Guided Abscess Drain    Result Date: 8/23/2018  IMPRESSION: Technically successful transgluteal drainage of pelvic collection and placement of 10-Syriac Resolve drain.     Ct Abdomen Pelvis With Iv Contrast    Result Date: 8/21/2018  IMPRESSION: Progression of pelvic abscess from sigmoid diverticulitis.  Superimposed suspected partial small bowel obstruction. Discussed with Taryn Frias RN, at 11:20 AM on 8/21/2018.       Assessment: Continued diverticulitis w/ abscess s/p IR drain x  2    Plan: IV abx, CLs, cont IR drain for now.  Pt will need sigmoid colectomy, doesn't need emergent Mireles's.  Hopefully we can clear the abscess w/ IR drain and IV abx.  Discussed timing of elective sigmoid colectomy w/ pt.  Options are couple weeks IV abx and then OR....or IV then oral abx and then OR but pt has failed this once.    Code Status: Full [place]

## 2018-08-24 NOTE — PLAN OF CARE
Problem: Pain, Acute (Adult)  Intervention: Mutually Develop/Implement Acute Pain Management Plan   08/24/18 0018   Manage Acute Burn Pain   Pain Management Interventions pain management plan reviewed with patient/caregiver   Cognitive Interventions   Sensory Stimulation Regulation care clustered;quiet environment promoted

## 2018-08-24 NOTE — NURSING NOTE
Paged Dr. Vann about pt requesting more pain medication for pain 6/10 r buttock. Pt not due for dilaudid yet.

## 2018-08-24 NOTE — CONSULTS
Infectious Disease Consult Note    Patient Name: Reyna Castillo  MR#: [id number]  : 1950  Admission Date: 2018  Consult Date: 18 8:46 AM   Consultant: Dwayne Gonsales MD    Reason for Consult: reoccurance of diverticular abcess  Referring Provider: Sebastien Teague PA-C      History of Present Illness     Reyna Castillo is a 68 y.o. female who was admitted on 2018 with recurrent sigmoid diverticulitis with pelvic abscess.  An ID consultation was requested by Sebastien Teague PA-C.  The patient's history is significant for the fact that she had previously been admitted to [facility] on 2018 and was discharged home on 2018.  During that hospitalization she was found to have a pelvic abscess due to acute diverticulitis.  She underwent successful IR drain placement and was discharged home on doxycycline and fluconazole which she took for 1 month.  During that hospitalization she was treated with ciprofloxacin and metronidazole.  The patient pelvic abscess culture at that time grew Staphylococcus simulans which is a coagulase-negative Staphylococcus.  This is an unusual organism that would cause diverticulitis.  The sensitivities did prove to be susceptible to doxycycline.  While she was at home she was doing well and then eventually the drainage tube was removed on 2018 in the IR department.  Over the next several days she then started having fevers and chills at home associated with drenching night sweats.  The symptoms lasted for 3 weeks.  Her temperatures varied between 100.3-102.7°.  She had another CT scan of the abdomen and pelvis done on 2018 and this time she was found to have progression of pelvic abscess from sigmoid diverticulitis.  There was also a superimposed suspected partial small bowel obstruction.  She was brought back to the hospital yesterday and IR placed another drainage tube and the specimen was sent for cultures.  The patient notes that in July  "the fluid was clear and on 8/21/2018 the fluid was cloudy and thick.  So for the culture is pending but the Gram stain does show gram-positive cocci.  The patient has been started on piperacillin-tazobactam 3.375 g IV every 6 hours and I am adding vancomycin 1.25 g every 12 hours today.    Outside records were reviewed.    Allergies:   Allergies   Allergen Reactions   • Nsaids (Non-Steroidal Anti-Inflammatory Drug) GI intolerance     Per RN during rounds, pt NSAID \"allergy\" is upset stomach.   • Adhesive Tape-Silicones Rash   • Latex Rash       Medical History:   Past Medical History:   Diagnosis Date   • Lopez's esophagus    • Diverticulitis    • Diverticulitis of colon     admitted into [facility] 0707/18 for abscess drain placement, drain removed 7/22/19   • Hypertension    • Knee pain, bilateral    • Left ankle pain    • Tran-Juan tear    • Pleural effusion        Surgical History:   Past Surgical History:   Procedure Laterality Date   • CHOLECYSTECTOMY     • KNEE ARTHROSCOPY     • TRANSUMBILICAL AUGMENTATION MAMMAPLASTY         Social History:   Social History     Social History   • Marital status:      Spouse name: N/A   • Number of children: N/A   • Years of education: N/A     Social History Main Topics   • Smoking status: Former Smoker   • Smokeless tobacco: Never Used      Comment: quit 18 years ago   • Alcohol use No      Comment: occasional   • Drug use: No   • Sexual activity: Not Asked     Other Topics Concern   • None     Social History Narrative   • None          Travel Exposure:   Travel and Exposure Screening      Most Recent Value   Travel Screening   Traveled outside the [place] in the last month?  No Filed On: 08/23/2018 1820   Exposure Screening   Contact with someone with a communicable disease in the last month?  No Filed On: 08/23/2018 1820   Symptoms          Animal Exposure: None    Other Exposure: None    Family History: History reviewed. No pertinent family history.    Review of " Systems    All other systems reviewed and negative except as noted in the HPI.    Medications:    Current IP Meds         Frequency     vancomycin IVPB 1250 mg/250 mL NSS      Every 12 hours interval     HYDROmorphone (DILAUDID) 1 mg/mL injection 0.5-1 mg  (Analgesics - IV Opiates)      Every 3 hours PRN     dextrose 5 % and sodium chloride 0.45 % with KCl 20 mEq/L infusion      Continuous     piperacillin-tazobactam (ZOSYN) 3.375 g in sodium chloride 0.9 % 100 mL IVPB      Every 6 hours interval     glucose chewable tablet 16-32 g of dextrose  (Hypoglycemia Treatment Protocol and Hyperglycemia Validation Protocol)      As needed     dextrose 40 % oral gel 15-30 g of dextrose  (Hypoglycemia Treatment Protocol and Hyperglycemia Validation Protocol)      As needed     glucagon (GLUCAGEN) injection 1 mg  (Hypoglycemia Treatment Protocol and Hyperglycemia Validation Protocol)      As needed     dextrose in water injection 12.5 g  (Hypoglycemia Treatment Protocol and Hyperglycemia Validation Protocol)      As needed     potassium chloride (KLOR-CON) tablet extended release 20 mEq  (Potassium Replacement)      As needed     potassium chloride (KLOR-CON) tablet extended release 40 mEq  (Potassium Replacement)      As needed     potassium chloride 20 mEq in 100 mL IVPB  (premix)  (Potassium Replacement)      As needed     potassium chloride 40 mEq in sodium chloride 0.9 % 250 mL IVPB  (Potassium Replacement)      As needed     ondansetron (ZOFRAN) injection 4 mg  (Nausea/Vomiting)      Every 8 hours PRN     bisacodyl (DULCOLAX) 10 mg suppository 10 mg  (Constipation)      Daily PRN     diphenhydrAMINE (BENADRYL) injection 25 mg  (Itching/Pruritis)      Every 6 hours PRN     acetaminophen (TYLENOL) suppository 650 mg      Every 4 hours PRN     magnesium sulfate in water IVPB premix 2 g      As needed     calcium gluconate 1,000 mg in sodium chloride 0.9 % 50 mL IVPB      Every 6 hours PRN     HYDROmorphone (DILAUDID) 1 mg/mL  "injection 0.25-0.5 mg  (Analgesics - IV Opiates)  Status:  Discontinued      Every 3 hours PRN     ciprofloxacin (CIPRO) IVPB 400 mg  (IV Antibiotics Panel)      Once     metroNIDAZOLE in NaCl (iso-os) (FLAGYL) IVPB 500 mg  (IV Antibiotics Panel)      Once     morphine injection 4 mg      Once     ondansetron (ZOFRAN) injection 4 mg      Once     sodium chloride 0.9 % bolus 1,000 mL      Once     meperidine (DEMEROL) injection 25 mg  Status:  Discontinued      Once     meperidine (DEMEROL) injection 25 mg      Once     HYDROmorphone (DILAUDID) tablet 2 mg  Status:  Discontinued      Every 3 hours PRN     lidocaine (XYLOCAINE) 10 mg/mL (1 %) injection      Once via One Step medication administration     fentaNYL (PF) (SUBLIMAZE) injection      Once via One Step medication administration     sodium chloride 0.9 % infusion  Status:  Discontinued      Continuous          Anti-infectives     Start     Dose/Rate Route Frequency Ordered Stop    18 0915  vancomycin IVPB 1250 mg/250 mL NSS      1.25 g  over 90 Minutes intravenous Every 12 hours interval 18 0818 0914    18 2215  piperacillin-tazobactam (ZOSYN) 3.375 g in sodium chloride 0.9 % 100 mL IVPB      3.375 g  200 mL/hr over 0.5 Hours intravenous Every 6 hours interval 18 2214            Objective     Vital Signs:    Patient Vitals for the past 72 hrs:   BP Temp Temp src Pulse Resp SpO2 Height Weight   18 0700 (!) 132/59 37.2 °C (99 °F) Oral (!) 52 17 96 % - -   18 2330 (!) 148/74 37.3 °C (99.1 °F) Oral 75 18 96 % - -   182 - - - - - - 1.6 m (5' 3\") 97.5 kg (215 lb)   18 2100 (!) 169/73 37.2 °C (99 °F) Oral 84 18 96 % - -   184 - 36.9 °C (98.5 °F) Oral - - - - -   183 (!) 153/65 - - 77 18 99 % - -   18 (!) 159/69 - - 84 18 99 % - -   18 (!) 177/76 37.8 °C (100 °F) Temporal 89 18 96 % 1.575 m (5' 2\") 97.5 kg (215 lb)       Temp (72hrs), Av.5 °C (99.5 " "°F), Min:36.3 °C (97.3 °F), Max:39.2 °C (102.5 °F)      Physical Exam:    BP (!) 132/59 (BP Location: Right upper arm, Patient Position: Lying)   Pulse (!) 52   Temp 37.2 °C (99 °F) (Oral)   Resp 17   Ht 1.6 m (5' 3\")   Wt 97.5 kg (215 lb)   SpO2 96%   BMI 38.09 kg/m²     General Appearance:    Alert, cooperative, no distress, appears stated age   Head:    Normocephalic, without obvious abnormality, atraumatic   Eyes:    PERRL, conjunctiva/corneas clear, EOM's intact, fundi     benign, both eyes   Ears:    Normal TM's and external ear canals, both ears   Nose:   Nares normal, septum midline, mucosa normal, no drainage     or     sinus tenderness   Throat:   Lips, mucosa, and tongue normal; teeth and gums normal   Neck:   Supple, symmetrical, trachea midline, no adenopathy;     thyroid:  no enlargement/tenderness/nodules; no carotid    bruit or JVD   Back:     Symmetric, no curvature, ROM normal, no CVA tenderness   Lungs:     Clear to auscultation bilaterally, respirations unlabored   Chest Wall:    No tenderness or deformity   Heart:    Regular rate and rhythm, S1 and S2 normal, no murmur, rub or          gallop   Breast Exam:    No tenderness, masses, or nipple abnormality   Abdomen:     Soft, tender in LLQ and less in RLQ.  No rebound or rigidity, bowel sounds active all four quadrants, Drainage tube in R buttock.    no masses, no organomegaly   Genitalia:    Normal female without lesion, discharge or tenderness   Rectal:    Normal tone, no masses or tenderness; guaiac negative stool   Extremities:   Extremities normal, atraumatic, no cyanosis or edema   Musculoskeletal:  Pulses:   No injury or deformity    2+ and symmetric all extremities   Skin:   Skin color, texture, turgor normal, no rashes or lesions   Lymph nodes:   Cervical, supraclavicular, and axillary nodes normal   Neurologic:    Behavior/  Emotional:   CNII-XII intact, normal strength, sensation and reflexes     throughout    Appropriate, " cooperative       Lines, Drains, Airways, Wounds:  Peripheral IV 08/23/18 Left Hand (Active)   Number of days: 1       Peripheral IV 08/23/18 Right Forearm (Active)   Number of days: 1       Closed/Suction Drain Right Buttock 10 Fr. (Active)   Number of days: 1       Labs:    CBC Results       08/24/18 08/23/18 08/03/18                    0300 1829 1328         WBC 12.14 (H) 13.46 (H) 8.57         RBC 3.12 (L) 3.54 (L) 4.17         HGB 8.8 (L) 10.0 (L) 12.9         HCT 27.5 (L) 30.7 (L) 37.2         MCV 88.1 86.7 89.2         MCH 28.2 28.2 30.9         MCHC 32.0 (L) 32.6 34.7          (H) 581 (H) 418 (H)         Comment for PLT at 1328 on 08/03/18:  SPECIMEN QUALITY CHECKED. RESULTS OBTAINED AFTER VORTEXING TO ELIMINATE PLT CLUMPS      BMP Results       08/24/18 08/23/18 07/27/18                    0300 1829 1010          (L) 135 (L) 138         K 3.9 3.3 (L) 4.4         Cl 104 103 105         CO2 23 22 24         Glucose 119 (H) 120 (H) 96         BUN 11 15 16         Creatinine 0.6 0.5 (L) 0.7         Calcium 8.4 (L) 8.5 (L) 9.9         Anion Gap 8 10 9         EGFR &gt;60.0 &gt;60.0 &gt;60.0         Comment for K at 1829 on 08/23/18:    Results obtained on plasma. Plasma Potassium values may be up to 0.4 mEQ/L less than serum values. The differences may be greater for patients with high platelet or white cell counts.      PT/PTT Results       08/23/18 07/10/18                       1048 1356          PT 18.7 (H) 14.8 (H)          INR 1.6 1.2          Comment for INR at 1048 on 08/23/18:    Moderate Intensity Anticoagulation = 2.0 to 3.0, High Intensity = 2.5 to 3.5    Comment for INR at 1356 on 07/10/18:    Moderate Intensity Anticoagulation = 2.0 to 3.0, High Intensity = 2.5 to 3.5      UA Results       07/27/18                          1010           Color Enedelia (A)           Clarity Turbid (A)           Glucose Negative           Bilirubin Negative           Ketones Negative           Sp Grav  1.025           Blood Negative           Ph 5.0           Protein Negative           Urobilinogen 0.2           Nitrite Positive (A)           Leuk Est +2 (A)           WBC 20 TO 35 (A)           RBC 0 TO 4           Bacteria None Seen           Comment for Blood at 1010 on 07/27/18:    The sensitivity of the occult blood test is equivalent to approximately 4 intact RBC/HPF.      Lactate Results       08/23/18                          1829           Lactate 0.8                           Microbiology Results     Procedure Component Value Units Date/Time    Bacterial culture / smear, aerobic and anaerobic [[id number]] Collected:  08/23/18 1325    Specimen:  Abscess Fluid from Abscess Fluid Updated:  08/23/18 2006     Gram Stain Result 4+ WBC      4+ Gram positive cocci          Pathology Results     ** No results found for the last 720 hours. **          Echo:         Imaging:    Radiology Imaging   XR FOOT 3+ VW LEFT    Narrative DIAGNOSTIC RADIOLOGY - Jan 25 2018  7:02PM  - L FOOT COMPLETE  CLINICAL HISTORY: Left foot pain and swelling and bruising  IMPRESSION: Previously identified soft tissue swelling over the dorsum of  the midfoot/forefoot has resolved.  No other interval change.  COMMENT: Four views of the left foot are compared to a prior study from  12/14/2017.  Previously identified soft tissue swelling over the dorsum of the  midfoot/forefoot has resolved.  No fracture or dislocation is seen.  Deformity of the heads of the 2nd and 3rd proximal phalanges are likely  postsurgical.  Flattening of the head of the 2nd metatarsal may be  postsurgical or perhaps due to Freiberg's infraction.  Mild plantar and posterior calcaneal spurring is again noted.  Transcribed by: n/a :Jan 26 2018  7:37A  Dictated by: ARIEL JOHNSON M.D.:Jan 26 2018  7:37A  Dictation signed by:ARIEL JOHNSON M.D.:Jan 26 2018  7:38A  CPT Code: 48063       Assessment     This is a pleasant 68-year-old female that was admitted to [facility]  yesterday with a recurrent pelvic abscess secondary to sigmoid diverticulitis.  She had previously been treated for the same thing on 7/7/2018 and discharged home on 7/18/2018.  The abdominal fluid culture at that time grew Staphylococcus simulans.  The new culture is pending.  I agree with the piperacillin-tazobactam that was started and I have added vancomycin pending the results of her new culture.        Plan     Thank you very much for this interesting consultation.   I will be following the patient with you.

## 2018-08-24 NOTE — H&P
Chief complaint:   Chief Complaint   Patient presents with   • Fever     HPI     Patient is a 68 y.o. female who presents with past medical history of Lopez's esophagus, HTN, Tran-Juan Tear, left knee meniscal tear s/p repair, diverticulitis who was admitted to [facility] in early July with an acute diverticulitis flare with abscess underwent successful IR drain placement and was treated with a course of IV antibiotics (cipro/flagyl) and was transitioned to a PO course of doxycycline and fluconazole which finished on 8/20. Her initial drain was d/c'd on 7/22 however she has continued with lower abdominal pain, fevers to 102.7F, chills, and night sweats. She underwent an outpatient follow-up CT earlier this week which showed an enlarging diverticular abscess. She underwent successful placement of a 2nd IR drain today and due to her continued fever to 102F, persistant pain and CT findings she was referred to the ED for admission. At present she c/o 6/10 lower abdominal pain, intermittent nausea, anorexia, along with off and on night sweats. She says that her pain is worse today than its been in the previous few weeks. C/o constipation over the last week with her last BM being Tuesday.   Medical History:   Past Medical History:   Diagnosis Date   • Lopez's esophagus    • Diverticulitis    • Diverticulitis of colon     [facility], drain removed 7/22/19   • Hypertension    • Knee pain, bilateral    • Left ankle pain    • Tran-Juan tear    • Pleural effusion        Surgical History:   Past Surgical History:   Procedure Laterality Date   • CHOLECYSTECTOMY     • KNEE ARTHROSCOPY     • TRANSUMBILICAL AUGMENTATION MAMMAPLASTY     bilateral ear surgery, tonsillectomy, left knee meniscal repair     Social History:   Social History     Social History Narrative   • No narrative on file   former smoker, quit 20 years ago; occasional etoh use, negative illicit drug  "use    Family History: History reviewed. No pertinent family history. Reviewed and non-contributory to case.    Allergies: Nsaids (non-steroidal anti-inflammatory drug); Adhesive tape-silicones; and Latex       Medication List      CONTINUE taking these medications    aspirin 81 mg enteric coated tablet  Take 81 mg by mouth daily.     DIOVAN ORAL  Take 160 mg by mouth daily with breakfast.     fenofibrate 48 mg tablet  Commonly known as:  TRICOR  Take 48 mg by mouth daily.     irbesartan 150 mg tablet  Commonly known as:  AVAPRO  Take 150 mg by mouth daily.     multivitamin capsule  Take 1 capsule by mouth daily.     omeprazole 40 mg capsule  Commonly known as:  PriLOSEC  Take 40 mg by mouth daily before breakfast.     oxyCODONE-acetaminophen 7.5-325 mg per tablet  Commonly known as:  PERCOCET  Take 1 tablet by mouth every 4 (four) hours as needed.     rosuvastatin 20 mg tablet  Commonly known as:  CRESTOR  Take 20 mg by mouth daily.     sertraline 50 mg tablet  Commonly known as:  ZOLOFT  Take 50 mg by mouth daily.          Review of Systems  All other systems reviewed and negative except as noted in the HPI.    Objective     Vital Signs for the last 24 hours:  Temp:  [36.3 °C (97.3 °F)-39.2 °C (102.5 °F)] 37.2 °C (99 °F)  Heart Rate:  [66-90] 84  Resp:  [16-18] 18  BP: (117-177)/(60-89) 169/73    Physical Exam  BP (!) 169/73 (Patient Position: Lying)   Pulse 84   Temp 37.2 °C (99 °F) (Oral)   Resp 18   Ht 1.6 m (5' 3\")   Wt 97.5 kg (215 lb)   SpO2 96%   BMI 38.09 kg/m²   General appearance: Negative Findings; comfortable, no apparent distress and resting comfortably  Neurologic: Negative Findings; awake, alert and oriented to person, place, and time and GCS 15  Lungs: Negative Findings; clear to auscultation b/l, no rales/rhonci/wheezing and breath sounds equal b/l  Heart: Negative Findings; RRR and +S1S2  Abdomen: abnormal findings:  obese and tender in; RLQ and LLQ. RLQ drain actively draining purulent " drainage. No rebound or guarding.  Extremities: negative findings; no apparent injury and no clubbing/cyanosis/edema    Labs  I have reviewed the patients labs until the time of note; WBC 13  Lactate 0.8    Imaging  I have indepedently reviewed patient's imaging; any concerning findings adressed below    CT 8/21    IMPRESSION:     Progression of pelvic abscess from sigmoid diverticulitis.  Superimposed  suspected partial small bowel obstruction.      VTE Assessment  Increased risk from baseline; VTE Prophylaxis as ordered- SCDs in place; chemical prophylaxis TBD                 Assessment/Plan    1. Recurrent/Failure of Outpatient Mgmt of Diverticular Abscess  This is a 68 year old female who had follow-up outpatient CT scan earlier this week after she was admitted in June for a diverticular abscess requiring IR guided drain placement and IV abx. Unfortunately she continued at home, despite PO antibiotics which finished on 8/20, with continued abdominal pain, nausea, fever, chills, and night sweats. Her most recent CT this week demonstrated a increased pelvic collection c/w abscess, today she underwent successful replacement of IR drain and will be admitted to the hospital for further evaluation and IV antibiotics given her continued systemic infectious symptoms.   At present she is resting comfortably in bed and certainly appears nontoxic. She is Hemodynamically stable with a low grade temp of 100.0F.   Will admit to Med-Surg, Dr. Sal service (Dr Trujillo is away this weekend)  One dose of IV Cipro/Flagyl was ordered by and given in ED (patient states she was on these same antibiotics when she was admitted in June). Due to increased pelvic collection and failure of outpatient mgmt, I have broadened her antibiotic coverage to include IV Zosyn and have ordered an ID Consult to assist in her mgmt and greatly appreciate their input.   Keep NPO for now  IVF  Blood Cultures pending  Check AM labs- WBC 13 on admission  IV  Dilaudid/Zofran for pain and nausea prn  Serial Abdominal exams  Attending Niki in AM    2. HTN  NPO for now, will monitor BP and treat with IV meds as needed until she is cleared for a diet.     Code Status: Full Code

## 2018-08-24 NOTE — PROGRESS NOTES
CM met with the patient who states she is unclear of the ultimate timeframe of when she is going to be discharged.  Patient states that she is anticipating a repeat CT scan and if discharged, she would prefer to continue with [facility] and home IV infusion.  She would like to reiterate that her 's insurance [employer] is the primary insurance and Medicare is secondary. Marvin Allan RN

## 2018-08-25 LAB
DATE+TIME DOSE: NORMAL
DATE+TIME DOSE: NORMAL
VANCOMYCIN TROUGH SERPL-MCNC: 11.7 UG/ML (ref 10–15)

## 2018-08-25 PROCEDURE — 63700000 HC SELF-ADMINISTRABLE DRUG: Performed by: SURGERY

## 2018-08-25 PROCEDURE — 12000000 HC ROOM AND CARE MED/SURG

## 2018-08-25 PROCEDURE — 36415 COLL VENOUS BLD VENIPUNCTURE: CPT | Performed by: INTERNAL MEDICINE

## 2018-08-25 PROCEDURE — 63600000 HC DRUGS/DETAIL CODE: Performed by: SURGERY

## 2018-08-25 PROCEDURE — 80202 ASSAY OF VANCOMYCIN: CPT | Performed by: INTERNAL MEDICINE

## 2018-08-25 PROCEDURE — 25000000 HC PHARMACY GENERAL: Performed by: SURGERY

## 2018-08-25 PROCEDURE — 25800000 HC PHARMACY IV SOLUTIONS: Performed by: PHYSICIAN ASSISTANT

## 2018-08-25 PROCEDURE — 63600000 HC DRUGS/DETAIL CODE: Performed by: INTERNAL MEDICINE

## 2018-08-25 PROCEDURE — 63600000 HC DRUGS/DETAIL CODE: Performed by: PHYSICIAN ASSISTANT

## 2018-08-25 RX ADMIN — PIPERACILLIN AND TAZOBACTAM 3.38 G: 3; .375 INJECTION, POWDER, LYOPHILIZED, FOR SOLUTION INTRAVENOUS; PARENTERAL at 15:44

## 2018-08-25 RX ADMIN — ASPIRIN 81 MG: 81 TABLET, COATED ORAL at 09:37

## 2018-08-25 RX ADMIN — Medication 1250 MG: at 09:12

## 2018-08-25 RX ADMIN — PIPERACILLIN AND TAZOBACTAM 3.38 G: 3; .375 INJECTION, POWDER, LYOPHILIZED, FOR SOLUTION INTRAVENOUS; PARENTERAL at 10:44

## 2018-08-25 RX ADMIN — ONDANSETRON 4 MG: 2 INJECTION INTRAMUSCULAR; INTRAVENOUS at 20:49

## 2018-08-25 RX ADMIN — KETOROLAC TROMETHAMINE 15 MG: 15 INJECTION, SOLUTION INTRAMUSCULAR; INTRAVENOUS at 10:43

## 2018-08-25 RX ADMIN — HYDROMORPHONE HYDROCHLORIDE 1 MG: 1 INJECTION, SOLUTION INTRAMUSCULAR; INTRAVENOUS; SUBCUTANEOUS at 04:27

## 2018-08-25 RX ADMIN — PIPERACILLIN AND TAZOBACTAM 3.38 G: 3; .375 INJECTION, POWDER, LYOPHILIZED, FOR SOLUTION INTRAVENOUS; PARENTERAL at 22:33

## 2018-08-25 RX ADMIN — HYDROMORPHONE HYDROCHLORIDE 1 MG: 1 INJECTION, SOLUTION INTRAMUSCULAR; INTRAVENOUS; SUBCUTANEOUS at 20:48

## 2018-08-25 RX ADMIN — HYDROMORPHONE HYDROCHLORIDE 1 MG: 1 INJECTION, SOLUTION INTRAMUSCULAR; INTRAVENOUS; SUBCUTANEOUS at 09:03

## 2018-08-25 RX ADMIN — PANTOPRAZOLE SODIUM 40 MG: 40 TABLET, DELAYED RELEASE ORAL at 09:38

## 2018-08-25 RX ADMIN — FENOFIBRATE 67 MG: 67 CAPSULE ORAL at 09:38

## 2018-08-25 RX ADMIN — KETOROLAC TROMETHAMINE 15 MG: 15 INJECTION, SOLUTION INTRAMUSCULAR; INTRAVENOUS at 22:37

## 2018-08-25 RX ADMIN — HYDROMORPHONE HYDROCHLORIDE 1 MG: 1 INJECTION, SOLUTION INTRAMUSCULAR; INTRAVENOUS; SUBCUTANEOUS at 15:43

## 2018-08-25 RX ADMIN — ROSUVASTATIN CALCIUM 20 MG: 20 TABLET, FILM COATED ORAL at 09:38

## 2018-08-25 RX ADMIN — LOSARTAN POTASSIUM 50 MG: 50 TABLET, FILM COATED ORAL at 09:38

## 2018-08-25 RX ADMIN — PIPERACILLIN AND TAZOBACTAM 3.38 G: 3; .375 INJECTION, POWDER, LYOPHILIZED, FOR SOLUTION INTRAVENOUS; PARENTERAL at 04:23

## 2018-08-25 RX ADMIN — Medication 1250 MG: at 23:10

## 2018-08-25 RX ADMIN — SERTRALINE HYDROCHLORIDE 50 MG: 50 TABLET ORAL at 09:38

## 2018-08-25 RX ADMIN — MULTIPLE VITAMINS W/ MINERALS TAB 1 TABLET: TAB at 09:38

## 2018-08-25 NOTE — PLAN OF CARE
Problem: Pain, Acute (Adult)  Goal: Acceptable Pain Control/Comfort Level  Outcome: Ongoing (interventions implemented as appropriate)      Problem: Fall Risk (Adult)  Goal: Absence of Falls  Outcome: Ongoing (interventions implemented as appropriate)   08/25/18 3740   Fall Risk (Adult)   Absence of Falls making progress toward outcome

## 2018-08-25 NOTE — PROGRESS NOTES
"Infectious Disease Progress Note    Patient Name: Reyna Castillo  MR#: [id number]  : 1950  Admission Date: 2018  Date: 18   Time: 11:40 AM   Author: Dwayne Gonsales MD    Major Events: The patient is feeling better this morning.  When Dr. Vann saw her earlier today she was not feeling as good but she thinks she is turning the corner.  She is tolerating Zosyn and vancomycin without any side effects.  Microbiology lab was called and there is a gram-negative lucia and a Streptococcus species growing from the abscess fluid but the results will not be available until tomorrow or may be Monday.  I would continue her on the same antibiotics over the weekend and reevaluate Monday.  I will check a vancomycin trough level with the fourth dose.    Antibiotics:    Anti-infectives     Start     Dose/Rate Route Frequency Ordered Stop    18 0915  vancomycin IVPB 1250 mg/250 mL NSS      1.25 g  over 90 Minutes intravenous Every 12 hours interval 18 0829 18 0914    18 2215  piperacillin-tazobactam (ZOSYN) 3.375 g in sodium chloride 0.9 % 100 mL IVPB      3.375 g  200 mL/hr over 0.5 Hours intravenous Every 6 hours interval 18 2214          Subjective     Review of Systems    All other systems reviewed and negative except as noted in the HPI.    Objective     Vital Signs:    Patient Vitals for the past 72 hrs:   BP Temp Temp src Pulse Resp SpO2 Height Weight   18 0843 (!) 170/77 36.7 °C (98.1 °F) Oral 70 18 99 % - -   18 0015 - - - - - 97 % - -   18 2330 140/62 36.9 °C (98.5 °F) Oral 63 18 96 % - -   18 1500 138/64 36.9 °C (98.5 °F) Oral 60 17 95 % - -   18 1027 (!) 151/69 - - 70 - - - -   18 0700 (!) 132/59 37.2 °C (99 °F) Oral (!) 52 17 96 % - -   18 2330 (!) 148/74 37.3 °C (99.1 °F) Oral 75 18 96 % - -   182 - - - - - - 1.6 m (5' 3\") 97.5 kg (215 lb)   18 2100 (!) 169/73 37.2 °C (99 °F) Oral 84 18 96 % - - " "  18 - 36.9 °C (98.5 °F) Oral - - - - -   18 (!) 153/65 - - 77 18 99 % - -   18 1931 (!) 159/69 - - 84 18 99 % - -   18 181 (!) 177/76 37.8 °C (100 °F) Temporal 89 18 96 % 1.575 m (5' 2\") 97.5 kg (215 lb)       Temp (72hrs), Av.4 °C (99.3 °F), Min:36.3 °C (97.3 °F), Max:39.2 °C (102.5 °F)      Physical Exam:    BP (!) 170/77 (BP Location: Right upper arm, Patient Position: Lying)   Pulse 70   Temp 36.7 °C (98.1 °F) (Oral)   Resp 18   Ht 1.6 m (5' 3\")   Wt 97.5 kg (215 lb)   SpO2 99%   BMI 38.09 kg/m²     General Appearance:    Alert, cooperative, no distress, appears stated age   Head:    Normocephalic, without obvious abnormality, atraumatic   Eyes:    PERRL, conjunctiva/corneas clear, EOM's intact, fundi     benign, both eyes   Ears:    Normal TM's and external ear canals, both ears   Nose:   Nares normal, septum midline, mucosa normal, no drainage     or     sinus tenderness   Throat:   Lips, mucosa, and tongue normal; teeth and gums normal   Neck:   Supple, symmetrical, trachea midline, no adenopathy;     thyroid:  no enlargement/tenderness/nodules; no carotid    bruit or JVD   Back:     Symmetric, no curvature, ROM normal, no CVA tenderness   Lungs:     Clear to auscultation bilaterally, respirations unlabored   Chest Wall:    No tenderness or deformity   Heart:    Regular rate and rhythm, S1 and S2 normal, no murmur, rub or          gallop   Breast Exam:    No tenderness, masses, or nipple abnormality   Abdomen:     Soft, non-tender, bowel sounds active all four quadrants,     no masses, no organomegaly   Genitalia:    Normal female without lesion, discharge or tenderness   Rectal:    Normal tone, no masses or tenderness; guaiac negative stool   Extremities:   Extremities normal, atraumatic, no cyanosis or edema   Musculoskeletal:  Pulses:   No injury or deformity    2+ and symmetric all extremities   Skin:   Skin color, texture, turgor normal, no rashes or lesions "   Lymph nodes:   Cervical, supraclavicular, and axillary nodes normal   Neurologic:    Behavior/  Emotional:   CNII-XII intact, normal strength, sensation and reflexes     throughout    Appropriate, cooperative       Lines, Drains, Airways, Wounds:  Peripheral IV 08/23/18 Left Hand (Active)   Number of days: 2       Closed/Suction Drain Right Buttock 10 Fr. (Active)   Number of days: 2       Labs:    CBC Results       08/24/18 08/23/18 08/03/18                    0300 1829 1328         WBC 12.14 (H) 13.46 (H) 8.57         RBC 3.12 (L) 3.54 (L) 4.17         HGB 8.8 (L) 10.0 (L) 12.9         HCT 27.5 (L) 30.7 (L) 37.2         MCV 88.1 86.7 89.2         MCH 28.2 28.2 30.9         MCHC 32.0 (L) 32.6 34.7          (H) 581 (H) 418 (H)         Comment for PLT at 1328 on 08/03/18:  SPECIMEN QUALITY CHECKED. RESULTS OBTAINED AFTER VORTEXING TO ELIMINATE PLT CLUMPS      BMP Results       08/24/18 08/23/18 07/27/18                    0300 1829 1010          (L) 135 (L) 138         K 3.9 3.3 (L) 4.4         Cl 104 103 105         CO2 23 22 24         Glucose 119 (H) 120 (H) 96         BUN 11 15 16         Creatinine 0.6 0.5 (L) 0.7         Calcium 8.4 (L) 8.5 (L) 9.9         Anion Gap 8 10 9         EGFR &gt;60.0 &gt;60.0 &gt;60.0         Comment for K at 1829 on 08/23/18:    Results obtained on plasma. Plasma Potassium values may be up to 0.4 mEQ/L less than serum values. The differences may be greater for patients with high platelet or white cell counts.      PT/PTT Results       08/23/18 07/10/18                       1048 1356          PT 18.7 (H) 14.8 (H)          INR 1.6 1.2          Comment for INR at 1048 on 08/23/18:    Moderate Intensity Anticoagulation = 2.0 to 3.0, High Intensity = 2.5 to 3.5    Comment for INR at 1356 on 07/10/18:    Moderate Intensity Anticoagulation = 2.0 to 3.0, High Intensity = 2.5 to 3.5      UA Results       07/27/18                          1010           Color Enedelia (A)            Clarity Turbid (A)           Glucose Negative           Bilirubin Negative           Ketones Negative           Sp Grav 1.025           Blood Negative           Ph 5.0           Protein Negative           Urobilinogen 0.2           Nitrite Positive (A)           Leuk Est +2 (A)           WBC 20 TO 35 (A)           RBC 0 TO 4           Bacteria None Seen           Comment for Blood at 1010 on 07/27/18:    The sensitivity of the occult blood test is equivalent to approximately 4 intact RBC/HPF.      Lactate Results       08/23/18 1829           Lactate 0.8                           Microbiology Results     Procedure Component Value Units Date/Time    Blood Culture [[id number]]  (Normal) Collected:  08/23/18 1829    Specimen:  Blood from Blood, Venous Updated:  08/24/18 2200     Culture No growth at 18-24 hours    Blood Culture [[id number]]  (Normal) Collected:  08/23/18 1829    Specimen:  Blood from Blood, Venous Updated:  08/24/18 2300     Culture No growth at 18-24 hours    Bacterial culture / smear, aerobic and anaerobic [[id number]]  (Abnormal) Collected:  08/23/18 1325    Specimen:  Abscess Fluid from Abscess Fluid Updated:  08/25/18 0847     Culture **Positive Culture** (A)      4+ Streptococcus species      2+ Gram Negative Rods     Gram Stain Result 4+ WBC      4+ Gram positive cocci    Narrative:       Isolation in progress          Pathology Results     ** No results found for the last 720 hours. **          Echo:         Imaging:    Radiology Imaging   XR FOOT 3+ VW LEFT    Narrative DIAGNOSTIC RADIOLOGY - Jan 25 2018  7:02PM  - L FOOT COMPLETE  CLINICAL HISTORY: Left foot pain and swelling and bruising  IMPRESSION: Previously identified soft tissue swelling over the dorsum of  the midfoot/forefoot has resolved.  No other interval change.  COMMENT: Four views of the left foot are compared to a prior study from  12/14/2017.  Previously identified soft tissue swelling over the dorsum of  the  midfoot/forefoot has resolved.  No fracture or dislocation is seen.  Deformity of the heads of the 2nd and 3rd proximal phalanges are likely  postsurgical.  Flattening of the head of the 2nd metatarsal may be  postsurgical or perhaps due to Freiberg's infraction.  Mild plantar and posterior calcaneal spurring is again noted.  Transcribed by: n/a :Jan 26 2018  7:37A  Dictated by: ARIEL JOHNSON M.D.:Jan 26 2018  7:37A  Dictation signed by:ARIEL JOHNSON M.D.:Jan 26 2018  7:38A  CPT Code: 03745       Assessment     Continue Zosyn/Vanco over the weekend  Check abscess culture results tomorrow.  Repeat CBC in AM.  Check Vanco trough level tonight         Plan      As above  Continue same AB  Will follow.

## 2018-08-25 NOTE — PLAN OF CARE
Problem: Pain, Acute (Adult)  Goal: Acceptable Pain Control/Comfort Level  Outcome: Ongoing (interventions implemented as appropriate)   08/24/18 9837   Pain, Acute (Adult)   Acceptable Pain Control/Comfort Level making progress toward outcome

## 2018-08-25 NOTE — PROGRESS NOTES
"General Surgery Daily Progress Note    Subjective: Pt seen and examined.  No new c/o.  Still w/ buttock and R groin/RLQ pain.     Vital signs in last 24 hours:  Temp:  [36.7 °C (98.1 °F)-36.9 °C (98.5 °F)] 36.7 °C (98.1 °F)  Heart Rate:  [60-70] 70  Resp:  [17-18] 18  BP: (138-170)/(62-77) 170/77    Intake/Output this shift:    Intake/Output Summary (Last 24 hours) at 08/25/18 0849  Last data filed at 08/25/18 0706   Gross per 24 hour   Intake             1544 ml   Output              476 ml   Net             1068 ml       Physical Exam    General appearance: alert, appears stated age and cooperative    Abdomen: soft, min RLQ ttp; bowel sounds normal; no masses, no organomegaly\"      Labs    Results from last 7 days  Lab Units 08/24/18  0300   SODIUM mmol/L 135*   POTASSIUM mmol/L 3.9   CHLORIDE mmol/L 104   CO2 mmol/L 23   BUN mg/dL 11   CREATININE mg/dL 0.6   GLUCOSE mg/dL 119*   CALCIUM mg/dL 8.4*       Results from last 7 days  Lab Units 08/24/18  0300   WBC K/uL 12.14*   HEMOGLOBIN g/dL 8.8*   HEMATOCRIT % 27.5*   PLATELETS K/uL 492*       Results from last 7 days  Lab Units 08/23/18  1048   INR INR 1.6       Results from last 7 days  Lab Units 08/24/18  0300 08/23/18  1829   SODIUM mmol/L 135* 135*   POTASSIUM mmol/L 3.9 3.3*   CHLORIDE mmol/L 104 103   CO2 mmol/L 23 22   BUN mg/dL 11 15   CREATININE mg/dL 0.6 0.5*   CALCIUM mg/dL 8.4* 8.5*   ALBUMIN g/dL  --  2.7*   BILIRUBIN TOTAL mg/dL  --  0.8   ALK PHOS IU/L  --  54   ALT IU/L  --  16   AST IU/L  --  21   GLUCOSE mg/dL 119* 120*       Results from last 7 days  Lab Units 08/24/18  0300   HEMOGLOBIN g/dL 8.8*   HEMATOCRIT % 27.5*       0  Lab Value Date/Time   LIPASE 26 07/07/2018 0722     Pathology Results     ** No results found for the last 720 hours. **              Imaging  Ct Abdomen Pelvis With And Without Iv Contrast    Result Date: 8/3/2018  IMPRESSION: There has been significant improvement in the inflammation in the pelvis.  Small collection with " extraluminal air and/or trace amount of contrast still seen measuring up to 2 cm previously 4.5cm.  Please see above comments. Other incidental findings as discussed.    Ct Guided Abscess Drain    Result Date: 8/23/2018  IMPRESSION: Technically successful transgluteal drainage of pelvic collection and placement of 10-Swiss Resolve drain.     Ct Abdomen Pelvis With Iv Contrast    Result Date: 8/21/2018  IMPRESSION: Progression of pelvic abscess from sigmoid diverticulitis.  Superimposed suspected partial small bowel obstruction. Discussed with Taryn Frias RN, at 11:20 AM on 8/21/2018.     Ir Technical Assistance    Result Date: 8/24/2018  IMPRESSION:  Technical assistance was provided to Dr. Paul Real for CT guided drainage of this patient's pelvic collection and placement of a drain. Please see his report for details.         Assessment   Recurrent/continued diverticulitis w/ recurrent abscess    Plan   Cont IV abx, question will be timing of sigmoid colectomy.  Would ideally like to have 2 wks of IV abx and then elective colectomy.  Rpt CT Mon/Tues.        Dwayne Vann, DO

## 2018-08-26 PROCEDURE — 25000000 HC PHARMACY GENERAL: Performed by: SURGERY

## 2018-08-26 PROCEDURE — 63600000 HC DRUGS/DETAIL CODE: Performed by: INTERNAL MEDICINE

## 2018-08-26 PROCEDURE — 63700000 HC SELF-ADMINISTRABLE DRUG: Performed by: PHYSICIAN ASSISTANT

## 2018-08-26 PROCEDURE — 63700000 HC SELF-ADMINISTRABLE DRUG: Performed by: SURGERY

## 2018-08-26 PROCEDURE — 63600000 HC DRUGS/DETAIL CODE: Performed by: PHYSICIAN ASSISTANT

## 2018-08-26 PROCEDURE — 25800000 HC PHARMACY IV SOLUTIONS: Performed by: PHYSICIAN ASSISTANT

## 2018-08-26 PROCEDURE — 63700000 HC SELF-ADMINISTRABLE DRUG: Performed by: INTERNAL MEDICINE

## 2018-08-26 PROCEDURE — 12000000 HC ROOM AND CARE MED/SURG

## 2018-08-26 RX ORDER — OXYCODONE AND ACETAMINOPHEN 5; 325 MG/1; MG/1
2 TABLET ORAL EVERY 6 HOURS PRN
Status: DISCONTINUED | OUTPATIENT
Start: 2018-08-26 | End: 2018-08-27 | Stop reason: HOSPADM

## 2018-08-26 RX ORDER — OXYCODONE AND ACETAMINOPHEN 5; 325 MG/1; MG/1
1 TABLET ORAL EVERY 6 HOURS PRN
Status: DISCONTINUED | OUTPATIENT
Start: 2018-08-26 | End: 2018-08-27 | Stop reason: HOSPADM

## 2018-08-26 RX ORDER — METRONIDAZOLE 500 MG/1
500 TABLET ORAL 3 TIMES DAILY
Status: DISCONTINUED | OUTPATIENT
Start: 2018-08-26 | End: 2018-08-27 | Stop reason: HOSPADM

## 2018-08-26 RX ADMIN — ROSUVASTATIN CALCIUM 20 MG: 20 TABLET, FILM COATED ORAL at 08:53

## 2018-08-26 RX ADMIN — CEFTRIAXONE SODIUM 2 G: 2 INJECTION, POWDER, FOR SOLUTION INTRAVENOUS at 11:50

## 2018-08-26 RX ADMIN — MULTIPLE VITAMINS W/ MINERALS TAB 1 TABLET: TAB at 08:50

## 2018-08-26 RX ADMIN — OXYCODONE HYDROCHLORIDE AND ACETAMINOPHEN 2 TABLET: 5; 325 TABLET ORAL at 18:56

## 2018-08-26 RX ADMIN — METRONIDAZOLE 500 MG: 500 TABLET ORAL at 20:52

## 2018-08-26 RX ADMIN — BISACODYL 10 MG: 10 SUPPOSITORY RECTAL at 10:53

## 2018-08-26 RX ADMIN — PIPERACILLIN AND TAZOBACTAM 3.38 G: 3; .375 INJECTION, POWDER, LYOPHILIZED, FOR SOLUTION INTRAVENOUS; PARENTERAL at 04:57

## 2018-08-26 RX ADMIN — OXYCODONE HYDROCHLORIDE AND ACETAMINOPHEN 2 TABLET: 5; 325 TABLET ORAL at 10:52

## 2018-08-26 RX ADMIN — PANTOPRAZOLE SODIUM 40 MG: 40 TABLET, DELAYED RELEASE ORAL at 08:50

## 2018-08-26 RX ADMIN — SERTRALINE HYDROCHLORIDE 50 MG: 50 TABLET ORAL at 08:50

## 2018-08-26 RX ADMIN — METRONIDAZOLE 500 MG: 500 TABLET ORAL at 15:20

## 2018-08-26 RX ADMIN — ONDANSETRON 4 MG: 2 INJECTION INTRAMUSCULAR; INTRAVENOUS at 23:52

## 2018-08-26 RX ADMIN — FENOFIBRATE 67 MG: 67 CAPSULE ORAL at 08:53

## 2018-08-26 RX ADMIN — METRONIDAZOLE 500 MG: 500 TABLET ORAL at 11:50

## 2018-08-26 RX ADMIN — HYDROMORPHONE HYDROCHLORIDE 1 MG: 1 INJECTION, SOLUTION INTRAMUSCULAR; INTRAVENOUS; SUBCUTANEOUS at 23:52

## 2018-08-26 RX ADMIN — HYDROMORPHONE HYDROCHLORIDE 1 MG: 1 INJECTION, SOLUTION INTRAMUSCULAR; INTRAVENOUS; SUBCUTANEOUS at 05:52

## 2018-08-26 RX ADMIN — ASPIRIN 81 MG: 81 TABLET, COATED ORAL at 08:50

## 2018-08-26 RX ADMIN — LOSARTAN POTASSIUM 50 MG: 50 TABLET, FILM COATED ORAL at 08:50

## 2018-08-26 NOTE — PROGRESS NOTES
Infectious Disease Progress Note    Patient Name: Reyna Castillo  MR#: [id number]  : 1950  Admission Date: 2018  Date: 18   Time: 9:32 AM   Author: Dwayne Gonsales MD    Major Events: The patient is clinically stable.  The case was discussed with Dr. Dwayne Vann this morning.  The peritoneal abscess culture grew 4+ Streptococcus anginosis and 2+ Citrobacter freundii complex.  The Streptococcus should be susceptible to ceftriaxone as should the Citrobacter.  I will therefore DC vancomycin and Zosyn and start ceftriaxone 2 g IV daily.  I will also start her on p.o. metronidazole for anaerobic coverage.  The sensitivities of the Citrobacter should be available tomorrow to make sure that she is on the appropriate therapy.  She will likely go home tomorrow with PICC line for 10-14 days of IV antibiotics.    Antibiotics:    Anti-infectives     Start     Dose/Rate Route Frequency Ordered Stop    18 1030  cefTRIAXone (ROCEPHIN) IVPB 2 g in 100 mL NSS vial in bag      2 g  200 mL/hr over 30 Minutes intravenous Every 24 hours interval 18 0931 18 1029    18 1030  metroNIDAZOLE (FLAGYL) tablet 500 mg      500 mg oral 3 times daily 18 0931 18 0859          Subjective     Review of Systems    All other systems reviewed and negative except as noted in the HPI.    Objective     Vital Signs:    Patient Vitals for the past 72 hrs:   BP Temp Temp src Pulse Resp SpO2 Height Weight   18 0751 (!) 180/84 36.9 °C (98.4 °F) Oral 60 18 97 % - -   18 2300 (!) 158/65 36.9 °C (98.5 °F) Oral 61 18 96 % - -   18 2045 - - - - - 98 % - -   18 1746 (!) 156/70 36.9 °C (98.4 °F) Oral 64 18 97 % - -   18 0843 (!) 170/77 36.7 °C (98.1 °F) Oral 70 18 99 % - -   18 0015 - - - - - 97 % - -   18 2330 140/62 36.9 °C (98.5 °F) Oral 63 18 96 % - -   18 1500 138/64 36.9 °C (98.5 °F) Oral 60 17 95 % - -   18 1027 (!) 151/69 - - 70 - - - -   18  "0700 (!) 132/59 37.2 °C (99 °F) Oral (!) 52 17 96 % - -   18 2330 (!) 148/74 37.3 °C (99.1 °F) Oral 75 18 96 % - -   18 2252 - - - - - - 1.6 m (5' 3\") 97.5 kg (215 lb)   18 2100 (!) 169/73 37.2 °C (99 °F) Oral 84 18 96 % - -   184 - 36.9 °C (98.5 °F) Oral - - - - -   18 2043 (!) 153/65 - - 77 18 99 % - -   18 1931 (!) 159/69 - - 84 18 99 % - -   18 1814 (!) 177/76 37.8 °C (100 °F) Temporal 89 18 96 % 1.575 m (5' 2\") 97.5 kg (215 lb)       Temp (72hrs), Av.3 °C (99.1 °F), Min:36.3 °C (97.3 °F), Max:39.2 °C (102.5 °F)      Physical Exam:    BP (!) 180/84 (BP Location: Right upper arm, Patient Position: Lying)   Pulse 60   Temp 36.9 °C (98.4 °F) (Oral)   Resp 18   Ht 1.6 m (5' 3\")   Wt 97.5 kg (215 lb)   SpO2 97%   BMI 38.09 kg/m²     General Appearance:    Alert, cooperative, no distress, appears stated age   Head:    Normocephalic, without obvious abnormality, atraumatic   Eyes:    PERRL, conjunctiva/corneas clear, EOM's intact, fundi     benign, both eyes   Ears:    Normal TM's and external ear canals, both ears   Nose:   Nares normal, septum midline, mucosa normal, no drainage     or     sinus tenderness   Throat:   Lips, mucosa, and tongue normal; teeth and gums normal   Neck:   Supple, symmetrical, trachea midline, no adenopathy;     thyroid:  no enlargement/tenderness/nodules; no carotid    bruit or JVD   Back:     Symmetric, no curvature, ROM normal, no CVA tenderness   Lungs:     Clear to auscultation bilaterally, respirations unlabored   Chest Wall:    No tenderness or deformity   Heart:    Regular rate and rhythm, S1 and S2 normal, no murmur, rub or          gallop   Breast Exam:    No tenderness, masses, or nipple abnormality   Abdomen:     Soft, non-tender, bowel sounds active all four quadrants,     no masses, no organomegaly   Genitalia:    Normal female without lesion, discharge or tenderness   Rectal:    Normal tone, no masses or tenderness; " guaiac negative stool   Extremities:   Extremities normal, atraumatic, no cyanosis or edema   Musculoskeletal:  Pulses:   No injury or deformity    2+ and symmetric all extremities   Skin:   Skin color, texture, turgor normal, no rashes or lesions   Lymph nodes:   Cervical, supraclavicular, and axillary nodes normal   Neurologic:    Behavior/  Emotional:   CNII-XII intact, normal strength, sensation and reflexes     throughout    Appropriate, cooperative       Lines, Drains, Airways, Wounds:  Peripheral IV 08/25/18 Left;Lower Forearm (Active)   Number of days: 1       Closed/Suction Drain Right Buttock 10 Fr. (Active)   Number of days: 3       Labs:    CBC Results       08/24/18 08/23/18 08/03/18                    0300 1829 1328         WBC 12.14 (H) 13.46 (H) 8.57         RBC 3.12 (L) 3.54 (L) 4.17         HGB 8.8 (L) 10.0 (L) 12.9         HCT 27.5 (L) 30.7 (L) 37.2         MCV 88.1 86.7 89.2         MCH 28.2 28.2 30.9         MCHC 32.0 (L) 32.6 34.7          (H) 581 (H) 418 (H)         Comment for PLT at 1328 on 08/03/18:  SPECIMEN QUALITY CHECKED. RESULTS OBTAINED AFTER VORTEXING TO ELIMINATE PLT CLUMPS      BMP Results       08/24/18 08/23/18 07/27/18                    0300 1829 1010          (L) 135 (L) 138         K 3.9 3.3 (L) 4.4         Cl 104 103 105         CO2 23 22 24         Glucose 119 (H) 120 (H) 96         BUN 11 15 16         Creatinine 0.6 0.5 (L) 0.7         Calcium 8.4 (L) 8.5 (L) 9.9         Anion Gap 8 10 9         EGFR &gt;60.0 &gt;60.0 &gt;60.0         Comment for K at 1829 on 08/23/18:    Results obtained on plasma. Plasma Potassium values may be up to 0.4 mEQ/L less than serum values. The differences may be greater for patients with high platelet or white cell counts.      PT/PTT Results       08/23/18 07/10/18                       1048 1356          PT 18.7 (H) 14.8 (H)          INR 1.6 1.2          Comment for INR at 1048 on 08/23/18:    Moderate Intensity Anticoagulation  = 2.0 to 3.0, High Intensity = 2.5 to 3.5    Comment for INR at 1356 on 07/10/18:    Moderate Intensity Anticoagulation = 2.0 to 3.0, High Intensity = 2.5 to 3.5      UA Results       07/27/18                          1010           Color Enedelia (A)           Clarity Turbid (A)           Glucose Negative           Bilirubin Negative           Ketones Negative           Sp Grav 1.025           Blood Negative           Ph 5.0           Protein Negative           Urobilinogen 0.2           Nitrite Positive (A)           Leuk Est +2 (A)           WBC 20 TO 35 (A)           RBC 0 TO 4           Bacteria None Seen           Comment for Blood at 1010 on 07/27/18:    The sensitivity of the occult blood test is equivalent to approximately 4 intact RBC/HPF.      Lactate Results       08/23/18 1829           Lactate 0.8                           Microbiology Results     Procedure Component Value Units Date/Time    Blood Culture [[id number]]  (Normal) Collected:  08/23/18 1829    Specimen:  Blood from Blood, Venous Updated:  08/25/18 2200     Culture No growth at 48 hours    Blood Culture [[id number]]  (Normal) Collected:  08/23/18 1829    Specimen:  Blood from Blood, Venous Updated:  08/25/18 2300     Culture No growth at 48 hours    Bacterial culture / smear, aerobic and anaerobic [[id number]]  (Abnormal)  (Susceptibility) Collected:  08/23/18 1325    Specimen:  Abscess Fluid from Abscess Fluid Updated:  08/26/18 0917     Culture **Positive Culture** (A)      4+ Streptococcus anginosus      2+ Citrobacter freundii complex     Gram Stain Result 4+ WBC      4+ Gram positive cocci          Pathology Results     ** No results found for the last 720 hours. **          Echo:         Imaging:    Radiology Imaging   XR FOOT 3+ VW LEFT    Narrative DIAGNOSTIC RADIOLOGY - Jan 25 2018  7:02PM  - L FOOT COMPLETE  CLINICAL HISTORY: Left foot pain and swelling and bruising  IMPRESSION: Previously identified soft tissue  swelling over the dorsum of  the midfoot/forefoot has resolved.  No other interval change.  COMMENT: Four views of the left foot are compared to a prior study from  12/14/2017.  Previously identified soft tissue swelling over the dorsum of the  midfoot/forefoot has resolved.  No fracture or dislocation is seen.  Deformity of the heads of the 2nd and 3rd proximal phalanges are likely  postsurgical.  Flattening of the head of the 2nd metatarsal may be  postsurgical or perhaps due to Freiberg's infraction.  Mild plantar and posterior calcaneal spurring is again noted.  Transcribed by: n/a :Jan 26 2018  7:37A  Dictated by: ARIEL JOHNSON M.D.:Jan 26 2018  7:37A  Dictation signed by:ARIEL JOHNSON M.D.:Jan 26 2018  7:38A  CPT Code: 70977       Assessment     The patient is doing well.  The peritoneal abscess culture has grown 4+ Streptococcus anginosus and 2+ Citrobacter freundii complex.  The Streptococcus anginosus has a propensity to form abscesses but should be very susceptible to ceftriaxone.  Vancomycin has been covering this organism.  Quinolones are generally resistant which is probably why she did not respond to outpatient oral antibiotics the last time.  Her antibiotics have been changed to ceftriaxone 2 g IV daily and p.o. metronidazole.        Plan     Possible discharge home in a.m. on IV ceftriaxone and p.o. Flagyl.  Case management is following.  Patient will need a PICC line tomorrow.

## 2018-08-26 NOTE — PLAN OF CARE
Problem: Patient Care Overview  Goal: Plan of Care Review  Outcome: Ongoing (interventions implemented as appropriate)   08/26/18 0438   Coping/Psychosocial   Plan Of Care Reviewed With patient   Plan of Care Review   Progress improving   Outcome Summary pain to right groin/back controlled with dilaudid and toradol. toradol seems to help better. zofran given earlier but no complaints after that. hypoactive BS. drain draining tea/darnell fluid       Problem: Pain, Acute (Adult)  Goal: Acceptable Pain Control/Comfort Level  Outcome: Ongoing (interventions implemented as appropriate)      Problem: Fall Risk (Adult)  Goal: Absence of Falls  Outcome: Ongoing (interventions implemented as appropriate)

## 2018-08-26 NOTE — PLAN OF CARE
Problem: Patient Care Overview  Goal: Plan of Care Review  Outcome: Ongoing (interventions implemented as appropriate)      Problem: Pain, Acute (Adult)  Goal: Acceptable Pain Control/Comfort Level  Outcome: Ongoing (interventions implemented as appropriate)      Problem: Fall Risk (Adult)  Goal: Absence of Falls  Outcome: Ongoing (interventions implemented as appropriate)

## 2018-08-26 NOTE — PROGRESS NOTES
"General Surgery Daily Progress Note    Subjective: Pt seen and examined.  Still w/ nightsweats.  Mod R groin and buttock pain.  Horace diet, +BM    Vital signs in last 24 hours:  Temp:  [36.9 °C (98.4 °F)-36.9 °C (98.5 °F)] 36.9 °C (98.4 °F)  Heart Rate:  [60-64] 60  Resp:  [18] 18  BP: (156-180)/(65-84) 180/84    Intake/Output this shift:    Intake/Output Summary (Last 24 hours) at 08/26/18 0925  Last data filed at 08/26/18 0700   Gross per 24 hour   Intake             1160 ml   Output              905 ml   Net              255 ml       Physical Exam    General appearance: alert, appears stated age and cooperative    Abdomen: soft, scant RLQ ttp; bowel sounds normal; no masses, no organomegaly\"      Labs    Results from last 7 days  Lab Units 08/24/18  0300   SODIUM mmol/L 135*   POTASSIUM mmol/L 3.9   CHLORIDE mmol/L 104   CO2 mmol/L 23   BUN mg/dL 11   CREATININE mg/dL 0.6   GLUCOSE mg/dL 119*   CALCIUM mg/dL 8.4*       Results from last 7 days  Lab Units 08/24/18  0300   WBC K/uL 12.14*   HEMOGLOBIN g/dL 8.8*   HEMATOCRIT % 27.5*   PLATELETS K/uL 492*       Results from last 7 days  Lab Units 08/23/18  1048   INR INR 1.6       Results from last 7 days  Lab Units 08/24/18  0300 08/23/18  1829   SODIUM mmol/L 135* 135*   POTASSIUM mmol/L 3.9 3.3*   CHLORIDE mmol/L 104 103   CO2 mmol/L 23 22   BUN mg/dL 11 15   CREATININE mg/dL 0.6 0.5*   CALCIUM mg/dL 8.4* 8.5*   ALBUMIN g/dL  --  2.7*   BILIRUBIN TOTAL mg/dL  --  0.8   ALK PHOS IU/L  --  54   ALT IU/L  --  16   AST IU/L  --  21   GLUCOSE mg/dL 119* 120*       Results from last 7 days  Lab Units 08/24/18  0300   HEMOGLOBIN g/dL 8.8*   HEMATOCRIT % 27.5*       0  Lab Value Date/Time   LIPASE 26 07/07/2018 0722     Pathology Results     ** No results found for the last 720 hours. **              Imaging  Ct Abdomen Pelvis With And Without Iv Contrast    Result Date: 8/3/2018  IMPRESSION: There has been significant improvement in the inflammation in the pelvis.  Small " collection with extraluminal air and/or trace amount of contrast still seen measuring up to 2 cm previously 4.5cm.  Please see above comments. Other incidental findings as discussed.    Ct Guided Abscess Drain    Result Date: 8/23/2018  IMPRESSION: Technically successful transgluteal drainage of pelvic collection and placement of 10-[place] Resolve drain.     Ct Abdomen Pelvis With Iv Contrast    Result Date: 8/21/2018  IMPRESSION: Progression of pelvic abscess from sigmoid diverticulitis.  Superimposed suspected partial small bowel obstruction. Discussed with Taryn Frias RN, at 11:20 AM on 8/21/2018.     Ir Technical Assistance    Result Date: 8/24/2018  IMPRESSION:  Technical assistance was provided to Dr. Paul Real for CT guided drainage of this patient's pelvic collection and placement of a drain. Please see his report for details.     Cx: Strep panS, Citrobacter S pending      Assessment   Diverticulitis w/ abscess    Plan   Awaiting sensitivities, cont IV abx per ID, will discuss w/ IR tomorrow re timing of abscessogram, still leaning toward d/c on 2 wks IV abx and then elective sigmoid colectomy        Dwayne Vann, DO

## 2018-08-27 ENCOUNTER — APPOINTMENT (INPATIENT)
Dept: RADIOLOGY | Facility: HOSPITAL | Age: 68
DRG: 392 | End: 2018-08-27
Attending: SURGERY
Payer: COMMERCIAL

## 2018-08-27 ENCOUNTER — APPOINTMENT (INPATIENT)
Dept: RADIOLOGY | Facility: HOSPITAL | Age: 68
DRG: 392 | End: 2018-08-27
Attending: INTERNAL MEDICINE
Payer: COMMERCIAL

## 2018-08-27 VITALS
SYSTOLIC BLOOD PRESSURE: 144 MMHG | DIASTOLIC BLOOD PRESSURE: 70 MMHG | HEIGHT: 63 IN | OXYGEN SATURATION: 96 % | HEART RATE: 63 BPM | WEIGHT: 215 LBS | RESPIRATION RATE: 17 BRPM | TEMPERATURE: 97.6 F | BODY MASS INDEX: 38.09 KG/M2

## 2018-08-27 LAB
ANION GAP SERPL CALC-SCNC: 8 MEQ/L (ref 3–15)
BUN SERPL-MCNC: 8 MG/DL (ref 8–20)
C DIFF STL QL: NEGATIVE
CALCIUM SERPL-MCNC: 8.9 MG/DL (ref 8.9–10.3)
CHLORIDE SERPL-SCNC: 104 MMOL/L (ref 98–109)
CO2 SERPL-SCNC: 28 MMOL/L (ref 22–32)
CREAT SERPL-MCNC: 0.6 MG/DL (ref 0.6–1.1)
ERYTHROCYTE [DISTWIDTH] IN BLOOD BY AUTOMATED COUNT: 14.6 % (ref 11.7–14.4)
GFR SERPL CREATININE-BSD FRML MDRD: >60 ML/MIN/1.73M*2
GLUCOSE SERPL-MCNC: 100 MG/DL (ref 70–99)
HCT VFR BLDCO AUTO: 29.1 % (ref 35–45)
HGB BLD-MCNC: 9.2 G/DL (ref 11.8–15.7)
MAGNESIUM SERPL-MCNC: 1.8 MG/DL (ref 1.8–2.5)
MCH RBC QN AUTO: 28 PG (ref 28–33.2)
MCHC RBC AUTO-ENTMCNC: 31.6 G/DL (ref 32.2–35.5)
MCV RBC AUTO: 88.4 FL (ref 83–98)
PDW BLD AUTO: 8.9 FL (ref 9.4–12.3)
PHOSPHATE SERPL-MCNC: 3.9 MG/DL (ref 2.4–4.7)
PLATELET # BLD AUTO: 517 K/UL (ref 150–369)
POTASSIUM SERPL-SCNC: 3.6 MMOL/L (ref 3.6–5.1)
RBC # BLD AUTO: 3.29 M/UL (ref 3.93–5.22)
SODIUM SERPL-SCNC: 140 MMOL/L (ref 136–144)
WBC # BLD AUTO: 9.02 K/UL (ref 3.8–10.5)

## 2018-08-27 PROCEDURE — 63700000 HC SELF-ADMINISTRABLE DRUG: Performed by: INTERNAL MEDICINE

## 2018-08-27 PROCEDURE — 63600000 HC DRUGS/DETAIL CODE: Performed by: PHYSICIAN ASSISTANT

## 2018-08-27 PROCEDURE — 85027 COMPLETE CBC AUTOMATED: CPT | Performed by: SURGERY

## 2018-08-27 PROCEDURE — 25000000 HC PHARMACY GENERAL: Performed by: SURGERY

## 2018-08-27 PROCEDURE — 02HV33Z INSERTION OF INFUSION DEVICE INTO SUPERIOR VENA CAVA, PERCUTANEOUS APPROACH: ICD-10-PCS | Performed by: RADIOLOGY

## 2018-08-27 PROCEDURE — 27200000 IR FISTULAGRAM / TUBE CHECK

## 2018-08-27 PROCEDURE — 36569 INSJ PICC 5 YR+ W/O IMAGING: CPT

## 2018-08-27 PROCEDURE — 63600105 HC IODINE BASED CONTRAST: Mod: JW | Performed by: SURGERY

## 2018-08-27 PROCEDURE — 36415 COLL VENOUS BLD VENIPUNCTURE: CPT | Performed by: SURGERY

## 2018-08-27 PROCEDURE — 87324 CLOSTRIDIUM AG IA: CPT | Performed by: SURGERY

## 2018-08-27 PROCEDURE — C1751 CATH, INF, PER/CENT/MIDLINE: HCPCS

## 2018-08-27 PROCEDURE — 80048 BASIC METABOLIC PNL TOTAL CA: CPT | Performed by: SURGERY

## 2018-08-27 PROCEDURE — 71045 X-RAY EXAM CHEST 1 VIEW: CPT

## 2018-08-27 PROCEDURE — BW111ZZ FLUOROSCOPY OF ABDOMEN AND PELVIS USING LOW OSMOLAR CONTRAST: ICD-10-PCS | Performed by: RADIOLOGY

## 2018-08-27 PROCEDURE — 63700000 HC SELF-ADMINISTRABLE DRUG: Performed by: SURGERY

## 2018-08-27 PROCEDURE — 83735 ASSAY OF MAGNESIUM: CPT | Performed by: SURGERY

## 2018-08-27 PROCEDURE — 84100 ASSAY OF PHOSPHORUS: CPT | Performed by: SURGERY

## 2018-08-27 PROCEDURE — 63600000 HC DRUGS/DETAIL CODE: Performed by: INTERNAL MEDICINE

## 2018-08-27 RX ORDER — SODIUM CHLORIDE 0.9 % (FLUSH) 0.9 %
10 SYRINGE (ML) INJECTION AS NEEDED
Status: DISCONTINUED | OUTPATIENT
Start: 2018-08-27 | End: 2018-08-27 | Stop reason: HOSPADM

## 2018-08-27 RX ORDER — SODIUM CHLORIDE 0.9 % (FLUSH) 0.9 %
10 SYRINGE (ML) INJECTION EVERY 8 HOURS
Status: DISCONTINUED | OUTPATIENT
Start: 2018-08-27 | End: 2018-08-27 | Stop reason: HOSPADM

## 2018-08-27 RX ADMIN — OXYCODONE HYDROCHLORIDE AND ACETAMINOPHEN 2 TABLET: 5; 325 TABLET ORAL at 18:05

## 2018-08-27 RX ADMIN — HYDROMORPHONE HYDROCHLORIDE 1 MG: 1 INJECTION, SOLUTION INTRAMUSCULAR; INTRAVENOUS; SUBCUTANEOUS at 09:01

## 2018-08-27 RX ADMIN — ROSUVASTATIN CALCIUM 20 MG: 20 TABLET, FILM COATED ORAL at 08:49

## 2018-08-27 RX ADMIN — MULTIPLE VITAMINS W/ MINERALS TAB 1 TABLET: TAB at 08:49

## 2018-08-27 RX ADMIN — LOSARTAN POTASSIUM 50 MG: 50 TABLET, FILM COATED ORAL at 08:50

## 2018-08-27 RX ADMIN — METRONIDAZOLE 500 MG: 500 TABLET ORAL at 17:14

## 2018-08-27 RX ADMIN — METRONIDAZOLE 500 MG: 500 TABLET ORAL at 08:49

## 2018-08-27 RX ADMIN — FENOFIBRATE 67 MG: 67 CAPSULE ORAL at 08:50

## 2018-08-27 RX ADMIN — HYDROMORPHONE HYDROCHLORIDE 1 MG: 1 INJECTION, SOLUTION INTRAMUSCULAR; INTRAVENOUS; SUBCUTANEOUS at 02:41

## 2018-08-27 RX ADMIN — ASPIRIN 81 MG: 81 TABLET, COATED ORAL at 08:50

## 2018-08-27 RX ADMIN — CEFTRIAXONE SODIUM 2 G: 2 INJECTION, POWDER, FOR SOLUTION INTRAVENOUS at 12:59

## 2018-08-27 RX ADMIN — IOHEXOL 10 ML: 300 INJECTION, SOLUTION INTRAVENOUS at 09:57

## 2018-08-27 RX ADMIN — PANTOPRAZOLE SODIUM 40 MG: 40 TABLET, DELAYED RELEASE ORAL at 08:49

## 2018-08-27 RX ADMIN — Medication 10 ML: at 17:15

## 2018-08-27 RX ADMIN — ONDANSETRON 4 MG: 2 INJECTION INTRAMUSCULAR; INTRAVENOUS at 09:05

## 2018-08-27 RX ADMIN — SERTRALINE HYDROCHLORIDE 50 MG: 50 TABLET ORAL at 08:50

## 2018-08-27 RX ADMIN — HYDROMORPHONE HYDROCHLORIDE 1 MG: 1 INJECTION, SOLUTION INTRAMUSCULAR; INTRAVENOUS; SUBCUTANEOUS at 14:38

## 2018-08-27 NOTE — DISCHARGE INSTRUCTIONS
PICC Insertion, Care After  Refer to this sheet in the next few weeks. These instructions provide you with information on caring for yourself after your procedure. Your health care provider may also give you more specific instructions. Your treatment has been planned according to current medical practices, but problems sometimes occur. Call your health care provider if you have any problems or questions after your procedure.  What can I expect after the procedure?  After your procedure, it is typical to have the following:  · Mild discomfort at the insertion site. This should not last more than a day.  Follow these instructions at home:  · Rest at home for the remainder of the day after the procedure.  · You may bend your arm and move it freely. If your PICC is near or at the bend of your elbow, avoid activity with repeated motion at the elbow.  · Avoid lifting heavy objects as instructed by your health care provider.  · Avoid using a crutch with the arm on the same side as your PICC. You may need to use a walker.  Bandage Care  · Keep your PICC bandage (dressing) clean and dry to prevent infection.  · Ask your health care provider when you may shower. To keep the dressing dry, cover the PICC with plastic wrap and tape before showering. If the dressing does become wet, replace it right after the shower.  · Do not soak in the bath, swim, or use hot tubs when you have a PICC.  · Change the PICC dressing as instructed by your health care provider.  · Change your PICC dressing if it becomes loose or wet.  General PICC Care  · Check the PICC insertion site daily for leakage, redness, swelling, or pain.  · Flush the PICC as directed by your health care provider. Let your health care provider know right away if the PICC is difficult to flush or does not flush. Do not use force to flush the PICC.  · Do not use a syringe that is less than 10 mL to flush the PICC.  · Never pull or tug on the PICC.  · Avoid blood pressure  "checks on the arm with the PICC.  · Keep your PICC identification card with you at all times.  · Do not take the PICC out yourself. Only a trained health care professional should remove the PICC.  Contact a health care provider if:  · You have pain in your arm, ear, face, or teeth.  · You have fever or chills.  · You have drainage from the PICC insertion site.  · You have redness or palpate a \"cord\" around the PICC insertion site.  · You cannot flush the catheter.  Get help right away if:  · You have swelling in the arm in which the PICC is inserted.  This information is not intended to replace advice given to you by your health care provider. Make sure you discuss any questions you have with your health care provider.  Document Released: 10/08/2014 Document Revised: 05/25/2017 Document Reviewed: 10/10/2014  [employer] Interactive Patient Education © 2017 [employer]  PICC Home Guide  A peripherally inserted central catheter (PICC) is a long, thin, flexible tube that is inserted into a vein in the upper arm. It is a form of intravenous (IV) access. It is considered to be a \"central\" line because the tip of the PICC ends in a large vein in your chest. This large vein is called the superior vena cava (SVC). The PICC tip ends in the SVC because there is a lot of blood flow in the SVC. This allows medicines and IV fluids to be quickly distributed throughout the body. The PICC is inserted using a sterile technique by a specially trained nurse or physician. After the PICC is inserted, a chest X-ray exam is done to be sure it is in the correct place.  A PICC may be placed for different reasons, such as:  · To give medicines and liquid nutrition that can only be given through a central line. Examples are:  ¨ Certain antibiotic treatments.  ¨ Chemotherapy.  ¨ Total parenteral nutrition (TPN).  · To take frequent blood samples.  · To give IV fluids and blood products.  · If there is difficulty placing a peripheral intravenous " "(PIV) catheter.  If taken care of properly, a PICC can remain in place for several months. A PICC can also allow a person to go home from the hospital early. Medicine and PICC care can be managed at home by a family member or home health care team.  What problems can happen when I have a PICC?  Problems with a PICC can occasionally occur. These may include the following:  · A blood clot (thrombus) forming in or at the tip of the PICC. This can cause the PICC to become clogged. A clot-dissolving medicine called tissue plasminogen activator (tPA) can be given through the PICC to help break up the clot.  · Inflammation of the vein (phlebitis) in which the PICC is placed. Signs of inflammation may include redness, pain at the insertion site, red streaks, or being able to feel a \"cord\" in the vein where the PICC is located.  · Infection in the PICC or at the insertion site. Signs of infection may include fever, chills, redness, swelling, or pus drainage from the PICC insertion site.  · PICC movement (malposition). The PICC tip may move from its original position due to excessive physical activity, forceful coughing, sneezing, or vomiting.  · A break or cut in the PICC. It is important to not use scissors near the PICC.  · Nerve or tendon irritation or injury during PICC insertion.  What should I keep in mind about activities when I have a PICC?  · You may bend your arm and move it freely. If your PICC is near or at the bend of your elbow, avoid activity with repeated motion at the elbow.  · Rest at home for the remainder of the day following PICC line insertion.  · Avoid lifting heavy objects as instructed by your health care provider.  · Avoid using a crutch with the arm on the same side as your PICC. You may need to use a walker.  What should I know about my PICC dressing?  · Keep your PICC bandage (dressing) clean and dry to prevent infection.  ¨ Ask your health care provider when you may shower. Ask your health care " "provider to teach you how to wrap the PICC when you do take a shower.  · Change the PICC dressing as instructed by your health care provider.  · Change your PICC dressing if it becomes loose or wet.  What should I know about PICC care?  · Check the PICC insertion site daily for leakage, redness, swelling, or pain.  · Do not take a bath, swim, or use hot tubs when you have a PICC. Cover PICC line with clear plastic wrap and tape to keep it dry while showering.  · Flush the PICC as directed by your health care provider. Let your health care provider know right away if the PICC is difficult to flush or does not flush. Do not use force to flush the PICC.  · Do not use a syringe that is less than 10 mL to flush the PICC.  · Never pull or tug on the PICC.  · Avoid blood pressure checks on the arm with the PICC.  · Keep your PICC identification card with you at all times.  · Do not take the PICC out yourself. Only a trained clinical professional should remove the PICC.  Get help right away if:  · Your PICC is accidentally pulled all the way out. If this happens, cover the insertion site with a bandage or gauze dressing. Do not throw the PICC away. Your health care provider will need to inspect it.  · Your PICC was tugged or pulled and has partially come out. Do not  push the PICC back in.  · There is any type of drainage, redness, or swelling where the PICC enters the skin.  · You cannot flush the PICC, it is difficult to flush, or the PICC leaks around the insertion site when it is flushed.  · You hear a \"flushing\" sound when the PICC is flushed.  · You have pain, discomfort, or numbness in your arm, shoulder, or jaw on the same side as the PICC.  · You feel your heart \"racing\" or skipping beats.  · You notice a hole or tear in the PICC.  · You develop chills or a fever.  This information is not intended to replace advice given to you by your health care provider. Make sure you discuss any questions you have with your " health care provider.  Document Released: 06/23/2004 Document Revised: 07/07/2017 Document Reviewed: 10/10/2014  [employer] Interactive Patient Education © 2017 [employer]

## 2018-08-27 NOTE — PROGRESS NOTES
Infectious Disease Progress Note    Patient Name: Reyna Castillo  MR#: [id number]  : 1950  Admission Date: 2018  Date: 18   Time: 1:20 PM   Author: Dwayne Gonsales MD    Major Events: The patient is clinically stable.  The peritoneal abscess culture grew 4+ Streptococcus anginosis and 2+ Citrobacter freundii complex.  Both organisms are susceptible to ceftriaxone .  I will therefore D/C pt on ceftriaxone 2 g IV daily for 10 to 14 days. I will also start her on p.o. metronidazole for anaerobic coverage.  A PICC line is being inserted at this time. I have called the Rx for metronidazole to [facility] [phone] today..    Antibiotics:    Anti-infectives     Start     Dose/Rate Route Frequency Ordered Stop    18 1030  cefTRIAXone (ROCEPHIN) IVPB 2 g in 100 mL NSS vial in bag      2 g  200 mL/hr over 30 Minutes intravenous Every 24 hours interval 18 0931 18 1029    18 1030  metroNIDAZOLE (FLAGYL) tablet 500 mg      500 mg oral 3 times daily 18 0931 18 0859          Subjective     Review of Systems    All other systems reviewed and negative except as noted in the HPI.    Objective     Vital Signs:    Patient Vitals for the past 72 hrs:   BP Temp Temp src Pulse Resp SpO2   18 1055 (!) 144/70 - - 63 - -   18 1032 (!) 178/70 - - 81 - -   18 1000 (!) 176/84 36.4 °C (97.6 °F) - 78 17 96 %   18 0950 (!) 155/121 - - 75 18 96 %   18 2300 (!) 163/75 36.8 °C (98.2 °F) - 71 - 98 %   18 - - - - - 97 %   18 1604 (!) 152/72 36.7 °C (98.1 °F) Oral 68 18 97 %   18 0751 (!) 180/84 36.9 °C (98.4 °F) Oral 60 18 97 %   18 2300 (!) 158/65 36.9 °C (98.5 °F) Oral 61 18 96 %   18 2045 - - - - - 98 %   18 1746 (!) 156/70 36.9 °C (98.4 °F) Oral 64 18 97 %   18 0843 (!) 170/77 36.7 °C (98.1 °F) Oral 70 18 99 %   18 0015 - - - - - 97 %   18 2330 140/62 36.9 °C (98.5 °F) Oral 63 18 96  "%   18 1500 138/64 36.9 °C (98.5 °F) Oral 60 17 95 %       Temp (72hrs), Av.8 °C (98.3 °F), Min:36.4 °C (97.6 °F), Max:36.9 °C (98.5 °F)      Physical Exam:    BP (!) 144/70 (BP Location: Right upper arm, Patient Position: Lying)   Pulse 63   Temp 36.4 °C (97.6 °F)   Resp 17   Ht 1.6 m (5' 3\")   Wt 97.5 kg (215 lb)   SpO2 96%   BMI 38.09 kg/m²     General Appearance:    Alert, cooperative, no distress, appears stated age   Head:    Normocephalic, without obvious abnormality, atraumatic   Eyes:    PERRL, conjunctiva/corneas clear, EOM's intact, fundi     benign, both eyes   Ears:    Normal TM's and external ear canals, both ears   Nose:   Nares normal, septum midline, mucosa normal, no drainage     or     sinus tenderness   Throat:   Lips, mucosa, and tongue normal; teeth and gums normal   Neck:   Supple, symmetrical, trachea midline, no adenopathy;     thyroid:  no enlargement/tenderness/nodules; no carotid    bruit or JVD   Back:     Symmetric, no curvature, ROM normal, no CVA tenderness   Lungs:     Clear to auscultation bilaterally, respirations unlabored   Chest Wall:    No tenderness or deformity   Heart:    Regular rate and rhythm, S1 and S2 normal, no murmur, rub or          gallop   Breast Exam:    No tenderness, masses, or nipple abnormality   Abdomen:     Soft, non-tender, bowel sounds active all four quadrants,     no masses, no organomegaly   Genitalia:    Normal female without lesion, discharge or tenderness   Rectal:    Normal tone, no masses or tenderness; guaiac negative stool   Extremities:   Extremities normal, atraumatic, no cyanosis or edema   Musculoskeletal:  Pulses:   No injury or deformity    2+ and symmetric all extremities   Skin:   Skin color, texture, turgor normal, no rashes or lesions   Lymph nodes:   Cervical, supraclavicular, and axillary nodes normal   Neurologic:    Behavior/  Emotional:   CNII-XII intact, normal strength, sensation and reflexes     throughout    " Appropriate, cooperative       Lines, Drains, Airways, Wounds:  Peripheral IV 08/25/18 Left;Lower Forearm (Active)   Number of days: 1       Closed/Suction Drain Right Buttock 10 Fr. (Active)   Number of days: 3       Labs:    CBC Results       08/27/18 08/24/18 08/23/18                    0309 0300 1829         WBC 9.02 12.14 (H) 13.46 (H)         RBC 3.29 (L) 3.12 (L) 3.54 (L)         HGB 9.2 (L) 8.8 (L) 10.0 (L)         HCT 29.1 (L) 27.5 (L) 30.7 (L)         MCV 88.4 88.1 86.7         MCH 28.0 28.2 28.2         MCHC 31.6 (L) 32.0 (L) 32.6          (H) 492 (H) 581 (H)                   BMP Results       08/27/18 08/24/18 08/23/18                    0309 0300 1829          135 (L) 135 (L)         K 3.6 3.9 3.3 (L)         Cl 104 104 103         CO2 28 23 22         Glucose 100 (H) 119 (H) 120 (H)         BUN 8 11 15         Creatinine 0.6 0.6 0.5 (L)         Calcium 8.9 8.4 (L) 8.5 (L)         Anion Gap 8 8 10         EGFR >60.0 >60.0 >60.0         Comment for K at 1829 on 08/23/18:    Results obtained on plasma. Plasma Potassium values may be up to 0.4 mEQ/L less than serum values. The differences may be greater for patients with high platelet or white cell counts.      PT/PTT Results       08/23/18 07/10/18                       1048 1356          PT 18.7 (H) 14.8 (H)          INR 1.6 1.2          Comment for INR at 1048 on 08/23/18:    Moderate Intensity Anticoagulation = 2.0 to 3.0, High Intensity = 2.5 to 3.5    Comment for INR at 1356 on 07/10/18:    Moderate Intensity Anticoagulation = 2.0 to 3.0, High Intensity = 2.5 to 3.5      UA Results       07/27/18                          1010           Color Enedelia (A)           Clarity Turbid (A)           Glucose Negative           Bilirubin Negative           Ketones Negative           Sp Grav 1.025           Blood Negative           Ph 5.0           Protein Negative           Urobilinogen 0.2           Nitrite Positive (A)           Leuk Est +2 (A)            WBC 20 TO 35 (A)           RBC 0 TO 4           Bacteria None Seen           Comment for Blood at 1010 on 07/27/18:    The sensitivity of the occult blood test is equivalent to approximately 4 intact RBC/HPF.      Lactate Results       08/23/18 1829           Lactate 0.8                           Microbiology Results     Procedure Component Value Units Date/Time    Blood Culture [[id number]]  (Normal) Collected:  08/23/18 1829    Specimen:  Blood from Blood, Venous Updated:  08/25/18 2200     Culture No growth at 48 hours    Blood Culture [[id number]]  (Normal) Collected:  08/23/18 1829    Specimen:  Blood from Blood, Venous Updated:  08/25/18 2300     Culture No growth at 48 hours    Bacterial culture / smear, aerobic and anaerobic [[id number]]  (Abnormal)  (Susceptibility) Collected:  08/23/18 1325    Specimen:  Abscess Fluid from Abscess Fluid Updated:  08/26/18 0917     Culture **Positive Culture** (A)      4+ Streptococcus anginosus      2+ Citrobacter freundii complex     Gram Stain Result 4+ WBC      4+ Gram positive cocci          Pathology Results     ** No results found for the last 720 hours. **          Echo:         Imaging:    Radiology Imaging   XR FOOT 3+ VW LEFT    Narrative DIAGNOSTIC RADIOLOGY - Jan 25 2018  7:02PM  - L FOOT COMPLETE  CLINICAL HISTORY: Left foot pain and swelling and bruising  IMPRESSION: Previously identified soft tissue swelling over the dorsum of  the midfoot/forefoot has resolved.  No other interval change.  COMMENT: Four views of the left foot are compared to a prior study from  12/14/2017.  Previously identified soft tissue swelling over the dorsum of the  midfoot/forefoot has resolved.  No fracture or dislocation is seen.  Deformity of the heads of the 2nd and 3rd proximal phalanges are likely  postsurgical.  Flattening of the head of the 2nd metatarsal may be  postsurgical or perhaps due to Freiberg's infraction.  Mild plantar and posterior  calcaneal spurring is again noted.  Transcribed by: n/a :Jan 26 2018  7:37A  Dictated by: ARIEL JOHNSON M.D.:Jan 26 2018  7:37A  Dictation signed by:ARIEL JOHNSON M.D.:Jan 26 2018  7:38A  CPT Code: 03034       Assessment     The patient is doing well.  The peritoneal abscess culture has grown 4+ Streptococcus anginosus and 2+ Citrobacter freundii complex, both susceptible to Ceftriaxone.  The Streptococcus anginosus has a propensity to form abscesses.  Quinolones are generally resistant which is probably why she did not respond to outpatient oral antibiotics the last time.  She will be going home today on ceftriaxone 2 g IV daily and p.o. metronidazole.        Plan     D/C today on IV ceftriaxone and p.o. Flagyl.  Case management is following and as soon as an infusion company is chosen, I will call the orders them.   The PICC line is being inserted today.  She will need weekly labs while on antibiotics.   Pt knows to call MD if she gets fever/chills, diarrhea, rash.

## 2018-08-27 NOTE — POST-PROCEDURE NOTE
Interventional Radiology Brief Postprocedure Note    Reyna Castillo     Attending: Javier    Assistant:      Diagnosis: Diverticular abscess, s/p perc drainange; eval for fistula    Description of procedure: Tube check/fistulagram    Contrast:  10 cc ,Omni 300     Anesthesia:  None    Medications:       Complications: None      Estimated Blood Loss: Estimated Blood Loss: None    Anticoagulation:      Specimens:      Findings:  Cavity ~ 8 cc, No fistula identified    8/27/2018 9:56 AM

## 2018-08-27 NOTE — PROGRESS NOTES
Referral received and chart reviewed. Spoke with patient and agreeable to services with [facility] and [facility]. [facility] SET UP TO START iv atx ON 8/28 AS WELL AS A VISITING NURSE FOR [facility]. pATIENT AGREEEABLE TO cost of $12 per day and $15.55 per nursing visit. Referrals completed.

## 2018-08-27 NOTE — PROGRESS NOTES
Pt wants to go to home with [facility] and said she would use whatever home infusion they would use. She said she has used [facility] in the past and prefers to work through them. IAN Moreno contacted with that information and Dr Gonsales contacting [facility]. [facility]IE [facility] following. Pt just got her PICC line in.    Addendum- 3:21 PM-IAN Moreno was in to see pt. Pt is awaiting to see Dr Vann to see if she is medically cleared for dc from the hospital today. [facility] following to assist further, as needed.    Addendum- 4:27 PM-Dr Vann came and discharged pt. MSW CC contacted IAN Moreno and updated her on progress. There are no additional needs at this time and pt is medically cleared for dc from the hospital.

## 2018-08-27 NOTE — DISCHARGE INSTR - ACTIVITY
PICC Information:    Insertion Date: {8/27/2018}    Length: 39cm    Chest X-Ray Results: {cxr interpretation:89720}    External Catheter Length: 0    Last Dressing Change: {8/27/2018}    Power Inject PICC Line? : yes  Last Cap Change: {8/27/2018}    Keep this information in a safe place and share with your provider  and/or infusion nurse as needed.

## 2018-08-27 NOTE — PLAN OF CARE
Problem: Patient Care Overview  Goal: Plan of Care Review  Outcome: Ongoing (interventions implemented as appropriate)   08/26/18 2104   Coping/Psychosocial   Plan Of Care Reviewed With patient   Plan of Care Review   Progress improving   Outcome Summary had 3liquid BM. hypoactive BS. pain tolerable to groin/buttock       Problem: Pain, Acute (Adult)  Goal: Acceptable Pain Control/Comfort Level  Outcome: Ongoing (interventions implemented as appropriate)      Problem: Fall Risk (Adult)  Goal: Absence of Falls  Outcome: Ongoing (interventions implemented as appropriate)

## 2018-08-27 NOTE — PLAN OF CARE
Problem: Patient Care Overview  Goal: Plan of Care Review  Outcome: Ongoing (interventions implemented as appropriate)   08/27/18 0391   Coping/Psychosocial   Plan Of Care Reviewed With patient   Plan of Care Review   Progress no change   Outcome Summary positive BS to abd. slight nausea overnight with pain increased. dilaudid 1mg iv helped control pain down. zofran given. drain draining red/maroon fluid.       Problem: Pain, Acute (Adult)  Goal: Acceptable Pain Control/Comfort Level  Outcome: Ongoing (interventions implemented as appropriate)      Problem: Fall Risk (Adult)  Goal: Absence of Falls  Outcome: Ongoing (interventions implemented as appropriate)

## 2018-08-27 NOTE — NURSING NOTE
Discharge instructions given to patient, reviewed, verbalized understanding, questions answered. Pt medicated and assisted to wheelchair, to lobby  here. Picc line intact, home care set up.

## 2018-08-28 LAB
BACTERIA BLD CULT: NORMAL
BACTERIA BLD CULT: NORMAL
GRAM STN SPEC: ABNORMAL
GRAM STN SPEC: ABNORMAL
MICROORGANISM SPEC CULT: ABNORMAL

## 2018-08-29 NOTE — DISCHARGE SUMMARY
Discharge Summary    BRIEF OVERVIEW    Admitting Provider: Karolina Sal MD  AttendingProvider: No att. providers found  Primary Care Physician at Discharge: Lubna Kerr [phone]   H&P Notes 7/29/2018 to 8/29/2018     Date of Service Author Author Type Status Note Type File Time    08/24/18 0858 Dwayne Vann DO Physician Signed H&P 08/24/18 0911    08/23/18 2243 Sebastien Teague PA C Physician Assistant Signed H&P 08/23/18 2311        Admission Date: 8/23/2018     Discharge Date: 8/28/2018    Primary Discharge Diagnosis  No Principal Problem: There is no principal problem currently on the Problem List. Please update the Problem List and refresh.    Secondary Discharge Diagnosis  Patient Active Problem List    Diagnosis Date Noted   • Diverticulitis of large intestine with abscess without bleeding 07/07/2018       DETAILS OF HOSPITAL STAY    Operative Procedures Performed      Consults:   Consult Notes 7/29/2018 to 8/29/2018     Date of Service Author Author Type Status Note Type File Time    08/24/18 0846 Dwayne Gonsales MD Physician Signed Consults 08/24/18 0856          Consult Orders During Admission:  IP CONSULT TO INFECTIOUS DISEASE  IP CONSULT TO IV TEAM     Procedures: interventional radiology abscess drainage placement of percutaneous drain  Pertinent Test Results: CT showed recurrent pelvic diverticularabscess    Imaging  Ct Abdomen Pelvis With And Without Iv Contrast    Result Date: 8/3/2018  Narrative: CLINICAL HISTORY:      k57.92 , diverticulitis of the intestine with bowel perforation or abscess without bleeding COMMENT: Serial axial images of the abdomen and pelvis are obtained without and after the uneventful administration of   intravenous contrast. 145 cc Omnipaque 350 given without incident. CT DOSE:  One or more dose reduction techniques (e.g. automated exposure control, adjustment of the mA and/or kV according to patient size, use of iterative reconstruction technique)  utilized for this examination. Comparison: 7/10/2018 LUNG BASES/HEART: Large portion of the stomach is intrathoracic. LIVER/BILIARY TREE: Stable in appearance. GALL BLADDER: There  are cholecystectomy changes. SPLEEN: Calcified splenic granulomas PANCREAS:  Normal. ADRENAL GLANDS:  Normal. KIDNEYS: There is probable hyperdense right interpolar renal cyst measuring 15 mm. GI TRACT: Diverticulosis coli with the chronic diverticular thickening. VESSELS: Atherosclerotic changes. PELVIC STRUCTURES: The sigmoid colonic inflammation from diverticulitis has significantly improved. There is a persistent the peripheral enhancing collection with some high density surrounding these collection, which could be a small amount of residual contrast from prior CTs in the area for a small localized perforation.  There is punctate extraluminal air are seen.  The collection measures 2 cm x 11 mm.  This has significantly improved since prior study where it measured approximately 4.5 cm on the 7/10/2018 study ADENOPATHY: No significant adenopathy present. ASCITES: None. OSSEOUS STRUCTURES: No apparent acute findings.  Grade 1 spondylolisthesis at L5-S1.  Bilateral pars defects at the L5.     Impression: IMPRESSION: There has been significant improvement in the inflammation in the pelvis.  Small collection with extraluminal air and/or trace amount of contrast still seen measuring up to 2 cm previously 4.5cm.  Please see above comments. Other incidental findings as discussed.    Ct Guided Abscess Drain    Result Date: 8/23/2018  Narrative: CLINICAL HISTORY:  Recurrent right pelvic diverticular abscess. : Paul Real MD CT DOSE:  One or more dose reduction techniques (e.g. automated exposure control, adjustment of the mA and/or kV according to patient size, use of iterative reconstruction technique) utilized for this examination. COMMENT: After obtaining informed consent from the patient, the patient was brought to the CT suite  and placed prone on the CT table.  Localizing grid was placed and a preliminary limited CT scan was performed demonstrating pelvic fluid collection.  An appropriate skin entry site was marked and the right buttocks was prepped and draped in normal sterile fashion.  10 cc of 1% lidocaine was used subcutaneous for local anesthesia. Under CT guidance, a Yueh needle was used to access the collection via a right transgluteal approach. Upon access, purulent material was seen draining from the catheter.  An Amplatz wire was advanced through the catheter into the collection to maintain access, and over this an 10-Marshallese Resolve locking pigtail catheter was advanced into the collection.  Position was confirmed with aspiration and postplacement CT scan. Post procedure scan showed drain within the fluid collection.  Drain was secured with sutures and connected to gravity bag drainage.  Sterile dressing was applied. The number of guidewires used, and their integrity, was confirmed at the end of the procedure. The patient tolerated the procedure well without immediate complication. Medications: 10 cc lidocaine 1% subcutaneous, 150 mcg Fentanyl Contrast Dose: None     Impression: IMPRESSION: Technically successful transgluteal drainage of pelvic collection and placement of 10-Marshallese Resolve drain.     Ct Abdomen Pelvis With Iv Contrast    Result Date: 8/21/2018  Narrative: CLINICAL HISTORY: Diverticulitis. TECHNIQUE: CT abdomen and pelvis with intravenous and oral contrast.  135 mL Omnipaque 350 CT DOSE:  One or more dose reduction techniques (e.g. automated exposure control, adjustment of the mA and/or kV according to patient size, use of iterative reconstruction technique) utilized for this examination. COMPARISON: CT from 8/3/2018 and CT from 7/10/2018. COMMENT: --------------------------------------------------------------------------- Lower chest: Lung bases are clear.  Moderate size hiatal hernia.  Heart size is normal.  --------------------------------------------------------------------------- Upper abdomen: Calcified granulomas are noted in the liver.  Status post cholecystectomy.  The pancreas is unremarkable.  Calcified granulomas throughout the spleen. --------------------------------------------------------------------------- Adrenals and kidneys: Adrenal glands are normal.  There is an incidental note made of left renal low attenuation lesion, too small to characterize, statistically likely cyst, measuring equal to/less than 1.0 cm. --------------------------------------------------------------------------- Abdominal pelvic vasculature and lymph nodes: Abdominal aorta is normal in course and caliber.  Vascular calcifications are present.  No pathologic retroperitoneal, mesenteric or pelvic lymphadenopathy. --------------------------------------------------------------------------- Bowel: Hiatal hernia.  Small bowel loops are dilated.  Contrast does not pass into the distal ileal loops.  There are air-fluid levels throughout the small bowel. Air-fluid levels are present throughout the colon as well. Again seen are findings of acute diverticulitis of the sigmoid colon.  There is rim-enhancing collection in the right hemipelvis which has foci of gas within it.  This is concerning for abscess and has progressed.  This does abut loops of small bowel as well as the colon.  Posterior extension to the right piriformis muscle is present with suspected intramuscular involvement.  Suspected abscess measures 5.3 x 3.6 x 5.5 cm (AP x TRV x CC).  This abscess is suspected to be causing a degree of partial small bowel obstruction. --------------------------------------------------------------------------- Lower abdomen and pelvis: Bladder is decompressed.  Rectum itself is unremarkable.  Mild fat stranding in the pelvis.  Please see above regarding right hemipelvic suspected abscess.  --------------------------------------------------------------------------- Abdominal pelvic wall and osseous structures: Abdominal and pelvic wall soft tissues are unremarkable.  The osseous structures are intact noting degenerative changes and chronic bilateral L5 spondylolysis. ---------------------------------------------------------------------------     Impression: IMPRESSION: Progression of pelvic abscess from sigmoid diverticulitis.  Superimposed suspected partial small bowel obstruction. Discussed with Taryn Frias RN, at 11:20 AM on 8/21/2018.     X-ray Chest 1 View    Result Date: 8/27/2018  Narrative: CLINICAL HISTORY:    68-year-old female status post PICC insertion.     Impression: IMPRESSION: 1.  Interval placement of a right-sided PICC which ends at the cavoatrial junction. 2.  Interval development of cardiomegaly. 3.  Clear lungs bilaterally. COMMENT:    A single portable AP erect chest radiograph is compared to a prior study dated 10/17/2006.  The projection is lordotic.  A right-sided PICC has been placed which ends at the cavoatrial junction. The cardiomediastinal contour shows interval development of cardiomegaly.. The lungs remain clear bilaterally without pneumothorax, noting interval development of an eventration of the medial right hemidiaphragm. The osseous thorax and surrounding soft tissues shows interval development of right greater than left inferior glenohumeral osteoarthritis.     Ir Technical Assistance    Result Date: 8/24/2018  Narrative: CLINICAL HISTORY:  Recurrent right pelvic diverticular abscess. COMMENT:  Technical assistance was provided to Dr. Paul Real for CT guided drainage of this patient's pelvic collection and placement of a drain.  Please see his report for details.     Impression: IMPRESSION:  Technical assistance was provided to Dr. Paul Real for CT guided drainage of this patient's pelvic collection and placement of a drain. Please see his report for  details.     Ir Fistulagram / Tube Check    Result Date: 8/27/2018  Narrative: CLINICAL HISTORY:    Recurrent right pelvic diverticular abscess.  Persistent output.  Evaluate for fistula..     Impression: IMPRESSION:   Fistulogram via the indwelling right transgluteal percutaneous drainage catheter demonstrates a residual cavity measuring approximately 8 cc. No discrete fistula is identified. COMMENT: PROCEDURE:  Tube check/fistulogram, under fluoroscopic guidance RADIOLOGIST: Artis Herrera MD ANESTHESIA:  None.. FLUORO TIME: 0.3 min REFERENCE AIR KERMA: 29 mGy CONTRAST: 10  cc of Omnipaque 300 MEDICATIONS:  none DESCRIPTION OF PROCEDURE: The indwelling right percutaneous drainage catheter was accessed in a sterile fashion.  Contrast was administered and tube check and fistulogram was performed.  This demonstrates a residual cavity measuring approximately 8 cc.  No discrete fistula is identified. The number of guidewires used, and their integrity, was confirmed at the end of the procedure.          Presenting Problem/History of Present Illness  Diverticulitis of large intestine with abscess without bleeding     See H&P    Hospital Course  Patient seen and examined on day of discharge.  Patient was admitted after having IR drain placed as an outpatient.  She had had chills and fever.  She was replaced on intravenous antibiotics and ID was consulted.  She complained of moderate right lower quadrantand buttock pain from her drain which did defervesce during her hospitalization.  Her white count did defervesce as well.  Once the cultures and sensitivities were obtained antibiotics were adjusted and the patient was discharged on IV ceftriaxone and oral Flagyl.    Discharge Orders  Released Discharge Orders  Current as of patient's discharge on: 08/27/18    Order Details Provider Status    aspirin 81 mg enteric coated tablet Take 81 mg by mouth daily. Dwayne Vann, DO Resume at Discharge (Patient Reported)     fenofibrate (TRICOR) 48 mg tablet Take 48 mg by mouth daily. Dwayne Vann, DO Resume at Discharge (Patient Reported)    irbesartan (AVAPRO) 150 mg tablet Take 150 mg by mouth daily. Dwayne Vann, DO Resume at Discharge (Patient Reported)    multivitamin capsule Take 1 capsule by mouth daily. Dwayne Vann, DO Resume at Discharge (Patient Reported)    omeprazole (PriLOSEC) 40 mg capsule Take 40 mg by mouth daily before breakfast. Dwayne Vann, DO Resume at Discharge (Patient Reported)    oxyCODONE-acetaminophen (PERCOCET) 7.5-325 mg per tablet Take 1 tablet by mouth every 4 (four) hours as needed. Dwayne Vann, DO Resume at Discharge (Patient Reported)    rosuvastatin (CRESTOR) 20 mg tablet Take 20 mg by mouth daily. Dwayne Vann, DO Resume at Discharge (Patient Reported)    sertraline (ZOLOFT) 50 mg tablet Take 50 mg by mouth daily. Dwayne Vann, DO Resume at Discharge (Patient Reported)    Follow-up with Provider: Referral By - DWAYNE VANN 1 visitInstructions for follow-up: 1 week Dwayne Vann DO New at Discharge    Diet Routine, Normal Dwayne Vann DO New at Discharge    Post-Discharge Activity: Normal activity as tolerated. RoutineNormal activity as tolerated. Dwayne Vann DO New at Discharge    Post-Discharge Activity: Sponge bathe only until clinic visit. RoutineSponge bathe only until clinic visit. Dwayne Vann DO New at Discharge          Outpatient Follow-Ups  Encounter Information     You do not currently have any appointments scheduled.        Referrals:  No orders of the defined types were placed in this encounter.      Active Issues Requiring Follow-up  Issue: Diverticular abscess  What is Needed: Follow-up in office in one week, for elective surgery in 2-3 weeks      Test Results Pending at Discharge    None    Discharge Disposition  [facility]  Code Status at Discharge: Prior

## 2018-09-04 ENCOUNTER — TRANSCRIBE ORDERS (OUTPATIENT)
Dept: SCHEDULING | Age: 68
End: 2018-09-04

## 2018-09-04 ENCOUNTER — LAB REQUISITION (OUTPATIENT)
Dept: LAB | Facility: HOSPITAL | Age: 68
End: 2018-09-04
Attending: INTERNAL MEDICINE
Payer: COMMERCIAL

## 2018-09-04 DIAGNOSIS — B95.5 UNSPECIFIED STREPTOCOCCUS AS THE CAUSE OF DISEASES CLASSIFIED ELSEWHERE: ICD-10-CM

## 2018-09-04 DIAGNOSIS — K57.20 DIVERTICULITIS OF LARGE INTESTINE WITH PERFORATION AND ABSCESS WITHOUT BLEEDING: Primary | ICD-10-CM

## 2018-09-04 DIAGNOSIS — K57.20 DIVERTICULITIS OF LARGE INTESTINE WITH PERFORATION AND ABSCESS WITHOUT BLEEDING: ICD-10-CM

## 2018-09-04 LAB
ALBUMIN SERPL-MCNC: 3.6 G/DL (ref 3.4–5)
ALP SERPL-CCNC: 50 IU/L (ref 35–126)
ALT SERPL-CCNC: 14 IU/L (ref 11–54)
ANION GAP SERPL CALC-SCNC: 17 MEQ/L (ref 3–15)
AST SERPL-CCNC: 26 IU/L (ref 15–41)
BASOPHILS # BLD: 0.12 K/UL (ref 0.01–0.1)
BASOPHILS NFR BLD: 1.3 %
BILIRUB SERPL-MCNC: 0.3 MG/DL (ref 0.3–1.2)
BUN SERPL-MCNC: 14 MG/DL (ref 8–20)
BUN SERPL-MCNC: 14 MG/DL (ref 8–20)
CALCIUM SERPL-MCNC: 9.6 MG/DL (ref 8.9–10.3)
CHLORIDE SERPL-SCNC: 100 MMOL/L (ref 98–109)
CO2 SERPL-SCNC: 26 MMOL/L (ref 22–32)
CREAT SERPL-MCNC: 0.8 MG/DL (ref 0.6–1.1)
CRP SERPL-MCNC: 10.83 MG/L
DIFFERENTIAL METHOD BLD: ABNORMAL
EOSINOPHIL # BLD: 1.32 K/UL (ref 0.04–0.36)
EOSINOPHIL NFR BLD: 13.9 %
ERYTHROCYTE [DISTWIDTH] IN BLOOD BY AUTOMATED COUNT: 16.4 % (ref 11.7–14.4)
ERYTHROCYTE [SEDIMENTATION RATE] IN BLOOD BY WESTERGREN METHOD: 19 MM/HR
GFR SERPL CREATININE-BSD FRML MDRD: >60 ML/MIN/1.73M*2
GLUCOSE SERPL-MCNC: 85 MG/DL (ref 70–99)
HCT VFR BLDCO AUTO: 34.2 % (ref 35–45)
HGB BLD-MCNC: 10.7 G/DL (ref 11.8–15.7)
IMM GRANULOCYTES # BLD AUTO: 0.02 K/UL (ref 0–0.08)
IMM GRANULOCYTES NFR BLD AUTO: 0.2 %
LYMPHOCYTES # BLD: 1.84 K/UL (ref 1.2–3.5)
LYMPHOCYTES NFR BLD: 19.4 %
MCH RBC QN AUTO: 28.4 PG (ref 28–33.2)
MCHC RBC AUTO-ENTMCNC: 31.3 G/DL (ref 32.2–35.5)
MCV RBC AUTO: 90.7 FL (ref 83–98)
MONOCYTES # BLD: 0.67 K/UL (ref 0.28–0.8)
MONOCYTES NFR BLD: 7.1 %
NEUTROPHILS # BLD: 5.51 K/UL (ref 1.7–7)
NEUTS SEG NFR BLD: 58.1 %
NRBC BLD-RTO: 0 %
PDW BLD AUTO: 9.8 FL (ref 9.4–12.3)
PLATELET # BLD AUTO: 500 K/UL (ref 150–369)
POTASSIUM SERPL-SCNC: 3.6 MMOL/L (ref 3.6–5.1)
PROT SERPL-MCNC: 6.4 G/DL (ref 6–8.2)
RBC # BLD AUTO: 3.77 M/UL (ref 3.93–5.22)
SODIUM SERPL-SCNC: 143 MMOL/L (ref 136–144)
WBC # BLD AUTO: 9.48 K/UL (ref 3.8–10.5)

## 2018-09-04 PROCEDURE — 85652 RBC SED RATE AUTOMATED: CPT | Performed by: INTERNAL MEDICINE

## 2018-09-04 PROCEDURE — 36415 COLL VENOUS BLD VENIPUNCTURE: CPT | Performed by: INTERNAL MEDICINE

## 2018-09-04 PROCEDURE — 85025 COMPLETE CBC W/AUTO DIFF WBC: CPT | Performed by: INTERNAL MEDICINE

## 2018-09-04 PROCEDURE — 84520 ASSAY OF UREA NITROGEN: CPT | Mod: 59 | Performed by: INTERNAL MEDICINE

## 2018-09-04 PROCEDURE — 80053 COMPREHEN METABOLIC PANEL: CPT | Performed by: INTERNAL MEDICINE

## 2018-09-04 PROCEDURE — 86140 C-REACTIVE PROTEIN: CPT | Performed by: INTERNAL MEDICINE

## 2018-09-05 LAB — EXTRA TUBES HOLD SPECIMEN: NORMAL

## 2018-09-06 ENCOUNTER — HOSPITAL ENCOUNTER (OUTPATIENT)
Dept: RADIOLOGY | Facility: HOSPITAL | Age: 68
Discharge: HOME | End: 2018-09-06
Attending: SURGERY
Payer: COMMERCIAL

## 2018-09-06 VITALS
SYSTOLIC BLOOD PRESSURE: 186 MMHG | RESPIRATION RATE: 18 BRPM | OXYGEN SATURATION: 95 % | DIASTOLIC BLOOD PRESSURE: 101 MMHG | HEART RATE: 85 BPM

## 2018-09-06 DIAGNOSIS — K57.20 DIVERTICULITIS OF LARGE INTESTINE WITH PERFORATION AND ABSCESS WITHOUT BLEEDING: ICD-10-CM

## 2018-09-06 DIAGNOSIS — K57.20 ABSCESS OF SIGMOID COLON DUE TO DIVERTICULITIS: ICD-10-CM

## 2018-09-06 PROCEDURE — 3E0M3KZ INTRODUCTION OF OTHER DIAGNOSTIC SUBSTANCE INTO PERITONEAL CAVITY, PERCUTANEOUS APPROACH: ICD-10-PCS | Performed by: RADIOLOGY

## 2018-09-06 PROCEDURE — 63600105 HC IODINE BASED CONTRAST: Performed by: SURGERY

## 2018-09-06 PROCEDURE — 72193 CT PELVIS W/DYE: CPT

## 2018-09-06 PROCEDURE — 27200000 IR FISTULAGRAM / TUBE CHECK

## 2018-09-06 RX ADMIN — IOHEXOL 130 ML: 350 INJECTION, SOLUTION INTRAVENOUS at 16:46

## 2018-09-06 RX ADMIN — IOHEXOL 3 ML: 300 INJECTION, SOLUTION INTRAVENOUS at 16:28

## 2018-09-06 NOTE — POST-PROCEDURE NOTE
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    9/6/2018 5:42 PM

## 2018-10-05 ENCOUNTER — TRANSCRIBE ORDERS (OUTPATIENT)
Dept: REGISTRATION | Facility: HOSPITAL | Age: 68
End: 2018-10-05

## 2018-10-05 ENCOUNTER — HOSPITAL ENCOUNTER (OUTPATIENT)
Dept: CARDIOLOGY | Facility: HOSPITAL | Age: 68
Discharge: HOME | End: 2018-10-05
Attending: SURGERY
Payer: COMMERCIAL

## 2018-10-05 ENCOUNTER — APPOINTMENT (OUTPATIENT)
Dept: LAB | Facility: HOSPITAL | Age: 68
End: 2018-10-05
Attending: SURGERY
Payer: COMMERCIAL

## 2018-10-05 DIAGNOSIS — K57.20 DIVERTICULITIS OF LARGE INTESTINE WITH PERFORATION AND ABSCESS WITHOUT BLEEDING: ICD-10-CM

## 2018-10-05 DIAGNOSIS — K57.20 DIVERTICULITIS OF LARGE INTESTINE WITH PERFORATION AND ABSCESS WITHOUT BLEEDING: Primary | ICD-10-CM

## 2018-10-05 LAB
ALBUMIN SERPL-MCNC: 4 G/DL (ref 3.4–5)
ALP SERPL-CCNC: 60 IU/L (ref 35–126)
ALT SERPL-CCNC: 20 IU/L (ref 11–54)
ANION GAP SERPL CALC-SCNC: 9 MEQ/L (ref 3–15)
AST SERPL-CCNC: 24 IU/L (ref 15–41)
BASOPHILS # BLD: 0.08 K/UL (ref 0.01–0.1)
BASOPHILS NFR BLD: 1.1 %
BILIRUB SERPL-MCNC: 0.3 MG/DL (ref 0.3–1.2)
BUN SERPL-MCNC: 21 MG/DL (ref 8–20)
CALCIUM SERPL-MCNC: 9.8 MG/DL (ref 8.9–10.3)
CHLORIDE SERPL-SCNC: 102 MEQ/L (ref 98–109)
CO2 SERPL-SCNC: 27 MEQ/L (ref 22–32)
CREAT SERPL-MCNC: 0.7 MG/DL (ref 0.6–1.1)
DIFFERENTIAL METHOD BLD: ABNORMAL
EOSINOPHIL # BLD: 0.41 K/UL (ref 0.04–0.36)
EOSINOPHIL NFR BLD: 5.8 %
ERYTHROCYTE [DISTWIDTH] IN BLOOD BY AUTOMATED COUNT: 15.4 % (ref 11.7–14.4)
GFR SERPL CREATININE-BSD FRML MDRD: >60 ML/MIN/1.73M*2
GLUCOSE SERPL-MCNC: 111 MG/DL (ref 70–99)
HCT VFR BLDCO AUTO: 38.2 % (ref 35–45)
HGB BLD-MCNC: 12.5 G/DL (ref 11.8–15.7)
IMM GRANULOCYTES # BLD AUTO: 0.02 K/UL (ref 0–0.08)
IMM GRANULOCYTES NFR BLD AUTO: 0.3 %
LYMPHOCYTES # BLD: 2.28 K/UL (ref 1.2–3.5)
LYMPHOCYTES NFR BLD: 32.2 %
MCH RBC QN AUTO: 29.5 PG (ref 28–33.2)
MCHC RBC AUTO-ENTMCNC: 32.7 G/DL (ref 32.2–35.5)
MCV RBC AUTO: 90.1 FL (ref 83–98)
MONOCYTES # BLD: 0.48 K/UL (ref 0.28–0.8)
MONOCYTES NFR BLD: 6.8 %
NEUTROPHILS # BLD: 3.81 K/UL (ref 1.7–7)
NEUTS SEG NFR BLD: 53.8 %
NRBC BLD-RTO: 0 %
PDW BLD AUTO: 10.7 FL (ref 9.4–12.3)
PLATELET # BLD AUTO: 370 K/UL (ref 150–369)
POTASSIUM SERPL-SCNC: 4.6 MEQ/L (ref 3.6–5.1)
PROT SERPL-MCNC: 6.9 G/DL (ref 6–8.2)
RBC # BLD AUTO: 4.24 M/UL (ref 3.93–5.22)
SODIUM SERPL-SCNC: 138 MEQ/L (ref 136–144)
WBC # BLD AUTO: 7.08 K/UL (ref 3.8–10.5)

## 2018-10-05 PROCEDURE — 93005 ELECTROCARDIOGRAM TRACING: CPT

## 2018-10-05 PROCEDURE — 85025 COMPLETE CBC W/AUTO DIFF WBC: CPT

## 2018-10-05 PROCEDURE — 36415 COLL VENOUS BLD VENIPUNCTURE: CPT

## 2018-10-05 PROCEDURE — 80053 COMPREHEN METABOLIC PANEL: CPT

## 2018-10-06 LAB
ATRIAL RATE: 73
P AXIS: 41
PR INTERVAL: 132
QRS DURATION: 82
QT INTERVAL: 394
QTC CALCULATION(BAZETT): 434
R AXIS: -22
T WAVE AXIS: 16
VENTRICULAR RATE: 73

## 2018-10-08 ENCOUNTER — TRANSCRIBE ORDERS (OUTPATIENT)
Dept: CARDIOLOGY | Facility: CLINIC | Age: 68
End: 2018-10-08

## 2018-10-08 DIAGNOSIS — Z01.818 PRE-OP TESTING: Primary | ICD-10-CM

## 2018-10-09 ENCOUNTER — OFFICE VISIT (OUTPATIENT)
Dept: CARDIOLOGY | Facility: CLINIC | Age: 68
End: 2018-10-09
Payer: COMMERCIAL

## 2018-10-09 VITALS
WEIGHT: 206 LBS | BODY MASS INDEX: 36.49 KG/M2 | OXYGEN SATURATION: 97 % | SYSTOLIC BLOOD PRESSURE: 130 MMHG | DIASTOLIC BLOOD PRESSURE: 78 MMHG | HEART RATE: 84 BPM

## 2018-10-09 DIAGNOSIS — Z01.810 PREOPERATIVE CARDIOVASCULAR EXAMINATION: Primary | ICD-10-CM

## 2018-10-09 DIAGNOSIS — R93.1 AGATSTON CAC SCORE 100-199: ICD-10-CM

## 2018-10-09 DIAGNOSIS — R94.31 ABNORMAL ECG: ICD-10-CM

## 2018-10-09 DIAGNOSIS — K57.20 DIVERTICULITIS OF LARGE INTESTINE WITH ABSCESS WITHOUT BLEEDING: ICD-10-CM

## 2018-10-09 DIAGNOSIS — E66.812 CLASS 2 SEVERE OBESITY DUE TO EXCESS CALORIES WITH SERIOUS COMORBIDITY AND BODY MASS INDEX (BMI) OF 36.0 TO 36.9 IN ADULT (CMS/HCC): ICD-10-CM

## 2018-10-09 DIAGNOSIS — E66.01 CLASS 2 SEVERE OBESITY DUE TO EXCESS CALORIES WITH SERIOUS COMORBIDITY AND BODY MASS INDEX (BMI) OF 36.0 TO 36.9 IN ADULT (CMS/HCC): ICD-10-CM

## 2018-10-09 DIAGNOSIS — I10 ESSENTIAL HYPERTENSION: ICD-10-CM

## 2018-10-09 DIAGNOSIS — Z82.49 FAMILY HISTORY OF EARLY CAD: ICD-10-CM

## 2018-10-09 DIAGNOSIS — E78.2 MIXED HYPERLIPIDEMIA: ICD-10-CM

## 2018-10-09 PROBLEM — K22.6 MALLORY-WEISS TEAR: Status: RESOLVED | Noted: 2018-10-09 | Resolved: 2018-10-09

## 2018-10-09 PROBLEM — E78.5 HYPERLIPIDEMIA: Status: ACTIVE | Noted: 2018-10-09

## 2018-10-09 PROBLEM — I25.10 CORONARY ARTERY CALCIFICATION SEEN ON CT SCAN: Status: ACTIVE | Noted: 2018-10-09

## 2018-10-09 PROBLEM — Z01.818 PREOPERATIVE CLEARANCE: Status: ACTIVE | Noted: 2018-10-09

## 2018-10-09 PROBLEM — K22.6 MALLORY-WEISS TEAR: Status: ACTIVE | Noted: 2018-10-09

## 2018-10-09 PROCEDURE — 99214 OFFICE O/P EST MOD 30 MIN: CPT | Performed by: INTERNAL MEDICINE

## 2018-10-09 RX ORDER — ESOMEPRAZOLE MAGNESIUM 40 MG/1
CAPSULE, DELAYED RELEASE ORAL
COMMUNITY
Start: 2007-11-05 | End: 2020-10-23 | Stop reason: ALTCHOICE

## 2018-10-09 RX ORDER — OMEGA-3-ACID ETHYL ESTERS 1 G/1
CAPSULE, LIQUID FILLED ORAL
Status: ON HOLD | COMMUNITY
Start: 2007-11-05 | End: 2018-10-10 | Stop reason: ALTCHOICE

## 2018-10-09 RX ORDER — EZETIMIBE AND SIMVASTATIN 10; 20 MG/1; MG/1
TABLET ORAL
COMMUNITY
Start: 2007-11-05 | End: 2020-10-23 | Stop reason: ALTCHOICE

## 2018-10-09 RX ORDER — SERTRALINE HYDROCHLORIDE 50 MG/1
TABLET, FILM COATED ORAL
COMMUNITY
End: 2018-10-09 | Stop reason: SDUPTHER

## 2018-10-09 RX ORDER — NAPROXEN SODIUM 220 MG/1
TABLET, FILM COATED ORAL
COMMUNITY
End: 2018-10-09 | Stop reason: SDUPTHER

## 2018-10-09 ASSESSMENT — ENCOUNTER SYMPTOMS
DIAPHORESIS: 0
LIGHT-HEADEDNESS: 0
HEMOPTYSIS: 0
HEMATEMESIS: 0
HEMATOCHEZIA: 0
BLURRED VISION: 0
DYSURIA: 0
SHORTNESS OF BREATH: 0
BRIEF PARALYSIS: 0
MYALGIAS: 0
FALLS: 0
JAUNDICE: 0
POLYDIPSIA: 0
HOARSE VOICE: 0
TREMORS: 0
ALTERED MENTAL STATUS: 0
HALLUCINATIONS: 0
FEVER: 0

## 2018-10-09 NOTE — ASSESSMENT & PLAN NOTE
Recommend to continue healthy lifestyles weight loss and exercise statin therapy and eventually a stress test.

## 2018-10-09 NOTE — LETTER
October 9, 2018                                                                                                                                                                                                                                                                                                               anesthesia    Patient: Reyna Castillo   YOB: 1950   Date of Visit: 10/9/2018       Dear  Anesthesia:    Thank you for referring Reyna Castillo to me for evaluation. Below are the relevant portions of my assessment and plan of care.    If you have questions, please do not hesitate to call me. I look forward to following Reyna MARTINEZ along with you.         Sincerely,        Bruce Casey, DO        CC: No Recipients        Assessment and Plan:  Problem List Items Addressed This Visit        Cardiology Problems    Mixed hyperlipidemia    Relevant Medications    omega-3 acid ethyl esters (LOVAZA) 1 gram capsule    ezetimibe-simvastatin (VYTORIN 10-20) 10-20 mg per tablet       Other    Diverticulitis of large intestine with abscess without bleeding     She has had a drain since July 2 and now is having a sigmoid colon resection which is the procedure planned for tomorrow.         Preoperative cardiovascular examination - Primary     She has no high risk markers for perioperative complications, no history of infarction, known coronary disease, heart failure,  stroke or kidney failure.  She has good functional capacity as described above.    Therefore, there are no cardiac contraindications to proceeding with the planned surgery without further testing.  I did discuss that all surgery entails cardiac risk which cannot be predicted or prevented.         Agatston CAC score 100-199     Recommend to continue healthy lifestyles weight loss and exercise statin therapy and eventually a stress test.         Family history of early CAD    Essential hypertension     Reasonably well-controlled.   No change.         Class 2 severe obesity due to excess calories with serious comorbidity and body mass index (BMI) of 36.0 to 36.9 in adult (CMS/[facility])     By the above number she has lost 26 pounds in about 14 months which is great and she is continuing to modify her diet and exercise.  Congratulated continue her changes.         Abnormal ECG     These are nonspecific changes and no preoperative evaluation or other testing is needed.

## 2018-10-09 NOTE — ASSESSMENT & PLAN NOTE
She has no high risk markers for perioperative complications, no history of infarction, known coronary disease, heart failure,  stroke or kidney failure.  She has good functional capacity as described above.    Therefore, there are no cardiac contraindications to proceeding with the planned surgery without further testing.  I did discuss that all surgery entails cardiac risk which cannot be predicted or prevented.

## 2018-10-09 NOTE — ASSESSMENT & PLAN NOTE
By the above number she has lost 26 pounds in about 14 months which is great and she is continuing to modify her diet and exercise.  Congratulated continue her changes.

## 2018-10-09 NOTE — ASSESSMENT & PLAN NOTE
She has had a drain since July 2 and now is having a sigmoid colon resection which is the procedure planned for tomorrow.

## 2018-10-09 NOTE — PROGRESS NOTES
Cardiology Outpatient  Progress note    Reyna Castillo is a 68 y.o. female  who presents for preoperative evaluation of her ECG changes as she is scheduled for sigmoid colon resection tomorrow.    She has known coronary atherosclerosis only based on a coronary calcium score 101, which is quite low, August 2017.  She has no known history of angina, infarction or any other atherosclerotic events or symptoms.  She has never had heart failure or stroke.  She exercises, she walks between 1.5 and 3 miles usually at least 2 times but more often 3 or more times a week without any angina.  Her only symptom is knee pain when she is doing this.    Her past medical history, interval history, is she had a diverticular abscess and had a drain placed July 2 which is still in place.  No elective surgery for sigmoid resection is planned.    Past medical, family, and social history were reviewed and has been no significant interval change.       Past Medical History:   Diagnosis Date   • Tran-Juan tear        :   Past Surgical History:   Procedure Laterality Date   • CHOLECYSTECTOMY     • COLONOSCOPY     • KNEE ARTHROSCOPY     • TRANSUMBILICAL AUGMENTATION MAMMAPLASTY     • UPPER GASTROINTESTINAL ENDOSCOPY     • WISDOM TOOTH EXTRACTION         Allergies:   Nsaids (non-steroidal anti-inflammatory drug); Adhesive tape-silicones; and Latex    Current Outpatient Prescriptions   Medication Sig Dispense Refill   • aspirin 81 mg enteric coated tablet Take 81 mg by mouth daily.     • ezetimibe-simvastatin (VYTORIN 10-20) 10-20 mg per tablet Take by mouth.     • irbesartan (AVAPRO) 150 mg tablet Take 150 mg by mouth every morning.       • multivitamin capsule Take 1 capsule by mouth daily.     • omega-3 acid ethyl esters (LOVAZA) 1 gram capsule Take by mouth.     • pantoprazole (PROTONIX) 40 mg EC tablet Take 40 mg by mouth daily.     • sertraline (ZOLOFT) 50 mg tablet Take 50 mg by mouth every morning.       • erythromycin (PCE) 500 mg  EC tablet Take 1,000 mg by mouth 3 (three) times a day. 1400, 1500, 2200     • esomeprazole (NexIUM) 40 mg capsule Take by mouth.     • neomycin (MYCIFRADIN) 500 mg tablet Take 1,000 mg by mouth 3 (three) times a day. Take 2 tablets (1000 mg) by mouth at 1pm, 2pm, and 11pm for bowel prep     • oxyCODONE-acetaminophen (PERCOCET) 7.5-325 mg per tablet Take 1 tablet by mouth every 4 (four) hours as needed.       No current facility-administered medications for this visit.           Social History     Social History   • Marital status:      Spouse name: N/A   • Number of children: N/A   • Years of education: N/A     Social History Main Topics   • Smoking status: Former Smoker     Quit date: 2000   • Smokeless tobacco: Never Used      Comment: quit 18 years ago   • Alcohol use Yes      Comment: occasional   • Drug use: No   • Sexual activity: Defer     Other Topics Concern   • None     Social History Narrative   • None        History reviewed. No pertinent family history.    Review of Systems   Constitution: Negative for diaphoresis and fever.   HENT: Negative for hoarse voice and nosebleeds.    Eyes: Negative for blurred vision and visual disturbance.   Respiratory: Negative for hemoptysis and shortness of breath.    Endocrine: Negative for cold intolerance, heat intolerance and polydipsia.   Skin: Negative for rash.   Musculoskeletal: Negative for falls, muscle weakness and myalgias.   Gastrointestinal: Negative for hematemesis, hematochezia and jaundice.   Genitourinary: Negative for dysuria.   Neurological: Negative for brief paralysis, light-headedness and tremors.   Psychiatric/Behavioral: Negative for altered mental status and hallucinations.       Objective     Vitals:    10/09/18 1452   BP: 130/78   Pulse: 84   SpO2: 97%       Physical Exam   Constitutional: She is oriented to person, place, and time. She appears well-developed.   HENT:   Head: Atraumatic.   Mouth/Throat: Oropharynx is clear and moist.    Eyes: Conjunctivae are normal. No scleral icterus.   Neck: No JVD present. Carotid bruit is not present. No tracheal deviation present. No thyromegaly present.   Cardiovascular: Normal rate, regular rhythm and normal heart sounds.  Exam reveals no gallop and no friction rub.    No murmur heard.  Pulmonary/Chest: Effort normal and breath sounds normal. No respiratory distress. She has no wheezes. She has no rales.   Abdominal: Soft. Bowel sounds are normal. She exhibits no distension.   Musculoskeletal: She exhibits no edema or tenderness.   Neurological: She is alert and oriented to person, place, and time. No cranial nerve deficit.   Skin: Skin is warm and dry. She is not diaphoretic.   Psychiatric: She has a normal mood and affect.   Vitals reviewed.       Labs   Lab Results   Component Value Date    WBC 7.08 10/05/2018    HGB 12.5 10/05/2018    HCT 38.2 10/05/2018     (H) 10/05/2018    CHOL 127 07/27/2018    TRIG 119 07/27/2018    HDL 37 (L) 07/27/2018    ALT 20 10/05/2018    AST 24 10/05/2018     10/05/2018    K 4.6 10/05/2018     10/05/2018    CREATININE 0.7 10/05/2018    BUN 21 (H) 10/05/2018    CO2 27 10/05/2018    TSH 1.96 07/27/2018    INR 1.6 08/23/2018    HGBA1C 5.4 07/27/2018       Imaging  Coronary calcium score July 2017 101    ECG EKG:  ECG from October 5 sinus rhythm rate 73 with minor ST and T wave changes    Problem List Items Addressed This Visit        Cardiology Problems    Mixed hyperlipidemia    Overview     Overview:          Relevant Medications    omega-3 acid ethyl esters (LOVAZA) 1 gram capsule    ezetimibe-simvastatin (VYTORIN 10-20) 10-20 mg per tablet       Other    Diverticulitis of large intestine with abscess without bleeding    Current Assessment & Plan     She has had a drain since July 2 and now is having a sigmoid colon resection which is the procedure planned for tomorrow.         Preoperative cardiovascular examination - Primary    Current Assessment &  Plan     She has no high risk markers for perioperative complications, no history of infarction, known coronary disease, heart failure,  stroke or kidney failure.  She has good functional capacity as described above.    Therefore, there are no cardiac contraindications to proceeding with the planned surgery without further testing.  I did discuss that all surgery entails cardiac risk which cannot be predicted or prevented.         Agatston CAC score 100-199    Overview     8/17 coronary calcium score 101 LAD 13, circumflex 68, RCA 20         Current Assessment & Plan     Recommend to continue healthy lifestyles weight loss and exercise statin therapy and eventually a stress test.         Family history of early CAD    Overview     Overview:          Essential hypertension    Overview     Overview:          Current Assessment & Plan     Reasonably well-controlled.  No change.         Class 2 severe obesity due to excess calories with serious comorbidity and body mass index (BMI) of 36.0 to 36.9 in adult (CMS/[facility])    Overview     8/2017 weight 232 pounds         Current Assessment & Plan     By the above number she has lost 26 pounds in about 14 months which is great and she is continuing to modify her diet and exercise.  Congratulated continue her changes.         Abnormal ECG    Overview     Nonspecific T wave flattening inferiorly minimal ST depression laterally and anterolateral T wave inversion         Current Assessment & Plan     These are nonspecific changes and no preoperative evaluation or other testing is needed.                 This patient note has been dictated using speech recognition software. Inadvertent speech recognition errors should be disregarded. Please do not hesitate to call my office for clarifications.    Bruce Casey,   10/9/2018 3:36 PM

## 2018-10-10 ENCOUNTER — ANESTHESIA EVENT (OUTPATIENT)
Dept: OPERATING ROOM | Facility: HOSPITAL | Age: 68
Setting detail: SURGERY ADMIT
DRG: 330 | End: 2018-10-10
Payer: COMMERCIAL

## 2018-10-10 ENCOUNTER — HOSPITAL ENCOUNTER (INPATIENT)
Facility: HOSPITAL | Age: 68
LOS: 3 days | Discharge: HOME | DRG: 330 | End: 2018-10-13
Attending: SURGERY | Admitting: SURGERY
Payer: COMMERCIAL

## 2018-10-10 DIAGNOSIS — K57.20 DIVERTICULITIS OF LARGE INTESTINE WITH ABSCESS, UNSPECIFIED BLEEDING STATUS: ICD-10-CM

## 2018-10-10 PROCEDURE — 25800000 HC PHARMACY IV SOLUTIONS: Performed by: SURGERY

## 2018-10-10 PROCEDURE — 63600000 HC DRUGS/DETAIL CODE: Performed by: NURSE ANESTHETIST, CERTIFIED REGISTERED

## 2018-10-10 PROCEDURE — 25000000 HC PHARMACY GENERAL: Performed by: SURGERY

## 2018-10-10 PROCEDURE — 63600000 HC DRUGS/DETAIL CODE: Performed by: ANESTHESIOLOGY

## 2018-10-10 PROCEDURE — 0DTJ0ZZ RESECTION OF APPENDIX, OPEN APPROACH: ICD-10-PCS | Performed by: SURGERY

## 2018-10-10 PROCEDURE — 88307 TISSUE EXAM BY PATHOLOGIST: CPT | Performed by: SURGERY

## 2018-10-10 PROCEDURE — 0DBP0ZZ EXCISION OF RECTUM, OPEN APPROACH: ICD-10-PCS | Performed by: SURGERY

## 2018-10-10 PROCEDURE — 63700000 HC SELF-ADMINISTRABLE DRUG: Performed by: SURGERY

## 2018-10-10 PROCEDURE — 0DBB0ZZ EXCISION OF ILEUM, OPEN APPROACH: ICD-10-PCS | Performed by: SURGERY

## 2018-10-10 PROCEDURE — 27200000 HC STERILE SUPPLY: Performed by: SURGERY

## 2018-10-10 PROCEDURE — 71000011 HC PACU PHASE 1 EA ADDL MIN: Performed by: SURGERY

## 2018-10-10 PROCEDURE — 63600000 HC DRUGS/DETAIL CODE: Performed by: SURGERY

## 2018-10-10 PROCEDURE — 25000000 HC PHARMACY GENERAL: Performed by: NURSE ANESTHETIST, CERTIFIED REGISTERED

## 2018-10-10 PROCEDURE — 37000001 HC ANESTHESIA GENERAL: Performed by: SURGERY

## 2018-10-10 PROCEDURE — 12000000 HC ROOM AND CARE MED/SURG

## 2018-10-10 PROCEDURE — 0DBN0ZZ EXCISION OF SIGMOID COLON, OPEN APPROACH: ICD-10-PCS | Performed by: SURGERY

## 2018-10-10 PROCEDURE — 36000003 HC OR LEVEL 3 INITIAL 30MIN: Performed by: SURGERY

## 2018-10-10 PROCEDURE — 36000013 HC OR LEVEL 3 EA ADDL MIN: Performed by: SURGERY

## 2018-10-10 PROCEDURE — 71000001 HC PACU PHASE 1 INITIAL 30MIN: Performed by: SURGERY

## 2018-10-10 PROCEDURE — 0DNN0ZZ RELEASE SIGMOID COLON, OPEN APPROACH: ICD-10-PCS | Performed by: SURGERY

## 2018-10-10 PROCEDURE — 25800000 HC PHARMACY IV SOLUTIONS: Performed by: NURSE ANESTHETIST, CERTIFIED REGISTERED

## 2018-10-10 RX ORDER — LIDOCAINE HYDROCHLORIDE 10 MG/ML
INJECTION, SOLUTION INFILTRATION; PERINEURAL AS NEEDED
Status: DISCONTINUED | OUTPATIENT
Start: 2018-10-10 | End: 2018-10-10 | Stop reason: SURG

## 2018-10-10 RX ORDER — MEPERIDINE HYDROCHLORIDE 25 MG/ML
12.5 INJECTION INTRAMUSCULAR; INTRAVENOUS; SUBCUTANEOUS EVERY 10 MIN PRN
Status: DISCONTINUED | OUTPATIENT
Start: 2018-10-10 | End: 2018-10-10 | Stop reason: HOSPADM

## 2018-10-10 RX ORDER — HYDROMORPHONE HYDROCHLORIDE 1 MG/ML
INJECTION, SOLUTION INTRAMUSCULAR; INTRAVENOUS; SUBCUTANEOUS AS NEEDED
Status: DISCONTINUED | OUTPATIENT
Start: 2018-10-10 | End: 2018-10-10 | Stop reason: SURG

## 2018-10-10 RX ORDER — SODIUM CHLORIDE 9 MG/ML
INJECTION, SOLUTION INTRAVENOUS CONTINUOUS
Status: ACTIVE | OUTPATIENT
Start: 2018-10-10 | End: 2018-10-11

## 2018-10-10 RX ORDER — FENTANYL CITRATE 50 UG/ML
50 INJECTION, SOLUTION INTRAMUSCULAR; INTRAVENOUS
Status: DISCONTINUED | OUTPATIENT
Start: 2018-10-10 | End: 2018-10-10 | Stop reason: HOSPADM

## 2018-10-10 RX ORDER — FENTANYL CITRATE 50 UG/ML
INJECTION, SOLUTION INTRAMUSCULAR; INTRAVENOUS AS NEEDED
Status: DISCONTINUED | OUTPATIENT
Start: 2018-10-10 | End: 2018-10-10 | Stop reason: SURG

## 2018-10-10 RX ORDER — FENTANYL CITRATE 50 UG/ML
INJECTION, SOLUTION INTRAMUSCULAR; INTRAVENOUS AS NEEDED
Status: DISCONTINUED | OUTPATIENT
Start: 2018-10-10 | End: 2018-10-10

## 2018-10-10 RX ORDER — LIDOCAINE HYDROCHLORIDE 10 MG/ML
INJECTION, SOLUTION INFILTRATION; PERINEURAL AS NEEDED
Status: DISCONTINUED | OUTPATIENT
Start: 2018-10-10 | End: 2018-10-10

## 2018-10-10 RX ORDER — DEXAMETHASONE SODIUM PHOSPHATE 4 MG/ML
INJECTION, SOLUTION INTRA-ARTICULAR; INTRALESIONAL; INTRAMUSCULAR; INTRAVENOUS; SOFT TISSUE AS NEEDED
Status: DISCONTINUED | OUTPATIENT
Start: 2018-10-10 | End: 2018-10-10 | Stop reason: SURG

## 2018-10-10 RX ORDER — MIDAZOLAM HYDROCHLORIDE 2 MG/2ML
INJECTION, SOLUTION INTRAMUSCULAR; INTRAVENOUS AS NEEDED
Status: DISCONTINUED | OUTPATIENT
Start: 2018-10-10 | End: 2018-10-10 | Stop reason: SURG

## 2018-10-10 RX ORDER — DEXTROSE MONOHYDRATE AND SODIUM CHLORIDE 5; .45 G/100ML; G/100ML
INJECTION, SOLUTION INTRAVENOUS CONTINUOUS
Status: DISCONTINUED | OUTPATIENT
Start: 2018-10-10 | End: 2018-10-12

## 2018-10-10 RX ORDER — HYDROMORPHONE HYDROCHLORIDE 1 MG/ML
0.5 INJECTION, SOLUTION INTRAMUSCULAR; INTRAVENOUS; SUBCUTANEOUS
Status: DISCONTINUED | OUTPATIENT
Start: 2018-10-10 | End: 2018-10-10 | Stop reason: HOSPADM

## 2018-10-10 RX ORDER — ONDANSETRON HYDROCHLORIDE 2 MG/ML
INJECTION, SOLUTION INTRAVENOUS AS NEEDED
Status: DISCONTINUED | OUTPATIENT
Start: 2018-10-10 | End: 2018-10-10 | Stop reason: SURG

## 2018-10-10 RX ORDER — ONDANSETRON HYDROCHLORIDE 2 MG/ML
4 INJECTION, SOLUTION INTRAVENOUS
Status: DISCONTINUED | OUTPATIENT
Start: 2018-10-10 | End: 2018-10-10 | Stop reason: HOSPADM

## 2018-10-10 RX ORDER — KETOROLAC TROMETHAMINE 15 MG/ML
15 INJECTION, SOLUTION INTRAMUSCULAR; INTRAVENOUS EVERY 6 HOURS
Status: DISCONTINUED | OUTPATIENT
Start: 2018-10-10 | End: 2018-10-12 | Stop reason: SDUPTHER

## 2018-10-10 RX ORDER — CEFAZOLIN SODIUM/WATER 1 G/10 ML
1 SYRINGE (ML) INTRAVENOUS ONCE
Status: DISCONTINUED | OUTPATIENT
Start: 2018-10-10 | End: 2018-10-10 | Stop reason: ENTERED-IN-ERROR

## 2018-10-10 RX ORDER — FAMOTIDINE 10 MG/ML
20 INJECTION INTRAVENOUS EVERY 12 HOURS
Status: DISCONTINUED | OUTPATIENT
Start: 2018-10-10 | End: 2018-10-12

## 2018-10-10 RX ORDER — GLYCOPYRROLATE 0.6MG/3ML
SYRINGE (ML) INTRAVENOUS AS NEEDED
Status: DISCONTINUED | OUTPATIENT
Start: 2018-10-10 | End: 2018-10-10 | Stop reason: SURG

## 2018-10-10 RX ORDER — HEPARIN SODIUM 5000 [USP'U]/ML
5000 INJECTION, SOLUTION INTRAVENOUS; SUBCUTANEOUS
Status: DISCONTINUED | OUTPATIENT
Start: 2018-10-10 | End: 2018-10-13 | Stop reason: HOSPADM

## 2018-10-10 RX ORDER — SODIUM CHLORIDE, SODIUM GLUCONATE, SODIUM ACETATE, POTASSIUM CHLORIDE AND MAGNESIUM CHLORIDE 30; 37; 368; 526; 502 MG/100ML; MG/100ML; MG/100ML; MG/100ML; MG/100ML
INJECTION, SOLUTION INTRAVENOUS CONTINUOUS PRN
Status: DISCONTINUED | OUTPATIENT
Start: 2018-10-10 | End: 2018-10-10 | Stop reason: SURG

## 2018-10-10 RX ORDER — FENOFIBRATE 67 MG/1
67 CAPSULE ORAL
COMMUNITY
End: 2021-04-23 | Stop reason: ALTCHOICE

## 2018-10-10 RX ORDER — BUPIVACAINE HYDROCHLORIDE AND EPINEPHRINE 5; 5 MG/ML; UG/ML
INJECTION, SOLUTION EPIDURAL; INTRACAUDAL; PERINEURAL AS NEEDED
Status: DISCONTINUED | OUTPATIENT
Start: 2018-10-10 | End: 2018-10-10 | Stop reason: HOSPADM

## 2018-10-10 RX ORDER — HYDROMORPHONE HYDROCHLORIDE 1 MG/ML
.5-1 INJECTION, SOLUTION INTRAMUSCULAR; INTRAVENOUS; SUBCUTANEOUS EVERY 2 HOUR PRN
Status: DISCONTINUED | OUTPATIENT
Start: 2018-10-10 | End: 2018-10-12

## 2018-10-10 RX ORDER — NEOSTIGMINE METHYLSULFATE 1 MG/ML
INJECTION INTRAVENOUS AS NEEDED
Status: DISCONTINUED | OUTPATIENT
Start: 2018-10-10 | End: 2018-10-10 | Stop reason: SURG

## 2018-10-10 RX ORDER — FAMOTIDINE 10 MG/ML
INJECTION INTRAVENOUS
Status: DISPENSED
Start: 2018-10-10 | End: 2018-10-11

## 2018-10-10 RX ORDER — MORPHINE SULFATE 4 MG/ML
3-4 INJECTION, SOLUTION INTRAMUSCULAR; INTRAVENOUS EVERY 2 HOUR PRN
Status: DISCONTINUED | OUTPATIENT
Start: 2018-10-10 | End: 2018-10-10

## 2018-10-10 RX ORDER — KETOROLAC TROMETHAMINE 15 MG/ML
15 INJECTION, SOLUTION INTRAMUSCULAR; INTRAVENOUS
Status: DISPENSED | OUTPATIENT
Start: 2018-10-10 | End: 2018-10-12

## 2018-10-10 RX ORDER — PROPOFOL 10 MG/ML
INJECTION, EMULSION INTRAVENOUS AS NEEDED
Status: DISCONTINUED | OUTPATIENT
Start: 2018-10-10 | End: 2018-10-10 | Stop reason: SURG

## 2018-10-10 RX ORDER — ROSUVASTATIN CALCIUM 20 MG/1
20 TABLET, COATED ORAL DAILY
COMMUNITY

## 2018-10-10 RX ORDER — MORPHINE SULFATE 2 MG/ML
1-2 INJECTION, SOLUTION INTRAMUSCULAR; INTRAVENOUS
Status: DISCONTINUED | OUTPATIENT
Start: 2018-10-10 | End: 2018-10-10

## 2018-10-10 RX ORDER — ROCURONIUM BROMIDE 10 MG/ML
INJECTION, SOLUTION INTRAVENOUS AS NEEDED
Status: DISCONTINUED | OUTPATIENT
Start: 2018-10-10 | End: 2018-10-10 | Stop reason: SURG

## 2018-10-10 RX ADMIN — KETOROLAC TROMETHAMINE 15 MG: 15 INJECTION, SOLUTION INTRAMUSCULAR; INTRAVENOUS at 10:36

## 2018-10-10 RX ADMIN — HYDROMORPHONE HYDROCHLORIDE 0.5 MG: 1 INJECTION, SOLUTION INTRAMUSCULAR; INTRAVENOUS; SUBCUTANEOUS at 11:02

## 2018-10-10 RX ADMIN — FENTANYL CITRATE 50 MCG: 50 INJECTION INTRAMUSCULAR; INTRAVENOUS at 08:16

## 2018-10-10 RX ADMIN — SODIUM CHLORIDE, SODIUM GLUCONATE, SODIUM ACETATE, POTASSIUM CHLORIDE AND MAGNESIUM CHLORIDE: 526; 502; 368; 37; 30 INJECTION, SOLUTION INTRAVENOUS at 09:20

## 2018-10-10 RX ADMIN — SODIUM CHLORIDE: 9 INJECTION, SOLUTION INTRAVENOUS at 06:48

## 2018-10-10 RX ADMIN — FAMOTIDINE 20 MG: 10 INJECTION, SOLUTION INTRAVENOUS at 19:30

## 2018-10-10 RX ADMIN — ROCURONIUM BROMIDE 10 MG: 10 INJECTION INTRAVENOUS at 08:08

## 2018-10-10 RX ADMIN — ROCURONIUM BROMIDE 10 MG: 10 INJECTION INTRAVENOUS at 09:33

## 2018-10-10 RX ADMIN — ROCURONIUM BROMIDE 40 MG: 10 INJECTION INTRAVENOUS at 07:40

## 2018-10-10 RX ADMIN — DEXTROSE AND SODIUM CHLORIDE: 5; 450 INJECTION, SOLUTION INTRAVENOUS at 21:15

## 2018-10-10 RX ADMIN — MIDAZOLAM HYDROCHLORIDE 2 MG: 1 INJECTION, SOLUTION INTRAMUSCULAR; INTRAVENOUS at 07:34

## 2018-10-10 RX ADMIN — PROPOFOL 200 MG: 10 INJECTION, EMULSION INTRAVENOUS at 07:42

## 2018-10-10 RX ADMIN — Medication 3 MG: at 09:58

## 2018-10-10 RX ADMIN — ROCURONIUM BROMIDE 10 MG: 10 INJECTION INTRAVENOUS at 08:25

## 2018-10-10 RX ADMIN — HYDROMORPHONE HYDROCHLORIDE 0.5 MG: 1 INJECTION, SOLUTION INTRAMUSCULAR; INTRAVENOUS; SUBCUTANEOUS at 09:43

## 2018-10-10 RX ADMIN — HYDROMORPHONE HYDROCHLORIDE 0.5 MG: 1 INJECTION, SOLUTION INTRAMUSCULAR; INTRAVENOUS; SUBCUTANEOUS at 10:45

## 2018-10-10 RX ADMIN — HYDROMORPHONE HYDROCHLORIDE 1 MG: 1 INJECTION, SOLUTION INTRAMUSCULAR; INTRAVENOUS; SUBCUTANEOUS at 19:30

## 2018-10-10 RX ADMIN — FENTANYL CITRATE 50 MCG: 50 INJECTION, SOLUTION INTRAMUSCULAR; INTRAVENOUS at 11:20

## 2018-10-10 RX ADMIN — ONDANSETRON 4 MG: 2 INJECTION INTRAMUSCULAR; INTRAVENOUS at 10:30

## 2018-10-10 RX ADMIN — FENTANYL CITRATE 50 MCG: 50 INJECTION INTRAMUSCULAR; INTRAVENOUS at 08:20

## 2018-10-10 RX ADMIN — PROPOFOL 100 MG: 10 INJECTION, EMULSION INTRAVENOUS at 08:25

## 2018-10-10 RX ADMIN — HEPARIN SODIUM 5000 UNITS: 5000 INJECTION, SOLUTION INTRAVENOUS; SUBCUTANEOUS at 17:54

## 2018-10-10 RX ADMIN — ONDANSETRON 4 MG: 2 INJECTION INTRAMUSCULAR; INTRAVENOUS at 09:58

## 2018-10-10 RX ADMIN — DEXTROSE AND SODIUM CHLORIDE: 5; 450 INJECTION, SOLUTION INTRAVENOUS at 11:39

## 2018-10-10 RX ADMIN — ROCURONIUM BROMIDE 10 MG: 10 INJECTION INTRAVENOUS at 08:56

## 2018-10-10 RX ADMIN — KETOROLAC TROMETHAMINE 15 MG: 15 INJECTION, SOLUTION INTRAMUSCULAR; INTRAVENOUS at 19:29

## 2018-10-10 RX ADMIN — DEXAMETHASONE SODIUM PHOSPHATE 4 MG: 4 INJECTION, SOLUTION INTRAMUSCULAR; INTRAVENOUS at 08:05

## 2018-10-10 RX ADMIN — HYDROMORPHONE HYDROCHLORIDE 1 MG: 1 INJECTION, SOLUTION INTRAMUSCULAR; INTRAVENOUS; SUBCUTANEOUS at 23:33

## 2018-10-10 RX ADMIN — NEOMYCIN SULFATE 500 ML: 500 TABLET ORAL at 07:17

## 2018-10-10 RX ADMIN — FENTANYL CITRATE 100 MCG: 50 INJECTION INTRAMUSCULAR; INTRAVENOUS at 07:40

## 2018-10-10 RX ADMIN — CEFOTETAN DISODIUM 2 G: 2 INJECTION, POWDER, FOR SOLUTION INTRAMUSCULAR; INTRAVENOUS at 07:26

## 2018-10-10 RX ADMIN — FENTANYL CITRATE 50 MCG: 50 INJECTION, SOLUTION INTRAMUSCULAR; INTRAVENOUS at 10:22

## 2018-10-10 RX ADMIN — FENTANYL CITRATE 50 MCG: 50 INJECTION INTRAMUSCULAR; INTRAVENOUS at 08:48

## 2018-10-10 RX ADMIN — LIDOCAINE HYDROCHLORIDE 5 ML: 10 INJECTION, SOLUTION INFILTRATION; PERINEURAL at 07:40

## 2018-10-10 RX ADMIN — GLYCOPYRROLATE 0.4 MG: 0.2 INJECTION, SOLUTION INTRAMUSCULAR; INTRAVENOUS at 09:58

## 2018-10-10 RX ADMIN — FENTANYL CITRATE 50 MCG: 50 INJECTION INTRAMUSCULAR; INTRAVENOUS at 08:45

## 2018-10-10 RX ADMIN — HYDROMORPHONE HYDROCHLORIDE 1 MG: 1 INJECTION, SOLUTION INTRAMUSCULAR; INTRAVENOUS; SUBCUTANEOUS at 15:49

## 2018-10-10 ASSESSMENT — PAIN - FUNCTIONAL ASSESSMENT
PAIN_FUNCTIONAL_ASSESSMENT: 0-10

## 2018-10-10 ASSESSMENT — COGNITIVE AND FUNCTIONAL STATUS - GENERAL
MOVING TO AND FROM BED TO CHAIR: 4 - NONE
HELP NEEDED FOR BATHING: 4 - NONE
TOILETING: 4 - NONE
DRESSING REGULAR LOWER BODY CLOTHING: 4 - NONE
HELP NEEDED FOR PERSONAL GROOMING: 4 - NONE
EATING MEALS: 4 - NONE
STANDING UP FROM CHAIR USING ARMS: 4 - NONE
WALKING IN HOSPITAL ROOM: 4 - NONE
DRESSING REGULAR UPPER BODY CLOTHING: 4 - NONE
CLIMB 3 TO 5 STEPS WITH RAILING: 4 - NONE

## 2018-10-10 NOTE — ANESTHESIA PROCEDURE NOTES
Airway  Urgency: elective    Start Time: 10/10/2018 7:40 AM  Airway not difficult    General Information and Staff    Patient location during procedure: OR  Anesthesiologist: GILLIAN WONG  Resident/CRNA: AGUSTO LEBLANC  Performed: resident/CRNA     Indications and Patient Condition  Indications for airway management: anesthesia  Sedation level: general  Preoxygenated: yes  Patient position: sniffing  MILS maintained throughout  Mask difficulty assessment: 1 - vent by mask    Final Airway Details  Final airway type: endotracheal airway      Successful airway: ETT  Cuffed: yes   Successful intubation technique: direct laryngoscopy  Facilitating devices/methods: intubating stylet  Blade: Be  Blade size: #3  ETT size: 7.0 mm  Cormack-Lehane Classification: grade I - full view of glottis  Placement verified by: chest auscultation and capnometry   Inital cuff pressure (cm H2O): 22  Measured from: lips  Number of attempts at approach: 1  Ventilation between attempts: none  Atraumatic airway insertion

## 2018-10-10 NOTE — PLAN OF CARE
Problem: Patient Care Overview  Goal: Plan of Care Review  Outcome: Ongoing (interventions implemented as appropriate)   10/10/18 1424   Coping/Psychosocial   Plan Of Care Reviewed With patient;daughter   Plan of Care Review   Progress progress toward functional goals as expected   Outcome Summary Patient is progressing with plan of care by verbalizing understanding of post-op instructions. Pt continues to deny N/V and reports mild abd pain at this time. Pt daughter at bedside at this time.        Problem: Surgery Nonspecified (Adult)  Goal: Signs and Symptoms of Listed Potential Problems Will be Absent, Minimized or Managed (Surgery Nonspecified)  Outcome: Outcome(s) Achieved Date Met: 10/10/18   10/10/18 1424   Surgery Nonspecified   Problems Assessed (Surgery) bleeding/anemia;bowel motility decreased;postoperative nausea and vomiting;pain;infection;situational response;wound healing impaired   Problems Present (Surgery) pain;bowel motility decreased

## 2018-10-10 NOTE — ANESTHESIA PREPROCEDURE EVALUATION
Anesthesia ROS/MED HX      Cardiovascular   hypertension   Cardiac clearance reviewed  Musculoskeletal   Arthritis  Endo/Other  History of cancer and colon cancer      Past Surgical History:   Procedure Laterality Date   • CHOLECYSTECTOMY     • COLONOSCOPY     • KNEE ARTHROSCOPY     • TRANSUMBILICAL AUGMENTATION MAMMAPLASTY     • UPPER GASTROINTESTINAL ENDOSCOPY     • WISDOM TOOTH EXTRACTION       Patient Active Problem List   Diagnosis   • Diverticulitis of large intestine with abscess without bleeding   • Preoperative cardiovascular examination   • Agatston CAC score 100-199   • Mixed hyperlipidemia   • Family history of early CAD   • Essential hypertension   • Tran-Juan tear   • Class 2 severe obesity due to excess calories with serious comorbidity and body mass index (BMI) of 36.0 to 36.9 in adult (CMS/[facility])   • Abnormal ECG       Past Surgical History:   Procedure Laterality Date   • CHOLECYSTECTOMY     • COLONOSCOPY     • KNEE ARTHROSCOPY     • TRANSUMBILICAL AUGMENTATION MAMMAPLASTY     • UPPER GASTROINTESTINAL ENDOSCOPY     • WISDOM TOOTH EXTRACTION         Current Facility-Administered Medications   Medication Dose Route Frequency   • cefoTEtan  2 g intravenous Once   • sodium chloride 0.9 %   intravenous Continuous       Prior to Admission medications    Medication Sig Start Date End Date Taking? Authorizing Provider   aspirin 81 mg enteric coated tablet Take 81 mg by mouth daily.   Yes Brandon Page MD   erythromycin (PCE) 500 mg EC tablet Take 1,000 mg by mouth 3 (three) times a day. 1400, 1500, 2200   Yes Brandon Page MD   ezetimibe-simvastatin (VYTORIN 10-20) 10-20 mg per tablet Take by mouth. 11/5/07  Yes Brandon Page MD   fenofibrate micronized (LOFIBRA) 67 mg capsule Take 67 mg by mouth daily before breakfast.   Yes Brandon Page MD   irbesartan (AVAPRO) 150 mg tablet Take 150 mg by mouth every morning.     Yes Brandon Page MD   multivitamin capsule Take  1 capsule by mouth daily.   Yes Brandon Page MD   oxyCODONE-acetaminophen (PERCOCET) 7.5-325 mg per tablet Take 1 tablet by mouth every 4 (four) hours as needed.   Yes Brandon Page MD   pantoprazole (PROTONIX) 40 mg EC tablet Take 40 mg by mouth daily.   Yes Brandon Page MD   rosuvastatin (CRESTOR) 20 mg tablet Take 20 mg by mouth daily.   Yes Brandon Page MD   sertraline (ZOLOFT) 50 mg tablet Take 50 mg by mouth every morning.     Yes Brandon Page MD   esomeprazole (NexIUM) 40 mg capsule Take by mouth. 11/5/07   Brandon Page MD   neomycin (MYCIFRADIN) 500 mg tablet Take 1,000 mg by mouth 3 (three) times a day. Take 2 tablets (1000 mg) by mouth at 1pm, 2pm, and 11pm for bowel prep    Brandon Page MD   omega-3 acid ethyl esters (LOVAZA) 1 gram capsule Take by mouth. 11/5/07 10/10/18  Brandon Page MD       CBC Results       10/05/18 09/04/18 08/27/18                    1532 [id number] [id number]         WBC 7.08 9.48 9.02         RBC 4.24 3.77 (L) 3.29 (L)         HGB 12.5 10.7 (L) 9.2 (L)         HCT 38.2 34.2 (L) 29.1 (L)         MCV 90.1 90.7 88.4         MCH 29.5 28.4 28.0         MCHC 32.7 31.3 (L) 31.6 (L)          (H) 500 (H) 517 (H)                       BMP Results       10/05/18 09/04/18 08/27/18                    1532 2259 0309          143 140         K 4.6 3.6 3.6         Cl 102 100 104         CO2 27 26 28         Glucose 111 (H) 85 100 (H)         BUN 21 (H) 14 8           14          Creatinine 0.7 0.8 0.6         Calcium 9.8 9.6 8.9         Anion Gap 9 17 (H) 8         EGFR &gt;60.0 &gt;60.0 &gt;60.0                       No results found for: HCGPREGUR, PREGSERUM, HCG, HCGQUANT          No orders to display       Physical Exam    Airway   Mallampati: I  Cardiovascular    Friction rub: 16 caps.  Dental    Teeth Problems: caps        Anesthesia Plan    Plan: general    Technique: general endotracheal     Lines and Monitors:  additional IV     Airway: oral intubation and natural airway / supplemental oxygen   ASA 2  Induction:    intravenous

## 2018-10-10 NOTE — OP NOTE
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    Surgeon:  Surgeon(s) and Role:     * Evans Trujillo MD - Primary    Assistant: ROBERTO Moreno    Anesthesia: General    Findings: Dense adhesions throughout the pelvis with adherent small bowel in the right lower pelvis at point of fistulization to  drainage catheter please by interventional radiology tensive pelvic adhesions bilaterally.  No residual abscess.  Extensive inflammation of the sigmoid colon    Specimens: Segment of distal ileum at point of fistula, sigmoid colon with additional proximal margin, appendix, additional distal moist and proximal rectum with anastomotic ring    Complications: None    EBL: 100-125 cc    Technique: The patient was placed on the operating room table in the supine position and rolled onto her left side.  The sutures holding the percutaneous drain that was inserted by way of the right gluteus muscle were divided and the catheter was divided and the catheter removed.  The suture within the catheter was removed separately.  Following this, the patient was given general anesthesia and positioned in the modified lithotomy position.  Brantley catheter and sequential teds were placed.  Patient was quite obese with a large pannus.  The abdomen was prepped with ChloraPrep and the perineum with Betadine.  The area was then draped in a sterile fashion.  A subumbilical/suprapubic incision was made and continued through the subcutaneous fat to the fascia which was incised using cautery and the peritoneal cavity was entered  with care.  Upon reaching the peritoneal cavity there was no significant ascites or pus apparent.  There were multiple adhesions throughout the pelvis involving the sigmoid colon and small bowel loops.  There were adhesions of the sigmoid to the lateral pelvic sidewall on the left also involving the tube and ovary.  There were extensive adhesions between the posterior uterus and the sigmoid as well.  Ultimately, the adhesions were lysed and the sigmoid was mobilized along the white line of Toldt.  The left ureter was identified and preserved throughout the case.  Eventually with all colonic adhesions mobilized, there was a loop of bowel adherent into the right pelvis.  This was densely adherent at the site of the abscess cavity where the fistula had been identified by way of the percutaneous catheter.  This area was dissected free and when it was delivered, there was a hole of the small bowel as expected.  This tore somewhat and therefore the area was resected after clearing about the bowel of its mesentery proximally and distally and firing NORMA stapler.  Mesentery was taken down with LigaSure and the specimen removed.  This area was not anastomosed at this time but instead packed into the upper abdomen.  There is continued dissection carried out along the posterior uterus and eventually, the majority of the rectosigmoid was mobilized.  The proximal line of resection was selected where the bowel was cleared of its mesentery and divided with a NORMA-75 stapler.  The mesentery of the rectosigmoid was taken down using the LigaSure device down to the proximal rectum at which point the bowel was transected.  The 29 ILS stapler was selected and the anvil was placed in the proximal sigmoid and closed with a TA 60 stapler bringing the pointed obturator through the staple line.  I    At this point attention was directed to the perineum and the anus was dilated to 3 fingers.  The sizers were placed transrectally but there was  considerable scarring and redundancy of the proximal rectum that was folded upon itself.  The anastomosis was therefore abandoned and attention was directed to the pelvis where, using a LigaSure device additional mid or distal sigmoid and mid rectum was mobilized and eventually the rectum was uncoiled and additional distal sigmoid and proximal rectum were resected.  Following this a standard I last stapled anastomosis was carried out in a standard fashion.  The staple was removed and the donuts were noted to be intact.  Patient was then placed in reverse Trendelenburg and the pelvis filled with antibiotic solution.  Air was insufflated into the rectum by way of Brantley catheter and there is no bubbling from the anastomosis at all.  Following this, the small bowel continuity was really established using a NORMA-75 and TA 60 stapler performing a standard side-to-side enteroenterostomy.  Silk sutures were used to reinforce the crotch of the anastomosis as well as the cross staple lines.  Incidental appendectomy was performed using LigaSure for the mesoappendix and the base appendix was clamped and excised ligating it with 0 chromic.  Following this with all counts correct, the abdominal cavity was irrigated with antibiotic solution and the midline fascia was closed with 2 segments of running #1 PDS.  After infiltrating the deep and superficial tissues with half percent Marcaine with epinephrine, a flat Royce drain was placed in the depths of the subcutaneous tissues and brought out through a stab wound inferiorly and secured with nylon.  The skin was closed with clips.  At the close of procedure all counts were correct.  The patient tolerated procedure well went to recovery in satisfactory condition.

## 2018-10-10 NOTE — ANESTHESIA POSTPROCEDURE EVALUATION
Patient: Reyna Castillo    Procedure Summary     Date:  10/10/18 Room / Location:   OR 3 / [facility] OR    Anesthesia Start:  0735 Anesthesia Stop:  1019    Procedure:  COLECTOMY SIGMOID (N/A Abdomen) Diagnosis:       Diverticulitis of large intestine with abscess, unspecified bleeding status      (Diverticulitis)    Surgeon:  Evans Trujillo MD Responsible Provider:  Brigido Ricardo MD    Anesthesia Type:  general ASA Status:  2          Anesthesia Type: general  PACU Vitals  10/10/2018 1009 - 10/10/2018 1049      10/10/2018 1020 10/10/2018 1030 10/10/2018 1045       BP: 137/62 140/63 133/62     Temp: 37.2 °C (99 °F) - -     Pulse: 86 78 78     Resp: 15 14 14     SpO2: 96 % 99 % 98 %             Anesthesia Post Evaluation    Pain management: adequate  Patient participation: complete - patient participated  Level of consciousness: awake and alert  Cardiovascular status: acceptable  Airway Patency: adequate  Respiratory status: acceptable  Hydration status: acceptable  Anesthetic complications: no

## 2018-10-10 NOTE — NURSING NOTE
Dr. Trujillo in to see patient; discussed pain medication regimen with MD; per pt, Dilaudid works better for pain relief. Per MD- will change orders for pain medications. Assessment unchanged; VSS. Pt daughter at bedside. Will continue to make hourly rounding.

## 2018-10-10 NOTE — PERIOPERATIVE NURSING NOTE
Spoke with Best at the OR desk regarding the patient has a latex allergy that isn't on the OR schedule.

## 2018-10-10 NOTE — NURSING NOTE
Received patient from PACU. Pt AOx3 upon arrival to floor; VSS; abd dressing remains dry and intact; dickey catheter present on arrival to floor; clear yellow urine draining. Pt denies N/V and pain at this time. Pt oriented to room; bed alarm on, call bell within reach. Pt daughter at bedside at this time as well. Will continue to monitor.

## 2018-10-11 LAB
CASE RPRT: NORMAL
CLINICAL INFO: NORMAL
PATH REPORT.FINAL DX SPEC: NORMAL
PATH REPORT.FINAL DX SPEC: NORMAL
PATH REPORT.GROSS SPEC: NORMAL

## 2018-10-11 PROCEDURE — 63600000 HC DRUGS/DETAIL CODE: Performed by: SURGERY

## 2018-10-11 PROCEDURE — 25800000 HC PHARMACY IV SOLUTIONS: Performed by: SURGERY

## 2018-10-11 PROCEDURE — 63700000 HC SELF-ADMINISTRABLE DRUG: Performed by: SURGERY

## 2018-10-11 PROCEDURE — 25000000 HC PHARMACY GENERAL: Performed by: SURGERY

## 2018-10-11 PROCEDURE — 12000000 HC ROOM AND CARE MED/SURG

## 2018-10-11 RX ORDER — ALVIMOPAN 12 MG/1
12 CAPSULE ORAL 2 TIMES DAILY
Status: DISCONTINUED | OUTPATIENT
Start: 2018-10-11 | End: 2018-10-13 | Stop reason: HOSPADM

## 2018-10-11 RX ADMIN — KETOROLAC TROMETHAMINE 15 MG: 15 INJECTION, SOLUTION INTRAMUSCULAR; INTRAVENOUS at 08:28

## 2018-10-11 RX ADMIN — DEXTROSE AND SODIUM CHLORIDE: 5; 450 INJECTION, SOLUTION INTRAVENOUS at 17:46

## 2018-10-11 RX ADMIN — KETOROLAC TROMETHAMINE 15 MG: 15 INJECTION, SOLUTION INTRAMUSCULAR; INTRAVENOUS at 00:50

## 2018-10-11 RX ADMIN — HEPARIN SODIUM 5000 UNITS: 5000 INJECTION, SOLUTION INTRAVENOUS; SUBCUTANEOUS at 17:47

## 2018-10-11 RX ADMIN — KETOROLAC TROMETHAMINE 15 MG: 15 INJECTION, SOLUTION INTRAMUSCULAR; INTRAVENOUS at 14:51

## 2018-10-11 RX ADMIN — KETOROLAC TROMETHAMINE 15 MG: 15 INJECTION, SOLUTION INTRAMUSCULAR; INTRAVENOUS at 19:29

## 2018-10-11 RX ADMIN — HEPARIN SODIUM 5000 UNITS: 5000 INJECTION, SOLUTION INTRAVENOUS; SUBCUTANEOUS at 06:09

## 2018-10-11 RX ADMIN — FAMOTIDINE 20 MG: 10 INJECTION, SOLUTION INTRAVENOUS at 20:40

## 2018-10-11 RX ADMIN — HYDROMORPHONE HYDROCHLORIDE 1 MG: 1 INJECTION, SOLUTION INTRAMUSCULAR; INTRAVENOUS; SUBCUTANEOUS at 19:29

## 2018-10-11 RX ADMIN — HYDROMORPHONE HYDROCHLORIDE 1 MG: 1 INJECTION, SOLUTION INTRAMUSCULAR; INTRAVENOUS; SUBCUTANEOUS at 08:29

## 2018-10-11 RX ADMIN — FAMOTIDINE 20 MG: 10 INJECTION, SOLUTION INTRAVENOUS at 08:29

## 2018-10-11 RX ADMIN — ALVIMOPAN 12 MG: 12 CAPSULE ORAL at 08:52

## 2018-10-11 RX ADMIN — DEXTROSE AND SODIUM CHLORIDE: 5; 450 INJECTION, SOLUTION INTRAVENOUS at 06:10

## 2018-10-11 RX ADMIN — ALVIMOPAN 12 MG: 12 CAPSULE ORAL at 20:37

## 2018-10-11 RX ADMIN — HYDROMORPHONE HYDROCHLORIDE 1 MG: 1 INJECTION, SOLUTION INTRAMUSCULAR; INTRAVENOUS; SUBCUTANEOUS at 14:51

## 2018-10-11 RX ADMIN — HYDROMORPHONE HYDROCHLORIDE 1 MG: 1 INJECTION, SOLUTION INTRAMUSCULAR; INTRAVENOUS; SUBCUTANEOUS at 02:59

## 2018-10-11 RX ADMIN — CEFOTETAN DISODIUM 2 G: 2 INJECTION, POWDER, FOR SOLUTION INTRAMUSCULAR; INTRAVENOUS at 08:27

## 2018-10-11 NOTE — PROGRESS NOTES
Pt is ambulating independently around the room and states she is going to complete a phone call and may take a walk through the unit shortly. MSW CC to meet up with her at a later time. MSRITO CC following.

## 2018-10-11 NOTE — PLAN OF CARE
Problem: Patient Care Overview  Goal: Plan of Care Review  Outcome: Ongoing (interventions implemented as appropriate)   10/10/18 2010   Coping/Psychosocial   Plan Of Care Reviewed With patient   Plan of Care Review   Progress progress toward functional goals as expected   Outcome Summary Pt. amb in ivey, participating in POC.       Problem: Fall Risk (Adult)  Goal: Identify Related Risk Factors and Signs and Symptoms  Outcome: Ongoing (interventions implemented as appropriate)

## 2018-10-11 NOTE — NURSING NOTE
Patient tolerating clears. Went for 3 walks around unit today. Brantley draining clear yellow urine.  Drain site incision reinforced with gauze and tape.

## 2018-10-11 NOTE — PROGRESS NOTES
Patient reports she is doing very well.  She has mild incisional pain but, has been ambulating about the hallway.  She denies nausea or vomiting.  She is passed no flatus and had no bowel movement.  There is no chest pain or shortness of breath.    Patient's been afebrile pulse is 80 blood pressure 120/60.    On examination, patient looks terrific.  Her color is good lungs are clear.  Heart is regular rhythm.  The abdomen is soft with no audible bowel sounds.  The dressing is dry.  Extremities show no calf tenderness.    Impression:    Patient appears to be doing very well.  I have encouraged her to continue to ambulate.  Will start Entereg and allow sips of liquids later today.

## 2018-10-11 NOTE — STUDENT
Medical Student Note: For educational purposes only.  Do not use for coding or billing.     Medical Student General Surgery Daily Progress Note    Subjective     Interval History: Pt is a 69 y/o F presenting post-op day #1 after a colectomy of the rectosigmoid and small bowel. Today the pt is in good spirits and has no complaints. She states she has mild pain at the surgical site, but states pain is well controlled. She has been up and ambulating out of bed since yesterday without trouble or concern. She continues to be NPO, only eating ice chips occasionally and reports no appetite. The pt states she has not had a bowel movement or flatus since awakening from surgery yesterday.      Objective     Vital signs in last 24 hours:  Temp:  [36.6 °C (97.8 °F)-37.5 °C (99.5 °F)] 36.6 °C (97.8 °F)  Heart Rate:  [77-98] 77  Resp:  [12-17] 16  BP: ()/(53-71) 122/60      Intake/Output Summary (Last 24 hours) at 10/11/18 0645  Last data filed at 10/11/18 0551   Gross per 24 hour   Intake             1600 ml   Output              850 ml   Net              750 ml     Intake/Output this shift:  Deferred - not recorded at time of seeing pt    Physical Exam    Vitals - deferred    General appearance: alert, comfortable, and in good spirits    Eyes: conjunctiva pink, sclera white, non-icteric    Neck: negative JVD, negative lymphadenopathy appreciated    Lungs: clear to auscultation bilaterally     Chest wall: no tenderness     Heart: regular rate and rhythm, S1, S2 normal, no murmur, click, rub or gallop     Abdomen: soft and non-tender to palpation, pain around site of incision, incision looks good with no signs of infection or dehiscence, normal tympany on percussion, mass palpated in lower left quadrant, hypoactive bowel sounds noted in all four quadrants, negative hepatosplenomegaly appreciated    Labs  No new labs to review    Imaging  No new imaging to review      Assessment/Plan   Pt is post-op colectomy of  rectosigmoid and small bowel day #1  -Continue Pain control as needed  -Continue pt ambulation  -Consider moving pt to clear liquids    Expected Discharge Date:   10/14/2018    Yohannes Sorensen

## 2018-10-12 PROCEDURE — 12000000 HC ROOM AND CARE MED/SURG

## 2018-10-12 PROCEDURE — 25800000 HC PHARMACY IV SOLUTIONS: Performed by: SURGERY

## 2018-10-12 PROCEDURE — 63600000 HC DRUGS/DETAIL CODE: Performed by: SURGERY

## 2018-10-12 PROCEDURE — 25000000 HC PHARMACY GENERAL: Performed by: SURGERY

## 2018-10-12 PROCEDURE — 63700000 HC SELF-ADMINISTRABLE DRUG: Performed by: PHYSICIAN ASSISTANT

## 2018-10-12 PROCEDURE — 63700000 HC SELF-ADMINISTRABLE DRUG: Performed by: SURGERY

## 2018-10-12 RX ORDER — DIPHENHYDRAMINE HCL 25 MG
25 CAPSULE ORAL NIGHTLY PRN
Status: DISCONTINUED | OUTPATIENT
Start: 2018-10-12 | End: 2018-10-13 | Stop reason: HOSPADM

## 2018-10-12 RX ORDER — OXYCODONE AND ACETAMINOPHEN 5; 325 MG/1; MG/1
1-2 TABLET ORAL EVERY 4 HOURS PRN
Status: DISCONTINUED | OUTPATIENT
Start: 2018-10-12 | End: 2018-10-12

## 2018-10-12 RX ORDER — NEOMYCIN SULFATE 500 MG/1
1000 TABLET ORAL 3 TIMES DAILY
Status: DISCONTINUED | OUTPATIENT
Start: 2018-10-12 | End: 2018-10-12

## 2018-10-12 RX ORDER — KETOROLAC TROMETHAMINE 15 MG/ML
15 INJECTION, SOLUTION INTRAMUSCULAR; INTRAVENOUS EVERY 6 HOURS PRN
Status: DISCONTINUED | OUTPATIENT
Start: 2018-10-12 | End: 2018-10-12

## 2018-10-12 RX ORDER — FENOFIBRATE 67 MG/1
67 CAPSULE ORAL
Status: DISCONTINUED | OUTPATIENT
Start: 2018-10-12 | End: 2018-10-13 | Stop reason: HOSPADM

## 2018-10-12 RX ORDER — LOSARTAN POTASSIUM 50 MG/1
50 TABLET ORAL DAILY
Status: DISCONTINUED | OUTPATIENT
Start: 2018-10-12 | End: 2018-10-13 | Stop reason: HOSPADM

## 2018-10-12 RX ORDER — KETOROLAC TROMETHAMINE 15 MG/ML
15 INJECTION, SOLUTION INTRAMUSCULAR; INTRAVENOUS EVERY 6 HOURS
Status: COMPLETED | OUTPATIENT
Start: 2018-10-12 | End: 2018-10-13

## 2018-10-12 RX ORDER — ASPIRIN 81 MG/1
81 TABLET ORAL DAILY
Status: DISCONTINUED | OUTPATIENT
Start: 2018-10-12 | End: 2018-10-13 | Stop reason: HOSPADM

## 2018-10-12 RX ORDER — HYDROMORPHONE HYDROCHLORIDE 2 MG/1
1-2 TABLET ORAL EVERY 4 HOURS PRN
Status: DISCONTINUED | OUTPATIENT
Start: 2018-10-12 | End: 2018-10-13 | Stop reason: HOSPADM

## 2018-10-12 RX ORDER — PANTOPRAZOLE SODIUM 40 MG/1
40 TABLET, DELAYED RELEASE ORAL DAILY
Status: DISCONTINUED | OUTPATIENT
Start: 2018-10-12 | End: 2018-10-13 | Stop reason: HOSPADM

## 2018-10-12 RX ORDER — LOSARTAN POTASSIUM 25 MG/1
25 TABLET ORAL DAILY
Status: DISCONTINUED | OUTPATIENT
Start: 2018-10-12 | End: 2018-10-12

## 2018-10-12 RX ORDER — PANTOPRAZOLE SODIUM 40 MG/1
40 TABLET, DELAYED RELEASE ORAL DAILY
Status: DISCONTINUED | OUTPATIENT
Start: 2018-10-12 | End: 2018-10-12 | Stop reason: SDUPTHER

## 2018-10-12 RX ORDER — ROSUVASTATIN CALCIUM 20 MG/1
20 TABLET, COATED ORAL DAILY
Status: DISCONTINUED | OUTPATIENT
Start: 2018-10-12 | End: 2018-10-13 | Stop reason: HOSPADM

## 2018-10-12 RX ORDER — SERTRALINE HYDROCHLORIDE 50 MG/1
50 TABLET, FILM COATED ORAL EVERY MORNING
Status: DISCONTINUED | OUTPATIENT
Start: 2018-10-12 | End: 2018-10-13 | Stop reason: HOSPADM

## 2018-10-12 RX ADMIN — HYDROMORPHONE HYDROCHLORIDE 2 MG: 2 TABLET ORAL at 23:43

## 2018-10-12 RX ADMIN — HEPARIN SODIUM 5000 UNITS: 5000 INJECTION, SOLUTION INTRAVENOUS; SUBCUTANEOUS at 06:57

## 2018-10-12 RX ADMIN — HYDROMORPHONE HYDROCHLORIDE 2 MG: 2 TABLET ORAL at 18:46

## 2018-10-12 RX ADMIN — KETOROLAC TROMETHAMINE 15 MG: 15 INJECTION, SOLUTION INTRAMUSCULAR; INTRAVENOUS at 18:46

## 2018-10-12 RX ADMIN — KETOROLAC TROMETHAMINE 15 MG: 15 INJECTION, SOLUTION INTRAMUSCULAR; INTRAVENOUS at 01:11

## 2018-10-12 RX ADMIN — HEPARIN SODIUM 5000 UNITS: 5000 INJECTION, SOLUTION INTRAVENOUS; SUBCUTANEOUS at 18:46

## 2018-10-12 RX ADMIN — ALVIMOPAN 12 MG: 12 CAPSULE ORAL at 09:11

## 2018-10-12 RX ADMIN — KETOROLAC TROMETHAMINE 15 MG: 15 INJECTION, SOLUTION INTRAMUSCULAR; INTRAVENOUS at 09:01

## 2018-10-12 RX ADMIN — MULTIPLE VITAMINS W/ MINERALS TAB 1 TABLET: TAB at 09:05

## 2018-10-12 RX ADMIN — ALVIMOPAN 12 MG: 12 CAPSULE ORAL at 19:33

## 2018-10-12 RX ADMIN — HYDROMORPHONE HYDROCHLORIDE 1 MG: 1 INJECTION, SOLUTION INTRAMUSCULAR; INTRAVENOUS; SUBCUTANEOUS at 01:12

## 2018-10-12 RX ADMIN — DIPHENHYDRAMINE HYDROCHLORIDE 25 MG: 25 CAPSULE ORAL at 23:40

## 2018-10-12 RX ADMIN — CEFOTETAN DISODIUM 2 G: 2 INJECTION, POWDER, FOR SOLUTION INTRAMUSCULAR; INTRAVENOUS at 08:58

## 2018-10-12 RX ADMIN — HYDROMORPHONE HYDROCHLORIDE 1 MG: 1 INJECTION, SOLUTION INTRAMUSCULAR; INTRAVENOUS; SUBCUTANEOUS at 06:55

## 2018-10-12 RX ADMIN — FENOFIBRATE 67 MG: 67 CAPSULE ORAL at 09:05

## 2018-10-12 RX ADMIN — SERTRALINE HYDROCHLORIDE 50 MG: 50 TABLET ORAL at 09:06

## 2018-10-12 RX ADMIN — DEXTROSE AND SODIUM CHLORIDE: 5; 450 INJECTION, SOLUTION INTRAVENOUS at 03:29

## 2018-10-12 RX ADMIN — PANTOPRAZOLE SODIUM 40 MG: 40 TABLET, DELAYED RELEASE ORAL at 09:05

## 2018-10-12 RX ADMIN — ASPIRIN 81 MG: 81 TABLET, COATED ORAL at 09:06

## 2018-10-12 RX ADMIN — LOSARTAN POTASSIUM 50 MG: 50 TABLET, FILM COATED ORAL at 09:06

## 2018-10-12 RX ADMIN — OXYCODONE HYDROCHLORIDE AND ACETAMINOPHEN 2 TABLET: 5; 325 TABLET ORAL at 11:41

## 2018-10-12 RX ADMIN — ROSUVASTATIN CALCIUM 20 MG: 20 TABLET, FILM COATED ORAL at 09:06

## 2018-10-12 NOTE — PLAN OF CARE
Problem: Patient Care Overview  Goal: Plan of Care Review  Outcome: Ongoing (interventions implemented as appropriate)   10/12/18 1944   Coping/Psychosocial   Plan Of Care Reviewed With patient   Plan of Care Review   Progress improving   Outcome Summary tolerating food. had 3 loose stools. positive BS. incision CDI.        Problem: Surgery Nonspecified (Adult)  Goal: Anesthesia/Sedation Recovery  Outcome: Outcome(s) Achieved Date Met: 10/12/18      Problem: Fall Risk (Adult)  Goal: Identify Related Risk Factors and Signs and Symptoms  Outcome: Ongoing (interventions implemented as appropriate)    Goal: Absence of Falls  Outcome: Ongoing (interventions implemented as appropriate)      Problem: Pain, Acute (Adult)  Goal: Identify Related Risk Factors and Signs and Symptoms  Outcome: Ongoing (interventions implemented as appropriate)    Goal: Acceptable Pain Control/Comfort Level  Outcome: Ongoing (interventions implemented as appropriate)

## 2018-10-12 NOTE — NURSING NOTE
Patient voided twice post catheter removal.  Has had two small, loose, dark/bloody BM. Dr. Trujillo is aware.  Per patient, Dr. Trujillo removed drain from abd when saw her this afternoon. Dressing intact with small amount of drainage noted.  Patient tolerated full liquids this AM and low fiber/low residue diet this evening.  Per Dr. Trujillo, he wrote script for percocet and put in patient's chart.  Asked RN to pass on in report to oncoming RN in morning to have Dr. Dutta write new script for PO dilaudid when he sees her. Will pass on to night shift RN.

## 2018-10-12 NOTE — DISCHARGE INSTRUCTIONS
[facility]  [address], [address]  [place] [address]  [phone]  [email or web address]     PAIN MEDICATION  You will be given a prescription for pain medication.    This medication may be constipating so:  If you become constipated (no bowel movement in 3 days), over-the-counter stool softeners or a mild laxative Miralax or Milk of Magnesia can be taken.    DO NOT DRIVE while taking the prescription pain medication.  When the pain decreases, you can change to EXTRA-STRENGTH TYLENOL or ADVIL for pain relief.    If you experience nausea and vomiting after taking the pain mediation:  Stop taking the pain medication and try EXTRA-STRENGTH TYLENOL or ADVIL or call the doctor for another prescription pain medication.    If you experience nausea and vomiting not associated with the pain medication:  Maintain your diet on clear liquids.    If the nausea and vomiting becomes persistent, call the doctor’s office.  DIET    Low residue (low fiber) diet for 1 week or until your post-op visit.  ACTIVITY  Ambulate normally.  No vigorous activity, sports or heavy lifting for 4 weeks postop.    DRESSINGS  Dressings should be changed daily and should remain in place until you are comfortable with the incision rubbing against your clothes.  You can shower after discharge. Remove the dressings and wash the incision site with soap and water. Pat the area dry and apply a new dressing, if necessary.  If a drain was placed you can shower 24 hours after the drain is removed.    CALL THE DOCTOR IF YOU EXPERIENCE:   Unusual discharge from the incision.  A temperature over 101 degrees for 24 hours.  A dramatic increase in your pain.  Persistent nausea and vomiting.    TO SCHEDULE A FOLLOW-UP APPOINTMENT FOR 7-10 DAYS FOLLOWING YOUR DISCHARGE, LOG ONTO [email or web address], Click on Patient Portal, next login (first time users will need to register), then click on Contact Your Doctor's Office to send us a message. If  you are unable to login, please call [phone] for assistance.  Tylenol # 3 for pain as needed

## 2018-10-12 NOTE — NURSING NOTE
Spoke to Dr. Trujillo on the phone. Pt requesting PO dilaudid rather than percocet. States that the percocet is not working as well for the pain.  Verbal order taken to discontinue PO percocet. Verbal order for 1-2mg PO dilaudid q4h prn for pain.  Dr. Trujillo also gave verbal order for 3 more doses of toradol.  Toradol previously discontinued because patient had maximum of 8 doses.  Dr. Trujillo aware and states patient okay to have.  Readback confirmation done with all orders.

## 2018-10-12 NOTE — PROGRESS NOTES
Reports she is doing very well.  She has mild incisional pain but is ambulating around the hospital.  She tolerated clear liquids and passed some flatus but no bowel movement yet.  She feels quite comfortable.    T-max was 99 1 and she is been essentially afebrile otherwise.  All signs are stable.    On exam, the patient looks comfortable sitting up in the chair.  Her color is good and the lungs are clear.  Heart shows regular rhythm without tachycardia.  The abdomen is soft with diminished bowel sounds.  There is minimal drainage from the surgical drain at the inferior aspect and this was removed and the dressing replaced at the drain site.  Extremities show no calf tenderness or edema.    Impression:    Patient appears to be doing very well.  We will DC her [place], advance her diet and change over to p.o. meds I think there is a good chance she could go home tomorrow.

## 2018-10-13 VITALS
RESPIRATION RATE: 14 BRPM | WEIGHT: 204 LBS | OXYGEN SATURATION: 98 % | HEART RATE: 74 BPM | TEMPERATURE: 97.6 F | DIASTOLIC BLOOD PRESSURE: 87 MMHG | BODY MASS INDEX: 36.14 KG/M2 | SYSTOLIC BLOOD PRESSURE: 171 MMHG | HEIGHT: 63 IN

## 2018-10-13 PROCEDURE — 25800000 HC PHARMACY IV SOLUTIONS: Performed by: SURGERY

## 2018-10-13 PROCEDURE — 25000000 HC PHARMACY GENERAL: Performed by: SURGERY

## 2018-10-13 PROCEDURE — 63700000 HC SELF-ADMINISTRABLE DRUG: Performed by: SURGERY

## 2018-10-13 PROCEDURE — 63600000 HC DRUGS/DETAIL CODE: Performed by: SURGERY

## 2018-10-13 RX ADMIN — KETOROLAC TROMETHAMINE 15 MG: 15 INJECTION, SOLUTION INTRAMUSCULAR; INTRAVENOUS at 02:08

## 2018-10-13 RX ADMIN — HEPARIN SODIUM 5000 UNITS: 5000 INJECTION, SOLUTION INTRAVENOUS; SUBCUTANEOUS at 06:16

## 2018-10-13 RX ADMIN — ALVIMOPAN 12 MG: 12 CAPSULE ORAL at 08:19

## 2018-10-13 RX ADMIN — SERTRALINE HYDROCHLORIDE 50 MG: 50 TABLET ORAL at 08:15

## 2018-10-13 RX ADMIN — ASPIRIN 81 MG: 81 TABLET, COATED ORAL at 08:17

## 2018-10-13 RX ADMIN — MULTIPLE VITAMINS W/ MINERALS TAB 1 TABLET: TAB at 08:18

## 2018-10-13 RX ADMIN — FENOFIBRATE 67 MG: 67 CAPSULE ORAL at 08:19

## 2018-10-13 RX ADMIN — HYDROMORPHONE HYDROCHLORIDE 2 MG: 2 TABLET ORAL at 06:30

## 2018-10-13 RX ADMIN — KETOROLAC TROMETHAMINE 15 MG: 15 INJECTION, SOLUTION INTRAMUSCULAR; INTRAVENOUS at 08:20

## 2018-10-13 RX ADMIN — LOSARTAN POTASSIUM 50 MG: 50 TABLET, FILM COATED ORAL at 08:15

## 2018-10-13 RX ADMIN — ROSUVASTATIN CALCIUM 20 MG: 20 TABLET, FILM COATED ORAL at 08:18

## 2018-10-13 RX ADMIN — CEFOTETAN DISODIUM 2 G: 2 INJECTION, POWDER, FOR SOLUTION INTRAMUSCULAR; INTRAVENOUS at 08:12

## 2018-10-13 NOTE — DISCHARGE SUMMARY
Doing well postop day 3 status post sigmoid colectomy    Afebrile vital signs stable and in good spirits    Abdomen soft and dressings dry    Ready for discharge today instructions given

## 2018-10-13 NOTE — NURSING NOTE
Pt given discharge instructions, verbalized understanding.  at bedside and verbalized understanding as well.  Prescriptions given and explained. Pt escorted to door via escort.

## 2018-10-13 NOTE — PROGRESS NOTES
10/13/2018 10:19 AM - pt has d/c order to home.  Met with pt and she denies d/c needs at this time.  Pt signed medicare letter.  Martha curran MSW LSW

## 2018-10-13 NOTE — PLAN OF CARE
Problem: Surgery Nonspecified (Adult)  Intervention: Prevent/Manage DVT/VTE Risk   10/12/18 1900   Minimize Embolism Risk   VTE Prevention/Management bilateral;sequential compression devices     Intervention: Optimize Psychosocial Response to the Surgical Experience   10/12/18 1900   Psychosocial Support   Trust Relationship/Rapport care explained;reassurance provided;questions encouraged;questions answered;thoughts/feelings acknowledged;empathic listening provided;emotional support provided         Problem: Fall Risk (Adult)  Intervention: Review Medications/Identify Contributors to Fall Risk   10/12/18 1900   Safety Interventions   Medication Review/Management medications reviewed         Problem: Pain, Acute (Adult)  Intervention: Mutually Develop/Implement Acute Pain Management Plan   10/12/18 1900   Manage Acute Burn Pain   Pain Management Interventions pain management plan reviewed with patient/caregiver

## 2018-10-17 NOTE — DISCHARGE SUMMARY
Inpatient Discharge Summary    BRIEF OVERVIEW  Admitting Provider: Evans Trujillo MD  Discharge Provider: No att. 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     fenofibrate micronized 67 mg capsule  Commonly known as:  LOFIBRA  Take 67 mg by mouth daily before breakfast.     irbesartan 150 mg tablet  Commonly known as:  AVAPRO  Take 150 mg by mouth every morning.     multivitamin capsule  Take 1 capsule by mouth daily.     neomycin 500 mg tablet  Commonly known as:  MYCIFRADIN  Take 1,000 mg by mouth 3 (three) times a day. Take 2 tablets (1000 mg) by mouth at 1pm, 2pm, and 11pm for bowel prep     NexIUM 40 mg capsule  Generic drug:  esomeprazole  Take by mouth.     oxyCODONE-acetaminophen 7.5-325 mg per tablet  Commonly known as:  PERCOCET  Take 1 tablet by mouth every 4 (four) hours as needed.     pantoprazole 40 mg EC tablet  Commonly known as:  PROTONIX  Take 40 mg by mouth daily.     rosuvastatin 20 mg tablet  Commonly known as:  CRESTOR  Take 20 mg by mouth daily.     sertraline 50 mg tablet  Commonly known as:  ZOLOFT  Take 50 mg by mouth every morning.     VYTORIN 10-20 10-20 mg per tablet  Generic drug:  ezetimibe-simvastatin  Take by mouth.            Active Issues Requiring Follow-up  None    Outpatient Follow-Up  Encounter Information     You do not currently have any appointments scheduled.              Test Results Pending at Discharge      DETAILS OF HOSPITAL STAY    Presenting  Problem/History of Present Illness    This is a 68-year-old female with recurrent sigmoid diverticulitis who recently had a perforation with a pelvic abscess that recurred following percutaneous drainage.  She had a transgluteal drainage that ultimately demonstrated a fistula.  Patient was admitted for elective sigmoid colectomy with takedown of fistula.    Operative Procedures Performed  Sporter laparotomy, lysis of adhesions, segmental small bowel resection with takedown of enterocutaneous fistula, sigmoid colectomy with primary anastomosed    Hospital Course  Patient was admitted to same-day surgical admission after undergoing outpatient bowel prep.  She was brought to the operating room at which time extensive pelvic adhesions were taken down in the visualized segment of distal ileum was resected with primary anastomosis.  Sigmoid colectomy was carried out as well with a primary anastomosis.  The patient's postoperative course was remarkable for her ambulating promptly with prompt return of her peristalsis.  She was started on liquids advance to a low residue diet which she was tolerated the time of discharge having been moving her bowels.  She will follow-up in the office about 10 days postop

## 2019-01-16 ENCOUNTER — TRANSCRIBE ORDERS (OUTPATIENT)
Dept: SCHEDULING | Age: 69
End: 2019-01-16

## 2019-01-16 DIAGNOSIS — M53.3 SACROCOCCYGEAL DISORDERS, NOT ELSEWHERE CLASSIFIED: Primary | ICD-10-CM

## 2019-01-17 ENCOUNTER — HOSPITAL ENCOUNTER (OUTPATIENT)
Dept: RADIOLOGY | Age: 69
Discharge: HOME | End: 2019-01-17
Attending: INTERNAL MEDICINE
Payer: COMMERCIAL

## 2019-01-17 DIAGNOSIS — M53.3 SACROCOCCYGEAL DISORDERS, NOT ELSEWHERE CLASSIFIED: ICD-10-CM

## 2019-01-17 PROCEDURE — 72110 X-RAY EXAM L-2 SPINE 4/>VWS: CPT

## 2019-03-28 ENCOUNTER — HOSPITAL ENCOUNTER (OUTPATIENT)
Dept: RADIOLOGY | Age: 69
Discharge: HOME | End: 2019-03-28
Attending: NURSE PRACTITIONER
Payer: COMMERCIAL

## 2019-03-28 ENCOUNTER — TRANSCRIBE ORDERS (OUTPATIENT)
Dept: LAB | Age: 69
End: 2019-03-28

## 2019-03-28 ENCOUNTER — TRANSCRIBE ORDERS (OUTPATIENT)
Dept: RADIOLOGY | Age: 69
End: 2019-03-28

## 2019-03-28 DIAGNOSIS — M25.561 PAIN IN RIGHT KNEE: ICD-10-CM

## 2019-03-28 DIAGNOSIS — M25.561 PAIN IN RIGHT KNEE: Primary | ICD-10-CM

## 2019-03-28 PROCEDURE — 73564 X-RAY EXAM KNEE 4 OR MORE: CPT | Mod: 50

## 2019-04-25 ENCOUNTER — TRANSCRIBE ORDERS (OUTPATIENT)
Dept: SCHEDULING | Age: 69
End: 2019-04-25

## 2019-04-25 DIAGNOSIS — R22.31 LOCALIZED SWELLING, MASS AND LUMP, RIGHT UPPER LIMB: ICD-10-CM

## 2019-04-25 DIAGNOSIS — Z12.31 ENCOUNTER FOR SCREENING MAMMOGRAM FOR MALIGNANT NEOPLASM OF BREAST: Primary | ICD-10-CM

## 2019-05-03 ENCOUNTER — HOSPITAL ENCOUNTER (OUTPATIENT)
Dept: RADIOLOGY | Age: 69
Discharge: HOME | End: 2019-05-03
Attending: INTERNAL MEDICINE
Payer: COMMERCIAL

## 2019-05-03 DIAGNOSIS — Z12.31 ENCOUNTER FOR SCREENING MAMMOGRAM FOR MALIGNANT NEOPLASM OF BREAST: ICD-10-CM

## 2019-05-03 DIAGNOSIS — R22.31 LOCALIZED SWELLING, MASS AND LUMP, RIGHT UPPER LIMB: ICD-10-CM

## 2019-05-03 PROCEDURE — 76882 US LMTD JT/FCL EVL NVASC XTR: CPT | Mod: RT

## 2019-05-03 PROCEDURE — 77067 SCR MAMMO BI INCL CAD: CPT

## 2019-05-07 ENCOUNTER — TRANSCRIBE ORDERS (OUTPATIENT)
Dept: SCHEDULING | Age: 69
End: 2019-05-07

## 2019-05-07 DIAGNOSIS — S60.459A SUPERFICIAL FOREIGN BODY OF FINGER: ICD-10-CM

## 2019-05-07 DIAGNOSIS — R22.31 LOCALIZED SWELLING, MASS AND LUMP, RIGHT UPPER LIMB: Primary | ICD-10-CM

## 2019-05-10 ENCOUNTER — HOSPITAL ENCOUNTER (OUTPATIENT)
Dept: RADIOLOGY | Age: 69
Discharge: HOME | End: 2019-05-10
Attending: INTERNAL MEDICINE
Payer: COMMERCIAL

## 2019-05-10 DIAGNOSIS — R22.31 LOCALIZED SWELLING, MASS AND LUMP, RIGHT UPPER LIMB: ICD-10-CM

## 2019-05-10 DIAGNOSIS — S60.459A SUPERFICIAL FOREIGN BODY OF FINGER: ICD-10-CM

## 2019-05-10 RX ORDER — GADOBUTROL 604.72 MG/ML
9 INJECTION INTRAVENOUS ONCE
Status: COMPLETED | OUTPATIENT
Start: 2019-05-10 | End: 2019-05-10

## 2019-05-10 RX ADMIN — GADOBUTROL 9 MMOL: 604.72 INJECTION INTRAVENOUS at 13:58

## 2019-05-17 ENCOUNTER — LAB REQUISITION (OUTPATIENT)
Dept: LAB | Facility: HOSPITAL | Age: 69
End: 2019-05-17
Payer: COMMERCIAL

## 2019-05-17 DIAGNOSIS — M79.5 RESIDUAL FOREIGN BODY IN SOFT TISSUE: ICD-10-CM

## 2019-05-17 PROCEDURE — 87076 CULTURE ANAEROBE IDENT EACH: CPT

## 2019-05-17 PROCEDURE — 88300 SURGICAL PATH GROSS: CPT

## 2019-05-20 ENCOUNTER — LAB REQUISITION (OUTPATIENT)
Dept: LAB | Facility: HOSPITAL | Age: 69
End: 2019-05-20
Payer: COMMERCIAL

## 2019-05-20 DIAGNOSIS — M79.5 RESIDUAL FOREIGN BODY IN SOFT TISSUE: ICD-10-CM

## 2019-05-20 LAB
CASE RPRT: NORMAL
CLINICAL INFO: NORMAL
PATH REPORT.FINAL DX SPEC: NORMAL
PATH REPORT.GROSS SPEC: NORMAL

## 2019-05-22 LAB
GRAM STN SPEC: ABNORMAL
GRAM STN SPEC: ABNORMAL
MICROORGANISM SPEC CULT: ABNORMAL
MICROORGANISM SPEC CULT: ABNORMAL

## 2019-10-11 ENCOUNTER — TRANSCRIBE ORDERS (OUTPATIENT)
Dept: SCHEDULING | Age: 69
End: 2019-10-11

## 2019-10-11 DIAGNOSIS — R94.5 ABNORMAL RESULTS OF LIVER FUNCTION STUDIES: Primary | ICD-10-CM

## 2019-10-16 ENCOUNTER — HOSPITAL ENCOUNTER (OUTPATIENT)
Dept: RADIOLOGY | Facility: HOSPITAL | Age: 69
Discharge: HOME | End: 2019-10-16
Attending: INTERNAL MEDICINE
Payer: COMMERCIAL

## 2019-10-16 DIAGNOSIS — R94.5 ABNORMAL RESULTS OF LIVER FUNCTION STUDIES: ICD-10-CM

## 2019-10-16 PROCEDURE — 76705 ECHO EXAM OF ABDOMEN: CPT

## 2020-09-22 ENCOUNTER — TRANSCRIBE ORDERS (OUTPATIENT)
Dept: SCHEDULING | Age: 70
End: 2020-09-22

## 2020-09-22 DIAGNOSIS — Z12.31 ENCOUNTER FOR SCREENING MAMMOGRAM FOR MALIGNANT NEOPLASM OF BREAST: Primary | ICD-10-CM

## 2020-09-22 DIAGNOSIS — Z78.0 ASYMPTOMATIC MENOPAUSAL STATE: ICD-10-CM

## 2020-09-23 ENCOUNTER — HOSPITAL ENCOUNTER (OUTPATIENT)
Dept: RADIOLOGY | Facility: CLINIC | Age: 70
Discharge: HOME | End: 2020-09-23
Attending: INTERNAL MEDICINE
Payer: COMMERCIAL

## 2020-09-23 DIAGNOSIS — Z12.31 ENCOUNTER FOR SCREENING MAMMOGRAM FOR MALIGNANT NEOPLASM OF BREAST: ICD-10-CM

## 2020-09-23 DIAGNOSIS — Z78.0 ASYMPTOMATIC MENOPAUSAL STATE: ICD-10-CM

## 2020-09-23 PROCEDURE — 77080 DXA BONE DENSITY AXIAL: CPT

## 2020-09-23 PROCEDURE — 77067 SCR MAMMO BI INCL CAD: CPT

## 2020-10-23 ENCOUNTER — OFFICE VISIT (OUTPATIENT)
Dept: CARDIOLOGY | Facility: CLINIC | Age: 70
End: 2020-10-23
Payer: COMMERCIAL

## 2020-10-23 VITALS
HEART RATE: 75 BPM | DIASTOLIC BLOOD PRESSURE: 82 MMHG | HEIGHT: 63 IN | OXYGEN SATURATION: 99 % | WEIGHT: 210 LBS | BODY MASS INDEX: 37.21 KG/M2 | SYSTOLIC BLOOD PRESSURE: 159 MMHG

## 2020-10-23 DIAGNOSIS — E66.01 CLASS 2 SEVERE OBESITY DUE TO EXCESS CALORIES WITH SERIOUS COMORBIDITY AND BODY MASS INDEX (BMI) OF 36.0 TO 36.9 IN ADULT (CMS/HCC): ICD-10-CM

## 2020-10-23 DIAGNOSIS — Z01.810 PREOPERATIVE CARDIOVASCULAR EXAMINATION: ICD-10-CM

## 2020-10-23 DIAGNOSIS — E66.812 CLASS 2 SEVERE OBESITY DUE TO EXCESS CALORIES WITH SERIOUS COMORBIDITY AND BODY MASS INDEX (BMI) OF 36.0 TO 36.9 IN ADULT (CMS/HCC): ICD-10-CM

## 2020-10-23 DIAGNOSIS — Z82.49 FAMILY HISTORY OF EARLY CAD: ICD-10-CM

## 2020-10-23 DIAGNOSIS — R94.31 ABNORMAL ECG: ICD-10-CM

## 2020-10-23 DIAGNOSIS — I10 ESSENTIAL HYPERTENSION: ICD-10-CM

## 2020-10-23 DIAGNOSIS — Z01.810 PRE-OPERATIVE CARDIOVASCULAR EXAMINATION: Primary | ICD-10-CM

## 2020-10-23 DIAGNOSIS — E78.2 MIXED HYPERLIPIDEMIA: ICD-10-CM

## 2020-10-23 DIAGNOSIS — R93.1 AGATSTON CAC SCORE 100-199: ICD-10-CM

## 2020-10-23 PROCEDURE — 93000 ELECTROCARDIOGRAM COMPLETE: CPT | Performed by: NURSE PRACTITIONER

## 2020-10-23 PROCEDURE — 99214 OFFICE O/P EST MOD 30 MIN: CPT | Performed by: INTERNAL MEDICINE

## 2020-10-23 RX ORDER — GABAPENTIN 300 MG/1
300 CAPSULE ORAL 3 TIMES DAILY
COMMUNITY

## 2020-10-23 ASSESSMENT — ENCOUNTER SYMPTOMS
HOARSE VOICE: 0
ORTHOPNEA: 0
DIAPHORESIS: 0
DYSPNEA ON EXERTION: 0
FEVER: 0
INSOMNIA: 0
HEMOPTYSIS: 0
NERVOUS/ANXIOUS: 0
NAIL CHANGES: 0
LIGHT-HEADEDNESS: 0
MYALGIAS: 0
SYNCOPE: 0
HEMATEMESIS: 0
WEAKNESS: 0
HEMATURIA: 0
WHEEZING: 0
COLOR CHANGE: 0
IRREGULAR HEARTBEAT: 0
BLURRED VISION: 0
PND: 0
JAUNDICE: 0
ALTERED MENTAL STATUS: 0
DYSURIA: 0
NEAR-SYNCOPE: 0
PALPITATIONS: 0
HEMATOCHEZIA: 0
VERTIGO: 0
SHORTNESS OF BREATH: 0
ABDOMINAL PAIN: 0
SPUTUM PRODUCTION: 0

## 2020-10-23 NOTE — PROGRESS NOTES
CARDIOLOGY OUTPATIENT PROGRESS NOTE    Reyna Castillo is a 70 y.o. female  who presents with ongoing cardiac care and pre-operative cardiac exam.    Summary: CAD based on a coronary calcium score 101, early family hx of CAD, HTN, diverticulitis & various orthopedic problems.    Interval hx: here for pre-operative exam today for left total knee replacement with Dr. Rodriguez in a few weeks at [facility].  She has been feeling generally well with no complaints of chest pain/angina, shortness of breath, orthopnea, palpitations, dizziness or syncope.  She walks often however no formal exercise given her orthpedic problems.  She has been working on diet and weight loss and plans to continue with heart healthy lifestyle changes once surgery complete.       Today on exam blood pressure is elevated.  She tells me this often happens with Dr. Chacon however usually controlled at home with SBP in 130s.  She had home cuff checked for accuracy before.  EKG demonstrates NSR with T wave inversion in septal leads which is unchanged from previous EKG, T wave inversion no longer present in anterior lateral leads when compared with EKG from 2018.    The following have been reviewed and updated as appropriate in this visit:  Tobacco  Allergies  Meds  Problems  Med Hx  Surg Hx  Fam Hx  Soc Hx      Past Medical History:   Diagnosis Date   • Abnormal ECG 10/9/2018    Nonspecific T wave flattening inferiorly minimal ST depression laterally and anterolateral T wave inversion   • Agatston CAC score 100-199 10/9/2018    8/17 coronary calcium score 101 LAD 13, circumflex 68, RCA 20   • Class 2 severe obesity due to excess calories with serious comorbidity and body mass index (BMI) of 36.0 to 36.9 in adult (CMS/[facility]) 10/9/2018    2017 weight 232 pounds   • DJD (degenerative joint disease)    • Family history of early CAD 10/9/2018    Father  of MI at age 59   • Hypertension    • Tran-Juan tear    • Mixed  hyperlipidemia 10/9/2018    2020 Lipid panel  LDL 91 HDL 47 Trigs 176       :   Past Surgical History:   Procedure Laterality Date   • CHOLECYSTECTOMY     • COLONOSCOPY     • KNEE ARTHROSCOPY     • TRANSUMBILICAL AUGMENTATION MAMMAPLASTY     • UPPER GASTROINTESTINAL ENDOSCOPY     • WISDOM TOOTH EXTRACTION         ALLERGIES   Nsaids (non-steroidal anti-inflammatory drug), Adhesive tape-silicones, and Latex    Current Outpatient Medications   Medication Sig Dispense Refill   • fenofibrate micronized (LOFIBRA) 67 mg capsule Take 67 mg by mouth daily before breakfast.     • gabapentin (NEURONTIN) 100 mg capsule Take 100 mg by mouth 3 (three) times a day.     • irbesartan (AVAPRO) 150 mg tablet Take 150 mg by mouth every morning.       • multivitamin capsule Take 1 capsule by mouth daily.     • rosuvastatin (CRESTOR) 20 mg tablet Take 20 mg by mouth daily.     • sertraline (ZOLOFT) 50 mg tablet Take 50 mg by mouth every morning.         No current facility-administered medications for this visit.           Social History     Socioeconomic History   • Marital status:      Spouse name: None   • Number of children: None   • Years of education: None   • Highest education level: None   Occupational History   • None   Social Needs   • Financial resource strain: None   • Food insecurity     Worry: None     Inability: None   • Transportation needs     Medical: None     Non-medical: None   Tobacco Use   • Smoking status: Former Smoker     Quit date: 2000     Years since quittin.8   • Smokeless tobacco: Never Used   • Tobacco comment: quit 18 years ago   Substance and Sexual Activity   • Alcohol use: Yes     Comment: occasional   • Drug use: No   • Sexual activity: Defer   Lifestyle   • Physical activity     Days per week: None     Minutes per session: None   • Stress: None   Relationships   • Social connections     Talks on phone: None     Gets together: None     Attends [employer] service: None     Active member of  "club or organization: None     Attends meetings of clubs or organizations: None     Relationship status: None   • Intimate partner violence     Fear of current or ex partner: None     Emotionally abused: None     Physically abused: None     Forced sexual activity: None   Other Topics Concern   • None   Social History Narrative   • None          Family History   Problem Relation Age of Onset   • Heart attack Biological Father        Review of Systems   Constitution: Negative for diaphoresis, fever and malaise/fatigue.   HENT: Negative for hoarse voice and nosebleeds.    Eyes: Negative for blurred vision and visual disturbance.   Cardiovascular: Negative for chest pain, cyanosis, dyspnea on exertion, irregular heartbeat, leg swelling, near-syncope, orthopnea, palpitations, paroxysmal nocturnal dyspnea and syncope.   Respiratory: Negative for hemoptysis, shortness of breath, sputum production and wheezing.    Endocrine: Negative for cold intolerance and heat intolerance.   Hematologic/Lymphatic: Negative for bleeding problem.   Skin: Negative for color change and nail changes.   Musculoskeletal: Negative for muscle weakness and myalgias.   Gastrointestinal: Negative for abdominal pain, hematemesis, hematochezia and jaundice.   Genitourinary: Negative for dysuria and hematuria.   Neurological: Negative for light-headedness, vertigo and weakness.   Psychiatric/Behavioral: Negative for altered mental status. The patient does not have insomnia and is not nervous/anxious.        Objective     Visit Vitals  BP (!) 159/82 (BP Location: Right upper arm, Patient Position: Sitting)   Pulse 75   Ht 1.6 m (5' 3\")   Wt 95.3 kg (210 lb)   SpO2 99%   BMI 37.20 kg/m²       Wt Readings from Last 3 Encounters:   10/23/20 95.3 kg (210 lb)   05/10/19 92.5 kg (204 lb)   10/10/18 92.5 kg (204 lb)       Physical Exam   Constitutional: She is oriented to person, place, and time. She appears well-developed and well-nourished. No distress. "   HENT:   Head: Normocephalic.   Neck: Normal range of motion.   Cardiovascular: Normal rate, regular rhythm and normal heart sounds.   Pulmonary/Chest: Breath sounds normal. No respiratory distress. She has no wheezes. She exhibits no tenderness.   Abdominal: Soft. She exhibits no distension. There is no abdominal tenderness.   Musculoskeletal: Normal range of motion.   Neurological: She is alert and oriented to person, place, and time.   Skin: Skin is warm and dry. She is not diaphoretic.   Psychiatric: She has a normal mood and affect.        LABS  Lab Results   Component Value Date    WBC 7.08 10/05/2018    HGB 12.5 10/05/2018    HCT 38.2 10/05/2018     (H) 10/05/2018    CHOL 127 07/27/2018    TRIG 119 07/27/2018    LDLCALC 66 07/27/2018    HDL 37 (L) 07/27/2018    ALT 20 10/05/2018    AST 24 10/05/2018     10/05/2018    K 4.6 10/05/2018     10/05/2018    CREATININE 0.7 10/05/2018    BUN 21 (H) 10/05/2018    CO2 27 10/05/2018    TSH 1.96 07/27/2018    INR 1.6 08/23/2018    GLUCOSE 111 (H) 10/05/2018    HGBA1C 5.4 07/27/2018       IMAGING/PROCEDURES  N/A       ECG EKG: 10/23/20      Problem List Items Addressed This Visit        Circulatory    Agatston CAC score 100-199    Overview     8/17 coronary calcium score 101 LAD 13, circumflex 68, RCA 20         Essential hypertension    Overview     Overview:          Current Assessment & Plan         10/23/2020 1057 10/23/2020 1135   /88 (A) 159/82 (A)      She tells me this often happens with Dr. Visits however usually controlled at home with SBP in 130s.  She had home cuff checked for accuracy before.  Reviewed continued home checks and to call if SBP consistently above 130s and or diastolic above 80s.      Reviewed sodium restriction, weight loss, maintaining ideal BMI and regular exercise program as physiologic means to help achieve blood pressure control.             Endocrine/Metabolic    Mixed hyperlipidemia    Overview     2020 Lipid  panel   LDL 91  HDL 47  Trigs 176         Current Assessment & Plan     Reviewed optimal LDL level of less than 70 and optimal triglyceride level of less than 150.  Reviewed aggressive diet and weight loss with Mediterranean type diet.      Will increase rosuvastatin to 30mg daily.  This can be re-evaluated next visit.         Class 2 severe obesity due to excess calories with serious comorbidity and body mass index (BMI) of 36.0 to 36.9 in adult (CMS/[facility])    Overview     8/2017 weight 232 pounds         Current Assessment & Plan     BMI 37.  The patient was counseled about potential long-term cardiovascular consequences of obesity.  Encouraged to increase physical activity, including low impact exercise if necessary, follow a heart healthy diet and consider participating in a structured weight loss program such as weight watchers            Other    Preoperative cardiovascular examination    Current Assessment & Plan     Pre-operative exam today for left total knee replacement with Dr. Rodriguez in a few weeks at [facility].     She has no hx of MI, HF, CVA, kidney disease or PAD.  She denies chest pain or other exertional symptoms.   Her functional capacity is excellent despite orthopedic issues as she can walk long distances without symptoms.  EKG demonstrates NSR with T wave inversion in septal leads which is unchanged from previous EKG, T wave inversion no longer present in anterior lateral leads when compared with EKG from 2018.  No ischemia noted.  This is can be normal variant especially for women and not contraindication to surgery.      She has essentially no high risk markers for grazyna-operative cardiac complications therefore we see no further contraindications to proceeding with the planned procedure.  Reviewed with patient that with any surgical procedure regardless of health status or history there is the potential for adverse cardiac events which cannot be planned or prevented.  She  verbalizes understanding.            Family history of early CAD    Overview     Father  of MI at age 59         Current Assessment & Plan     Reviewed optimal therapy and lifestyle changes as well as first degree relative screening         Abnormal ECG    Overview     Nonspecific T wave flattening inferiorly minimal ST depression laterally and anterolateral T wave inversion         Current Assessment & Plan     EKG today demonstrates NSR with T wave inversion in septal leads which is unchanged from previous EKG, T wave inversion no longer present in anterior lateral leads when compared with EKG from 2018.  This can be normal variant especially for women, no acute ischemia noted and not a contraindication to surgery.            Other Visit Diagnoses     Pre-operative cardiovascular examination    -  Primary    Relevant Orders    ECG 12 lead (Completed)          Alanis VILLARREAL am scribing for, and in the presence of Bruce Casey DO.  IBruce DO, personally performed the services described in this documentation as scribed by Alanis Reddy in my presence, and it is both accurate and complete.    This patient note has been dictated using speech recognition software. Inadvertent speech recognition errors should be disregarded. Please do not hesitate to call my office for clarifications.    TATE Hernandez  10/23/2020 11:50 AM

## 2020-10-23 NOTE — ASSESSMENT & PLAN NOTE
BMI 37.  The patient was counseled about potential long-term cardiovascular consequences of obesity.  Encouraged to increase physical activity, including low impact exercise if necessary, follow a heart healthy diet and consider participating in a structured weight loss program such as weight watchers

## 2020-10-23 NOTE — LETTER
October 23, 2020                                                                                                                                                                                                                                                                                                               Lubna Kerr MD  [address]  [address]  [facility] [address]    Patient: Reyna Castillo  YOB: 1950  Date of Visit: 10/23/2020    Dear Dr. Kerr:    Thank you for referring Reyna Castillo to me for evaluation. Below are the relevant portions of my assessment and plan of care.    If you have questions, please do not hesitate to call me. I look forward to following Reyna along with you.         Sincerely,        Bruce Casey, DO        CC: No Recipients        Assessment and Plan:  Problem List Items Addressed This Visit        Unprioritized    Preoperative cardiovascular examination     Pre-operative exam today for left total knee replacement with Dr. Rodriguez in a few weeks at [facility].     She has no hx of MI, HF, CVA, kidney disease or PAD.  She denies chest pain or other exertional symptoms.   Her functional capacity is excellent despite orthopedic issues as she can walk long distances without symptoms.  EKG demonstrates NSR with T wave inversion in septal leads which is unchanged from previous EKG, T wave inversion no longer present in anterior lateral leads when compared with EKG from 2018.  No ischemia noted.  This is can be normal variant especially for women and not contraindication to surgery.      She has essentially no high risk markers for grazyna-operative cardiac complications therefore we see no further contraindications to proceeding with the planned procedure.  Reviewed with patient that with any surgical procedure regardless of health status or history there is the potential for adverse cardiac events which cannot be planned or  prevented.  She verbalizes understanding.            Agatston CAC score 100-199    Mixed hyperlipidemia     Reviewed optimal LDL level of less than 70 and optimal triglyceride level of less than 150.  Reviewed aggressive diet and weight loss with Mediterranean type diet.      Will increase rosuvastatin to 30mg daily.  This can be re-evaluated next visit.         Family history of early CAD     Reviewed optimal therapy and lifestyle changes as well as first degree relative screening         Essential hypertension         10/23/2020 1057 10/23/2020 1135   /88 (A) 159/82 (A)      She tells me this often happens with Dr. Visits however usually controlled at home with SBP in 130s.  She had home cuff checked for accuracy before.  Reviewed continued home checks and to call if SBP consistently above 130s and or diastolic above 80s.      Reviewed sodium restriction, weight loss, maintaining ideal BMI and regular exercise program as physiologic means to help achieve blood pressure control.          Class 2 severe obesity due to excess calories with serious comorbidity and body mass index (BMI) of 36.0 to 36.9 in adult (CMS/[facility], [facility])     BMI 37.  The patient was counseled about potential long-term cardiovascular consequences of obesity.  Encouraged to increase physical activity, including low impact exercise if necessary, follow a heart healthy diet and consider participating in a structured weight loss program such as weight watchers         Abnormal ECG     EKG today demonstrates NSR with T wave inversion in septal leads which is unchanged from previous EKG, T wave inversion no longer present in anterior lateral leads when compared with EKG from 2018.  This can be normal variant especially for women, no acute ischemia noted and not a contraindication to surgery.            Other Visit Diagnoses     Pre-operative cardiovascular examination    -  Primary    Relevant Orders    ECG 12 lead (Completed)

## 2020-10-23 NOTE — ASSESSMENT & PLAN NOTE
EKG today demonstrates NSR with T wave inversion in septal leads which is unchanged from previous EKG, T wave inversion no longer present in anterior lateral leads when compared with EKG from 2018.  This can be normal variant especially for women, no acute ischemia noted and not a contraindication to surgery.

## 2020-10-23 NOTE — ASSESSMENT & PLAN NOTE
10/23/2020 1057 10/23/2020 1135   /88 (A) 159/82 (A)      She tells me this often happens with Dr. Visits however usually controlled at home with SBP in 130s.  She had home cuff checked for accuracy before.  Reviewed continued home checks and to call if SBP consistently above 130s and or diastolic above 80s.      Reviewed sodium restriction, weight loss, maintaining ideal BMI and regular exercise program as physiologic means to help achieve blood pressure control.

## 2020-10-23 NOTE — LETTER
October 23, 2020     Casey Wang MD  [address]  [place]  [facility] [address]    Patient: Reyna Castillo  YOB: 1950  Date of Visit: 10/23/2020      Dear Dr. aWng:    Thank you for referring Reyna Castillo to me for evaluation. Below are my notes for this consultation.    If you have questions, please do not hesitate to call me. I look forward to following your patient along with you.         Sincerely,        Bruce Casey, DO        CC: No Recipients  Alanis Reddy CRNP  10/23/2020 12:18 PM  Sign when Signing Visit  CARDIOLOGY OUTPATIENT PROGRESS NOTE    Reyna Castillo is a 70 y.o. female  who presents with ongoing cardiac care and pre-operative cardiac exam.    Summary: CAD based on a coronary calcium score 101, early family hx of CAD, HTN, diverticulitis & various orthopedic problems.    Interval hx: here for pre-operative exam today for left total knee replacement with Dr. Rodriguez in a few weeks at [facility].  She has been feeling generally well with no complaints of chest pain/angina, shortness of breath, orthopnea, palpitations, dizziness or syncope.  She walks often however no formal exercise given her orthpedic problems.  She has been working on diet and weight loss and plans to continue with heart healthy lifestyle changes once surgery complete.       Today on exam blood pressure is elevated.  She tells me this often happens with Dr. Chacon however usually controlled at home with SBP in 130s.  She had home cuff checked for accuracy before.  EKG demonstrates NSR with T wave inversion in septal leads which is unchanged from previous EKG, T wave inversion no longer present in anterior lateral leads when compared with EKG from 2018.    The following have been reviewed and updated as appropriate in this visit:  Tobacco  Allergies  Meds  Problems  Med Hx  Surg Hx  Fam Hx  Soc Hx      Past Medical History:   Diagnosis Date   •  Abnormal ECG 10/9/2018    Nonspecific T wave flattening inferiorly minimal ST depression laterally and anterolateral T wave inversion   • Agatston CAC score 100-199 10/9/2018    8/17 coronary calcium score 101 LAD 13, circumflex 68, RCA 20   • Class 2 severe obesity due to excess calories with serious comorbidity and body mass index (BMI) of 36.0 to 36.9 in adult (CMS/[facility]) 10/9/2018    2017 weight 232 pounds   • DJD (degenerative joint disease)    • Family history of early CAD 10/9/2018    Father  of MI at age 59   • Hypertension    • Tran-Juan tear    • Mixed hyperlipidemia 10/9/2018    2020 Lipid panel  LDL 91 HDL 47 Trigs 176       :   Past Surgical History:   Procedure Laterality Date   • CHOLECYSTECTOMY     • COLONOSCOPY     • KNEE ARTHROSCOPY     • TRANSUMBILICAL AUGMENTATION MAMMAPLASTY     • UPPER GASTROINTESTINAL ENDOSCOPY     • WISDOM TOOTH EXTRACTION         ALLERGIES   Nsaids (non-steroidal anti-inflammatory drug), Adhesive tape-silicones, and Latex    Current Outpatient Medications   Medication Sig Dispense Refill   • fenofibrate micronized (LOFIBRA) 67 mg capsule Take 67 mg by mouth daily before breakfast.     • gabapentin (NEURONTIN) 100 mg capsule Take 100 mg by mouth 3 (three) times a day.     • irbesartan (AVAPRO) 150 mg tablet Take 150 mg by mouth every morning.       • multivitamin capsule Take 1 capsule by mouth daily.     • rosuvastatin (CRESTOR) 20 mg tablet Take 20 mg by mouth daily.     • sertraline (ZOLOFT) 50 mg tablet Take 50 mg by mouth every morning.         No current facility-administered medications for this visit.           Social History     Socioeconomic History   • Marital status:      Spouse name: None   • Number of children: None   • Years of education: None   • Highest education level: None   Occupational History   • None   Social Needs   • Financial resource strain: None   • Food insecurity     Worry: None     Inability: None   • Transportation needs      Medical: None     Non-medical: None   Tobacco Use   • Smoking status: Former Smoker     Quit date: 2000     Years since quittin.8   • Smokeless tobacco: Never Used   • Tobacco comment: quit 18 years ago   Substance and Sexual Activity   • Alcohol use: Yes     Comment: occasional   • Drug use: No   • Sexual activity: Defer   Lifestyle   • Physical activity     Days per week: None     Minutes per session: None   • Stress: None   Relationships   • Social connections     Talks on phone: None     Gets together: None     Attends Yazidism service: None     Active member of club or organization: None     Attends meetings of clubs or organizations: None     Relationship status: None   • Intimate partner violence     Fear of current or ex partner: None     Emotionally abused: None     Physically abused: None     Forced sexual activity: None   Other Topics Concern   • None   Social History Narrative   • None          Family History   Problem Relation Age of Onset   • Heart attack Biological Father        Review of Systems   Constitution: Negative for diaphoresis, fever and malaise/fatigue.   HENT: Negative for hoarse voice and nosebleeds.    Eyes: Negative for blurred vision and visual disturbance.   Cardiovascular: Negative for chest pain, cyanosis, dyspnea on exertion, irregular heartbeat, leg swelling, near-syncope, orthopnea, palpitations, paroxysmal nocturnal dyspnea and syncope.   Respiratory: Negative for hemoptysis, shortness of breath, sputum production and wheezing.    Endocrine: Negative for cold intolerance and heat intolerance.   Hematologic/Lymphatic: Negative for bleeding problem.   Skin: Negative for color change and nail changes.   Musculoskeletal: Negative for muscle weakness and myalgias.   Gastrointestinal: Negative for abdominal pain, hematemesis, hematochezia and jaundice.   Genitourinary: Negative for dysuria and hematuria.   Neurological: Negative for light-headedness, vertigo and weakness.  "  Psychiatric/Behavioral: Negative for altered mental status. The patient does not have insomnia and is not nervous/anxious.        Objective     Visit Vitals  BP (!) 159/82 (BP Location: Right upper arm, Patient Position: Sitting)   Pulse 75   Ht 1.6 m (5' 3\")   Wt 95.3 kg (210 lb)   SpO2 99%   BMI 37.20 kg/m²       Wt Readings from Last 3 Encounters:   10/23/20 95.3 kg (210 lb)   05/10/19 92.5 kg (204 lb)   10/10/18 92.5 kg (204 lb)       Physical Exam   Constitutional: She is oriented to person, place, and time. She appears well-developed and well-nourished. No distress.   HENT:   Head: Normocephalic.   Neck: Normal range of motion.   Cardiovascular: Normal rate, regular rhythm and normal heart sounds.   Pulmonary/Chest: Breath sounds normal. No respiratory distress. She has no wheezes. She exhibits no tenderness.   Abdominal: Soft. She exhibits no distension. There is no abdominal tenderness.   Musculoskeletal: Normal range of motion.   Neurological: She is alert and oriented to person, place, and time.   Skin: Skin is warm and dry. She is not diaphoretic.   Psychiatric: She has a normal mood and affect.        LABS  Lab Results   Component Value Date    WBC 7.08 10/05/2018    HGB 12.5 10/05/2018    HCT 38.2 10/05/2018     (H) 10/05/2018    CHOL 127 07/27/2018    TRIG 119 07/27/2018    LDLCALC 66 07/27/2018    HDL 37 (L) 07/27/2018    ALT 20 10/05/2018    AST 24 10/05/2018     10/05/2018    K 4.6 10/05/2018     10/05/2018    CREATININE 0.7 10/05/2018    BUN 21 (H) 10/05/2018    CO2 27 10/05/2018    TSH 1.96 07/27/2018    INR 1.6 08/23/2018    GLUCOSE 111 (H) 10/05/2018    HGBA1C 5.4 07/27/2018       IMAGING/PROCEDURES  N/A       ECG EKG: 10/23/20      Problem List Items Addressed This Visit        Circulatory    Agatston CAC score 100-199    Overview     8/17 coronary calcium score 101 LAD 13, circumflex 68, RCA 20         Essential hypertension    Overview     Overview:          Current " Assessment & Plan         10/23/2020 1057 10/23/2020 1135   /88 (A) 159/82 (A)      She tells me this often happens with Dr. Visits however usually controlled at home with SBP in 130s.  She had home cuff checked for accuracy before.  Reviewed continued home checks and to call if SBP consistently above 130s and or diastolic above 80s.      Reviewed sodium restriction, weight loss, maintaining ideal BMI and regular exercise program as physiologic means to help achieve blood pressure control.             Endocrine/Metabolic    Mixed hyperlipidemia    Overview     2020 Lipid panel   LDL 91  HDL 47  Trigs 176         Current Assessment & Plan     Reviewed optimal LDL level of less than 70 and optimal triglyceride level of less than 150.  Reviewed aggressive diet and weight loss with Mediterranean type diet.      Will increase rosuvastatin to 30mg daily.  This can be re-evaluated next visit.         Class 2 severe obesity due to excess calories with serious comorbidity and body mass index (BMI) of 36.0 to 36.9 in adult (CMS/[facility])    Overview     8/2017 weight 232 pounds         Current Assessment & Plan     BMI 37.  The patient was counseled about potential long-term cardiovascular consequences of obesity.  Encouraged to increase physical activity, including low impact exercise if necessary, follow a heart healthy diet and consider participating in a structured weight loss program such as weight watchers            Other    Preoperative cardiovascular examination    Current Assessment & Plan     Pre-operative exam today for left total knee replacement with Dr. Rodriguez in a few weeks at [facility].     She has no hx of MI, HF, CVA, kidney disease or PAD.  She denies chest pain or other exertional symptoms.   Her functional capacity is excellent despite orthopedic issues as she can walk long distances without symptoms.  EKG demonstrates NSR with T wave inversion in septal leads which is unchanged from  previous EKG, T wave inversion no longer present in anterior lateral leads when compared with EKG from 2018.  No ischemia noted.  This is can be normal variant especially for women and not contraindication to surgery.      She has essentially no high risk markers for grazyna-operative cardiac complications therefore we see no further contraindications to proceeding with the planned procedure.  Reviewed with patient that with any surgical procedure regardless of health status or history there is the potential for adverse cardiac events which cannot be planned or prevented.  She verbalizes understanding.            Family history of early CAD    Overview     Father  of MI at age 59         Current Assessment & Plan     Reviewed optimal therapy and lifestyle changes as well as first degree relative screening         Abnormal ECG    Overview     Nonspecific T wave flattening inferiorly minimal ST depression laterally and anterolateral T wave inversion         Current Assessment & Plan     EKG today demonstrates NSR with T wave inversion in septal leads which is unchanged from previous EKG, T wave inversion no longer present in anterior lateral leads when compared with EKG from 2018.  This can be normal variant especially for women, no acute ischemia noted and not a contraindication to surgery.            Other Visit Diagnoses     Pre-operative cardiovascular examination    -  Primary    Relevant Orders    ECG 12 lead (Completed)          IAlanis am scribing for, and in the presence of Bruce Casey DO.      This patient note has been dictated using speech recognition software. Inadvertent speech recognition errors should be disregarded. Please do not hesitate to call my office for clarifications.    TATE Hernandez  10/23/2020 11:50 AM

## 2020-10-23 NOTE — ASSESSMENT & PLAN NOTE
Reviewed optimal LDL level of less than 70 and optimal triglyceride level of less than 150.  Reviewed aggressive diet and weight loss with Mediterranean type diet.      Will increase rosuvastatin to 30mg daily.  This can be re-evaluated next visit.

## 2020-10-23 NOTE — ASSESSMENT & PLAN NOTE
Pre-operative exam today for left total knee replacement with Dr. Rodriguez in a few weeks at [facility].     She has no hx of MI, HF, CVA, kidney disease or PAD.  She denies chest pain or other exertional symptoms.   Her functional capacity is excellent despite orthopedic issues as she can walk long distances without symptoms.  EKG demonstrates NSR with T wave inversion in septal leads which is unchanged from previous EKG, T wave inversion no longer present in anterior lateral leads when compared with EKG from 2018.  No ischemia noted.  This is can be normal variant especially for women and not contraindication to surgery.      She has essentially no high risk markers for grazyna-operative cardiac complications therefore we see no further contraindications to proceeding with the planned procedure.  Reviewed with patient that with any surgical procedure regardless of health status or history there is the potential for adverse cardiac events which cannot be planned or prevented.  She verbalizes understanding.

## 2021-04-08 ENCOUNTER — TRANSCRIBE ORDERS (OUTPATIENT)
Dept: CARDIOLOGY | Facility: CLINIC | Age: 71
End: 2021-04-08
Payer: COMMERCIAL

## 2021-04-08 DIAGNOSIS — Z01.818 PRE-OP TESTING: Primary | ICD-10-CM

## 2021-04-14 DIAGNOSIS — Z23 ENCOUNTER FOR IMMUNIZATION: ICD-10-CM

## 2021-04-21 ENCOUNTER — OFFICE VISIT (OUTPATIENT)
Dept: CARDIOLOGY | Facility: CLINIC | Age: 71
End: 2021-04-21
Payer: COMMERCIAL

## 2021-04-21 VITALS
HEART RATE: 64 BPM | WEIGHT: 213 LBS | OXYGEN SATURATION: 98 % | BODY MASS INDEX: 37.73 KG/M2 | DIASTOLIC BLOOD PRESSURE: 85 MMHG | SYSTOLIC BLOOD PRESSURE: 190 MMHG

## 2021-04-21 DIAGNOSIS — Z01.810 PREOPERATIVE CARDIOVASCULAR EXAMINATION: Primary | ICD-10-CM

## 2021-04-21 DIAGNOSIS — I10 ESSENTIAL HYPERTENSION: ICD-10-CM

## 2021-04-21 DIAGNOSIS — E66.01 CLASS 2 SEVERE OBESITY DUE TO EXCESS CALORIES WITH SERIOUS COMORBIDITY AND BODY MASS INDEX (BMI) OF 36.0 TO 36.9 IN ADULT (CMS/HCC): ICD-10-CM

## 2021-04-21 DIAGNOSIS — Z82.49 FAMILY HISTORY OF EARLY CAD: ICD-10-CM

## 2021-04-21 DIAGNOSIS — R94.31 ABNORMAL ECG: ICD-10-CM

## 2021-04-21 DIAGNOSIS — E66.812 CLASS 2 SEVERE OBESITY DUE TO EXCESS CALORIES WITH SERIOUS COMORBIDITY AND BODY MASS INDEX (BMI) OF 36.0 TO 36.9 IN ADULT (CMS/HCC): ICD-10-CM

## 2021-04-21 DIAGNOSIS — E78.2 MIXED HYPERLIPIDEMIA: ICD-10-CM

## 2021-04-21 DIAGNOSIS — R93.1 AGATSTON CAC SCORE 100-199: ICD-10-CM

## 2021-04-21 DIAGNOSIS — K57.20 DIVERTICULITIS OF LARGE INTESTINE WITH ABSCESS WITHOUT BLEEDING: ICD-10-CM

## 2021-04-21 PROCEDURE — 3008F BODY MASS INDEX DOCD: CPT | Performed by: INTERNAL MEDICINE

## 2021-04-21 PROCEDURE — 93000 ELECTROCARDIOGRAM COMPLETE: CPT | Performed by: INTERNAL MEDICINE

## 2021-04-21 PROCEDURE — 99214 OFFICE O/P EST MOD 30 MIN: CPT | Performed by: INTERNAL MEDICINE

## 2021-04-21 RX ORDER — OXYCODONE AND ACETAMINOPHEN 7.5; 325 MG/1; MG/1
1 TABLET ORAL
COMMUNITY
End: 2021-04-23 | Stop reason: ALTCHOICE

## 2021-04-21 RX ORDER — FENOFIBRATE 48 MG/1
48 TABLET, FILM COATED ORAL DAILY
COMMUNITY

## 2021-04-21 ASSESSMENT — ENCOUNTER SYMPTOMS
BLURRED VISION: 0
WEAKNESS: 0
PALPITATIONS: 0
HEMATEMESIS: 0
DIAPHORESIS: 0
PND: 0
NEAR-SYNCOPE: 0
WHEEZING: 0
SHORTNESS OF BREATH: 0
NAIL CHANGES: 0
DYSPNEA ON EXERTION: 0
HEMOPTYSIS: 0
MYALGIAS: 0
INSOMNIA: 0
HEMATURIA: 0
SYNCOPE: 0
NERVOUS/ANXIOUS: 0
SPUTUM PRODUCTION: 0
DYSURIA: 0
HOARSE VOICE: 0
IRREGULAR HEARTBEAT: 0
FEVER: 0
JAUNDICE: 0
HEMATOCHEZIA: 0
ABDOMINAL PAIN: 0
VERTIGO: 0
COLOR CHANGE: 0
LIGHT-HEADEDNESS: 0
ORTHOPNEA: 0
ALTERED MENTAL STATUS: 0

## 2021-04-21 NOTE — PROGRESS NOTES
CARDIOLOGY OUTPATIENT PROGRESS NOTE    Reyna Castillo is a 70 y.o. female  who presents with ongoing cardiac care and pre-operative cardiac exam.     Summary: CAD based on a coronary calcium score 101, early family hx of CAD, HTN, diverticulitis & various orthopedic problems.    Interval hx: she has been feeling generally well with no complaints of chest pain/angina, shortness of breath, orthopnea, palpitations, dizziness or syncope.  She walks about 1.5 miles 3x per week with no symptoms or breaks.  Tries to maintain a healthy diet however admits to room for improvement.  She will have upcoming lab work with PCP in next few weeks.    Today on exam her blood pressure is markedly elevated.  This has happened in the past.  She gets very anxious on exam.  She takes home blood pressures with SBP in 135-140 range.  Suggested she return on Friday for additional evaluation and home cuff check.  Will check accuracy of cuff however suspect she will need antihypertensive prior to her surgery.    Will send cardiac clearance for right total knee arthroscopy with Dr. Cowan when she returns for visit on Friday.        The following have been reviewed and updated as appropriate in this visit:  Tobacco  Allergies  Meds  Problems  Med Hx  Surg Hx  Fam Hx  Soc Hx      Past Medical History:   Diagnosis Date   • Abnormal ECG 10/9/2018    Nonspecific T wave flattening inferiorly minimal ST depression laterally and anterolateral T wave inversion   • Agatston CAC score 100-199 10/9/2018    8/17 coronary calcium score 101 LAD 13, circumflex 68, RCA 20   • Class 2 severe obesity due to excess calories with serious comorbidity and body mass index (BMI) of 36.0 to 36.9 in adult (CMS/[facility]) 10/9/2018    2017 weight 232 pounds   • DJD (degenerative joint disease)    • Family history of early CAD 10/9/2018    Father  of MI at age 59   • Hypertension    • Tran-Juan tear    • Mixed hyperlipidemia 10/9/2018    2020 Lipid  panel  LDL 91 HDL 47 Trigs 176       :   Past Surgical History:   Procedure Laterality Date   • CHOLECYSTECTOMY     • COLONOSCOPY     • KNEE ARTHROSCOPY     • TRANSUMBILICAL AUGMENTATION MAMMAPLASTY     • UPPER GASTROINTESTINAL ENDOSCOPY     • WISDOM TOOTH EXTRACTION         ALLERGIES   Nsaids (non-steroidal anti-inflammatory drug), Adhesive tape-silicones, and Latex    Current Outpatient Medications   Medication Sig Dispense Refill   • fenofibrate (TRICOR) 48 mg tablet Take 48 mg by mouth.     • fenofibrate micronized (LOFIBRA) 67 mg capsule Take 67 mg by mouth daily before breakfast.     • gabapentin (NEURONTIN) 100 mg capsule Take 100 mg by mouth 3 (three) times a day.     • irbesartan (AVAPRO) 150 mg tablet Take 150 mg by mouth every morning.       • multivitamin capsule Take 1 capsule by mouth daily.     • oxyCODONE-acetaminophen (PERCOCET) 7.5-325 mg per tablet Take 1 tablet by mouth.     • rosuvastatin (CRESTOR) 20 mg tablet Take 20 mg by mouth daily.     • sertraline (ZOLOFT) 50 mg tablet Take 50 mg by mouth every morning.         No current facility-administered medications for this visit.          Social History     Socioeconomic History   • Marital status:      Spouse name: Not on file   • Number of children: Not on file   • Years of education: Not on file   • Highest education level: Not on file   Occupational History   • Not on file   Tobacco Use   • Smoking status: Former Smoker     Quit date:      Years since quittin.3   • Smokeless tobacco: Never Used   • Tobacco comment: quit 18 years ago   Substance and Sexual Activity   • Alcohol use: Yes     Comment: occasional   • Drug use: No   • Sexual activity: Defer   Other Topics Concern   • Not on file   Social History Narrative   • Not on file     Social Determinants of Health     Financial Resource Strain:    • Difficulty of Paying Living Expenses:    Food Insecurity:    • Worried About Running Out of Food in the Last Year:    • Ran Out  of Food in the Last Year:    Transportation Needs:    • Lack of Transportation (Medical):    • Lack of Transportation (Non-Medical):    Physical Activity:    • Days of Exercise per Week:    • Minutes of Exercise per Session:    Stress:    • Feeling of Stress :    Social Connections:    • Frequency of Communication with Friends and Family:    • Frequency of Social Gatherings with Friends and Family:    • Attends [employer] Services:    • Active Member of Clubs or Organizations:    • Attends Club or Organization Meetings:    • Marital Status:    Intimate Partner Violence:    • Fear of Current or Ex-Partner:    • Emotionally Abused:    • Physically Abused:    • Sexually Abused:           Family History   Problem Relation Age of Onset   • Heart attack Biological Father        Review of Systems   Constitutional: Negative for diaphoresis, fever and malaise/fatigue.   HENT: Negative for hoarse voice and nosebleeds.    Eyes: Negative for blurred vision and visual disturbance.   Cardiovascular: Negative for chest pain, cyanosis, dyspnea on exertion, irregular heartbeat, leg swelling, near-syncope, orthopnea, palpitations, paroxysmal nocturnal dyspnea and syncope.   Respiratory: Negative for hemoptysis, shortness of breath, sputum production and wheezing.    Endocrine: Negative for cold intolerance and heat intolerance.   Hematologic/Lymphatic: Negative for bleeding problem.   Skin: Negative for color change and nail changes.   Musculoskeletal: Negative for muscle weakness and myalgias.   Gastrointestinal: Negative for abdominal pain, hematemesis, hematochezia and jaundice.   Genitourinary: Negative for dysuria and hematuria.   Neurological: Negative for light-headedness, vertigo and weakness.   Psychiatric/Behavioral: Negative for altered mental status. The patient does not have insomnia and is not nervous/anxious.        Objective     Visit Vitals  BP (!) 190/85 (BP Location: Right upper arm, Patient Position: Sitting)    Pulse 64   Wt 96.6 kg (213 lb)   SpO2 98%   BMI 37.73 kg/m²       Wt Readings from Last 3 Encounters:   04/21/21 96.6 kg (213 lb)   10/23/20 95.3 kg (210 lb)   05/10/19 92.5 kg (204 lb)       Physical Exam  Constitutional:       General: She is not in acute distress.     Appearance: She is well-developed. She is not diaphoretic.   HENT:      Head: Normocephalic.   Cardiovascular:      Rate and Rhythm: Normal rate and regular rhythm.      Heart sounds: Normal heart sounds.   Pulmonary:      Effort: No respiratory distress.      Breath sounds: Normal breath sounds. No wheezing.   Chest:      Chest wall: No tenderness.   Abdominal:      General: There is no distension.      Palpations: Abdomen is soft.      Tenderness: There is no abdominal tenderness.   Musculoskeletal:         General: Normal range of motion.      Cervical back: Normal range of motion.   Skin:     General: Skin is warm and dry.   Neurological:      Mental Status: She is alert and oriented to person, place, and time.          LABS  Lab Results   Component Value Date    WBC 16.19 (H) 10/30/2020    HGB 9.3 (L) 10/30/2020    HCT 29.7 (L) 10/30/2020     10/30/2020    CHOL 127 07/27/2018    TRIG 119 07/27/2018    LDLCALC 66 07/27/2018    HDL 37 (L) 07/27/2018    ALT 20 10/05/2018    AST 24 10/05/2018     10/30/2020    K 4.0 10/30/2020     10/30/2020    CREATININE 0.9 10/30/2020    BUN 31 (H) 10/30/2020    CO2 22 10/30/2020    TSH 1.96 07/27/2018    INR 1.6 08/23/2018    GLUCOSE 103 (H) 10/30/2020    HGBA1C 5.4 07/27/2018     ECG EKG:  NSR, T wave inversion septal leads, unchanged    Problem List Items Addressed This Visit        Circulatory    Agatston CAC score 100-199    Overview     8/17 coronary calcium score 101 LAD 13, circumflex 68, RCA 20         Current Assessment & Plan     Denies chest pain or other exertional symptoms.  Excellent functional capacity, able to walk 1.5 miles without symptoms or need for break.  For lipid  check soon with PCP.  Goal LDL less than 70.          Relevant Medications    fenofibrate (TRICOR) 48 mg tablet    Essential hypertension    Overview     Overview:          Current Assessment & Plan         2021 1520 2021 1540   /85 (A) 190/85 (A)     Markedly elevated today.  This has happened in the past.  She gets very anxious on exam.  She takes home blood pressures with SBP in 135-140 range.  Suggested she return on Friday for additional evaluation and home cuff check.  Will check accuracy of cuff however suspect she will need antihypertensive prior to her surgery.              Digestive    Diverticulitis of large intestine with abscess without bleeding       Endocrine/Metabolic    Mixed hyperlipidemia    Overview      Lipid panel   LDL 91  HDL 47  Trigs 176         Current Assessment & Plan     Will have lab work done with PCP soon, goal LDL less than 70, if not at goal would suggest increasing rosuvastatin.  Reviewed aggressive diet and weight loss with Mediterranean type diet.          Relevant Medications    fenofibrate (TRICOR) 48 mg tablet    Class 2 severe obesity due to excess calories with serious comorbidity and body mass index (BMI) of 36.0 to 36.9 in adult (CMS/[facility])    Overview     2017 weight 232 pounds         Current Assessment & Plan     The patient was counseled about potential long-term cardiovascular consequences of obesity.  Encouraged to increase physical activity, including low impact exercise if necessary, follow a heart healthy diet and consider participating in a structured weight loss program such as weight watchers            Other    Preoperative cardiovascular examination - Primary    Current Assessment & Plan     No contraindications, will be officially completed when she returns for BP check this Friday         Family history of early CAD    Overview     Father  of MI at age 59         Current Assessment & Plan     Continue optimal guideline directed  therapy.          Abnormal ECG    Overview     Nonspecific T wave flattening inferiorly minimal ST depression laterally and anterolateral T wave inversion         Current Assessment & Plan     T wave inversion septal leads, unchanged, no ischemia.  Not a contraindication to surgery               I, Alanis Reddy, am scribing for, and in the presence of Bruce Casey DO.  I, Bruce Casey DO, personally performed the services described in this documentation as scribed by Alanis Reddy in my presence, and it is both accurate and complete.    This patient note has been dictated using speech recognition software. Inadvertent speech recognition errors should be disregarded. Please do not hesitate to call my office for clarifications.    TATE Hernandez  4/21/2021 2:38 PM

## 2021-04-21 NOTE — ASSESSMENT & PLAN NOTE
Will have lab work done with PCP soon, goal LDL less than 70, if not at goal would suggest increasing rosuvastatin.  Reviewed aggressive diet and weight loss with Mediterranean type diet.

## 2021-04-21 NOTE — ASSESSMENT & PLAN NOTE
The patient was counseled about potential long-term cardiovascular consequences of obesity.  Encouraged to increase physical activity, including low impact exercise if necessary, follow a heart healthy diet and consider participating in a structured weight loss program such as weight watchers

## 2021-04-21 NOTE — ASSESSMENT & PLAN NOTE
4/21/2021 1520 4/21/2021 1540   /85 (A) 190/85 (A)     Markedly elevated today.  This has happened in the past.  She gets very anxious on exam.  She takes home blood pressures with SBP in 135-140 range.  Suggested she return on Friday for additional evaluation and home cuff check.  Will check accuracy of cuff however suspect she will need antihypertensive prior to her surgery.

## 2021-04-21 NOTE — ASSESSMENT & PLAN NOTE
Denies chest pain or other exertional symptoms.  Excellent functional capacity, able to walk 1.5 miles without symptoms or need for break.  For lipid check soon with PCP.  Goal LDL less than 70.

## 2021-04-23 ENCOUNTER — OFFICE VISIT (OUTPATIENT)
Dept: CARDIOLOGY | Facility: CLINIC | Age: 71
End: 2021-04-23
Payer: COMMERCIAL

## 2021-04-23 VITALS
OXYGEN SATURATION: 98 % | BODY MASS INDEX: 37.38 KG/M2 | SYSTOLIC BLOOD PRESSURE: 141 MMHG | WEIGHT: 211 LBS | HEART RATE: 70 BPM | DIASTOLIC BLOOD PRESSURE: 72 MMHG

## 2021-04-23 DIAGNOSIS — I10 ESSENTIAL HYPERTENSION: ICD-10-CM

## 2021-04-23 DIAGNOSIS — Z01.810 PREOPERATIVE CARDIOVASCULAR EXAMINATION: Primary | ICD-10-CM

## 2021-04-23 PROCEDURE — 99213 OFFICE O/P EST LOW 20 MIN: CPT | Performed by: INTERNAL MEDICINE

## 2021-04-23 PROCEDURE — 3008F BODY MASS INDEX DOCD: CPT | Performed by: INTERNAL MEDICINE

## 2021-04-23 ASSESSMENT — ENCOUNTER SYMPTOMS
SPUTUM PRODUCTION: 0
NAIL CHANGES: 0
WHEEZING: 0
HOARSE VOICE: 0
JAUNDICE: 0
DIAPHORESIS: 0
INSOMNIA: 0
IRREGULAR HEARTBEAT: 0
BLURRED VISION: 0
SHORTNESS OF BREATH: 0
LIGHT-HEADEDNESS: 0
HEMATURIA: 0
HEMOPTYSIS: 0
FEVER: 0
VERTIGO: 0
ABDOMINAL PAIN: 0
MYALGIAS: 0
PND: 0
HEMATEMESIS: 0
SYNCOPE: 0
NEAR-SYNCOPE: 0
ALTERED MENTAL STATUS: 0
HEMATOCHEZIA: 0
DYSPNEA ON EXERTION: 0
COLOR CHANGE: 0
WEAKNESS: 0
NERVOUS/ANXIOUS: 0
DYSURIA: 0
ORTHOPNEA: 0
PALPITATIONS: 0

## 2021-04-23 NOTE — LETTER
"April 23, 2021     Dr. Rowland    Patient: Reyna Castillo  YOB: 1950  Date of Visit: 4/23/2021      Dear Dr. Rowland:    Thank you for referring Reyna Castillo to me for evaluation. Below are my notes for this consultation.    If you have questions, please do not hesitate to call me. I look forward to following your patient along with you.         Sincerely,        Bruce Casey, DO        CC: No Recipients  Alanis Reddy CRNP  4/23/2021  1:30 PM  Signed  CARDIOLOGY OUTPATIENT PROGRESS NOTE    Reyna Castillo is a 70 y.o. female  who presents for pre-operative cardiac exam and blood pressure evaluation.    Summary: CAD based on a coronary calcium score 101, early family hx of CAD, HTN, diverticulitis & various orthopedic problems.     Interval hx: she presented a few days ago for both routine & pre-operative cardiac exam at which time SBP markedly elevated in 180-190 range.  This has happened previously during office visits and she states home blood pressure are much better controlled.  She was asked to return today with home cuff and re-evaluation of BP.    Her blood pressure is much improved today on exam 141/72, she feels much less anxiety.  Her home cuff checked today in office with SBP about 12mmHg higher than office value.  Reviewed that she can certainly use this as \"ball park measurement.\"  Reviewed most recent home readings averaging 135/67, this is acceptable and may even be a bit lower.  It appears she has true white coat syndrome.  Would allow some extra time prior to actual surgical intervention for BP to come down.    Plans for for right total knee arthroscopy with Dr. Cowan on 4/29/21.  See below for surgical clearance.     The following have been reviewed and updated as appropriate in this visit:  Tobacco  Allergies  Meds  Problems  Med Hx  Surg Hx  Fam Hx  Soc Hx      Past Medical History:   Diagnosis Date   • Abnormal ECG 10/9/2018    Nonspecific T wave " flattening inferiorly minimal ST depression laterally and anterolateral T wave inversion   • Agatston CAC score 100-199 10/9/2018    8/17 coronary calcium score 101 LAD 13, circumflex 68, RCA 20   • Class 2 severe obesity due to excess calories with serious comorbidity and body mass index (BMI) of 36.0 to 36.9 in adult (CMS/[facility]) 10/9/2018    2017 weight 232 pounds   • DJD (degenerative joint disease)    • Family history of early CAD 10/9/2018    Father  of MI at age 59   • Hypertension    • Tran-Juan tear    • Mixed hyperlipidemia 10/9/2018    2020 Lipid panel  LDL 91 HDL 47 Trigs 176       :   Past Surgical History:   Procedure Laterality Date   • CHOLECYSTECTOMY     • COLONOSCOPY     • KNEE ARTHROSCOPY     • TRANSUMBILICAL AUGMENTATION MAMMAPLASTY     • UPPER GASTROINTESTINAL ENDOSCOPY     • WISDOM TOOTH EXTRACTION         ALLERGIES   Nsaids (non-steroidal anti-inflammatory drug), Adhesive tape-silicones, and Latex    Current Outpatient Medications   Medication Sig Dispense Refill   • fenofibrate (TRICOR) 48 mg tablet Take 48 mg by mouth.     • gabapentin (NEURONTIN) 100 mg capsule Take 100 mg by mouth 3 (three) times a day.     • irbesartan (AVAPRO) 150 mg tablet Take 150 mg by mouth every morning.       • multivitamin capsule Take 1 capsule by mouth daily.     • rosuvastatin (CRESTOR) 20 mg tablet Take 20 mg by mouth daily.     • sertraline (ZOLOFT) 50 mg tablet Take 50 mg by mouth every morning.         No current facility-administered medications for this visit.          Social History     Socioeconomic History   • Marital status:      Spouse name: None   • Number of children: None   • Years of education: None   • Highest education level: None   Occupational History   • None   Tobacco Use   • Smoking status: Former Smoker     Quit date:      Years since quittin.3   • Smokeless tobacco: Never Used   • Tobacco comment: quit 18 years ago   Substance and Sexual Activity   • Alcohol use:  Yes     Comment: occasional   • Drug use: No   • Sexual activity: Defer   Other Topics Concern   • None   Social History Narrative   • None     Social Determinants of Health     Financial Resource Strain:    • Difficulty of Paying Living Expenses:    Food Insecurity:    • Worried About Running Out of Food in the Last Year:    • Ran Out of Food in the Last Year:    Transportation Needs:    • Lack of Transportation (Medical):    • Lack of Transportation (Non-Medical):    Physical Activity:    • Days of Exercise per Week:    • Minutes of Exercise per Session:    Stress:    • Feeling of Stress :    Social Connections:    • Frequency of Communication with Friends and Family:    • Frequency of Social Gatherings with Friends and Family:    • Attends [employer] Services:    • Active Member of Clubs or Organizations:    • Attends Club or Organization Meetings:    • Marital Status:    Intimate Partner Violence:    • Fear of Current or Ex-Partner:    • Emotionally Abused:    • Physically Abused:    • Sexually Abused:           Family History   Problem Relation Age of Onset   • Heart attack Biological Father        Review of Systems   Constitutional: Negative for diaphoresis, fever and malaise/fatigue.   HENT: Negative for hoarse voice and nosebleeds.    Eyes: Negative for blurred vision and visual disturbance.   Cardiovascular: Negative for chest pain, cyanosis, dyspnea on exertion, irregular heartbeat, leg swelling, near-syncope, orthopnea, palpitations, paroxysmal nocturnal dyspnea and syncope.   Respiratory: Negative for hemoptysis, shortness of breath, sputum production and wheezing.    Endocrine: Negative for cold intolerance and heat intolerance.   Hematologic/Lymphatic: Negative for bleeding problem.   Skin: Negative for color change and nail changes.   Musculoskeletal: Negative for muscle weakness and myalgias.   Gastrointestinal: Negative for abdominal pain, hematemesis, hematochezia and jaundice.   Genitourinary:  Negative for dysuria and hematuria.   Neurological: Negative for light-headedness, vertigo and weakness.   Psychiatric/Behavioral: Negative for altered mental status. The patient does not have insomnia and is not nervous/anxious.        Objective     Visit Vitals  BP (!) 141/72 (BP Location: Left upper arm, Patient Position: Sitting)   Pulse 70   Wt 95.7 kg (211 lb)   SpO2 98%   BMI 37.38 kg/m²       Wt Readings from Last 3 Encounters:   04/23/21 95.7 kg (211 lb)   04/21/21 96.6 kg (213 lb)   10/23/20 95.3 kg (210 lb)       Physical Exam  Constitutional:       General: She is not in acute distress.     Appearance: She is well-developed. She is not diaphoretic.   HENT:      Head: Normocephalic.   Cardiovascular:      Rate and Rhythm: Normal rate and regular rhythm.      Heart sounds: Normal heart sounds.   Pulmonary:      Effort: No respiratory distress.      Breath sounds: Normal breath sounds. No wheezing.   Chest:      Chest wall: No tenderness.   Abdominal:      General: There is no distension.      Palpations: Abdomen is soft.      Tenderness: There is no abdominal tenderness.   Musculoskeletal:         General: Normal range of motion.      Cervical back: Normal range of motion.   Skin:     General: Skin is warm and dry.   Neurological:      Mental Status: She is alert and oriented to person, place, and time.          LABS  Lab Results   Component Value Date    WBC 16.19 (H) 10/30/2020    HGB 9.3 (L) 10/30/2020    HCT 29.7 (L) 10/30/2020     10/30/2020    CHOL 127 07/27/2018    TRIG 119 07/27/2018    LDLCALC 66 07/27/2018    HDL 37 (L) 07/27/2018    ALT 20 10/05/2018    AST 24 10/05/2018     10/30/2020    K 4.0 10/30/2020     10/30/2020    CREATININE 0.9 10/30/2020    BUN 31 (H) 10/30/2020    CO2 22 10/30/2020    TSH 1.96 07/27/2018    INR 1.6 08/23/2018    GLUCOSE 103 (H) 10/30/2020    HGBA1C 5.4 07/27/2018       ECG 4/21/21      Problem List Items Addressed This Visit        Circulatory     "Essential hypertension    Overview     True white coat HTN.  Home BP cuff checked today in office with SBP about 12mmHg higher than office value.  Average -140/60-70s.  If office SBP as high as 200 at times.           Current Assessment & Plan         4/23/2021 1016 4/23/2021 1025   /72 (A) 141/72 (A)     Marked improvement today.  She does have anxiety with office visits and likely true white coat HTN.  Her home cuff checked today in office with SBP about 12mmHg higher than office value.  Reviewed that she can certainly use this as \"ball park measurement.\"  Reviewed most recent home readings averaging 135/67, this is acceptable and may even be a bit lower.  Continue current regimen.               Other    Preoperative cardiovascular examination - Primary    Current Assessment & Plan     She has no hx of MI, HF, CVA, kidney disease or PAD.  She denies chest pain or other exertional symptoms.   Her functional capacity is excellent despite orthopedic issues as she can walk long distances without symptoms.  EKG demonstrates NSR with T wave inversion in septal leads which is unchanged from previous EKG.  No ischemia noted.       She has essentially no high risk markers for grazyna-operative cardiac complications therefore we see no further contraindications to proceeding with the planned procedure.  Reviewed with patient that with any surgical procedure regardless of health status or history there is the potential for adverse cardiac events which cannot be planned or prevented.  She verbalizes understanding.     It appears she has true white coat syndrome.  Would allow some extra time prior to actual surgical intervention for BP to come down.  Her regular BP is about 135-140/60-70s.                  I, Alanis Reddy, am scribing for, and in the presence of Bruce Casey DO.      This patient note has been dictated using speech recognition software. Inadvertent speech recognition errors should be disregarded. " Please do not hesitate to call my office for clarifications.    TATE Hernandez  4/23/2021 9:08 AM

## 2021-04-23 NOTE — PROGRESS NOTES
"CARDIOLOGY OUTPATIENT PROGRESS NOTE    Reyna Castillo is a 70 y.o. female  who presents for pre-operative cardiac exam and blood pressure evaluation.    Summary: CAD based on a coronary calcium score 101, early family hx of CAD, HTN, diverticulitis & various orthopedic problems.     Interval hx: she presented a few days ago for both routine & pre-operative cardiac exam at which time SBP markedly elevated in 180-190 range.  This has happened previously during office visits and she states home blood pressure are much better controlled.  She was asked to return today with home cuff and re-evaluation of BP.    Her blood pressure is much improved today on exam 141/72, she feels much less anxiety.  Her home cuff checked today in office with SBP about 12mmHg higher than office value.  Reviewed that she can certainly use this as \"ball park measurement.\"  Reviewed most recent home readings averaging 135/67, this is acceptable and may even be a bit lower.  It appears she has true white coat syndrome.  Would allow some extra time prior to actual surgical intervention for BP to come down.    Plans for for right total knee arthroscopy with Dr. Cowan on 21.  See below for surgical clearance.     The following have been reviewed and updated as appropriate in this visit:  Tobacco  Allergies  Meds  Problems  Med Hx  Surg Hx  Fam Hx  Soc Hx      Past Medical History:   Diagnosis Date   • Abnormal ECG 10/9/2018    Nonspecific T wave flattening inferiorly minimal ST depression laterally and anterolateral T wave inversion   • Agatston CAC score 100-199 10/9/2018    8/17 coronary calcium score 101 LAD 13, circumflex 68, RCA 20   • Class 2 severe obesity due to excess calories with serious comorbidity and body mass index (BMI) of 36.0 to 36.9 in adult (CMS/HCC) 10/9/2018    2017 weight 232 pounds   • DJD (degenerative joint disease)    • Family history of early CAD 10/9/2018    Father  of MI at age 59   • " Hypertension    • Tran-Juan tear    • Mixed hyperlipidemia 10/9/2018    2020 Lipid panel  LDL 91 HDL 47 Trigs 176       :   Past Surgical History:   Procedure Laterality Date   • CHOLECYSTECTOMY     • COLONOSCOPY     • KNEE ARTHROSCOPY     • TRANSUMBILICAL AUGMENTATION MAMMAPLASTY     • UPPER GASTROINTESTINAL ENDOSCOPY     • WISDOM TOOTH EXTRACTION         ALLERGIES   Nsaids (non-steroidal anti-inflammatory drug), Adhesive tape-silicones, and Latex    Current Outpatient Medications   Medication Sig Dispense Refill   • fenofibrate (TRICOR) 48 mg tablet Take 48 mg by mouth.     • gabapentin (NEURONTIN) 100 mg capsule Take 100 mg by mouth 3 (three) times a day.     • irbesartan (AVAPRO) 150 mg tablet Take 150 mg by mouth every morning.       • multivitamin capsule Take 1 capsule by mouth daily.     • rosuvastatin (CRESTOR) 20 mg tablet Take 20 mg by mouth daily.     • sertraline (ZOLOFT) 50 mg tablet Take 50 mg by mouth every morning.         No current facility-administered medications for this visit.          Social History     Socioeconomic History   • Marital status:      Spouse name: None   • Number of children: None   • Years of education: None   • Highest education level: None   Occupational History   • None   Tobacco Use   • Smoking status: Former Smoker     Quit date:      Years since quittin.3   • Smokeless tobacco: Never Used   • Tobacco comment: quit 18 years ago   Substance and Sexual Activity   • Alcohol use: Yes     Comment: occasional   • Drug use: No   • Sexual activity: Defer   Other Topics Concern   • None   Social History Narrative   • None     Social Determinants of Health     Financial Resource Strain:    • Difficulty of Paying Living Expenses:    Food Insecurity:    • Worried About Running Out of Food in the Last Year:    • Ran Out of Food in the Last Year:    Transportation Needs:    • Lack of Transportation (Medical):    • Lack of Transportation (Non-Medical):    Physical  Activity:    • Days of Exercise per Week:    • Minutes of Exercise per Session:    Stress:    • Feeling of Stress :    Social Connections:    • Frequency of Communication with Friends and Family:    • Frequency of Social Gatherings with Friends and Family:    • Attends Worship Services:    • Active Member of Clubs or Organizations:    • Attends Club or Organization Meetings:    • Marital Status:    Intimate Partner Violence:    • Fear of Current or Ex-Partner:    • Emotionally Abused:    • Physically Abused:    • Sexually Abused:           Family History   Problem Relation Age of Onset   • Heart attack Biological Father        Review of Systems   Constitutional: Negative for diaphoresis, fever and malaise/fatigue.   HENT: Negative for hoarse voice and nosebleeds.    Eyes: Negative for blurred vision and visual disturbance.   Cardiovascular: Negative for chest pain, cyanosis, dyspnea on exertion, irregular heartbeat, leg swelling, near-syncope, orthopnea, palpitations, paroxysmal nocturnal dyspnea and syncope.   Respiratory: Negative for hemoptysis, shortness of breath, sputum production and wheezing.    Endocrine: Negative for cold intolerance and heat intolerance.   Hematologic/Lymphatic: Negative for bleeding problem.   Skin: Negative for color change and nail changes.   Musculoskeletal: Negative for muscle weakness and myalgias.   Gastrointestinal: Negative for abdominal pain, hematemesis, hematochezia and jaundice.   Genitourinary: Negative for dysuria and hematuria.   Neurological: Negative for light-headedness, vertigo and weakness.   Psychiatric/Behavioral: Negative for altered mental status. The patient does not have insomnia and is not nervous/anxious.        Objective     Visit Vitals  BP (!) 141/72 (BP Location: Left upper arm, Patient Position: Sitting)   Pulse 70   Wt 95.7 kg (211 lb)   SpO2 98%   BMI 37.38 kg/m²       Wt Readings from Last 3 Encounters:   04/23/21 95.7 kg (211 lb)   04/21/21 96.6 kg  (213 lb)   10/23/20 95.3 kg (210 lb)       Physical Exam  Constitutional:       General: She is not in acute distress.     Appearance: She is well-developed. She is not diaphoretic.   HENT:      Head: Normocephalic.   Cardiovascular:      Rate and Rhythm: Normal rate and regular rhythm.      Heart sounds: Normal heart sounds.   Pulmonary:      Effort: No respiratory distress.      Breath sounds: Normal breath sounds. No wheezing.   Chest:      Chest wall: No tenderness.   Abdominal:      General: There is no distension.      Palpations: Abdomen is soft.      Tenderness: There is no abdominal tenderness.   Musculoskeletal:         General: Normal range of motion.      Cervical back: Normal range of motion.   Skin:     General: Skin is warm and dry.   Neurological:      Mental Status: She is alert and oriented to person, place, and time.          LABS  Lab Results   Component Value Date    WBC 16.19 (H) 10/30/2020    HGB 9.3 (L) 10/30/2020    HCT 29.7 (L) 10/30/2020     10/30/2020    CHOL 127 07/27/2018    TRIG 119 07/27/2018    LDLCALC 66 07/27/2018    HDL 37 (L) 07/27/2018    ALT 20 10/05/2018    AST 24 10/05/2018     10/30/2020    K 4.0 10/30/2020     10/30/2020    CREATININE 0.9 10/30/2020    BUN 31 (H) 10/30/2020    CO2 22 10/30/2020    TSH 1.96 07/27/2018    INR 1.6 08/23/2018    GLUCOSE 103 (H) 10/30/2020    HGBA1C 5.4 07/27/2018       ECG 4/21/21      Problem List Items Addressed This Visit        Circulatory    Essential hypertension    Overview     True white coat HTN.  Home BP cuff checked today in office with SBP about 12mmHg higher than office value.  Average -140/60-70s.  If office SBP as high as 200 at times.           Current Assessment & Plan         4/23/2021 1016 4/23/2021 1025   /72 (A) 141/72 (A)     Marked improvement today.  She does have anxiety with office visits and likely true white coat HTN.  Her home cuff checked today in office with SBP about 12mmHg higher  "than office value.  Reviewed that she can certainly use this as \"ball park measurement.\"  Reviewed most recent home readings averaging 135/67, this is acceptable and may even be a bit lower.  Continue current regimen.               Other    Preoperative cardiovascular examination - Primary    Current Assessment & Plan     She has no hx of MI, HF, CVA, kidney disease or PAD.  She denies chest pain or other exertional symptoms.   Her functional capacity is excellent despite orthopedic issues as she can walk long distances without symptoms.  EKG demonstrates NSR with T wave inversion in septal leads which is unchanged from previous EKG.  No ischemia noted.       She has essentially no high risk markers for grazyna-operative cardiac complications therefore we see no further contraindications to proceeding with the planned procedure.  Reviewed with patient that with any surgical procedure regardless of health status or history there is the potential for adverse cardiac events which cannot be planned or prevented.  She verbalizes understanding.     It appears she has true white coat syndrome.  Would allow some extra time prior to actual surgical intervention for BP to come down.  Her regular BP is about 135-140/60-70s.                  I, Alanis Reddy, am scribing for, and in the presence of Bruce Casey DO.      This patient note has been dictated using speech recognition software. Inadvertent speech recognition errors should be disregarded. Please do not hesitate to call my office for clarifications.    TATE Hernandez  4/23/2021 9:08 AM      "

## 2021-04-23 NOTE — ASSESSMENT & PLAN NOTE
"    4/23/2021 1016 4/23/2021 1025   /72 (A) 141/72 (A)     Marked improvement today.  She does have anxiety with office visits and likely true white coat HTN.  Her home cuff checked today in office with SBP about 12mmHg higher than office value.  Reviewed that she can certainly use this as \"ball park measurement.\"  Reviewed most recent home readings averaging 135/67, this is acceptable and may even be a bit lower.  Continue current regimen.     "

## 2021-04-23 NOTE — ASSESSMENT & PLAN NOTE
She has no hx of MI, HF, CVA, kidney disease or PAD.  She denies chest pain or other exertional symptoms.   Her functional capacity is excellent despite orthopedic issues as she can walk long distances without symptoms.  EKG demonstrates NSR with T wave inversion in septal leads which is unchanged from previous EKG.  No ischemia noted.       She has essentially no high risk markers for grazyna-operative cardiac complications therefore we see no further contraindications to proceeding with the planned procedure.  Reviewed with patient that with any surgical procedure regardless of health status or history there is the potential for adverse cardiac events which cannot be planned or prevented.  She verbalizes understanding.     It appears she has true white coat syndrome.  Would allow some extra time prior to actual surgical intervention for BP to come down.  Her regular BP is about 135-140/60-70s.

## 2021-10-06 ENCOUNTER — TRANSCRIBE ORDERS (OUTPATIENT)
Dept: SCHEDULING | Age: 71
End: 2021-10-06
Payer: COMMERCIAL

## 2021-10-06 DIAGNOSIS — M54.50 LOW BACK PAIN: Primary | ICD-10-CM

## 2021-10-09 ENCOUNTER — HOSPITAL ENCOUNTER (OUTPATIENT)
Dept: RADIOLOGY | Age: 71
Discharge: HOME | End: 2021-10-09
Attending: NURSE PRACTITIONER
Payer: COMMERCIAL

## 2021-10-09 DIAGNOSIS — M54.50 LOW BACK PAIN: ICD-10-CM

## 2021-10-25 ENCOUNTER — TRANSCRIBE ORDERS (OUTPATIENT)
Dept: SCHEDULING | Age: 71
End: 2021-10-25
Payer: COMMERCIAL

## 2021-10-25 DIAGNOSIS — N28.1 CYST OF KIDNEY, ACQUIRED: Primary | ICD-10-CM

## 2021-10-26 ENCOUNTER — HOSPITAL ENCOUNTER (OUTPATIENT)
Dept: RADIOLOGY | Age: 71
Discharge: HOME | End: 2021-10-26
Attending: ORTHOPAEDIC SURGERY
Payer: COMMERCIAL

## 2021-10-26 DIAGNOSIS — N28.1 CYST OF KIDNEY, ACQUIRED: ICD-10-CM

## 2021-10-26 PROCEDURE — 76775 US EXAM ABDO BACK WALL LIM: CPT

## 2021-10-29 ENCOUNTER — APPOINTMENT (OUTPATIENT)
Dept: PREADMISSION TESTING | Facility: HOSPITAL | Age: 71
End: 2021-10-29
Payer: COMMERCIAL

## 2021-10-29 VITALS — BODY MASS INDEX: 36.7 KG/M2 | WEIGHT: 215 LBS | HEIGHT: 64 IN

## 2021-10-29 NOTE — PRE-PROCEDURE INSTRUCTIONS
1. We will call you between 3 pm and 7 pm on November 2, 2021 to inform you of arrival time for your procedure. If you do not hear by 6PM, please call [phone] for arrival time.     2. Please arrive through the Main Entrance of the [facility] (across from the parking garage) and report to the Surgery Registration Desk on the day of your procedure.    3. Please follow the following fasting guidelines:     No solid food EIGHT HOURS prior to surgery.  Unlimited clear liquids, meaning water or PLAIN black coffee WITHOUT any milk, cream, sugar, or sweetener are permitted up to TWO HOURS prior to arrival at the hospital.    4. Early on the morning of the procedure please take your usual dose of the listed medications with a sip of water:  Gabapentin, rosuvastatin, fenofibrate, sertraline. Do not take irbesartan day of surgery. No aspirin, antiinflammatories or ibuprofen one week prior to surgery. It is OK to take Tylenol. Stop vitamins and supplements one week prior to surgery.    5. Other Instructions: You may brush your teeth the morning of the procedure. Rinse and spit, do not swallow.  Bring a list of your medications with dosages.  Use surgical wash as directed.     6. If you develop a cold, cough, fever, rash, or other symptom prior to the day of the procedure, please report it to your physician immediately.    7. If you need to cancel the procedure for any reason, please contact your physician.    8. Make arrangements to have someone drive you home from the procedure. If you have not arranged for transportation home, your surgery may be cancelled.     9. You may not take public transportation unless accompanied by a responsible person.    10. You may not drive a car or operate complex or potentially dangerous machinery for 24 hours following anesthesia and/or sedation.    11.  If it is medically necessary for you to have a longer stay, you will be informed as soon as the decision is made.    12. Only bring  essential items to the hospital.  Do not wear or bring anything of value to the hospital including jewelry of any kind, money, or wallet. Do not wear make-up or contact lenses.  DO NOT BRING MEDICATIONS FROM HOME. DO bring your hearing aids, glasses, and a case    13. No lotion, creams, powders, or oils on skin the morning of procedure     14. Dress in comfortable clothes.    15.  If instructed, please bring a copy of your Advanced Directive (Living Will/Durable Power of ) on the day of your procedure.     16. Patients need to quarantine from the time of PAT COVID test to day of surgery, regardless of COVID vaccine status.      17. Ensuring your safety at all times is a very important part of our [facility] Culture of Safety. After having surgery and sedation, you are at risk for falling and balance issues. Although you may feel awake, the effects of the medication can last up to 24 hours after anesthesia. If you need to use the bathroom during your recovery period, nursing staff will escort you there and stay with you to ensure your safety.    Pre operative instructions given as per protocol.  Form explained by: Misty Yepez RN instruction reviewed with patient during anesthesia phone interview. Printed copy given day of PAT visit.     I have read and understand the above information. I have had sufficient opportunity to ask questions I might have and they have been answered to my satisfaction. I agree to comply with the Patient Responsibilities listed above and have received a copy of this form.

## 2021-11-01 ENCOUNTER — APPOINTMENT (OUTPATIENT)
Dept: PREADMISSION TESTING | Facility: HOSPITAL | Age: 71
End: 2021-11-01
Attending: ORTHOPAEDIC SURGERY
Payer: COMMERCIAL

## 2021-11-01 ENCOUNTER — TRANSCRIBE ORDERS (OUTPATIENT)
Dept: REGISTRATION | Facility: HOSPITAL | Age: 71
End: 2021-11-01
Payer: COMMERCIAL

## 2021-11-01 ENCOUNTER — APPOINTMENT (OUTPATIENT)
Dept: LAB | Facility: HOSPITAL | Age: 71
End: 2021-11-01
Attending: ORTHOPAEDIC SURGERY
Payer: COMMERCIAL

## 2021-11-01 ENCOUNTER — HOSPITAL ENCOUNTER (OUTPATIENT)
Dept: CARDIOLOGY | Facility: HOSPITAL | Age: 71
Discharge: HOME | End: 2021-11-01
Attending: ORTHOPAEDIC SURGERY
Payer: COMMERCIAL

## 2021-11-01 VITALS
HEART RATE: 74 BPM | OXYGEN SATURATION: 95 % | WEIGHT: 216 LBS | DIASTOLIC BLOOD PRESSURE: 75 MMHG | TEMPERATURE: 97.9 F | RESPIRATION RATE: 16 BRPM | BODY MASS INDEX: 36.88 KG/M2 | SYSTOLIC BLOOD PRESSURE: 177 MMHG | HEIGHT: 64 IN

## 2021-11-01 DIAGNOSIS — M43.17 SPONDYLOLISTHESIS, LUMBOSACRAL REGION: ICD-10-CM

## 2021-11-01 DIAGNOSIS — M48.062 SPINAL STENOSIS, LUMBAR REGION WITH NEUROGENIC CLAUDICATION: ICD-10-CM

## 2021-11-01 DIAGNOSIS — Z01.810 PREOPERATIVE CARDIOVASCULAR EXAMINATION: Primary | ICD-10-CM

## 2021-11-01 DIAGNOSIS — M48.062 SPINAL STENOSIS OF LUMBAR REGION WITH NEUROGENIC CLAUDICATION: ICD-10-CM

## 2021-11-01 DIAGNOSIS — Z11.59 ENCOUNTER FOR SCREENING FOR OTHER VIRAL DISEASES: Primary | ICD-10-CM

## 2021-11-01 DIAGNOSIS — E66.01 CLASS 2 SEVERE OBESITY DUE TO EXCESS CALORIES WITH SERIOUS COMORBIDITY AND BODY MASS INDEX (BMI) OF 36.0 TO 36.9 IN ADULT (CMS/HCC): ICD-10-CM

## 2021-11-01 DIAGNOSIS — Z11.59 ENCOUNTER FOR SCREENING FOR OTHER VIRAL DISEASES: ICD-10-CM

## 2021-11-01 DIAGNOSIS — I10 ESSENTIAL HYPERTENSION: ICD-10-CM

## 2021-11-01 DIAGNOSIS — E66.812 CLASS 2 SEVERE OBESITY DUE TO EXCESS CALORIES WITH SERIOUS COMORBIDITY AND BODY MASS INDEX (BMI) OF 36.0 TO 36.9 IN ADULT (CMS/HCC): ICD-10-CM

## 2021-11-01 LAB
ABO + RH BLD: NORMAL
ANION GAP SERPL CALC-SCNC: 12 MEQ/L (ref 3–15)
APTT PPP: 30 SEC (ref 23–35)
BASOPHILS # BLD: 0.06 K/UL (ref 0.01–0.1)
BASOPHILS NFR BLD: 0.8 %
BLD GP AB SCN SERPL QL: NEGATIVE
BLOOD BANK CMNT PATIENT-IMP: NORMAL
BUN SERPL-MCNC: 19 MG/DL (ref 8–20)
CALCIUM SERPL-MCNC: 10 MG/DL (ref 8.9–10.3)
CHLORIDE SERPL-SCNC: 100 MEQ/L (ref 98–109)
CO2 SERPL-SCNC: 25 MEQ/L (ref 22–32)
CREAT SERPL-MCNC: 0.6 MG/DL (ref 0.6–1.1)
D AG BLD QL: NEGATIVE
DIFFERENTIAL METHOD BLD: ABNORMAL
EOSINOPHIL # BLD: 0.44 K/UL (ref 0.04–0.36)
EOSINOPHIL NFR BLD: 6.1 %
ERYTHROCYTE [DISTWIDTH] IN BLOOD BY AUTOMATED COUNT: 13.2 % (ref 11.7–14.4)
GFR SERPL CREATININE-BSD FRML MDRD: >60 ML/MIN/1.73M*2
GLUCOSE SERPL-MCNC: 96 MG/DL (ref 70–99)
HCT VFR BLDCO AUTO: 41.3 % (ref 35–45)
HGB BLD-MCNC: 13.8 G/DL (ref 11.8–15.7)
IMM GRANULOCYTES # BLD AUTO: 0.01 K/UL (ref 0–0.08)
IMM GRANULOCYTES NFR BLD AUTO: 0.1 %
INR PPP: 1.1
LABORATORY COMMENT REPORT: NORMAL
LYMPHOCYTES # BLD: 1.56 K/UL (ref 1.2–3.5)
LYMPHOCYTES NFR BLD: 21.6 %
MCH RBC QN AUTO: 30.8 PG (ref 28–33.2)
MCHC RBC AUTO-ENTMCNC: 33.4 G/DL (ref 32.2–35.5)
MCV RBC AUTO: 92.2 FL (ref 83–98)
MONOCYTES # BLD: 0.56 K/UL (ref 0.28–0.8)
MONOCYTES NFR BLD: 7.8 %
NEUTROPHILS # BLD: 4.58 K/UL (ref 1.7–7)
NEUTS SEG NFR BLD: 63.6 %
NRBC BLD-RTO: 0 %
PDW BLD AUTO: 10.1 FL (ref 9.4–12.3)
PLATELET # BLD AUTO: 315 K/UL (ref 150–369)
POTASSIUM SERPL-SCNC: 3.9 MEQ/L (ref 3.6–5.1)
PROTHROMBIN TIME: 13.5 SEC (ref 12.2–14.5)
RBC # BLD AUTO: 4.48 M/UL (ref 3.93–5.22)
SODIUM SERPL-SCNC: 137 MEQ/L (ref 136–144)
SPECIMEN EXP DATE BLD: NORMAL
WBC # BLD AUTO: 7.21 K/UL (ref 3.8–10.5)

## 2021-11-01 PROCEDURE — 85730 THROMBOPLASTIN TIME PARTIAL: CPT | Mod: GA

## 2021-11-01 PROCEDURE — 86901 BLOOD TYPING SEROLOGIC RH(D): CPT

## 2021-11-01 PROCEDURE — U0003 INFECTIOUS AGENT DETECTION BY NUCLEIC ACID (DNA OR RNA); SEVERE ACUTE RESPIRATORY SYNDROME CORONAVIRUS 2 (SARS-COV-2) (CORONAVIRUS DISEASE [COVID-19]), AMPLIFIED PROBE TECHNIQUE, MAKING USE OF HIGH THROUGHPUT TECHNOLOGIES AS DESCRIBED BY CMS-2020-01-R: HCPCS

## 2021-11-01 PROCEDURE — 86850 RBC ANTIBODY SCREEN: CPT

## 2021-11-01 PROCEDURE — 36415 COLL VENOUS BLD VENIPUNCTURE: CPT

## 2021-11-01 PROCEDURE — 85610 PROTHROMBIN TIME: CPT | Mod: GA

## 2021-11-01 PROCEDURE — 3008F BODY MASS INDEX DOCD: CPT | Performed by: HOSPITALIST

## 2021-11-01 PROCEDURE — 85025 COMPLETE CBC W/AUTO DIFF WBC: CPT | Mod: GA

## 2021-11-01 PROCEDURE — 93005 ELECTROCARDIOGRAM TRACING: CPT

## 2021-11-01 PROCEDURE — 80048 BASIC METABOLIC PNL TOTAL CA: CPT

## 2021-11-01 PROCEDURE — C9803 HOPD COVID-19 SPEC COLLECT: HCPCS

## 2021-11-01 PROCEDURE — 99204 OFFICE O/P NEW MOD 45 MIN: CPT | Performed by: HOSPITALIST

## 2021-11-01 ASSESSMENT — PAIN SCALES - GENERAL: PAINLEVEL: 0-NO PAIN

## 2021-11-01 NOTE — ASSESSMENT & PLAN NOTE
Patient is scheduled for L3-S1 Posterior Lumbar Decompression, Posterior Lumbar Interbody Fusion with Dr. Fishman on 11/3/2021  Pre and postop management per primary

## 2021-11-01 NOTE — H&P (VIEW-ONLY)
[facility] Medicine Service -  Pre-Operative Consultation       Patient Name: Reyna Castillo  Referring Surgeon: Dr. Lee    Reason for Referral: Pre-Operative Evaluation  Surgical Procedure: L3-S1 Posterior Lumbar Decompression, Posterior Lumbar Interbody Fusion  Operative Date: 11/3/21  Other Providers:      PCP: Lubna Kerr MD       HISTORY OF PRESENT ILLNESS      Reyna Castillo is a 71 y.o. female presenting today to the [facility] at [facility] for pre-operative evaluation prior to planned surgery.    Patient presents with lower back pain.  Had issues with lower back for quite some time now.  Patient is now scheduled for lumbar surgery with .     In regards to medical history:  History of obesity, whitecoat hypertension    The patient denies any current or recent chest pain or pressure, dyspnea, cough, sputum, fevers, chills, abdominal pain, nausea, vomiting, diarrhea or other symptoms.     Functionally, the patient is able to ascend a flight or so of stairs with no dyspnea or chest pain.     The patient denies, on specific questioning, the following:  No history of MI, arrhythmia,or CHF.  No history of SUSHIL.  No history of DVT/PE.  No history of COPD.  No history of CVA.  No history of DM.   No history of CKD.     PAST MEDICAL AND SURGICAL HISTORY      Past Medical History:   Diagnosis Date   • Abnormal ECG 10/9/2018    Nonspecific T wave flattening inferiorly minimal ST depression laterally and anterolateral T wave inversion   • Agatston CAC score 100-199 10/9/2018    8/17 coronary calcium score 101 LAD 13, circumflex 68, RCA 20   • Anxiety    • Class 2 severe obesity due to excess calories with serious comorbidity and body mass index (BMI) of 36.0 to 36.9 in adult (CMS/[facility]) 10/9/2018    2017 weight 232 pounds   • DJD (degenerative joint disease)    • Family history of early CAD 10/9/2018    Father  of MI at age 59   • Hypertension   "  • Lumbar stenosis    • Tran-Juan tear    • Mixed hyperlipidemia 10/9/2018    2020 Lipid panel  LDL 91 HDL 47 Trigs 176       Past Surgical History:   Procedure Laterality Date   • CHOLECYSTECTOMY     • COLONOSCOPY     • INNER EAR SURGERY     • JOINT REPLACEMENT      bilateral 2021   • KNEE ARTHROSCOPY     • TRANSUMBILICAL AUGMENTATION MAMMAPLASTY     • UPPER GASTROINTESTINAL ENDOSCOPY     • WISDOM TOOTH EXTRACTION         MEDICATIONS        Current Outpatient Medications:   •  fenofibrate (TRICOR) 48 mg tablet, Take 48 mg by mouth daily.  , Disp: , Rfl:   •  gabapentin (NEURONTIN) 100 mg capsule, Take 100 mg by mouth 3 (three) times a day., Disp: , Rfl:   •  irbesartan (AVAPRO) 150 mg tablet, Take 150 mg by mouth 2 (two) times a day.  , Disp: , Rfl:   •  multivitamin capsule, Take 1 capsule by mouth daily., Disp: , Rfl:   •  rosuvastatin (CRESTOR) 20 mg tablet, Take 20 mg by mouth daily., Disp: , Rfl:   •  sertraline (ZOLOFT) 50 mg tablet, Take 50 mg by mouth every morning.  , Disp: , Rfl:     ALLERGIES      Nsaids (non-steroidal anti-inflammatory drug) and Adhesive tape-silicones    FAMILY HISTORY      family history includes Heart attack in her biological father.    Denies any prior known family history of DVTs/PEs/clotting disorder    SOCIAL HISTORY      Social History     Tobacco Use   • Smoking status: Former Smoker     Packs/day: 1.00     Years: 20.00     Pack years: 20.00     Types: Cigarettes     Quit date:      Years since quittin.8   • Smokeless tobacco: Never Used   Substance Use Topics   • Alcohol use: Yes     Comment: holidays   • Drug use: No       REVIEW OF SYSTEMS      All other systems reviewed and negative except as noted in HPI    PHYSICAL EXAMINATION      Visit Vitals  BP (!) 177/75 (BP Location: Left upper arm, Patient Position: Sitting)   Pulse 74   Temp 36.6 °C (97.9 °F) (Temporal)   Resp 16   Ht 1.626 m (5' 4\")   Wt 98 kg (216 lb)   SpO2 95%   BMI 37.08 kg/m²     Body " mass index is 37.08 kg/m².    Physical Exam     GEN: obese; not in acute distress  HEENT: normocephalic; atraumatic  NECK: no JVD; no bruits  CARDIO: regular rate and rhythm; no murmurs or rubs  RESP: clear to auscultation bilaterally; no rales, rhonchi, or wheezes  ABD: soft, non-distended, non-tender, normal bowel sounds  EXT: no cyanosis, clubbing, or edema  SKIN: clean, dry, warm, and intact  MUSCULOSKELETAL: no injury or deformity  NEURO: alert and oriented x 3; nonfocal  BEHAVIOR/EMOTIONAL: appropriate; cooperative    LABS / EKG        Labs  I have reviewed the patient's pertinent labs. Pertinent labs are within normal limits.    Lab Results   Component Value Date     11/01/2021    K 3.9 11/01/2021     11/01/2021    BUN 19 11/01/2021    CREATININE 0.6 11/01/2021    WBC 7.21 11/01/2021    HGB 13.8 11/01/2021    HCT 41.3 11/01/2021     11/01/2021    ALT 20 10/05/2018    AST 24 10/05/2018    INR 1.1 11/01/2021    HGBA1C 5.4 07/27/2018         ECG/Telemetry  I have independently reviewed the ECG. No significant findings.    ASSESSMENT AND PLAN         Essential hypertension  BP elevated but patient has whitecoat hypertension    Class 2 severe obesity due to excess calories with serious comorbidity and body mass index (BMI) of 36.0 to 36.9 in adult (CMS/[facility])  Lifestyle modification    Preoperative cardiovascular examination  Please see below    Spinal stenosis of lumbar region with neurogenic claudication  Patient is scheduled for L3-S1 Posterior Lumbar Decompression, Posterior Lumbar Interbody Fusion with Dr. Fishman on 11/3/2021  Pre and postop management per primary       In regards to perioperative cardiac risk:  The patient denies any history of ischemic heart disease, denies any history of CHF, denies any history of CVA, is not on pre-operative treatment with insulin, and does not have a pre-operative creatinine > 2 mg/dL.   The Revised Cardiac Risk Index (RCRI) for this patient indicates 0.4%  risk.     Further comments:  Resume supplements when OK with surgical team.  I would encourage incentive spirometry to assist with minimizing grazyna-operative pulmonary risk.  DVT prophylaxis and timing of such per the discretion of the surgeon.     Please do not hesitate to contact [facility] during the upcoming hospitalization with any questions or concerns.     Jose Juan White MD  11/1/2021

## 2021-11-01 NOTE — CONSULTS
[facility] Medicine Service -  Pre-Operative Consultation       Patient Name: Reyna Castillo  Referring Surgeon: Dr. Lee    Reason for Referral: Pre-Operative Evaluation  Surgical Procedure: L3-S1 Posterior Lumbar Decompression, Posterior Lumbar Interbody Fusion  Operative Date: 11/3/21  Other Providers:      PCP: Lubna Kerr MD       HISTORY OF PRESENT ILLNESS      Reyna Castillo is a 71 y.o. female presenting today to the [facility] at [facility] for pre-operative evaluation prior to planned surgery.    Patient presents with lower back pain.  Had issues with lower back for quite some time now.  Patient is now scheduled for lumbar surgery with .     In regards to medical history:  History of obesity, whitecoat hypertension    The patient denies any current or recent chest pain or pressure, dyspnea, cough, sputum, fevers, chills, abdominal pain, nausea, vomiting, diarrhea or other symptoms.     Functionally, the patient is able to ascend a flight or so of stairs with no dyspnea or chest pain.     The patient denies, on specific questioning, the following:  No history of MI, arrhythmia,or CHF.  No history of SUSHIL.  No history of DVT/PE.  No history of COPD.  No history of CVA.  No history of DM.   No history of CKD.     PAST MEDICAL AND SURGICAL HISTORY      Past Medical History:   Diagnosis Date   • Abnormal ECG 10/9/2018    Nonspecific T wave flattening inferiorly minimal ST depression laterally and anterolateral T wave inversion   • Agatston CAC score 100-199 10/9/2018    8/17 coronary calcium score 101 LAD 13, circumflex 68, RCA 20   • Anxiety    • Class 2 severe obesity due to excess calories with serious comorbidity and body mass index (BMI) of 36.0 to 36.9 in adult (CMS/[facility]) 10/9/2018    2017 weight 232 pounds   • DJD (degenerative joint disease)    • Family history of early CAD 10/9/2018    Father  of MI at age 59   • Hypertension   "  • Lumbar stenosis    • Tran-Juan tear    • Mixed hyperlipidemia 10/9/2018    2020 Lipid panel  LDL 91 HDL 47 Trigs 176       Past Surgical History:   Procedure Laterality Date   • CHOLECYSTECTOMY     • COLONOSCOPY     • INNER EAR SURGERY     • JOINT REPLACEMENT      bilateral 2021   • KNEE ARTHROSCOPY     • TRANSUMBILICAL AUGMENTATION MAMMAPLASTY     • UPPER GASTROINTESTINAL ENDOSCOPY     • WISDOM TOOTH EXTRACTION         MEDICATIONS        Current Outpatient Medications:   •  fenofibrate (TRICOR) 48 mg tablet, Take 48 mg by mouth daily.  , Disp: , Rfl:   •  gabapentin (NEURONTIN) 100 mg capsule, Take 100 mg by mouth 3 (three) times a day., Disp: , Rfl:   •  irbesartan (AVAPRO) 150 mg tablet, Take 150 mg by mouth 2 (two) times a day.  , Disp: , Rfl:   •  multivitamin capsule, Take 1 capsule by mouth daily., Disp: , Rfl:   •  rosuvastatin (CRESTOR) 20 mg tablet, Take 20 mg by mouth daily., Disp: , Rfl:   •  sertraline (ZOLOFT) 50 mg tablet, Take 50 mg by mouth every morning.  , Disp: , Rfl:     ALLERGIES      Nsaids (non-steroidal anti-inflammatory drug) and Adhesive tape-silicones    FAMILY HISTORY      family history includes Heart attack in her biological father.    Denies any prior known family history of DVTs/PEs/clotting disorder    SOCIAL HISTORY      Social History     Tobacco Use   • Smoking status: Former Smoker     Packs/day: 1.00     Years: 20.00     Pack years: 20.00     Types: Cigarettes     Quit date:      Years since quittin.8   • Smokeless tobacco: Never Used   Substance Use Topics   • Alcohol use: Yes     Comment: holidays   • Drug use: No       REVIEW OF SYSTEMS      All other systems reviewed and negative except as noted in HPI    PHYSICAL EXAMINATION      Visit Vitals  BP (!) 177/75 (BP Location: Left upper arm, Patient Position: Sitting)   Pulse 74   Temp 36.6 °C (97.9 °F) (Temporal)   Resp 16   Ht 1.626 m (5' 4\")   Wt 98 kg (216 lb)   SpO2 95%   BMI 37.08 kg/m²     Body " mass index is 37.08 kg/m².    Physical Exam     GEN: obese; not in acute distress  HEENT: normocephalic; atraumatic  NECK: no JVD; no bruits  CARDIO: regular rate and rhythm; no murmurs or rubs  RESP: clear to auscultation bilaterally; no rales, rhonchi, or wheezes  ABD: soft, non-distended, non-tender, normal bowel sounds  EXT: no cyanosis, clubbing, or edema  SKIN: clean, dry, warm, and intact  MUSCULOSKELETAL: no injury or deformity  NEURO: alert and oriented x 3; nonfocal  BEHAVIOR/EMOTIONAL: appropriate; cooperative    LABS / EKG        Labs  I have reviewed the patient's pertinent labs. Pertinent labs are within normal limits.    Lab Results   Component Value Date     11/01/2021    K 3.9 11/01/2021     11/01/2021    BUN 19 11/01/2021    CREATININE 0.6 11/01/2021    WBC 7.21 11/01/2021    HGB 13.8 11/01/2021    HCT 41.3 11/01/2021     11/01/2021    ALT 20 10/05/2018    AST 24 10/05/2018    INR 1.1 11/01/2021    HGBA1C 5.4 07/27/2018         ECG/Telemetry  I have independently reviewed the ECG. No significant findings.    ASSESSMENT AND PLAN         Essential hypertension  BP elevated but patient has whitecoat hypertension    Class 2 severe obesity due to excess calories with serious comorbidity and body mass index (BMI) of 36.0 to 36.9 in adult (CMS/[facility])  Lifestyle modification    Preoperative cardiovascular examination  Please see below    Spinal stenosis of lumbar region with neurogenic claudication  Patient is scheduled for L3-S1 Posterior Lumbar Decompression, Posterior Lumbar Interbody Fusion with Dr. Fishman on 11/3/2021  Pre and postop management per primary       In regards to perioperative cardiac risk:  The patient denies any history of ischemic heart disease, denies any history of CHF, denies any history of CVA, is not on pre-operative treatment with insulin, and does not have a pre-operative creatinine > 2 mg/dL.   The Revised Cardiac Risk Index (RCRI) for this patient indicates 0.4%  risk.     Further comments:  Resume supplements when OK with surgical team.  I would encourage incentive spirometry to assist with minimizing grazyna-operative pulmonary risk.  DVT prophylaxis and timing of such per the discretion of the surgeon.     Please do not hesitate to contact [facility] during the upcoming hospitalization with any questions or concerns.     Jose Juan White MD  11/1/2021

## 2021-11-02 ENCOUNTER — ANESTHESIA EVENT (OUTPATIENT)
Dept: OPERATING ROOM | Facility: HOSPITAL | Age: 71
Setting detail: SURGERY ADMIT
DRG: 455 | End: 2021-11-02
Payer: COMMERCIAL

## 2021-11-02 LAB
ATRIAL RATE: 63
P AXIS: 73
PR INTERVAL: 150
QRS DURATION: 82
QT INTERVAL: 426
QTC CALCULATION(BAZETT): 435
R AXIS: 6
SARS-COV-2 RNA RESP QL NAA+PROBE: NEGATIVE
T WAVE AXIS: 51
VENTRICULAR RATE: 63

## 2021-11-03 ENCOUNTER — HOSPITAL ENCOUNTER (INPATIENT)
Facility: HOSPITAL | Age: 71
LOS: 4 days | Discharge: HOME | DRG: 455 | End: 2021-11-07
Attending: ORTHOPAEDIC SURGERY | Admitting: ORTHOPAEDIC SURGERY
Payer: COMMERCIAL

## 2021-11-03 ENCOUNTER — APPOINTMENT (OUTPATIENT)
Dept: RADIOLOGY | Facility: HOSPITAL | Age: 71
Setting detail: SURGERY ADMIT
DRG: 455 | End: 2021-11-03
Attending: ORTHOPAEDIC SURGERY
Payer: COMMERCIAL

## 2021-11-03 ENCOUNTER — ANESTHESIA (OUTPATIENT)
Dept: OPERATING ROOM | Facility: HOSPITAL | Age: 71
Setting detail: SURGERY ADMIT
DRG: 455 | End: 2021-11-03
Payer: COMMERCIAL

## 2021-11-03 PROBLEM — M48.062 LUMBAR STENOSIS WITH NEUROGENIC CLAUDICATION: Status: ACTIVE | Noted: 2021-11-03

## 2021-11-03 PROCEDURE — 63700000 HC SELF-ADMINISTRABLE DRUG: Performed by: STUDENT IN AN ORGANIZED HEALTH CARE EDUCATION/TRAINING PROGRAM

## 2021-11-03 PROCEDURE — 25800000 HC PHARMACY IV SOLUTIONS: Performed by: NURSE ANESTHETIST, CERTIFIED REGISTERED

## 2021-11-03 PROCEDURE — 99221 1ST HOSP IP/OBS SF/LOW 40: CPT | Performed by: STUDENT IN AN ORGANIZED HEALTH CARE EDUCATION/TRAINING PROGRAM

## 2021-11-03 PROCEDURE — 63600000 HC DRUGS/DETAIL CODE: Performed by: NURSE ANESTHETIST, CERTIFIED REGISTERED

## 2021-11-03 PROCEDURE — 25000000 HC PHARMACY GENERAL: Performed by: NURSE ANESTHETIST, CERTIFIED REGISTERED

## 2021-11-03 PROCEDURE — 27800000 HC SUPPLY/IMPLANTS: Performed by: ORTHOPAEDIC SURGERY

## 2021-11-03 PROCEDURE — 25800000 HC PHARMACY IV SOLUTIONS: Performed by: ORTHOPAEDIC SURGERY

## 2021-11-03 PROCEDURE — 97162 PT EVAL MOD COMPLEX 30 MIN: CPT | Mod: GP

## 2021-11-03 PROCEDURE — 36000025 HC CELL SAVER PROCED

## 2021-11-03 PROCEDURE — 63600000 HC DRUGS/DETAIL CODE: Performed by: ANESTHESIOLOGY

## 2021-11-03 PROCEDURE — 63700000 HC SELF-ADMINISTRABLE DRUG: Performed by: NURSE PRACTITIONER

## 2021-11-03 PROCEDURE — 12000000 HC ROOM AND CARE MED/SURG

## 2021-11-03 PROCEDURE — 63600000 HC DRUGS/DETAIL CODE: Performed by: STUDENT IN AN ORGANIZED HEALTH CARE EDUCATION/TRAINING PROGRAM

## 2021-11-03 PROCEDURE — 36000004 HC OR LEVEL 4 INITIAL 30MIN: Performed by: ORTHOPAEDIC SURGERY

## 2021-11-03 PROCEDURE — 63600000 HC DRUGS/DETAIL CODE: Performed by: ORTHOPAEDIC SURGERY

## 2021-11-03 PROCEDURE — 25000000 HC PHARMACY GENERAL: Performed by: STUDENT IN AN ORGANIZED HEALTH CARE EDUCATION/TRAINING PROGRAM

## 2021-11-03 PROCEDURE — 27200000 HC STERILE SUPPLY: Performed by: ORTHOPAEDIC SURGERY

## 2021-11-03 PROCEDURE — 97530 THERAPEUTIC ACTIVITIES: CPT | Mod: GP

## 2021-11-03 PROCEDURE — 25000000 HC PHARMACY GENERAL: Performed by: ORTHOPAEDIC SURGERY

## 2021-11-03 PROCEDURE — 25800000 HC PHARMACY IV SOLUTIONS: Performed by: STUDENT IN AN ORGANIZED HEALTH CARE EDUCATION/TRAINING PROGRAM

## 2021-11-03 PROCEDURE — 71000011 HC PACU PHASE 1 EA ADDL MIN: Performed by: ORTHOPAEDIC SURGERY

## 2021-11-03 PROCEDURE — 71000001 HC PACU PHASE 1 INITIAL 30MIN: Performed by: ORTHOPAEDIC SURGERY

## 2021-11-03 PROCEDURE — C1713 ANCHOR/SCREW BN/BN,TIS/BN: HCPCS | Performed by: ORTHOPAEDIC SURGERY

## 2021-11-03 PROCEDURE — 36000014 HC OR LEVEL 4 EA ADDL MIN: Performed by: ORTHOPAEDIC SURGERY

## 2021-11-03 PROCEDURE — 72100 X-RAY EXAM L-S SPINE 2/3 VWS: CPT

## 2021-11-03 PROCEDURE — 37000001 HC ANESTHESIA GENERAL: Performed by: ORTHOPAEDIC SURGERY

## 2021-11-03 DEVICE — INFUSE BONE GRAFT LARGE II KIT: Type: IMPLANTABLE DEVICE | Site: SPINE LUMBAR | Status: FUNCTIONAL

## 2021-11-03 DEVICE — BONE CHIPS 30CC FINE FILLER: Type: IMPLANTABLE DEVICE | Site: SPINE LUMBAR | Status: FUNCTIONAL

## 2021-11-03 DEVICE — CAGE 12X26/8-12MM EXPANDABLE CALIBER: Type: IMPLANTABLE DEVICE | Site: SPINE LUMBAR | Status: FUNCTIONAL

## 2021-11-03 DEVICE — SCREW EXPEDIUM 6 X 45MM: Type: IMPLANTABLE DEVICE | Site: SPINE LUMBAR | Status: FUNCTIONAL

## 2021-11-03 DEVICE — SCREW EXPEDIUM 7.0 X 45MM: Type: IMPLANTABLE DEVICE | Site: SPINE LUMBAR | Status: FUNCTIONAL

## 2021-11-03 DEVICE — SET SCREWS EXPEDIUM: Type: IMPLANTABLE DEVICE | Site: SPINE LUMBAR | Status: FUNCTIONAL

## 2021-11-03 DEVICE — ROD EXPEDIUM 85MM: Type: IMPLANTABLE DEVICE | Site: SPINE LUMBAR | Status: FUNCTIONAL

## 2021-11-03 RX ORDER — DEXTROSE 50 % IN WATER (D50W) INTRAVENOUS SYRINGE
25 AS NEEDED
Status: DISCONTINUED | OUTPATIENT
Start: 2021-11-03 | End: 2021-11-07 | Stop reason: HOSPADM

## 2021-11-03 RX ORDER — FENTANYL CITRATE 50 UG/ML
INJECTION, SOLUTION INTRAMUSCULAR; INTRAVENOUS AS NEEDED
Status: DISCONTINUED | OUTPATIENT
Start: 2021-11-03 | End: 2021-11-03 | Stop reason: SURG

## 2021-11-03 RX ORDER — SERTRALINE HYDROCHLORIDE 25 MG/1
50 TABLET, FILM COATED ORAL EVERY MORNING
Status: DISCONTINUED | OUTPATIENT
Start: 2021-11-04 | End: 2021-11-07 | Stop reason: HOSPADM

## 2021-11-03 RX ORDER — IBUPROFEN 200 MG
16-32 TABLET ORAL AS NEEDED
Status: DISCONTINUED | OUTPATIENT
Start: 2021-11-03 | End: 2021-11-07 | Stop reason: HOSPADM

## 2021-11-03 RX ORDER — PANTOPRAZOLE SODIUM 20 MG/1
20 TABLET, DELAYED RELEASE ORAL DAILY
Status: DISCONTINUED | OUTPATIENT
Start: 2021-11-03 | End: 2021-11-07 | Stop reason: HOSPADM

## 2021-11-03 RX ORDER — VANCOMYCIN HYDROCHLORIDE
1250 ONCE
Status: COMPLETED | OUTPATIENT
Start: 2021-11-03 | End: 2021-11-03

## 2021-11-03 RX ORDER — ACETAMINOPHEN 325 MG/1
650 TABLET ORAL EVERY 6 HOURS
Status: DISPENSED | OUTPATIENT
Start: 2021-11-03 | End: 2021-11-05

## 2021-11-03 RX ORDER — SODIUM CHLORIDE 9 MG/ML
INJECTION, SOLUTION INTRAVENOUS CONTINUOUS
Status: DISCONTINUED | OUTPATIENT
Start: 2021-11-03 | End: 2021-11-03

## 2021-11-03 RX ORDER — SODIUM CHLORIDE, SODIUM GLUCONATE, SODIUM ACETATE, POTASSIUM CHLORIDE AND MAGNESIUM CHLORIDE 30; 37; 368; 526; 502 MG/100ML; MG/100ML; MG/100ML; MG/100ML; MG/100ML
INJECTION, SOLUTION INTRAVENOUS CONTINUOUS PRN
Status: DISCONTINUED | OUTPATIENT
Start: 2021-11-03 | End: 2021-11-03 | Stop reason: SURG

## 2021-11-03 RX ORDER — PROPOFOL 10 MG/ML
INJECTION, EMULSION INTRAVENOUS AS NEEDED
Status: DISCONTINUED | OUTPATIENT
Start: 2021-11-03 | End: 2021-11-03 | Stop reason: SURG

## 2021-11-03 RX ORDER — ALUMINUM HYDROXIDE, MAGNESIUM HYDROXIDE, AND SIMETHICONE 1200; 120; 1200 MG/30ML; MG/30ML; MG/30ML
30 SUSPENSION ORAL EVERY 4 HOURS PRN
Status: DISCONTINUED | OUTPATIENT
Start: 2021-11-03 | End: 2021-11-07 | Stop reason: HOSPADM

## 2021-11-03 RX ORDER — ROSUVASTATIN CALCIUM 20 MG/1
20 TABLET, COATED ORAL DAILY
Status: DISCONTINUED | OUTPATIENT
Start: 2021-11-04 | End: 2021-11-07 | Stop reason: HOSPADM

## 2021-11-03 RX ORDER — LIDOCAINE HYDROCHLORIDE 10 MG/ML
INJECTION, SOLUTION INFILTRATION; PERINEURAL AS NEEDED
Status: DISCONTINUED | OUTPATIENT
Start: 2021-11-03 | End: 2021-11-03 | Stop reason: SURG

## 2021-11-03 RX ORDER — GLYCOPYRROLATE 0.6MG/3ML
SYRINGE (ML) INTRAVENOUS AS NEEDED
Status: DISCONTINUED | OUTPATIENT
Start: 2021-11-03 | End: 2021-11-03 | Stop reason: SURG

## 2021-11-03 RX ORDER — DIAZEPAM 5 MG/1
5 TABLET ORAL EVERY 6 HOURS PRN
Status: DISCONTINUED | OUTPATIENT
Start: 2021-11-03 | End: 2021-11-03

## 2021-11-03 RX ORDER — BISACODYL 10 MG/1
10 SUPPOSITORY RECTAL DAILY PRN
Status: DISCONTINUED | OUTPATIENT
Start: 2021-11-03 | End: 2021-11-07 | Stop reason: HOSPADM

## 2021-11-03 RX ORDER — DEXTROSE 40 %
15-30 GEL (GRAM) ORAL AS NEEDED
Status: DISCONTINUED | OUTPATIENT
Start: 2021-11-03 | End: 2021-11-03 | Stop reason: SDUPTHER

## 2021-11-03 RX ORDER — VANCOMYCIN HYDROCHLORIDE 1 G/20ML
INJECTION, POWDER, LYOPHILIZED, FOR SOLUTION INTRAVENOUS
Status: DISCONTINUED | OUTPATIENT
Start: 2021-11-03 | End: 2021-11-03 | Stop reason: HOSPADM

## 2021-11-03 RX ORDER — FENOFIBRATE 67 MG/1
67 CAPSULE ORAL
Status: DISCONTINUED | OUTPATIENT
Start: 2021-11-04 | End: 2021-11-07 | Stop reason: HOSPADM

## 2021-11-03 RX ORDER — DIAZEPAM 5 MG/1
10 TABLET ORAL EVERY 6 HOURS PRN
Status: DISCONTINUED | OUTPATIENT
Start: 2021-11-03 | End: 2021-11-07 | Stop reason: HOSPADM

## 2021-11-03 RX ORDER — FENTANYL CITRATE 50 UG/ML
50 INJECTION, SOLUTION INTRAMUSCULAR; INTRAVENOUS
Status: COMPLETED | OUTPATIENT
Start: 2021-11-03 | End: 2021-11-03

## 2021-11-03 RX ORDER — DEXAMETHASONE SODIUM PHOSPHATE 4 MG/ML
INJECTION, SOLUTION INTRA-ARTICULAR; INTRALESIONAL; INTRAMUSCULAR; INTRAVENOUS; SOFT TISSUE AS NEEDED
Status: DISCONTINUED | OUTPATIENT
Start: 2021-11-03 | End: 2021-11-03 | Stop reason: SURG

## 2021-11-03 RX ORDER — DIPHENHYDRAMINE HCL 25 MG
25 CAPSULE ORAL EVERY 6 HOURS PRN
Status: DISCONTINUED | OUTPATIENT
Start: 2021-11-03 | End: 2021-11-07 | Stop reason: HOSPADM

## 2021-11-03 RX ORDER — HYDROMORPHONE HYDROCHLORIDE 1 MG/ML
0.5 INJECTION, SOLUTION INTRAMUSCULAR; INTRAVENOUS; SUBCUTANEOUS
Status: DISCONTINUED | OUTPATIENT
Start: 2021-11-03 | End: 2021-11-03 | Stop reason: HOSPADM

## 2021-11-03 RX ORDER — DIPHENHYDRAMINE HCL 50 MG/ML
25 VIAL (ML) INJECTION EVERY 6 HOURS PRN
Status: DISCONTINUED | OUTPATIENT
Start: 2021-11-03 | End: 2021-11-07 | Stop reason: HOSPADM

## 2021-11-03 RX ORDER — HYDROMORPHONE HYDROCHLORIDE 1 MG/ML
0.5 INJECTION, SOLUTION INTRAMUSCULAR; INTRAVENOUS; SUBCUTANEOUS EVERY 4 HOURS PRN
Status: DISCONTINUED | OUTPATIENT
Start: 2021-11-03 | End: 2021-11-04

## 2021-11-03 RX ORDER — ONDANSETRON HYDROCHLORIDE 2 MG/ML
INJECTION, SOLUTION INTRAVENOUS AS NEEDED
Status: DISCONTINUED | OUTPATIENT
Start: 2021-11-03 | End: 2021-11-03 | Stop reason: SURG

## 2021-11-03 RX ORDER — DEXTROSE 40 %
15-30 GEL (GRAM) ORAL AS NEEDED
Status: DISCONTINUED | OUTPATIENT
Start: 2021-11-03 | End: 2021-11-07 | Stop reason: HOSPADM

## 2021-11-03 RX ORDER — DEXTROSE 50 % IN WATER (D50W) INTRAVENOUS SYRINGE
25 AS NEEDED
Status: DISCONTINUED | OUTPATIENT
Start: 2021-11-03 | End: 2021-11-03 | Stop reason: SDUPTHER

## 2021-11-03 RX ORDER — MIDAZOLAM HYDROCHLORIDE 2 MG/2ML
INJECTION, SOLUTION INTRAMUSCULAR; INTRAVENOUS AS NEEDED
Status: DISCONTINUED | OUTPATIENT
Start: 2021-11-03 | End: 2021-11-03 | Stop reason: SURG

## 2021-11-03 RX ORDER — ROCURONIUM BROMIDE 10 MG/ML
INJECTION, SOLUTION INTRAVENOUS AS NEEDED
Status: DISCONTINUED | OUTPATIENT
Start: 2021-11-03 | End: 2021-11-03 | Stop reason: SURG

## 2021-11-03 RX ORDER — POLYETHYLENE GLYCOL 3350 17 G/17G
17 POWDER, FOR SOLUTION ORAL DAILY
Status: DISCONTINUED | OUTPATIENT
Start: 2021-11-03 | End: 2021-11-07 | Stop reason: HOSPADM

## 2021-11-03 RX ORDER — PHENYLEPHRINE HYDROCHLORIDE 10 MG/ML
INJECTION INTRAVENOUS AS NEEDED
Status: DISCONTINUED | OUTPATIENT
Start: 2021-11-03 | End: 2021-11-03 | Stop reason: SURG

## 2021-11-03 RX ORDER — SODIUM CHLORIDE 9 MG/ML
INJECTION, SOLUTION INTRAVENOUS CONTINUOUS
Status: ACTIVE | OUTPATIENT
Start: 2021-11-03 | End: 2021-11-04

## 2021-11-03 RX ORDER — HYDROMORPHONE HYDROCHLORIDE 1 MG/ML
INJECTION, SOLUTION INTRAMUSCULAR; INTRAVENOUS; SUBCUTANEOUS AS NEEDED
Status: DISCONTINUED | OUTPATIENT
Start: 2021-11-03 | End: 2021-11-03 | Stop reason: SURG

## 2021-11-03 RX ORDER — IBUPROFEN 200 MG
16-32 TABLET ORAL AS NEEDED
Status: DISCONTINUED | OUTPATIENT
Start: 2021-11-03 | End: 2021-11-03 | Stop reason: SDUPTHER

## 2021-11-03 RX ORDER — ONDANSETRON HYDROCHLORIDE 2 MG/ML
4 INJECTION, SOLUTION INTRAVENOUS
Status: DISCONTINUED | OUTPATIENT
Start: 2021-11-03 | End: 2021-11-03 | Stop reason: HOSPADM

## 2021-11-03 RX ORDER — CEFAZOLIN SODIUM/WATER 2 G/20 ML
2 SYRINGE (ML) INTRAVENOUS ONCE
Status: COMPLETED | OUTPATIENT
Start: 2021-11-03 | End: 2021-11-03

## 2021-11-03 RX ORDER — DIAZEPAM 5 MG/1
5 TABLET ORAL ONCE
Status: COMPLETED | OUTPATIENT
Start: 2021-11-03 | End: 2021-11-03

## 2021-11-03 RX ORDER — EPHEDRINE SULFATE 50 MG/ML
INJECTION, SOLUTION INTRAVENOUS AS NEEDED
Status: DISCONTINUED | OUTPATIENT
Start: 2021-11-03 | End: 2021-11-03 | Stop reason: SURG

## 2021-11-03 RX ORDER — AMOXICILLIN 250 MG
1 CAPSULE ORAL 2 TIMES DAILY
Status: DISCONTINUED | OUTPATIENT
Start: 2021-11-03 | End: 2021-11-07 | Stop reason: HOSPADM

## 2021-11-03 RX ORDER — OXYCODONE HYDROCHLORIDE 5 MG/1
5 TABLET ORAL EVERY 4 HOURS PRN
Status: DISCONTINUED | OUTPATIENT
Start: 2021-11-03 | End: 2021-11-04

## 2021-11-03 RX ADMIN — HYDROMORPHONE HYDROCHLORIDE 0.5 MG: 1 INJECTION, SOLUTION INTRAMUSCULAR; INTRAVENOUS; SUBCUTANEOUS at 12:11

## 2021-11-03 RX ADMIN — HYDROMORPHONE HYDROCHLORIDE 0.5 MG: 1 INJECTION, SOLUTION INTRAMUSCULAR; INTRAVENOUS; SUBCUTANEOUS at 11:49

## 2021-11-03 RX ADMIN — EPHEDRINE SULFATE 10 MG: 50 INJECTION, SOLUTION INTRAVENOUS at 10:13

## 2021-11-03 RX ADMIN — SUGAMMADEX 200 MG: 100 INJECTION, SOLUTION INTRAVENOUS at 11:13

## 2021-11-03 RX ADMIN — DIAZEPAM 5 MG: 5 TABLET ORAL at 13:19

## 2021-11-03 RX ADMIN — HYDROMORPHONE HYDROCHLORIDE 0.5 MG: 1 INJECTION, SOLUTION INTRAMUSCULAR; INTRAVENOUS; SUBCUTANEOUS at 09:59

## 2021-11-03 RX ADMIN — ROCURONIUM BROMIDE 50 MG: 10 INJECTION INTRAVENOUS at 10:06

## 2021-11-03 RX ADMIN — PROPOFOL 200 MG: 10 INJECTION, EMULSION INTRAVENOUS at 09:11

## 2021-11-03 RX ADMIN — FENTANYL CITRATE 50 MCG: 50 INJECTION INTRAMUSCULAR; INTRAVENOUS at 13:28

## 2021-11-03 RX ADMIN — OXYCODONE HYDROCHLORIDE 5 MG: 5 TABLET ORAL at 20:03

## 2021-11-03 RX ADMIN — PHENYLEPHRINE HYDROCHLORIDE 100 MCG: 10 INJECTION INTRAVENOUS at 11:09

## 2021-11-03 RX ADMIN — MIDAZOLAM HYDROCHLORIDE 2 MG: 1 INJECTION, SOLUTION INTRAMUSCULAR; INTRAVENOUS at 09:02

## 2021-11-03 RX ADMIN — ONDANSETRON 4 MG: 2 INJECTION INTRAMUSCULAR; INTRAVENOUS at 11:18

## 2021-11-03 RX ADMIN — SODIUM CHLORIDE: 9 INJECTION, SOLUTION INTRAVENOUS at 07:40

## 2021-11-03 RX ADMIN — DIAZEPAM 5 MG: 5 TABLET ORAL at 14:40

## 2021-11-03 RX ADMIN — SODIUM CHLORIDE: 9 INJECTION, SOLUTION INTRAVENOUS at 16:23

## 2021-11-03 RX ADMIN — FENTANYL CITRATE 50 MCG: 50 INJECTION INTRAMUSCULAR; INTRAVENOUS at 12:40

## 2021-11-03 RX ADMIN — SODIUM CHLORIDE 1250 MG: 9 INJECTION, SOLUTION INTRAVENOUS at 08:27

## 2021-11-03 RX ADMIN — FENTANYL CITRATE 50 MCG: 50 INJECTION INTRAMUSCULAR; INTRAVENOUS at 12:25

## 2021-11-03 RX ADMIN — HYDROMORPHONE HYDROCHLORIDE 0.5 MG: 1 INJECTION, SOLUTION INTRAMUSCULAR; INTRAVENOUS; SUBCUTANEOUS at 22:52

## 2021-11-03 RX ADMIN — ONDANSETRON HYDROCHLORIDE 4 MG: 2 SOLUTION INTRAMUSCULAR; INTRAVENOUS at 14:05

## 2021-11-03 RX ADMIN — OXYCODONE HYDROCHLORIDE 5 MG: 5 TABLET ORAL at 16:22

## 2021-11-03 RX ADMIN — HYDROMORPHONE HYDROCHLORIDE 0.5 MG: 1 INJECTION, SOLUTION INTRAMUSCULAR; INTRAVENOUS; SUBCUTANEOUS at 14:06

## 2021-11-03 RX ADMIN — HYDROMORPHONE HYDROCHLORIDE 0.5 MG: 1 INJECTION, SOLUTION INTRAMUSCULAR; INTRAVENOUS; SUBCUTANEOUS at 13:50

## 2021-11-03 RX ADMIN — HYDROMORPHONE HYDROCHLORIDE 0.5 MG: 1 INJECTION, SOLUTION INTRAMUSCULAR; INTRAVENOUS; SUBCUTANEOUS at 11:43

## 2021-11-03 RX ADMIN — LIDOCAINE HYDROCHLORIDE 5 ML: 10 INJECTION, SOLUTION INFILTRATION; PERINEURAL at 09:11

## 2021-11-03 RX ADMIN — ACETAMINOPHEN 650 MG: 325 TABLET, FILM COATED ORAL at 17:33

## 2021-11-03 RX ADMIN — FENTANYL CITRATE 50 MCG: 50 INJECTION INTRAMUSCULAR; INTRAVENOUS at 09:11

## 2021-11-03 RX ADMIN — HYDROMORPHONE HYDROCHLORIDE 0.5 MG: 1 INJECTION, SOLUTION INTRAMUSCULAR; INTRAVENOUS; SUBCUTANEOUS at 10:07

## 2021-11-03 RX ADMIN — DIAZEPAM 10 MG: 5 TABLET ORAL at 21:22

## 2021-11-03 RX ADMIN — PANTOPRAZOLE SODIUM 20 MG: 20 TABLET, DELAYED RELEASE ORAL at 16:22

## 2021-11-03 RX ADMIN — EPHEDRINE SULFATE 10 MG: 50 INJECTION, SOLUTION INTRAVENOUS at 11:05

## 2021-11-03 RX ADMIN — CEFAZOLIN SODIUM 2 G: 10 POWDER, FOR SOLUTION INTRAVENOUS at 17:33

## 2021-11-03 RX ADMIN — EPHEDRINE SULFATE 10 MG: 50 INJECTION, SOLUTION INTRAVENOUS at 10:39

## 2021-11-03 RX ADMIN — DEXAMETHASONE SODIUM PHOSPHATE 10 MG: 4 INJECTION, SOLUTION INTRA-ARTICULAR; INTRALESIONAL; INTRAMUSCULAR; INTRAVENOUS; SOFT TISSUE at 09:28

## 2021-11-03 RX ADMIN — GLYCOPYRROLATE 0.1 MG: 0.2 INJECTION, SOLUTION INTRAMUSCULAR; INTRAVITREAL at 09:28

## 2021-11-03 RX ADMIN — EPHEDRINE SULFATE 10 MG: 50 INJECTION, SOLUTION INTRAVENOUS at 11:03

## 2021-11-03 RX ADMIN — Medication 2 G: at 09:49

## 2021-11-03 RX ADMIN — DOCUSATE SODIUM AND SENNOSIDES 1 TABLET: 8.6; 5 TABLET, FILM COATED ORAL at 20:03

## 2021-11-03 RX ADMIN — HYDROMORPHONE HYDROCHLORIDE 0.5 MG: 1 INJECTION, SOLUTION INTRAMUSCULAR; INTRAVENOUS; SUBCUTANEOUS at 14:46

## 2021-11-03 RX ADMIN — FENTANYL CITRATE 50 MCG: 50 INJECTION INTRAMUSCULAR; INTRAVENOUS at 13:02

## 2021-11-03 RX ADMIN — SODIUM CHLORIDE, SODIUM GLUCONATE, SODIUM ACETATE, POTASSIUM CHLORIDE AND MAGNESIUM CHLORIDE: 526; 502; 368; 37; 30 INJECTION, SOLUTION INTRAVENOUS at 11:38

## 2021-11-03 RX ADMIN — ROCURONIUM BROMIDE 50 MG: 10 INJECTION INTRAVENOUS at 09:11

## 2021-11-03 RX ADMIN — FENTANYL CITRATE 50 MCG: 50 INJECTION INTRAMUSCULAR; INTRAVENOUS at 10:06

## 2021-11-03 RX ADMIN — HYDROMORPHONE HYDROCHLORIDE 0.5 MG: 1 INJECTION, SOLUTION INTRAMUSCULAR; INTRAVENOUS; SUBCUTANEOUS at 15:32

## 2021-11-03 ASSESSMENT — COGNITIVE AND FUNCTIONAL STATUS - GENERAL
CLIMB 3 TO 5 STEPS WITH RAILING: 2 - A LOT
WALKING IN HOSPITAL ROOM: 3 - A LITTLE
STANDING UP FROM CHAIR USING ARMS: 3 - A LITTLE
MOVING TO AND FROM BED TO CHAIR: 3 - A LITTLE
AFFECT: WFL

## 2021-11-03 ASSESSMENT — PATIENT HEALTH QUESTIONNAIRE - PHQ9: SUM OF ALL RESPONSES TO PHQ9 QUESTIONS 1 & 2: 0

## 2021-11-03 NOTE — CONSULTS
Hospital Medicine Service -  Inpatient Consultation         Requesting Physician: Melchor Lee    Reason for Consultation: Medical management     HISTORY OF PRESENT ILLNESS        This is a 71 y.o. female with past medical history of HTN, Anxiety, Lumbar stenosis, underwent L3-S1 Posterior Lumbar Decompression, Posterior Lumbar Interbody Fusion by orthopedics today. Postop patient was admitted to inpatient and medicine team is being consulted for her medication management.     Upon my evaluation patient complains of lower back pain, but no lower extremity pain/numbness/tingling. Denies any nausea/vomiting/abdominal pain.     PAST MEDICAL AND SURGICAL HISTORY        Past Medical History:   Diagnosis Date   • Abnormal ECG 10/9/2018    Nonspecific T wave flattening inferiorly minimal ST depression laterally and anterolateral T wave inversion   • Agatston CAC score 100-199 10/9/2018    8/17 coronary calcium score 101 LAD 13, circumflex 68, RCA 20   • Anxiety    • Class 2 severe obesity due to excess calories with serious comorbidity and body mass index (BMI) of 36.0 to 36.9 in adult (CMS/[facility]) 10/9/2018    2017 weight 232 pounds   • DJD (degenerative joint disease)    • Family history of early CAD 10/9/2018    Father  of MI at age 59   • Hypertension    • Lumbar stenosis    • Tran-Juan tear    • Mixed hyperlipidemia 10/9/2018    2020 Lipid panel  LDL 91 HDL 47 Trigs 176       Past Surgical History:   Procedure Laterality Date   • CHOLECYSTECTOMY     • COLONOSCOPY     • INNER EAR SURGERY     • JOINT REPLACEMENT      bilateral 2021   • KNEE ARTHROSCOPY     • TRANSUMBILICAL AUGMENTATION MAMMAPLASTY     • UPPER GASTROINTESTINAL ENDOSCOPY     • WISDOM TOOTH EXTRACTION         PCP: Lubna Kerr MD    MEDICATIONS        Home Medications:  Medications Prior to Admission   Medication Sig Dispense Refill Last Dose   • fenofibrate (TRICOR) 48 mg tablet Take 48 mg by mouth daily.     11/3/2021 at Unknown  time   • gabapentin (NEURONTIN) 100 mg capsule Take 100 mg by mouth 3 (three) times a day.   2021 at Unknown time   • irbesartan (AVAPRO) 150 mg tablet Take 150 mg by mouth 2 (two) times a day.     2021 at Unknown time   • multivitamin capsule Take 1 capsule by mouth daily.   Past Week at Unknown time   • rosuvastatin (CRESTOR) 20 mg tablet Take 20 mg by mouth daily.   11/3/2021 at Unknown time   • sertraline (ZOLOFT) 50 mg tablet Take 50 mg by mouth every morning.     11/3/2021 at Unknown time       Current inpatient medications were personally reviewed.    ALLERGIES        Nsaids (non-steroidal anti-inflammatory drug) and Adhesive tape-silicones    FAMILY HISTORY        Family History   Problem Relation Age of Onset   • Heart attack Biological Father        SOCIAL HISTORY        Social History     Socioeconomic History   • Marital status:      Spouse name: None   • Number of children: None   • Years of education: None   • Highest education level: None   Occupational History   • None   Tobacco Use   • Smoking status: Former Smoker     Packs/day: 1.00     Years: 20.00     Pack years: 20.00     Types: Cigarettes     Quit date:      Years since quittin.8   • Smokeless tobacco: Never Used   Substance and Sexual Activity   • Alcohol use: Yes     Comment: holidays   • Drug use: No   • Sexual activity: Defer   Other Topics Concern   • None   Social History Narrative   • None     Social Determinants of Health     Financial Resource Strain:    • Difficulty of Paying Living Expenses: Not on file   Food Insecurity:    • Worried About Running Out of Food in the Last Year: Not on file   • Ran Out of Food in the Last Year: Not on file   Transportation Needs:    • Lack of Transportation (Medical): Not on file   • Lack of Transportation (Non-Medical): Not on file   Physical Activity:    • Days of Exercise per Week: Not on file   • Minutes of Exercise per Session: Not on file   Stress:    • Feeling of  "Stress : Not on file   Social Connections:    • Frequency of Communication with Friends and Family: Not on file   • Frequency of Social Gatherings with Friends and Family: Not on file   • Attends Latter-day Services: Not on file   • Active Member of Clubs or Organizations: Not on file   • Attends Club or Organization Meetings: Not on file   • Marital Status: Not on file   Intimate Partner Violence:    • Fear of Current or Ex-Partner: Not on file   • Emotionally Abused: Not on file   • Physically Abused: Not on file   • Sexually Abused: Not on file   Housing Stability:    • Unable to Pay for Housing in the Last Year: Not on file   • Number of Places Lived in the Last Year: Not on file   • Unstable Housing in the Last Year: Not on file       REVIEW OF SYSTEMS        All other systems reviewed and negative except as noted in HPI    PHYSICAL EXAMINATION        Visit Vitals  BP (!) 101/55 (BP Location: Right upper arm)   Pulse 92   Temp 36.3 °C (97.3 °F) (Oral)   Resp 16   Ht 1.626 m (5' 4\")   Wt 97.1 kg (214 lb)   SpO2 98%   BMI 36.73 kg/m²     Body mass index is 36.73 kg/m².    Intake/Output Summary (Last 24 hours) at 11/3/2021 1725  Last data filed at 11/3/2021 1500  Gross per 24 hour   Intake 1900 ml   Output 762 ml   Net 1138 ml       Physical Exam  Constitutional:       Appearance: Normal appearance.   HENT:      Head: Normocephalic and atraumatic.      Right Ear: Tympanic membrane normal.      Left Ear: Tympanic membrane normal.      Nose: Nose normal.      Mouth/Throat:      Mouth: Mucous membranes are moist.   Eyes:      Extraocular Movements: Extraocular movements intact.      Pupils: Pupils are equal, round, and reactive to light.   Cardiovascular:      Rate and Rhythm: Normal rate and regular rhythm.      Pulses: Normal pulses.      Heart sounds: Normal heart sounds.   Pulmonary:      Effort: Pulmonary effort is normal.      Breath sounds: Normal breath sounds.   Abdominal:      General: Bowel sounds are " normal.      Palpations: Abdomen is soft.   Musculoskeletal:         General: Normal range of motion.   Skin:     General: Skin is warm.      Capillary Refill: Capillary refill takes less than 2 seconds.   Neurological:      Mental Status: She is alert. Mental status is at baseline.         LABS / EKG        Labs  Results from last 7 days   Lab Units 11/01/21  1023   WBC K/uL 7.21   HEMOGLOBIN g/dL 13.8   HEMATOCRIT % 41.3   PLATELETS K/uL 315     Results from last 7 days   Lab Units 11/01/21  1023   SODIUM mEQ/L 137   POTASSIUM mEQ/L 3.9   CHLORIDE mEQ/L 100   CO2 mEQ/L 25   BUN mg/dL 19   CREATININE mg/dL 0.6   GLUCOSE mg/dL 96   CALCIUM mg/dL 10.0     Microbiology Results     Procedure Component Value Units Date/Time    COVID-19 PAT/Pre-procedural [[id number]]  (Normal) Collected: 11/01/21 1029    Specimen: Nasopharyngeal Swab from Nasopharynx Updated: 11/02/21 0132    Narrative:      The following orders were created for panel order COVID-19 PAT/Pre-procedural.  Procedure                               Abnormality         Status                     ---------                               -----------         ------                     SARS-CoV-2 (COVID-19), PCR[[id number]]   Normal              Final result                 Please view results for these tests on the individual orders.    SARS-CoV-2 (COVID-19), PCR [[id number]]  (Normal) Collected: 11/01/21 1029    Specimen: Nasopharyngeal Swab from Nasopharynx Updated: 11/02/21 0132     SARS-CoV-2 (COVID-19) Negative            SARS-CoV-2 (COVID-19) (no units)   Date/Time Value   11/01/2021 1029 Negative            Imaging  X-RAY LUMBAR SPINE 2 OR 3 VIEWS    Result Date: 11/3/2021  Narrative: CLINICAL HISTORY: Lumbar fusion. COMMENT: PA and lateral views of the lumbar spine were performed intraoperatively. Initial image demonstrates a surgical instrument at the level of the L5 spinous process. Subsequent images demonstrate L3-S1 fusion hardware with L5-S1 interbody  spacer.     Impression: IMPRESSION: Lumbar fusion.    MRI LUMBAR SPINE WITHOUT CONTRAST    Addendum Date: 10/11/2021 Addendum:   Impression 2.  Incompletely characterized lesion in the right kidney, for which initial follow-up with ultrasound is recommended.    Result Date: 10/11/2021  Narrative: CLINICAL HISTORY:  M 54.5, M 48.062, M 47.816, M 43.17.  Right low back pain and bilateral leg fatigue. COMMENT: Comparison: Lumbar spine radiographs 1/17/2019 Technique: Multiplanar MR imaging of the lumbar spine was performed without intravenous contrast. Findings: For counting purposes, the last fully formed disc space is considered L5-S1 and is seen on series 8, image 21. There is mild exaggeration of the normal lumbar lordosis. Vertebral body heights are maintained. There is trace retrolisthesis of L1 on L2 and grade 1 anterolisthesis of L5 on S1. There are bilateral pars interarticularis defects at L5. There are mild multilevel edematous degenerative endplate changes. There is multilevel disc desiccation and disc space narrowing. No prevertebral or paravertebral soft tissue edema.  There is a 1.5 cm T2 hypointense lesion in the interpolar region of the right kidney, which is incompletely characterized. The conus terminates at the L1 level. The distal cord is normal in size and signal characteristics. Perineural cysts are noted in the lower thoracic spine and sacrum. Level by level assessment: T10-T11: Mild disc osteophyte complex.  Facet hypertrophy.  Bilateral perineural cysts resulting in severe neural foraminal narrowing.  No central canal narrowing. T11-T12: Mild disc osteophyte complex with a small left paracentral disc protrusion.  Facet hypertrophy.  Right perineural cyst resulting in severe right neural foraminal narrowing.  No central canal or left neural foraminal narrowing. T12-L1: Mild disc osteophyte complex.  Facet hypertrophy.  Mild right neural foraminal narrowing.  No central canal narrowing. L1-L2:  Large disc osteophyte complex, eccentric to the right.  Facet hypertrophy.  Severe bilateral neural foraminal narrowing.  No central canal narrowing. L2-L3: Moderate disc osteophyte complex.  Facet hypertrophy with ligamentum flavum infolding.  Mild central canal narrowing.  Moderate bilateral neural foraminal narrowing. L3-L4: Moderate disc osteophyte complex.  Facet hypertrophy with ligamentum flavum infolding.  Moderate central canal narrowing.  Severe bilateral neural foraminal narrowing. L4-L5: Moderate disc osteophyte complex with a superimposed central disc protrusion.  Facet hypertrophy with ligamentum flavum infolding.  Severe bilateral neural foraminal narrowing.  No central canal narrowing. L5-S1: Uncovering of the disc with a large diffuse disc bulge.  Facet hypertrophy.  Severe bilateral neural foraminal narrowing.  No central canal narrowing.     Impression: IMPRESSION: Multilevel degenerative changes of the lumbar spine as described, most pronounced at L3-L4.    ULTRASOUND KIDNEYS    Addendum Date: 10/26/2021 Addendum:   Margarita ALLISON spoke to Bharti in the office of Dr. Melchor Lee on 10/26/21 at 1:15pm and she confirmed receipt of the report and would give to the doctor to review.    Result Date: 10/26/2021  Narrative: CLINICAL HISTORY: N 28.1 COMMENT: Ultrasound of the kidneys was performed Comparison: Correlation is made with relevant images from a right upper quadrant ultrasound performed 10/16/2019 and with a prior abdominal and pelvic CT performed 8/21/2018. The right kidney measures 10.6 x 4.2 x 4.8 cm and the left kidney measures 11.8 x 5.7 x 5.5 cm. The kidneys are unremarkable in contour. A 1.3 x 1.1 x 1.4 cm hypoechoic structure with no internal flow is present at the mid to lower pole of the right kidney. A 1.2 x 0.8 x 1.1 cm exophytic hypoechoic structure is present at the upper pole of the left kidney. There is no demonstrable internal flow though the structure is suboptimally assessed due  to adjacent bowel gas. There is also a 0.6 x 0.6 x 0.5 cm parapelvic cyst at the interpolar region of the left kidney. There is no hydronephrosis, perinephric collection or evidence of a shadowing renal calculus.     Impression: IMPRESSION: 1.4 cm hypoechoic structure with no internal flow in the mid to lower pole the right kidney and 1.2 cm exophytic hypoechoic structure at the upper pole of the left kidney. These are incompletely characterized but could represent complex cysts. Further evaluation with MRI with and without intravenous contrast is recommended. In accordance with [place] Act 112,  the patient will receive a letter notifying them to follow up with their physician.       ASSESSMENT AND RECOMMENDATIONS         Spinal stenosis of lumbar region with neurogenic claudication  Underwent L3-S1 posterior lumbar decompression, posterior lumbar interbody fusion today - POD 0  Cont pain control, bowel regimen, PT/OT  DVT prophylaxis per ortho    Essential hypertension  On Irbesartan at home  Hold for now due to low Bps  Can resume as needed     Mixed HLD  Cont statin and fenofibrate    Class 2 severe obesity due to excess calories with serious comorbidity and body mass index (BMI) of 36.0 to 36.9 in adult (CMS/[facility])  Lifestyle modification    Anxiety  Continue Mamie Ladd MD  11/3/2021

## 2021-11-03 NOTE — ANESTHESIA PROCEDURE NOTES
Airway  Urgency: elective    Start Time: 11/3/2021 9:15 AM  Stop Time: 11/3/2021 9:15 AM    Airway not difficult    General Information and Staff    Patient location during procedure: OR  Anesthesiologist: Cherise Kim MD  Resident/CRNA: Mark Tejeda CRNA  Performed: resident/CRNA     Indications and Patient Condition  Indications for airway management: anesthesia  Sedation level: deep  Preoxygenated: yes  Patient position: sniffing  Mask difficulty assessment: 2 - vent by mask + OA or adjuvant +/- NMBA    Final Airway Details  Final airway type: endotracheal airway      Successful airway: ETT    Successful intubation technique: direct laryngoscopy  Facilitating devices/methods: intubating stylet and anterior pressure/BURP  Endotracheal tube insertion site: oral  Blade: Be  Blade size: #3  ETT size (mm): 7.0  Cormack-Lehane Classification: grade I - full view of glottis  Placement verified by: chest auscultation and capnometry   Measured from: lips  ETT to lips (cm): 22  Number of attempts at approach: 1  Atraumatic airway insertion

## 2021-11-03 NOTE — PROGRESS NOTES
Patient: Reyna Castillo  Location: [facility] Operating Room OR  MRN: [id number]  Today's date: 11/3/2021    Attempted to see patient for therapy. Unable due to medical hold.   Spoke to RN, deferred for pain management, patient getting ready to transfer to medical floor. PT will continue to follow.

## 2021-11-03 NOTE — PROGRESS NOTES
Subjective:  Patient seen at bedside. Awake and alert. Denies numbness, tingling, weakness in the LE.     Objective:  AVSS   L spine:  JPs in place, serosanguinous drainage  Dressing clean  5/5 motor in all muscle groups LE  SILT B/L LE    Post op images reviewed     A/P  70 y/o F s/p L3-S1 PLIF with Dr Lee 11/3    Doing well post op  Pain controlled  Abx until drain discontinued  SCDs for dvt ppx  Maintain CARLOS drain, close monitoring of output  AM labs  PT/OT WBAT B/L LE OOB

## 2021-11-03 NOTE — HOSPITAL COURSE
Reyna is a 71 y.o. female admitted on 11/3/2021 with Spondylolisthesis of lumbosacral region [M43.17]  Spinal stenosis of lumbar region with neurogenic claudication [M48.062]  Lumbar stenosis with neurogenic claudication [M48.062]. Principal problem is Lumbar stenosis with neurogenic claudication.    Past Medical History  Reyna has a past medical history of Abnormal ECG (10/9/2018), Agatston CAC score 100-199 (10/9/2018), Anxiety, Class 2 severe obesity due to excess calories with serious comorbidity and body mass index (BMI) of 36.0 to 36.9 in adult (CMS/[facility]) (10/9/2018), DJD (degenerative joint disease), Family history of early CAD (10/9/2018), Hypertension, Lumbar stenosis, Tran-Juan tear, and Mixed hyperlipidemia (10/9/2018).    History of Present Illness   S/p L3-S1 Posterior Lumbar Decompression, Posterior Lumbar Interbody Fusion, Instrumentation, Cage, Allograft, Autograft, BMP   Dr. Lee 11/3/21

## 2021-11-03 NOTE — BRIEF OP NOTE
L3-S1 Posterior Lumbar Decompression, Posterior Lumbar Interbody Fusion, Instrumentation, Cage, Allograft, Autograft, BMP Procedure Note    Procedure:    L3-S1 Posterior Lumbar Decompression, Posterior Lumbar Interbody Fusion, Instrumentation, Cage, Allograft, Autograft, BMP  CPT(R) Code:  09503 - [place] LAMINEC/FACETECT/FORAMIN,LUMBAR 1 SEG      Pre-op Diagnosis     * Spondylolisthesis of lumbosacral region [M43.17]     * Spinal stenosis of lumbar region with neurogenic claudication [M48.062]       Post-op Diagnosis     * Spondylolisthesis of lumbosacral region [M43.17]     * Spinal stenosis of lumbar region with neurogenic claudication [M48.062]    Surgeon(s) and Role:     * Melchor Lee MD - Primary     * Kar Carranza DO - Resident - Assisting    Anesthesia: General    Staff:   Circulator: Jennifer Kimbrough RN; Judith Sr RN  Radiology IR Technologist: Keyla Hutchinson RTR  Scrub Person: Laisha Roque RN  Registered Nurse First Assistant: Taylor, Corrinne, RNFA    Procedure Details   See procedure note    Estimated Blood Loss: 437 mL    Specimens:                No specimens collected during this procedure.      Drains:   Drain 1 Left Back 10 Fr. (Active)       Urethral Catheter Non-latex 16 Fr (Active)       Implants:   Implant Name Type Inv. Item Serial No.  Lot No. LRB No. Used Action   BONE CHIPS 30CC FINE FILLER - [id number] Bone graft extender or substitute BONE CHIPS 30CC FINE FILLER  BACTERIN [id number] N/A 1 Implanted   INFUSE BONE GRAFT LARGE II KIT - [id number] Bone graft extender or substitute INFUSE BONE GRAFT LARGE II KIT  [employer] SOFAMOR DANEK [id number] N/A 1 Implanted   CAGE 12X26/8-12MM EXPANDABLE CALIBER - [id number]  CAGE 12X26/8-12MM EXPANDABLE CALIBER  [employer]  N/A 1 Implanted   SET SCREWS EXPEDIUM - [id number] Spinal screw SET SCREWS EXPEDIUM  DEPUY SPINE  N/A 8 Implanted   SCREW EXPEDIUM 7.0 X 45MM - [id number] Spinal screw SCREW EXPEDIUM 7.0 X 45MM   DEPUY SPINE  N/A 6 Implanted   SCREW EXPEDIUM 6 X 45MM - [id number] Spinal screw SCREW EXPEDIUM 6 X 45MM  DEPUY SPINE  N/A 2 Implanted   REAL EXPEDIUM 85MM - [id number] Spinal real REAL EXPEDIUM 85MM  DEPUY SPINE  N/A 1 Implanted              Complications:  None; patient tolerated the procedure well.           Disposition: PACU - hemodynamically stable.           Condition: stable    Melchor Lee MD  Phone Number: [phone]

## 2021-11-03 NOTE — PROGRESS NOTES
Patient: Reyna Castillo  Location:  [facility] 4B 4203  MRN:  [id number]  Today's date:  11/3/2021    Pt was left seated in chair with alarm on, call bell in reach, NAD, and all needs met. RN notified.    Reyna is a 71 y.o. female admitted on 11/3/2021 with Spondylolisthesis of lumbosacral region [M43.17]  Spinal stenosis of lumbar region with neurogenic claudication [M48.062]  Lumbar stenosis with neurogenic claudication [M48.062]. Principal problem is Lumbar stenosis with neurogenic claudication.    Past Medical History  Reyna has a past medical history of Abnormal ECG (10/9/2018), Agatston CAC score 100-199 (10/9/2018), Anxiety, Class 2 severe obesity due to excess calories with serious comorbidity and body mass index (BMI) of 36.0 to 36.9 in adult (CMS/[facility]) (10/9/2018), DJD (degenerative joint disease), Family history of early CAD (10/9/2018), Hypertension, Lumbar stenosis, Tran-Juan tear, and Mixed hyperlipidemia (10/9/2018).    History of Present Illness   S/p L3-S1 Posterior Lumbar Decompression, Posterior Lumbar Interbody Fusion, Instrumentation, Cage, Allograft, Autograft, BMP   Dr. Lee 11/3/21      PT Vitals    Date/Time Pulse SpO2 Pt Activity O2 Therapy BP BP Location BP Method Pt Position [facility]   11/03/21 1831 97 95 % At rest None (Room air) 104/61 Right upper arm Automatic Lying DD   11/03/21 1832 -- -- At rest -- -- -- -- -- IMF   11/03/21 1840 97 -- -- -- 142/72 Right upper arm Automatic Sitting    11/03/21 1859 97 94 % At rest None (Room air) 126/61 Right upper arm Automatic Sitting DD      PT Pain    Date/Time Pain Type Location Rating: Rest Rating: Activity Interventions [facility]   11/03/21 1831 Pain Assessment back 5 7 position adjusted DD   11/03/21 1859 Pain Assessment back 5 7 position adjusted DD          Prior Living Environment      Most Recent Value   Living Environment Comment pt lives with  in 2  with 1 + 1 YAS. MI 1st floor. 2nd floor bed/bath. tub shower no GB or shower  chair.        Prior Level of Function      Most Recent Value   Dominant Hand right   Ambulation independent   Transferring independent   Toileting independent   Bathing independent   Dressing independent   Prior Level of Function Comment pt was previously independent with mobility and ADLs. drives. retired.           PT Evaluation and Treatment - 11/03/21 1831        PT Time Calculation    Start Time 1831     Stop Time 1901     Time Calculation (min) 30 min        Session Details    Document Type initial evaluation     Mode of Treatment physical therapy        General Information    Patient Profile Reviewed yes     Onset of Illness/Injury or Date of Surgery 11/03/21     Referring Physician Jesus     Patient/Family/Caregiver Comments/Observations RN cleared, daughter and  present     General Observations of Patient pt presents resting in bed, agreeable to therapy     Existing Precautions/Restrictions fall;spinal   elevate HOB       Cognition/Psychosocial    Affect/Mental Status (Cognition) WFL     Orientation Status (Cognition) oriented to;person;situation     Follows Commands (Cognition) follows one-step commands;WFL     Comment, Cognition cooperative during session, requires cues for safety        Sensory    Hearing Status hearing impairment, bilaterally   lip reads       Vision Assessment/Intervention    Visual Impairment/Limitations corrective lenses full-time        Sensory Assessment (Somatosensory)    Left LE Sensory Assessment light touch awareness;intact     Right LE Sensory Assessment light touch awareness;intact        Range of Motion (ROM)    Range of Motion ROM is WFL;bilateral lower extremities        Strength (Manual Muscle Testing)    Hip, Left (Strength) not formally tested secondary to surgery     Knee, Left (Strength) at least 3/5 supine knee flexion     Ankle, Left (Strength) 3+/5 DF/PF     Hip, Right (Strength) not formally tested secondary to surgery     Knee, Right (Strength) at least 3/5  supine knee flexion     Ankle, Right (Strength) 3+/5 DF/PF        Bed Mobility    Perry, Supine to Sit minimum assist (75% or more patient effort);verbal cues     Verbal Cues (Supine to Sit) maintaining precautions;safety;technique;hand placement     Assistive Device bed rails;head of bed elevated        Sit to Stand Transfer    Perry, Sit to Stand Transfer minimum assist (75% or more patient effort);2 person assist;verbal cues     Verbal Cues hand placement;safety;technique     Assistive Device walker, front-wheeled     Comment from EOB 2x. pt required min A x2 for 1st attempt, min Ax1 for 2nd attempt. denies dizziness upon standing. pt attempting to pull up from RW requiring assist to stabilize device. education provided about proper technique and safety.        Stand to Sit Transfer    Perry, Stand to Sit Transfer minimum assist (75% or more patient effort);verbal cues     Verbal Cues hand placement;safety;technique     Assistive Device walker, front-wheeled     Comment to EOB, chair. cues for hand placement and proper use of RW.        Gait Training    Perry, Gait close supervision     Assistive Device walker, front-wheeled     Distance in Feet 150 feet     Pattern (Gait) step-through     Deviations/Abnormal Patterns (Gait) gait speed decreased;step length decreased;stride length decreased     Comment (Gait/Stairs) pt requires CS for safety and cues for direction while ambulating in hallway. no overt LOB observed, pt appears steady. reports 7/10 pain with mobility.        Stairs Training    Comment TBD        Safety Issues, Functional Mobility    Impairments Affecting Function (Mobility) balance;endurance/activity tolerance;pain;range of motion (ROM);strength        Balance    Balance Assessment sitting static balance;sit to stand dynamic balance;standing dynamic balance;standing static balance     Static Sitting Balance mild impairment;sitting, edge of bed;WFL;sitting in chair      Sit to Stand Dynamic Balance mild impairment;supported     Static Standing Balance mild impairment;supported     Dynamic Standing Balance mild impairment;supported     Comment, Balance supported - RW        Therapeutic Exercise    Therapeutic Exercise lower extremity        Lower Extremity (Therapeutic Exercise)    Comment education provided to complete ankle pumps throughout the day        AM-PAC (TM) - Mobility (Current Function)    Turning from your back to your side while in a flat bed without using bedrails? 3 - A Little     Moving from lying on your back to sitting on the side of a flat bed without using bedrails? 3 - A Little     Moving to and from a bed to a chair? 3 - A Little     Standing up from a chair using your arms? 3 - A Little     To walk in a hospital room? 3 - A Little     Climbing 3-5 steps with a railing? 2 - A Lot     AM-PAC (TM) Mobility Score 17        Assessment/Plan (PT)    Daily Outcome Statement PT evaluation completed. pt requires min A for bed mobility, min A x1-2 for transfers, and CS for ambulation in hallway with RW. pt is limited by decreased balance, endurance, and strength as well as increased pain. requires cues and education for proper technique during mobility and to maintain spinal precautions. recommend continued skilled PT services to maximize independence and function. [facility] 17/24.     Rehab Potential good, to achieve stated therapy goals     Therapy Frequency 5 times/wk     Planned Therapy Interventions balance training;bed mobility training;gait training;patient/family education;strengthening;transfer training               PT Assessment/Plan      Most Recent Value   PT Recommended Discharge Disposition home with assistance, home with home health  [HH pending progress] at 11/03/2021 1831   Anticipated Equipment Needs at Discharge (PT) none  [has RW] at 11/03/2021 1831                    Education Documentation  Activity, taught by Gail López, PT at 11/3/2021   7:19 PM.  Learner: Patient  Readiness: Acceptance  Method: Explanation  Response: Verbalizes Understanding  Comment: role of acute PT, PT POC, safe transfer technique, bed mobility, log roll, proper use of RW, spinal precautions          PT Goals      Most Recent Value   Bed Mobility Goal 1    Activity/Assistive Device rolling to left, rolling to right, sit to supine/supine to sit at 11/03/2021 1831   Blanco independent at 11/03/2021 1831   Time Frame by discharge at 11/03/2021 1831   Progress/Outcome goal ongoing at 11/03/2021 1831   Transfer Goal 1    Activity/Assistive Device sit-to-stand/stand-to-sit, bed-to-chair/chair-to-bed, walker, front-wheeled at 11/03/2021 1831   Blanco modified independence at 11/03/2021 1831   Time Frame by discharge at 11/03/2021 1831   Progress/Outcome goal ongoing at 11/03/2021 1831   Gait Training Goal 1    Activity/Assistive Device gait (walking locomotion), walker, front-wheeled at 11/03/2021 1831   Blanco modified independence at 11/03/2021 1831   Distance 200 at 11/03/2021 1831   Time Frame by discharge at 11/03/2021 1831   Progress/Outcome goal ongoing at 11/03/2021 1831   Stairs Goal 1    Activity/Assistive Device ascending stairs, descending stairs, using handrail, left, using handrail, right at 11/03/2021 1831   Blanco modified independence at 11/03/2021 1831   Number of Stairs 12 at 11/03/2021 1831   Time Frame by discharge at 11/03/2021 1831   Progress/Outcome goal ongoing at 11/03/2021 1831

## 2021-11-03 NOTE — DISCHARGE INSTRUCTIONS
DISCHARGE INSTRUCTIONS:  LUMBAR FUSION  INCISON  Please make sure your incisions are checked at least twice daily for signs and symptoms of infection. If any of the below should occur, please call the office.  ? Draining of incisional site  ? Opening of incision  ? Fevers greater than 101.5 (low grade fevers post op are normal)  ? Flu-like symptoms  ? Increased redness and/or tenderness around the incision  If you have staples or sutures (not tape) in your incision they may be removed 2 weeks following your surgery. This may be done by a visiting nurse, family physician or by making an appointment to come into the office.  SHOWERING and the GAUZE DRESSING  ? You should change the gauze dressing over your incision every day and keep the incision  covered with dry sterile gauze and tape.  ? You may stop covering the incision with gauze five days after your surgery, as long as  the incision site is not leaking or draining fluid,  ? You may shower starting five days after your surgery (if incision is dry and intact). After  showering, gently dry the incision site.  ? Hair washing is permissible while in the shower. No tub baths, hot tubs or whirlpools until seen in the office. Do not rub cream or lotion on the incision until seen in the office.  EXERCISE  ? Only lift objects weighing less than 10-15 lbs  ? Do not bend or twist at the waist. Always bend your knees!!  ? Limit your sitting to 20-30 minute intervals. You should lie down or walk in between  sitting periods. There are no limitations for sitting in a recliner chair.  ? Walk as much as possible-let discomfort be your guide.  You may also go up and down stairs as much as you can tolerate.  ? Walking outside (as long as it is nice weather) or walking on a treadmill is permitted (no incline).  PAIN  ? Pain is expected after surgery. You should have a pain medication and muscle relaxer when you leave the hospital. Take the pain medication as needed. Start with 1  and supplement with extra strength Tylenol before using another. Take 1 muscle relaxer for muscle spasms in the back, you can take it 3 times a day if needed.  It is usually most helpful at night and in the morning.  ? Take your pain medication as needed. As your pain level decreases, you may begin to take over-the­ counter Extra Strength Tylenol (3000mg/day maximum).  ? DO NOT take any anti-inflammatories (like Motrin, Ibuprofen, Advil, Aleve, Celebrex, etc) for 12 weeks after surgery. Taking anti-inflammatories can interfere with fusion healing.  ? Do not resume taking Fosamax or Actonel for 12 weeks after your fusion surgery.  ? You must avoid nicotine exposure for at least 12 weeks after surgery including smoking, the patch, nicotine gum and second hand smoke.  BLOODCLOTS  ? Some swelling if normal post operatively. If you notice swelling in one or both of your legs that is painful and/or hot to the touch and/or you have shortness of breath - please give us a call.  CONSTIPATION  ? It is normal to be constipated after surgery due to anesthesia and narcotics. Make sure to stay hydrated, eat fiber, get up and walk and use the Colace that is prescribed. You can also use warm prune juice and get over the counter Miralax, suppositories, enemas and Magnesium Citrate if constipation persists.  DRIVING  ? You may not drive a car until told otherwise by your physician. You may be a passenger for short distances (20-30 minutes).  ? If you must take a longer trip make sure to make several stops so that you can walk  around and stretch your legs. Reclining the passenger seat seems to be the most  comfortable position for most patients.        FOLLOW-UP APPOINTMENTS  ? Your first post-op visit will be with Анна Terrazas, MOUNA, ANP-C, ONP-C. This appointment was made for you when you signed up for surgery. If you do not have a post op appointment, please call to make one.  ? If you have any additional questions/concerns  please contact Анна Terrazas DNP, ANP-C, ONP-C., or VERÓNICA Ivory at [phone].

## 2021-11-03 NOTE — ANESTHESIOLOGIST PRE-PROCEDURE ATTESTATION
Pre-Procedure Patient Identification:  I am the Primary Anesthesiologist and have identified the patient on 11/03/21 at 8:54 AM.   I have confirmed the procedure(s) will be performed by the following surgeon/proceduralist Melchor Lee MD.

## 2021-11-03 NOTE — PROGRESS NOTES
Pt without new complaints, doing well  AFVSS Reji in place  5/5 motor BLE all muscle groups  SILT BLE all sens dist  Dressing dry    A/P Stable post op  -pt, oob  -pain control  -abx/drain  -scds  -med management

## 2021-11-03 NOTE — ANESTHESIA PREPROCEDURE EVALUATION
Relevant Problems   CARDIOVASCULAR   (+) Essential hypertension       Anesthesia ROS/MED HX    Anesthesia History - neg  Pulmonary - neg  Neuro/Psych - neg  Cardiovascular   CAD (mild)   dyslipidemia   hypertension   Covid19 Test Reviewed  Hematological - neg  Musculoskeletal- neg  Renal Disease- neg  Endo/Other- neg  Body Habitus: Obese  ROS/MED HX Comments:    GI/Hepatic/Renal: H/o Tran aldana tear Watson's esophagus.   Musculoskeletal: Lumbar stenosis, LBP       Past Surgical History:   Procedure Laterality Date   • CHOLECYSTECTOMY     • COLONOSCOPY     • INNER EAR SURGERY     • JOINT REPLACEMENT      bilateral 2020, 2021   • KNEE ARTHROSCOPY     • TRANSUMBILICAL AUGMENTATION MAMMAPLASTY     • UPPER GASTROINTESTINAL ENDOSCOPY     • WISDOM TOOTH EXTRACTION         Physical Exam    Airway   Mallampati: I   TM distance: >3 FB   Neck ROM: full  Cardiovascular - normal   Rhythm: regular   Rate: normalPulmonary - normal   clear to auscultation  Dental    Teeth Problems: implants and caps        Anesthesia Plan    Plan: general    Technique: general endotracheal     Airway: oral intubation   ASA 2  Anesthetic plan and risks discussed with: patient

## 2021-11-03 NOTE — PLAN OF CARE
Problem: Adult Inpatient Plan of Care  Goal: Plan of Care Review  Outcome: Progressing  Flowsheets (Taken 11/3/2021 1918)  Progress: no change  Plan of Care Reviewed With: patient  Outcome Summary: PT evaluation completed. pt requires min A for bed mobility, min A x1-2 for transfers, and CS for ambulation in hallway with RW. pt is limited by decreased balance, endurance, and strength as well as increased pain. requires cues and education for proper technique during mobility and to maintain spinal precautions. recommend continued skilled PT services to maximize independence and function. [facility] 17/24.

## 2021-11-03 NOTE — OR SURGEON
Pre-Procedure patient identification:  I am the primary operating surgeon/proceduralist and I have identified the patient on 11/03/21 at 8:24 AM Melchor Lee MD  Phone Number: [phone]

## 2021-11-04 LAB
ANION GAP SERPL CALC-SCNC: 9 MEQ/L (ref 3–15)
BUN SERPL-MCNC: 19 MG/DL (ref 8–20)
CALCIUM SERPL-MCNC: 9.1 MG/DL (ref 8.9–10.3)
CHLORIDE SERPL-SCNC: 104 MEQ/L (ref 98–109)
CO2 SERPL-SCNC: 25 MEQ/L (ref 22–32)
CREAT SERPL-MCNC: 0.7 MG/DL (ref 0.6–1.1)
ERYTHROCYTE [DISTWIDTH] IN BLOOD BY AUTOMATED COUNT: 13.2 % (ref 11.7–14.4)
GFR SERPL CREATININE-BSD FRML MDRD: >60 ML/MIN/1.73M*2
GLUCOSE SERPL-MCNC: 106 MG/DL (ref 70–99)
HCT VFR BLDCO AUTO: 33.3 % (ref 35–45)
HGB BLD-MCNC: 10.7 G/DL (ref 11.8–15.7)
MCH RBC QN AUTO: 30.7 PG (ref 28–33.2)
MCHC RBC AUTO-ENTMCNC: 32.1 G/DL (ref 32.2–35.5)
MCV RBC AUTO: 95.7 FL (ref 83–98)
PDW BLD AUTO: 10.2 FL (ref 9.4–12.3)
PLATELET # BLD AUTO: 272 K/UL (ref 150–369)
POTASSIUM SERPL-SCNC: 4.3 MEQ/L (ref 3.6–5.1)
RBC # BLD AUTO: 3.48 M/UL (ref 3.93–5.22)
SODIUM SERPL-SCNC: 138 MEQ/L (ref 136–144)
WBC # BLD AUTO: 12.3 K/UL (ref 3.8–10.5)

## 2021-11-04 PROCEDURE — 99232 SBSQ HOSP IP/OBS MODERATE 35: CPT | Performed by: STUDENT IN AN ORGANIZED HEALTH CARE EDUCATION/TRAINING PROGRAM

## 2021-11-04 PROCEDURE — 63700000 HC SELF-ADMINISTRABLE DRUG: Performed by: NURSE PRACTITIONER

## 2021-11-04 PROCEDURE — 85027 COMPLETE CBC AUTOMATED: CPT | Performed by: STUDENT IN AN ORGANIZED HEALTH CARE EDUCATION/TRAINING PROGRAM

## 2021-11-04 PROCEDURE — 97535 SELF CARE MNGMENT TRAINING: CPT | Mod: GO

## 2021-11-04 PROCEDURE — 0SG10AJ FUSION OF 2 OR MORE LUMBAR VERTEBRAL JOINTS WITH INTERBODY FUSION DEVICE, POSTERIOR APPROACH, ANTERIOR COLUMN, OPEN APPROACH: ICD-10-PCS | Performed by: ORTHOPAEDIC SURGERY

## 2021-11-04 PROCEDURE — 0SG3071 FUSION OF LUMBOSACRAL JOINT WITH AUTOLOGOUS TISSUE SUBSTITUTE, POSTERIOR APPROACH, POSTERIOR COLUMN, OPEN APPROACH: ICD-10-PCS | Performed by: ORTHOPAEDIC SURGERY

## 2021-11-04 PROCEDURE — 80048 BASIC METABOLIC PNL TOTAL CA: CPT | Performed by: STUDENT IN AN ORGANIZED HEALTH CARE EDUCATION/TRAINING PROGRAM

## 2021-11-04 PROCEDURE — 3E0U0GB INTRODUCTION OF RECOMBINANT BONE MORPHOGENETIC PROTEIN INTO JOINTS, OPEN APPROACH: ICD-10-PCS | Performed by: ORTHOPAEDIC SURGERY

## 2021-11-04 PROCEDURE — 36415 COLL VENOUS BLD VENIPUNCTURE: CPT | Performed by: STUDENT IN AN ORGANIZED HEALTH CARE EDUCATION/TRAINING PROGRAM

## 2021-11-04 PROCEDURE — 97530 THERAPEUTIC ACTIVITIES: CPT | Mod: GO

## 2021-11-04 PROCEDURE — 25000000 HC PHARMACY GENERAL: Performed by: STUDENT IN AN ORGANIZED HEALTH CARE EDUCATION/TRAINING PROGRAM

## 2021-11-04 PROCEDURE — 97166 OT EVAL MOD COMPLEX 45 MIN: CPT | Mod: GO

## 2021-11-04 PROCEDURE — 0SG30AJ FUSION OF LUMBOSACRAL JOINT WITH INTERBODY FUSION DEVICE, POSTERIOR APPROACH, ANTERIOR COLUMN, OPEN APPROACH: ICD-10-PCS | Performed by: ORTHOPAEDIC SURGERY

## 2021-11-04 PROCEDURE — 63700000 HC SELF-ADMINISTRABLE DRUG: Performed by: STUDENT IN AN ORGANIZED HEALTH CARE EDUCATION/TRAINING PROGRAM

## 2021-11-04 PROCEDURE — 12000000 HC ROOM AND CARE MED/SURG

## 2021-11-04 PROCEDURE — 25800000 HC PHARMACY IV SOLUTIONS: Performed by: STUDENT IN AN ORGANIZED HEALTH CARE EDUCATION/TRAINING PROGRAM

## 2021-11-04 PROCEDURE — 63600000 HC DRUGS/DETAIL CODE: Performed by: STUDENT IN AN ORGANIZED HEALTH CARE EDUCATION/TRAINING PROGRAM

## 2021-11-04 PROCEDURE — 0SG1071 FUSION OF 2 OR MORE LUMBAR VERTEBRAL JOINTS WITH AUTOLOGOUS TISSUE SUBSTITUTE, POSTERIOR APPROACH, POSTERIOR COLUMN, OPEN APPROACH: ICD-10-PCS | Performed by: ORTHOPAEDIC SURGERY

## 2021-11-04 RX ORDER — HYDROMORPHONE HYDROCHLORIDE 1 MG/ML
0.5 INJECTION, SOLUTION INTRAMUSCULAR; INTRAVENOUS; SUBCUTANEOUS EVERY 4 HOURS PRN
Status: DISCONTINUED | OUTPATIENT
Start: 2021-11-04 | End: 2021-11-07 | Stop reason: HOSPADM

## 2021-11-04 RX ORDER — OXYCODONE HYDROCHLORIDE 5 MG/1
10 TABLET ORAL EVERY 4 HOURS PRN
Status: DISCONTINUED | OUTPATIENT
Start: 2021-11-04 | End: 2021-11-07 | Stop reason: HOSPADM

## 2021-11-04 RX ADMIN — CEFAZOLIN SODIUM 2 G: 10 POWDER, FOR SOLUTION INTRAVENOUS at 01:39

## 2021-11-04 RX ADMIN — ACETAMINOPHEN 650 MG: 325 TABLET, FILM COATED ORAL at 06:08

## 2021-11-04 RX ADMIN — OXYCODONE HYDROCHLORIDE 10 MG: 5 TABLET ORAL at 06:09

## 2021-11-04 RX ADMIN — DOCUSATE SODIUM AND SENNOSIDES 1 TABLET: 8.6; 5 TABLET, FILM COATED ORAL at 20:00

## 2021-11-04 RX ADMIN — ROSUVASTATIN CALCIUM 20 MG: 20 TABLET, FILM COATED ORAL at 08:22

## 2021-11-04 RX ADMIN — FENOFIBRATE 67 MG: 67 CAPSULE ORAL at 08:18

## 2021-11-04 RX ADMIN — CEFAZOLIN SODIUM 2 G: 10 POWDER, FOR SOLUTION INTRAVENOUS at 10:02

## 2021-11-04 RX ADMIN — ACETAMINOPHEN 650 MG: 325 TABLET, FILM COATED ORAL at 17:05

## 2021-11-04 RX ADMIN — PANTOPRAZOLE SODIUM 20 MG: 20 TABLET, DELAYED RELEASE ORAL at 08:20

## 2021-11-04 RX ADMIN — ACETAMINOPHEN 650 MG: 325 TABLET, FILM COATED ORAL at 22:04

## 2021-11-04 RX ADMIN — POLYETHYLENE GLYCOL (3350) 17 G: 17 POWDER, FOR SOLUTION ORAL at 08:27

## 2021-11-04 RX ADMIN — OXYCODONE HYDROCHLORIDE 10 MG: 5 TABLET ORAL at 01:39

## 2021-11-04 RX ADMIN — OXYCODONE HYDROCHLORIDE 10 MG: 5 TABLET ORAL at 10:22

## 2021-11-04 RX ADMIN — CEFAZOLIN SODIUM 2 G: 10 POWDER, FOR SOLUTION INTRAVENOUS at 18:49

## 2021-11-04 RX ADMIN — ACETAMINOPHEN 650 MG: 325 TABLET, FILM COATED ORAL at 11:51

## 2021-11-04 RX ADMIN — OXYCODONE HYDROCHLORIDE 10 MG: 5 TABLET ORAL at 22:04

## 2021-11-04 RX ADMIN — DOCUSATE SODIUM AND SENNOSIDES 1 TABLET: 8.6; 5 TABLET, FILM COATED ORAL at 08:22

## 2021-11-04 RX ADMIN — SERTRALINE HYDROCHLORIDE 50 MG: 25 TABLET, FILM COATED ORAL at 08:24

## 2021-11-04 RX ADMIN — DIAZEPAM 10 MG: 5 TABLET ORAL at 22:04

## 2021-11-04 RX ADMIN — OXYCODONE HYDROCHLORIDE 10 MG: 5 TABLET ORAL at 17:05

## 2021-11-04 RX ADMIN — ACETAMINOPHEN 650 MG: 325 TABLET, FILM COATED ORAL at 01:40

## 2021-11-04 ASSESSMENT — COGNITIVE AND FUNCTIONAL STATUS - GENERAL
DRESSING REGULAR UPPER BODY CLOTHING: 4 - NONE
HELP NEEDED FOR PERSONAL GROOMING: 4 - NONE
EATING MEALS: 4 - NONE
AFFECT: WFL
TOILETING: 4 - NONE
HELP NEEDED FOR BATHING: 3 - A LITTLE
DRESSING REGULAR LOWER BODY CLOTHING: 2 - A LOT

## 2021-11-04 NOTE — PROGRESS NOTES
Patient:  Reyna Castillo  Location:  [facility] 4B 4203  MRN:  [id number]  Today's date:  11/4/2021    At end of session, patient seated in recliner with alarm pad on and all needs within reach. RN notified.     Reyna is a 71 y.o. female admitted on 11/3/2021 with Spondylolisthesis of lumbosacral region [M43.17]  Spinal stenosis of lumbar region with neurogenic claudication [M48.062]  Lumbar stenosis with neurogenic claudication [M48.062]. Principal problem is Lumbar stenosis with neurogenic claudication.    Past Medical History  Reyna has a past medical history of Abnormal ECG (10/9/2018), Agatston CAC score 100-199 (10/9/2018), Anxiety, Class 2 severe obesity due to excess calories with serious comorbidity and body mass index (BMI) of 36.0 to 36.9 in adult (CMS/[facility]) (10/9/2018), DJD (degenerative joint disease), Family history of early CAD (10/9/2018), Hypertension, Lumbar stenosis, Tran-Juan tear, and Mixed hyperlipidemia (10/9/2018).    History of Present Illness   S/p L3-S1 Posterior Lumbar Decompression, Posterior Lumbar Interbody Fusion, Instrumentation, Cage, Allograft, Autograft, BMP   Dr. Lee 11/3/21      OT Vitals    Date/Time Pulse SpO2 BP BP Location BP Method Pt Position [facility]   11/04/21 0922 70 95 % 112/59 Right upper arm Automatic Lying KJB   11/04/21 0931 82 -- 146/74 Right upper arm Automatic Sitting KJB      OT Pain    Date/Time Pain Type Location Rating: Rest Rating: Activity Interventions [facility]   11/04/21 0922 Pain Assessment back 4 4 position adjusted KJB   11/04/21 0931 Post Activity back 4 4 position adjusted KJB          Prior Living Environment      Most Recent Value   Living Environment Comment pt lives with  in 2  with 1 + 1 YAS. LA 1st floor. 2nd floor bed/bath. tub shower no GB or shower chair.        Prior Level of Function      Most Recent Value   Dominant Hand right   Ambulation independent   Transferring independent   Toileting independent   Bathing independent    Dressing independent   Eating independent   Prior Level of Function Comment pt was previously independent with mobility and ADLs. drives. retired.   Assistive Device Currently Used at Home none        Occupational Profile      Most Recent Value   Reason for Services/Referral s/p L3-S1 PLIF           OT Evaluation and Treatment - 11/04/21 0920        OT Time Calculation    Start Time 0920     Stop Time 1000     Time Calculation (min) 40 min        Session Details    Document Type initial evaluation     Mode of Treatment occupational therapy        General Information    Patient Profile Reviewed yes     Patient/Family/Caregiver Comments/Observations RN cleared for OT     General Observations of Patient Supine, awake, agreeable to therapy     Existing Precautions/Restrictions fall;spinal        Cognition/Psychosocial    Affect/Mental Status (Cognition) WFL     Orientation Status (Cognition) oriented x 4     Follows Commands (Cognition) [facility]     Cognitive Function WFL     Comment, Cognition Pleasant, cooperative, receptive        Hearing Assessment    Hearing Status WFL        Vision Assessment/Intervention    Visual Impairment/Limitations corrective lenses for reading        Sensory Assessment (Somatosensory)    Sensory Assessment (Somatosensory) UE sensation intact     Left UE Sensory Assessment light touch awareness;intact     Right UE Sensory Assessment light touch awareness;intact        Range of Motion (ROM)    Range of Motion bilateral upper extremities;ROM is WFL        Strength (Manual Muscle Testing)    Strength (Manual Muscle Testing) bilateral upper extremities;strength is WFL        Bed Mobility    [place], Supine to Sit minimum assist (75% or more patient effort);1 person assist;verbal cues     Verbal Cues (Supine to Sit) hand placement;safety;technique     Assistive Device none     Comment (Bed Mobility) pt reports family to assist at home as needed        Transfers    Transfers toilet transfer;tub  "transfer     Comment S for functional mobility within room        Sit to Stand Transfer    [place], Sit to Stand Transfer supervision;modified independence     Assistive Device walker, front-wheeled;none     Comment Initially S with RW, progressed to Mod I without AD        Stand to Sit Transfer    [place], Stand to Sit Transfer supervision;modified independence     Assistive Device none        Toilet Transfer    Transfer Technique sit-stand;stand-sit     [place], Toilet Transfer supervision;verbal cues;modified independence     Verbal Cues hand placement;maintaining precautions     Assistive Device grab bars/safety frame        Tub Transfer    Comment Pt reports \"I wont be getting in there for a long time\" regarding her tub. Reports she will sponge bathe or go to family who lives ~3 houses away        Safety Issues, Functional Mobility    Impairments Affecting Function (Mobility) pain        Balance    Balance Assessment sitting static balance;sitting dynamic balance;sit to stand dynamic balance;standing static balance;standing dynamic balance     Static Sitting Balance WFL     Dynamic Sitting Balance WFL     Sit to Stand Dynamic Balance WFL     Static Standing Balance WFL     Dynamic Standing Balance WFL        Bathing    Self-Performance chest;left arm;front perineal area;abdomen;right arm;buttocks;left upper leg;right upper leg     [place] modified independence     Position unsupported sitting;unsupported standing     Adaptive Equipment none        Upper Body Dressing    Tasks doff;hospital gown;don;pull over garment     [place] modified independence     Adaptive Equipment none     Comment Assist to manage drain        Lower Body Dressing    Tasks don;underwear;pants/bottoms;shoes/slippers     Self-Performance threads left leg, underpants;threads right leg, underpants;threads left leg, pants/shorts;threads right leg, pants/shorts;dons/doffs left shoe;dons/doffs right shoe     " [place] moderate assist (50-74% patient effort)     Position unsupported sitting     Adaptive Equipment none     Comment Difficulty with figure 4 sit, educated on use of reacher to facilitate. pt reports family will assist as needed.        Grooming    Self-Performance washes, rinses and dries hands;brushes/barrera hair;oral care (brushing teeth, cleaning dentures)     [place] distant supervision     Position sink side     Adaptive Equipment none        Toileting    [place] distant supervision;perform bladder hygiene;adjust/manage clothing     Position unsupported sitting     Adaptive Equipment grab bar/safety frame        BADL Safety/Performance    Impairments, BADL Safety/Performance pain        AM-PAC (TM) - ADL (Current Function)    Putting on and taking off regular lower body clothing? 2 - A Lot     Bathing? 3 - A Little     Toileting? 4 - None     Putting on/taking off regular upper body clothing? 4 - None     How much help for taking care of personal grooming? 4 - None     Eating meals? 4 - None     AM-PAC (TM) ADL Score 21        Assessment/Plan (OT)    Daily Outcome Statement OT eval completed for 72 yo s/p L3-S1 PLIF. PTA patient independent with ADLs and functional mobility. Patient currently at S/Mod I level for functional transfers and mobility without AD. Patient requires some assist for LB ADLs but declined AE reporting family will be able to assist at home. Patient has no further acute OT needs at this time. Recommend discharge home with assist when medically stable.     Therapy Frequency evaluation only               OT Assessment/Plan      Most Recent Value   OT Recommended Discharge Disposition home with assistance at 11/04/2021 0920   Anticipated Equipment Needs At Discharge (OT) none at 11/04/2021 0920   Patient/Family Therapy Goal Statement d/c home at 11/04/2021 0920                    Education Documentation  Activity, taught by Bee Contreras, OT at 11/4/2021  3:11  PM.  Learner: Patient  Readiness: Acceptance  Method: Explanation  Response: Verbalizes Understanding  Comment: Role of OT, POC, spinal precautions, bed mobility, functional transfers, LB dressing techniques, call bell use

## 2021-11-04 NOTE — PROGRESS NOTES
Hospital Medicine Service -  Daily Progress Note       SUBJECTIVE   Interval History: Patient doing well, states back pain controlled, and no LE pain. Denies any nausea/vomiting, tolerating diet. No BM as of now, but passing flatus.      OBJECTIVE      Vital signs in last 24 hours:  Temp:  [36.7 °C (98 °F)-36.8 °C (98.3 °F)] 36.8 °C (98.3 °F)  Heart Rate:  [69-97] 82  Resp:  [16] 16  BP: (104-146)/(56-82) 117/57    Intake/Output Summary (Last 24 hours) at 11/4/2021 1705  Last data filed at 11/4/2021 1240  Gross per 24 hour   Intake 1552.08 ml   Output 2505 ml   Net -952.92 ml       PHYSICAL EXAMINATION      Gen: Appears stated age, not in any distress  Head: atraumatic, normocephalic  Eyes: PERRLA, EOMI, no pallor  Neck: supple, no Lymph nodes, no Thyromegaly, no JVD  CVS: RRR, No M/G, no JVD  Pulm: CTA B/l, no wheezing or rhonchi, no rales  Abd: Soft, NT, ND, normal bowel sounds  Extremities:no edema, no cyanosis, CARLOS drain in place  MSK: no DJD, no joint swellings, no joint tenderness   Neuro: AAO x3     LINES, CATHETERS, DRAINS, AIRWAYS, AND WOUNDS   Lines, Drains, Airways, Wounds:  Peripheral IV (Adult) 11/03/21 Distal;Left;Posterior Forearm (Active)   Number of days: 1       Peripheral IV (Adult) 11/03/21 Right Hand (Active)   Number of days: 1       Drain 1 Left Back 10 Fr. (Active)   Number of days: 1       Surgical Incision Back (Active)   Number of days: 1       Comments:      LABS / IMAGING / TELE      Labs  Results from last 7 days   Lab Units 11/04/21  0436   WBC K/uL 12.30*   HEMOGLOBIN g/dL 10.7*   HEMATOCRIT % 33.3*   PLATELETS K/uL 272     Results from last 7 days   Lab Units 11/04/21  0436   SODIUM mEQ/L 138   POTASSIUM mEQ/L 4.3   CHLORIDE mEQ/L 104   CO2 mEQ/L 25   BUN mg/dL 19   CREATININE mg/dL 0.7   GLUCOSE mg/dL 106*   CALCIUM mg/dL 9.1         Micro  Microbiology Results     Procedure Component Value Units Date/Time    COVID-19 PAT/Pre-procedural [[id number]]  (Normal) Collected: 11/01/21  1029    Specimen: Nasopharyngeal Swab from Nasopharynx Updated: 11/02/21 0132    Narrative:      The following orders were created for panel order COVID-19 PAT/Pre-procedural.  Procedure                               Abnormality         Status                     ---------                               -----------         ------                     SARS-CoV-2 (COVID-19), PCR[[id number]]   Normal              Final result                 Please view results for these tests on the individual orders.    SARS-CoV-2 (COVID-19), PCR [[id number]]  (Normal) Collected: 11/01/21 1029    Specimen: Nasopharyngeal Swab from Nasopharynx Updated: 11/02/21 0132     SARS-CoV-2 (COVID-19) Negative          Imaging  X-RAY LUMBAR SPINE 2 OR 3 VIEWS    Result Date: 11/3/2021  Narrative: CLINICAL HISTORY: Lumbar fusion. COMMENT: PA and lateral views of the lumbar spine were performed intraoperatively. Initial image demonstrates a surgical instrument at the level of the L5 spinous process. Subsequent images demonstrate L3-S1 fusion hardware with L5-S1 interbody spacer.     Impression: IMPRESSION: Lumbar fusion.    MRI LUMBAR SPINE WITHOUT CONTRAST    Addendum Date: 10/11/2021 Addendum:   Impression 2.  Incompletely characterized lesion in the right kidney, for which initial follow-up with ultrasound is recommended.    Result Date: 10/11/2021  Narrative: CLINICAL HISTORY:  M 54.5, M 48.062, M 47.816, M 43.17.  Right low back pain and bilateral leg fatigue. COMMENT: Comparison: Lumbar spine radiographs 1/17/2019 Technique: Multiplanar MR imaging of the lumbar spine was performed without intravenous contrast. Findings: For counting purposes, the last fully formed disc space is considered L5-S1 and is seen on series 8, image 21. There is mild exaggeration of the normal lumbar lordosis. Vertebral body heights are maintained. There is trace retrolisthesis of L1 on L2 and grade 1 anterolisthesis of L5 on S1. There are bilateral pars  interarticularis defects at L5. There are mild multilevel edematous degenerative endplate changes. There is multilevel disc desiccation and disc space narrowing. No prevertebral or paravertebral soft tissue edema.  There is a 1.5 cm T2 hypointense lesion in the interpolar region of the right kidney, which is incompletely characterized. The conus terminates at the L1 level. The distal cord is normal in size and signal characteristics. Perineural cysts are noted in the lower thoracic spine and sacrum. Level by level assessment: T10-T11: Mild disc osteophyte complex.  Facet hypertrophy.  Bilateral perineural cysts resulting in severe neural foraminal narrowing.  No central canal narrowing. T11-T12: Mild disc osteophyte complex with a small left paracentral disc protrusion.  Facet hypertrophy.  Right perineural cyst resulting in severe right neural foraminal narrowing.  No central canal or left neural foraminal narrowing. T12-L1: Mild disc osteophyte complex.  Facet hypertrophy.  Mild right neural foraminal narrowing.  No central canal narrowing. L1-L2: Large disc osteophyte complex, eccentric to the right.  Facet hypertrophy.  Severe bilateral neural foraminal narrowing.  No central canal narrowing. L2-L3: Moderate disc osteophyte complex.  Facet hypertrophy with ligamentum flavum infolding.  Mild central canal narrowing.  Moderate bilateral neural foraminal narrowing. L3-L4: Moderate disc osteophyte complex.  Facet hypertrophy with ligamentum flavum infolding.  Moderate central canal narrowing.  Severe bilateral neural foraminal narrowing. L4-L5: Moderate disc osteophyte complex with a superimposed central disc protrusion.  Facet hypertrophy with ligamentum flavum infolding.  Severe bilateral neural foraminal narrowing.  No central canal narrowing. L5-S1: Uncovering of the disc with a large diffuse disc bulge.  Facet hypertrophy.  Severe bilateral neural foraminal narrowing.  No central canal narrowing.      Impression: IMPRESSION: Multilevel degenerative changes of the lumbar spine as described, most pronounced at L3-L4.    ULTRASOUND KIDNEYS    Addendum Date: 10/26/2021 Addendum:   Margarita ALLISON spoke to Bharti in the office of Dr. Melchor Lee on 10/26/21 at 1:15pm and she confirmed receipt of the report and would give to the doctor to review.    Result Date: 10/26/2021  Narrative: CLINICAL HISTORY: N 28.1 COMMENT: Ultrasound of the kidneys was performed Comparison: Correlation is made with relevant images from a right upper quadrant ultrasound performed 10/16/2019 and with a prior abdominal and pelvic CT performed 8/21/2018. The right kidney measures 10.6 x 4.2 x 4.8 cm and the left kidney measures 11.8 x 5.7 x 5.5 cm. The kidneys are unremarkable in contour. A 1.3 x 1.1 x 1.4 cm hypoechoic structure with no internal flow is present at the mid to lower pole of the right kidney. A 1.2 x 0.8 x 1.1 cm exophytic hypoechoic structure is present at the upper pole of the left kidney. There is no demonstrable internal flow though the structure is suboptimally assessed due to adjacent bowel gas. There is also a 0.6 x 0.6 x 0.5 cm parapelvic cyst at the interpolar region of the left kidney. There is no hydronephrosis, perinephric collection or evidence of a shadowing renal calculus.     Impression: IMPRESSION: 1.4 cm hypoechoic structure with no internal flow in the mid to lower pole the right kidney and 1.2 cm exophytic hypoechoic structure at the upper pole of the left kidney. These are incompletely characterized but could represent complex cysts. Further evaluation with MRI with and without intravenous contrast is recommended. In accordance with [place] Act 112,  the patient will receive a letter notifying them to follow up with their physician.         ASSESSMENT AND PLAN      Spinal stenosis of lumbar region with neurogenic claudication  Underwent L3-S1 posterior lumbar decompression, posterior lumbar interbody fusion today -  POD 1  Cont pain control, bowel regimen, PT/OT  DVT prophylaxis per ortho     Essential hypertension  On Irbesartan at home  Hold for now due to low Bps  Can resume as needed      Mixed HLD  Cont statin and fenofibrate     Class 2 severe obesity due to excess calories with serious comorbidity and body mass index (BMI) of 36.0 to 36.9 in adult (CMS/[facility])  Lifestyle modification     Anxiety  Continue Mamie Ladd MD  11/4/2021

## 2021-11-04 NOTE — PROGRESS NOTES
Pt without new complaints, except incisional discomfort, doing well   AFVSS Reji in place  5/5 motor BLE all muscle groups  SILT BLE all sens dist  Dressing dry     A/P Stable post op day 1  -pt, oob  -pain control  -abx/drain  -scds  -med management

## 2021-11-04 NOTE — POST-OP (NON-BILLABLE)
Patient tolerated procedure well. Pain controlled.    AVSS   JPs in place, serosanguinous drainage  Dressing clean  5/5 motor in all muscle groups LE  SILT B/L LE     A/P  70 y/o F s/p L3-S1 PLIF with Dr Lee 11/3     Pain control  Abx until drain discontinued  SCDs for dvt ppx  Maintain CARLOS drain, close monitoring of output  AM labs  PT/OT WBAT B/L LE OOB

## 2021-11-04 NOTE — PLAN OF CARE
Plan of Care Review  Plan of Care Reviewed With: patient  Progress: improving  Outcome Summary: Pt ambulating around room. Pain controlled with oxycodone. Tolertating diet. Pt resting in bed with  at bedside. Bed alarm on and call bell in reach

## 2021-11-04 NOTE — PROGRESS NOTES
Ortho Daily Progress Note    Subjective   Pt seen at bedside no events overnight. Pain well controlled. No numbness, tingling, fevers or chills.    Objective     Vital signs in last 24 hours:  Temp:  [36.3 °C (97.3 °F)-36.8 °C (98.2 °F)] 36.8 °C (98.2 °F)  Heart Rate:  [69-98] 69  Resp:  [7-35] 16  BP: ()/(53-97) 120/82      Intake/Output Summary (Last 24 hours) at 11/4/2021 0750  Last data filed at 11/4/2021 0609  Gross per 24 hour   Intake 3452.08 ml   Output 2777 ml   Net 675.08 ml     Intake/Output this shift:  No intake/output data recorded.    AVSS   L spine:  JPs in place, serosanguinous drainage, 165 cc since post op, 80 cc overnight  Dressing clean  5/5 motor in all muscle groups LE  SILT B/L LE     Post op images reviewed     A/P  70 y/o F s/p L3-S1 PLIF with Dr Lee 11/3     Doing well post op  Pain controlled  Abx until drain discontinued  SCDs for dvt ppx  Maintain CARLOS drain, close monitoring of output  AM labs  PT/OT WBAT B/L LE OOB

## 2021-11-04 NOTE — PLAN OF CARE
Problem: Adult Inpatient Plan of Care  Goal: Readiness for Transition of Care  Intervention: Mutually Develop Transition Plan  Flowsheets (Taken 11/4/2021 0039)  Equipment Needed After Discharge: none  Assistive Device/Animal Currently Used at Home: none  Anticipated Changes Related to Illness: none  Transportation Concerns: car, none  Readmission Within the Last 30 Days: no previous admission in last 30 days  Patient/Family Anticipated Services at Transition: none  Patient/Family Anticipates Transition to: home with family  Concerns to be Addressed: no discharge needs identified     MSW CC met with pt. Pt is expected for dc to home where she lives with her spouse in a 2 story house. Pt states she has 1 step to enter. Pt denies any homecare needs at this time. MSW CC following to assist further, as needed.    PLAN: dc home. No needs.

## 2021-11-04 NOTE — ANESTHESIA POSTPROCEDURE EVALUATION
Patient: Reyna Castillo    Procedure Summary     Date: 11/03/21 Room / Location:  OR 4 / PH OR    Anesthesia Start: 0906 Anesthesia Stop: 1224    Procedure: L3-S1 Posterior Lumbar Decompression, Posterior Lumbar Interbody Fusion, Instrumentation, Cage, Allograft, Autograft, BMP (N/A Spine Lumbar) Diagnosis:       Spondylolisthesis of lumbosacral region      Spinal stenosis of lumbar region with neurogenic claudication      (Lumbosacral Spondylolithesis M43.17)      (Lumbar Stenosis M48.062)    Surgeons: Melchor Lee MD Responsible Provider: Chao Hurley MD    Anesthesia Type: general ASA Status: 2          Anesthesia Type: general  PACU Vitals  11/3/2021 1210 - 11/3/2021 1310      11/3/2021  1215 11/3/2021  1229 11/3/2021  1230 11/3/2021  1245    BP: 131/97 131/97 169/73 162/70    Temp: -- 36.8 °C (98.2 °F) -- --    Pulse: 88 84 82 86    Resp: 16 18 17 18    SpO2: 98 % 99 % 99 % --              11/3/2021  1300             BP: 130/60       Temp: --       Pulse: 94       Resp: 18       SpO2: 95 %               Anesthesia Post Evaluation    Pain management: adequate  Patient location during evaluation: PACU  Patient participation: complete - patient participated  Level of consciousness: awake and alert  Cardiovascular status: acceptable  Airway Patency: adequate  Respiratory status: acceptable  Hydration status: acceptable  Anesthetic complications: no

## 2021-11-04 NOTE — PLAN OF CARE
Problem: Adult Inpatient Plan of Care  Goal: Plan of Care Review  Outcome: Progressing  Flowsheets (Taken 11/4/2021 2224)  Progress: improving  Plan of Care Reviewed With: patient  Outcome Summary: Pt ambulating around room. Pain controlled with oxycodone. Tolertating diet. Pt resting in bed with  at bedside. Bed alarm on and call bell in reach   Plan of Care Review  Plan of Care Reviewed With: patient  Progress: improving  Outcome Summary: Pt ambulating around room. Pain controlled with oxycodone. Tolertating diet. Pt resting in bed with  at bedside. Bed alarm on and call bell in reach

## 2021-11-04 NOTE — PATIENT CARE CONFERENCE
Care Progression Rounds Note  Date: 11/4/2021  Time: 8:44 AM     Patient Name: Reyna Castillo     Medical Record Number: [id number]   YOB: 1950  Sex: Female      Room/Bed: 4203    Admitting Diagnosis: Spondylolisthesis of lumbosacral region [M43.17]  Spinal stenosis of lumbar region with neurogenic claudication [M48.062]  Lumbar stenosis with neurogenic claudication [M48.062]   Admit Date/Time: 11/3/2021  6:42 AM    Primary Diagnosis: Lumbar stenosis with neurogenic claudication  Principal Problem: Lumbar stenosis with neurogenic claudication    GMLOS: pending  Anticipated Discharge Date: 11/5/2021    AM-PAC:  Mobility Score: 17    Discharge Planning:       Barriers to Discharge:  Barriers to Discharge: Therapy update pending    Participants:  nursing,social work/services

## 2021-11-04 NOTE — PLAN OF CARE
Problem: Adult Inpatient Plan of Care  Goal: Plan of Care Review  Outcome: Progressing  Flowsheets (Taken 11/4/2021 1510)  Outcome Summary: OT evaluation completed.

## 2021-11-05 LAB
ANION GAP SERPL CALC-SCNC: 10 MEQ/L (ref 3–15)
BUN SERPL-MCNC: 20 MG/DL (ref 8–20)
CALCIUM SERPL-MCNC: 9.1 MG/DL (ref 8.9–10.3)
CHLORIDE SERPL-SCNC: 103 MEQ/L (ref 98–109)
CO2 SERPL-SCNC: 25 MEQ/L (ref 22–32)
CREAT SERPL-MCNC: 0.6 MG/DL (ref 0.6–1.1)
ERYTHROCYTE [DISTWIDTH] IN BLOOD BY AUTOMATED COUNT: 13.2 % (ref 11.7–14.4)
GFR SERPL CREATININE-BSD FRML MDRD: >60 ML/MIN/1.73M*2
GLUCOSE SERPL-MCNC: 106 MG/DL (ref 70–99)
HCT VFR BLDCO AUTO: 31.5 % (ref 35–45)
HGB BLD-MCNC: 10 G/DL (ref 11.8–15.7)
MCH RBC QN AUTO: 30.1 PG (ref 28–33.2)
MCHC RBC AUTO-ENTMCNC: 31.7 G/DL (ref 32.2–35.5)
MCV RBC AUTO: 94.9 FL (ref 83–98)
PDW BLD AUTO: 9.9 FL (ref 9.4–12.3)
PLATELET # BLD AUTO: 243 K/UL (ref 150–369)
POTASSIUM SERPL-SCNC: 4.4 MEQ/L (ref 3.6–5.1)
RBC # BLD AUTO: 3.32 M/UL (ref 3.93–5.22)
SODIUM SERPL-SCNC: 138 MEQ/L (ref 136–144)
WBC # BLD AUTO: 9.42 K/UL (ref 3.8–10.5)

## 2021-11-05 PROCEDURE — 99233 SBSQ HOSP IP/OBS HIGH 50: CPT | Performed by: STUDENT IN AN ORGANIZED HEALTH CARE EDUCATION/TRAINING PROGRAM

## 2021-11-05 PROCEDURE — 80048 BASIC METABOLIC PNL TOTAL CA: CPT | Performed by: STUDENT IN AN ORGANIZED HEALTH CARE EDUCATION/TRAINING PROGRAM

## 2021-11-05 PROCEDURE — 25000000 HC PHARMACY GENERAL: Performed by: STUDENT IN AN ORGANIZED HEALTH CARE EDUCATION/TRAINING PROGRAM

## 2021-11-05 PROCEDURE — 63600000 HC DRUGS/DETAIL CODE: Performed by: STUDENT IN AN ORGANIZED HEALTH CARE EDUCATION/TRAINING PROGRAM

## 2021-11-05 PROCEDURE — 63700000 HC SELF-ADMINISTRABLE DRUG: Performed by: STUDENT IN AN ORGANIZED HEALTH CARE EDUCATION/TRAINING PROGRAM

## 2021-11-05 PROCEDURE — 36415 COLL VENOUS BLD VENIPUNCTURE: CPT | Performed by: STUDENT IN AN ORGANIZED HEALTH CARE EDUCATION/TRAINING PROGRAM

## 2021-11-05 PROCEDURE — 12000000 HC ROOM AND CARE MED/SURG

## 2021-11-05 PROCEDURE — 25800000 HC PHARMACY IV SOLUTIONS: Performed by: STUDENT IN AN ORGANIZED HEALTH CARE EDUCATION/TRAINING PROGRAM

## 2021-11-05 PROCEDURE — 63700000 HC SELF-ADMINISTRABLE DRUG: Performed by: NURSE PRACTITIONER

## 2021-11-05 PROCEDURE — 97116 GAIT TRAINING THERAPY: CPT | Mod: GP

## 2021-11-05 PROCEDURE — 85027 COMPLETE CBC AUTOMATED: CPT | Performed by: STUDENT IN AN ORGANIZED HEALTH CARE EDUCATION/TRAINING PROGRAM

## 2021-11-05 RX ORDER — LOSARTAN POTASSIUM 50 MG/1
50 TABLET ORAL 2 TIMES DAILY
Status: DISCONTINUED | OUTPATIENT
Start: 2021-11-06 | End: 2021-11-07 | Stop reason: HOSPADM

## 2021-11-05 RX ORDER — LOSARTAN POTASSIUM 50 MG/1
50 TABLET ORAL DAILY
Status: DISCONTINUED | OUTPATIENT
Start: 2021-11-05 | End: 2021-11-05

## 2021-11-05 RX ADMIN — ACETAMINOPHEN 650 MG: 325 TABLET, FILM COATED ORAL at 06:04

## 2021-11-05 RX ADMIN — DOCUSATE SODIUM AND SENNOSIDES 1 TABLET: 8.6; 5 TABLET, FILM COATED ORAL at 20:21

## 2021-11-05 RX ADMIN — DIAZEPAM 10 MG: 5 TABLET ORAL at 17:35

## 2021-11-05 RX ADMIN — CEFAZOLIN SODIUM 2 G: 10 POWDER, FOR SOLUTION INTRAVENOUS at 17:35

## 2021-11-05 RX ADMIN — OXYCODONE HYDROCHLORIDE 10 MG: 5 TABLET ORAL at 20:21

## 2021-11-05 RX ADMIN — SERTRALINE HYDROCHLORIDE 50 MG: 25 TABLET, FILM COATED ORAL at 09:03

## 2021-11-05 RX ADMIN — PANTOPRAZOLE SODIUM 20 MG: 20 TABLET, DELAYED RELEASE ORAL at 09:03

## 2021-11-05 RX ADMIN — DOCUSATE SODIUM AND SENNOSIDES 1 TABLET: 8.6; 5 TABLET, FILM COATED ORAL at 09:03

## 2021-11-05 RX ADMIN — OXYCODONE HYDROCHLORIDE 10 MG: 5 TABLET ORAL at 02:18

## 2021-11-05 RX ADMIN — OXYCODONE HYDROCHLORIDE 10 MG: 5 TABLET ORAL at 06:04

## 2021-11-05 RX ADMIN — FENOFIBRATE 67 MG: 67 CAPSULE ORAL at 09:03

## 2021-11-05 RX ADMIN — DIAZEPAM 10 MG: 5 TABLET ORAL at 09:03

## 2021-11-05 RX ADMIN — OXYCODONE HYDROCHLORIDE 10 MG: 5 TABLET ORAL at 14:15

## 2021-11-05 RX ADMIN — ROSUVASTATIN CALCIUM 20 MG: 20 TABLET, FILM COATED ORAL at 09:03

## 2021-11-05 RX ADMIN — POLYETHYLENE GLYCOL (3350) 17 G: 17 POWDER, FOR SOLUTION ORAL at 09:03

## 2021-11-05 RX ADMIN — CEFAZOLIN SODIUM 2 G: 10 POWDER, FOR SOLUTION INTRAVENOUS at 10:12

## 2021-11-05 RX ADMIN — HYDROMORPHONE HYDROCHLORIDE 0.5 MG: 1 INJECTION, SOLUTION INTRAMUSCULAR; INTRAVENOUS; SUBCUTANEOUS at 22:00

## 2021-11-05 RX ADMIN — CEFAZOLIN SODIUM 2 G: 10 POWDER, FOR SOLUTION INTRAVENOUS at 02:18

## 2021-11-05 RX ADMIN — OXYCODONE HYDROCHLORIDE 10 MG: 5 TABLET ORAL at 10:12

## 2021-11-05 ASSESSMENT — COGNITIVE AND FUNCTIONAL STATUS - GENERAL
WALKING IN HOSPITAL ROOM: 4 - NONE
CLIMB 3 TO 5 STEPS WITH RAILING: 3 - A LITTLE
STANDING UP FROM CHAIR USING ARMS: 4 - NONE
MOVING TO AND FROM BED TO CHAIR: 4 - NONE

## 2021-11-05 NOTE — PROGRESS NOTES
Ortho Daily Progress Note    Subjective   Pt seen sitting in chair no events overnight. Doing very well this morning. Pain well controlled. No numbness, tingling, fevers or chills.    Objective     Vital signs in last 24 hours:  Temp:  [36.7 °C (98 °F)-37 °C (98.6 °F)] 37 °C (98.6 °F)  Heart Rate:  [70-82] 77  Resp:  [16] 16  BP: (112-146)/(57-74) 139/67      Intake/Output Summary (Last 24 hours) at 11/5/2021 0723  Last data filed at 11/5/2021 0316  Gross per 24 hour   Intake 100 ml   Output 610 ml   Net -510 ml     Intake/Output this shift:  No intake/output data recorded.    AVSS   L spine:  JPs in place, serosanguinous drainage, 100 cc overnight/ 260cc 24 hr  Dressing clean  5/5 motor in all muscle groups LE  SILT B/L LE     Post op images reviewed     A/P  72 y/o F s/p L3-S1 PLIF with Dr Lee 11/3     Doing well post op  Pain controlled  Abx until drain discontinued  SCDs for dvt ppx  Continue CARLOS drain, close monitoring of output  Continue PT/OT WBAT B/L LE OOB  Continue hospitalization

## 2021-11-05 NOTE — PLAN OF CARE
Patient still with CARLOS drain. Patient refused homecare yesterday, no needs. If patient is going home with CARLOS drain, please offer homecare.

## 2021-11-05 NOTE — PATIENT CARE CONFERENCE
Care Progression Rounds Note  Date: 11/5/2021  Time: 9:00 AM     Patient Name: Reyna Castillo     Medical Record Number: [id number]   YOB: 1950  Sex: Female      Room/Bed: 4203    Admitting Diagnosis: Spondylolisthesis of lumbosacral region [M43.17]  Spinal stenosis of lumbar region with neurogenic claudication [M48.062]  Lumbar stenosis with neurogenic claudication [M48.062]   Admit Date/Time: 11/3/2021  6:42 AM    Primary Diagnosis: Lumbar stenosis with neurogenic claudication  Principal Problem: Lumbar stenosis with neurogenic claudication    GMLOS: pending  Anticipated Discharge Date: 11/6/2021    AM-PAC:  Mobility Score: 17    Discharge Planning:       Barriers to Discharge:  Barriers to Discharge: Medical issues not resolved  Comment: still with a lot of drainage in CARLOS drain    Participants:  nursing,physician,social work/services,advanced practice provider

## 2021-11-05 NOTE — PROGRESS NOTES
Patient: Reyna Castillo  Location:  [facility] 4B 4203  MRN:  [id number]  Today's date:  11/5/2021    Pt was left seated in chair w/ alarm off as pt was found, RN aware, call bell in reach, NAD, and all needs met. RN notified.    Reyna is a 71 y.o. female admitted on 11/3/2021 with Spondylolisthesis of lumbosacral region [M43.17]  Spinal stenosis of lumbar region with neurogenic claudication [M48.062]  Lumbar stenosis with neurogenic claudication [M48.062]. Principal problem is Lumbar stenosis with neurogenic claudication.    Past Medical History  Reyna has a past medical history of Abnormal ECG (10/9/2018), Agatston CAC score 100-199 (10/9/2018), Anxiety, Class 2 severe obesity due to excess calories with serious comorbidity and body mass index (BMI) of 36.0 to 36.9 in adult (CMS/[facility]) (10/9/2018), DJD (degenerative joint disease), Family history of early CAD (10/9/2018), Hypertension, Lumbar stenosis, Tran-Juan tear, and Mixed hyperlipidemia (10/9/2018).    History of Present Illness   S/p L3-S1 Posterior Lumbar Decompression, Posterior Lumbar Interbody Fusion, Instrumentation, Cage, Allograft, Autograft, BMP   Dr. Lee 11/3/21      PT Vitals    Date/Time Pulse SpO2 Pt Activity O2 Therapy BP BP Location BP Method Pt Position Observations [facility]   11/05/21 1524 84 96 % At rest None (Room air) 137/63 Left upper arm Automatic Sitting -- VT   11/05/21 1546 -- -- -- -- 172/72 after therapy -- -- -- -- IMF   11/05/21 1546 86 98 % At rest None (Room air) -- Left upper arm Automatic Sitting PT- After ambulating and FF stairs VT      PT Pain    Date/Time Pain Type Side/Orientation Location Rating: Rest Rating: Activity Interventions [facility]   11/05/21 1524 Pain Assessment lower back 5 5 position adjusted VT   11/05/21 1546 Pain Assessment lower back 5 5 position adjusted VT          Prior Living Environment      Most Recent Value   Living Environment Comment pt lives with  in 2  with 1 + 1 YAS. CT 1st floor.  2nd floor bed/bath. tub shower no GB or shower chair.        Prior Level of Function      Most Recent Value   Dominant Hand right   Ambulation independent   Transferring independent   Toileting independent   Bathing independent   Dressing independent   Eating independent   Prior Level of Function Comment pt was previously independent with mobility and ADLs. drives. retired.   Assistive Device Currently Used at Home none           PT Evaluation and Treatment - 11/05/21 1524        PT Time Calculation    Start Time 1524     Stop Time 1547     Time Calculation (min) 23 min        Session Details    Document Type daily treatment/progress note     Mode of Treatment physical therapy        General Information    Patient Profile Reviewed yes     Onset of Illness/Injury or Date of Surgery 11/03/21     Referring Physician Jesus     Patient/Family/Caregiver Comments/Observations RN cleared pt for therapy; twin sister present for session     General Observations of Patient pt sitting in chair agreeable to therapy     Existing Precautions/Restrictions spinal   elevate HOB       Bed Mobility    Comment (Bed Mobility) pt OOB in chair;declined trial at this time, denies concerns        Sit to Stand Transfer    Smyth, Sit to Stand Transfer modified independence     Assistive Device walker, front-wheeled     Comment from chair        Stand to Sit Transfer    Smyth, Stand to Sit Transfer modified independence     Assistive Device walker, front-wheeled     Comment to chair        Gait Training    Smyth, Gait modified independence   and supervision w/o AD    Assistive Device walker, front-wheeled   and w/o AD    Distance in Feet 204 feet   + 30ft w/o AD    Pattern (Gait) step-through     Deviations/Abnormal Patterns (Gait) gait speed decreased;step length decreased;stride length decreased     Comment (Gait/Stairs) pt demo'd steadiness with use of RW. pt reqs S ambulating w/o AD. pt educated to use RW for inc'd  balance and steadiness. no LOB.        Stairs Training    Caroline, Stairs supervision     Assistive Device railing     Handrail Location (Stairs) right side (ascending);left side (descending)     Number of Stairs 12     Ascending Stairs Technique step-to-step     Descending Stairs Technique step-to-step     Comment pt demo'd mild unsteadiness w/ stairs. no LOB. educated to approach stairs w/ step to step approach. pt demo'd understanding        Safety Issues, Functional Mobility    Impairments Affecting Function (Mobility) balance;pain;endurance/activity tolerance        Balance    Balance Assessment sitting static balance;sit to stand dynamic balance;standing static balance;standing dynamic balance     Static Sitting Balance WFL;sitting in chair;unsupported     Sit to Stand Dynamic Balance WFL;supported     Static Standing Balance WFL;supported     Dynamic Standing Balance WFL;supported;mild impairment;unsupported     Comment, Balance RW= supported. pt demo'd mild unsteadiness ambulating w/o AD. no LOB noted t/o session        AM-PAC (TM) - Mobility (Current Function)    Turning from your back to your side while in a flat bed without using bedrails? 3 - A Little     Moving from lying on your back to sitting on the side of a flat bed without using bedrails? 3 - A Little     Moving to and from a bed to a chair? 4 - None     Standing up from a chair using your arms? 4 - None     To walk in a hospital room? 4 - None     Climbing 3-5 steps with a railing? 3 - A Little     AM-PAC (TM) Mobility Score 21        Assessment/Plan (PT)    Daily Outcome Statement PT treat completed. pt was mod I for transfers and gait using RW. pt reqs S for stairs using railing and gait w/o AD. pt demo'd mild unsteadiness during gait w/o AD thus educated to use RW for increased balance. pt was limited by dec'd balance and inc'd pain. no further acute PT needs. [facility] 21               PT Assessment/Plan      Most Recent Value   PT  Recommended Discharge Disposition home with assistance, home with home health at 11/05/2021 1524   Anticipated Equipment Needs at Discharge (PT) none  [owns RW] at 11/05/2021 1524   Patient/Family Therapy Goals Statement go home at 11/05/2021 1524                    Education Documentation  Activity, taught by Ramírez Wayne at 11/5/2021  4:26 PM.  Learner: Patient  Readiness: Acceptance  Method: Explanation, Demonstration  Response: Verbalizes Understanding, Demonstrated Understanding  Comment: PT POC, PT role, gait and stair training, car transfers, spinal precautions, call bell use          PT Goals      Most Recent Value   Bed Mobility Goal 1    Activity/Assistive Device rolling to left, rolling to right, sit to supine/supine to sit at 11/03/2021 1831   [place] independent at 11/03/2021 1831   Time Frame by discharge at 11/03/2021 1831   Progress/Outcome Other: pt declined trial denies concerns at 11/03/2021 1831   Transfer Goal 1    Activity/Assistive Device sit-to-stand/stand-to-sit, bed-to-chair/chair-to-bed, walker, front-wheeled at 11/03/2021 1831   [place] modified independence at 11/03/2021 1831   Time Frame by discharge at 11/03/2021 1831   Progress/Outcome goal met at 11/05/2021 1524   Gait Training Goal 1    Activity/Assistive Device gait (walking locomotion), walker, front-wheeled at 11/03/2021 1831   [place] modified independence at 11/03/2021 1831   Distance 200 at 11/03/2021 1831   Time Frame by discharge at 11/03/2021 1831   Progress/Outcome goal met at 11/05/2021 1524   Stairs Goal 1    Activity/Assistive Device ascending stairs, descending stairs, using handrail, left, using handrail, right at 11/03/2021 1831   [place] modified independence at 11/03/2021 1831   Number of Stairs 12 at 11/03/2021 1831   Time Frame by discharge at 11/03/2021 1831   Progress/Outcome good progress toward goal  [adequate for d/c] at 11/05/2021 1524

## 2021-11-05 NOTE — PROGRESS NOTES
[facility] Medicine Service -  Daily Progress Note       SUBJECTIVE   Interval History: Sitting in chair, pain well controlled. Worked with PT/OT. Offers no new complaints.      OBJECTIVE      Vital signs in last 24 hours:  Temp:  [36.2 °C (97.1 °F)-37 °C (98.6 °F)] 36.2 °C (97.1 °F)  Heart Rate:  [75-77] 77  Resp:  [16] 16  BP: (127-176)/(58-68) 176/68    Intake/Output Summary (Last 24 hours) at 11/5/2021 1544  Last data filed at 11/5/2021 1423  Gross per 24 hour   Intake 100 ml   Output 200 ml   Net -100 ml       PHYSICAL EXAMINATION      Gen: Appears stated age, not in any distress  Head: atraumatic, normocephalic  Eyes: PERRLA, EOMI, no pallor  Neck: supple, no Lymph nodes, no Thyromegaly, no JVD  CVS: RRR, No M/G, no JVD  Pulm: CTA B/l, no wheezing or rhonchi, no rales  Abd: Soft, NT, ND, normal bowel sounds  Extremities:no edema, no cyanosis, CARLOS drain in place  MSK: no DJD, no joint swellings, no joint tenderness   Neuro: AAO x3     LINES, CATHETERS, DRAINS, AIRWAYS, AND WOUNDS   Lines, Drains, Airways, Wounds:  Peripheral IV (Adult) 11/03/21 Distal;Left;Posterior Forearm (Active)   Number of days: 1       Peripheral IV (Adult) 11/03/21 Right Hand (Active)   Number of days: 1       Drain 1 Left Back 10 Fr. (Active)   Number of days: 1       Surgical Incision Back (Active)   Number of days: 1       Comments:      LABS / IMAGING / TELE      Labs  Results from last 7 days   Lab Units 11/05/21  0349   WBC K/uL 9.42   HEMOGLOBIN g/dL 10.0*   HEMATOCRIT % 31.5*   PLATELETS K/uL 243     Results from last 7 days   Lab Units 11/05/21  0349   SODIUM mEQ/L 138   POTASSIUM mEQ/L 4.4   CHLORIDE mEQ/L 103   CO2 mEQ/L 25   BUN mg/dL 20   CREATININE mg/dL 0.6   GLUCOSE mg/dL 106*   CALCIUM mg/dL 9.1         Micro  Microbiology Results     Procedure Component Value Units Date/Time    COVID-19 PAT/Pre-procedural [[id number]]  (Normal) Collected: 11/01/21 1029    Specimen: Nasopharyngeal Swab from Nasopharynx Updated: 11/02/21  0132    Narrative:      The following orders were created for panel order COVID-19 PAT/Pre-procedural.  Procedure                               Abnormality         Status                     ---------                               -----------         ------                     SARS-CoV-2 (COVID-19), PCR[[id number]]   Normal              Final result                 Please view results for these tests on the individual orders.    SARS-CoV-2 (COVID-19), PCR [[id number]]  (Normal) Collected: 11/01/21 1029    Specimen: Nasopharyngeal Swab from Nasopharynx Updated: 11/02/21 0132     SARS-CoV-2 (COVID-19) Negative          Imaging  X-RAY LUMBAR SPINE 2 OR 3 VIEWS    Result Date: 11/3/2021  Narrative: CLINICAL HISTORY: Lumbar fusion. COMMENT: PA and lateral views of the lumbar spine were performed intraoperatively. Initial image demonstrates a surgical instrument at the level of the L5 spinous process. Subsequent images demonstrate L3-S1 fusion hardware with L5-S1 interbody spacer.     Impression: IMPRESSION: Lumbar fusion.    MRI LUMBAR SPINE WITHOUT CONTRAST    Addendum Date: 10/11/2021 Addendum:   Impression 2.  Incompletely characterized lesion in the right kidney, for which initial follow-up with ultrasound is recommended.    Result Date: 10/11/2021  Narrative: CLINICAL HISTORY:  M 54.5, M 48.062, M 47.816, M 43.17.  Right low back pain and bilateral leg fatigue. COMMENT: Comparison: Lumbar spine radiographs 1/17/2019 Technique: Multiplanar MR imaging of the lumbar spine was performed without intravenous contrast. Findings: For counting purposes, the last fully formed disc space is considered L5-S1 and is seen on series 8, image 21. There is mild exaggeration of the normal lumbar lordosis. Vertebral body heights are maintained. There is trace retrolisthesis of L1 on L2 and grade 1 anterolisthesis of L5 on S1. There are bilateral pars interarticularis defects at L5. There are mild multilevel edematous degenerative  endplate changes. There is multilevel disc desiccation and disc space narrowing. No prevertebral or paravertebral soft tissue edema.  There is a 1.5 cm T2 hypointense lesion in the interpolar region of the right kidney, which is incompletely characterized. The conus terminates at the L1 level. The distal cord is normal in size and signal characteristics. Perineural cysts are noted in the lower thoracic spine and sacrum. Level by level assessment: T10-T11: Mild disc osteophyte complex.  Facet hypertrophy.  Bilateral perineural cysts resulting in severe neural foraminal narrowing.  No central canal narrowing. T11-T12: Mild disc osteophyte complex with a small left paracentral disc protrusion.  Facet hypertrophy.  Right perineural cyst resulting in severe right neural foraminal narrowing.  No central canal or left neural foraminal narrowing. T12-L1: Mild disc osteophyte complex.  Facet hypertrophy.  Mild right neural foraminal narrowing.  No central canal narrowing. L1-L2: Large disc osteophyte complex, eccentric to the right.  Facet hypertrophy.  Severe bilateral neural foraminal narrowing.  No central canal narrowing. L2-L3: Moderate disc osteophyte complex.  Facet hypertrophy with ligamentum flavum infolding.  Mild central canal narrowing.  Moderate bilateral neural foraminal narrowing. L3-L4: Moderate disc osteophyte complex.  Facet hypertrophy with ligamentum flavum infolding.  Moderate central canal narrowing.  Severe bilateral neural foraminal narrowing. L4-L5: Moderate disc osteophyte complex with a superimposed central disc protrusion.  Facet hypertrophy with ligamentum flavum infolding.  Severe bilateral neural foraminal narrowing.  No central canal narrowing. L5-S1: Uncovering of the disc with a large diffuse disc bulge.  Facet hypertrophy.  Severe bilateral neural foraminal narrowing.  No central canal narrowing.     Impression: IMPRESSION: Multilevel degenerative changes of the lumbar spine as described,  most pronounced at L3-L4.    ULTRASOUND KIDNEYS    Addendum Date: 10/26/2021 Addendum:   Margarita ALLISON spoke to Bharti in the office of Dr. Melchor Lee on 10/26/21 at 1:15pm and she confirmed receipt of the report and would give to the doctor to review.    Result Date: 10/26/2021  Narrative: CLINICAL HISTORY: N 28.1 COMMENT: Ultrasound of the kidneys was performed Comparison: Correlation is made with relevant images from a right upper quadrant ultrasound performed 10/16/2019 and with a prior abdominal and pelvic CT performed 8/21/2018. The right kidney measures 10.6 x 4.2 x 4.8 cm and the left kidney measures 11.8 x 5.7 x 5.5 cm. The kidneys are unremarkable in contour. A 1.3 x 1.1 x 1.4 cm hypoechoic structure with no internal flow is present at the mid to lower pole of the right kidney. A 1.2 x 0.8 x 1.1 cm exophytic hypoechoic structure is present at the upper pole of the left kidney. There is no demonstrable internal flow though the structure is suboptimally assessed due to adjacent bowel gas. There is also a 0.6 x 0.6 x 0.5 cm parapelvic cyst at the interpolar region of the left kidney. There is no hydronephrosis, perinephric collection or evidence of a shadowing renal calculus.     Impression: IMPRESSION: 1.4 cm hypoechoic structure with no internal flow in the mid to lower pole the right kidney and 1.2 cm exophytic hypoechoic structure at the upper pole of the left kidney. These are incompletely characterized but could represent complex cysts. Further evaluation with MRI with and without intravenous contrast is recommended. In accordance with [place] Act 112,  the patient will receive a letter notifying them to follow up with their physician.         ASSESSMENT AND PLAN      Spinal stenosis of lumbar region with neurogenic claudication  Underwent L3-S1 posterior lumbar decompression, posterior lumbar interbody fusion today - POD 2  Cont pain control, bowel regimen, PT/OT  DVT prophylaxis per ortho     Essential  hypertension  On Irbesartan at home  Restarted with Losartan 50mg daily  Will adjust as needed     Mixed HLD  Cont statin and fenofibrate     Class 2 severe obesity due to excess calories with serious comorbidity and body mass index (BMI) of 36.0 to 36.9 in adult (CMS/[facility])  Lifestyle modification     Anxiety  Continue Mamie Ladd MD  11/5/2021

## 2021-11-05 NOTE — OP NOTE
REPORT TYPE: Operative Note    DATE OF OPERATION: 11/03/2021    PREOPERATIVE DIAGNOSIS:  L3 to S1 stenosis, L5-S1 isthmic spondylolisthesis.    POSTOPERATIVE DIAGNOSIS:  L3 to S1 stenosis, L5-S1 isthmic spondylolisthesis.    PROCEDURES:  Bilateral lumbar laminectomy L3 to S1, posterior spinal fusion L3 to S1, transforaminal lumbar interbody fusion, L5-S1 with use of posterior segment instrumentation L3 to S1, use of interbody cage L5-S1; use of local autograft bone, crushed   cancellous allograft bone, and bone morphogenic protein.    SURGEON:  Melchor eLe MD.    ASSISTANT:  None.    ANESTHESIA:  General endotracheal anesthesia.    COMPLICATIONS:  None.    INSTRUMENTATION:  DePuy Expedium screws and Globus Caliber cage.    For a complete discussion of indication of procedure as well as the discussion I had with the patient in my office regarding risks, benefits, and alternatives of treatment, please refer to my office notes and the consent forms, which were signed.    DESCRIPTION OF PROCEDURE:  The patient was identified in the holding area.  Operative site was marked.  The patient was taken to the operating room.  Neurophysiologic monitoring leads were applied.  General anesthesia was administered.  The patient was   then prepped and draped in the prone position on a Alvino table.  All bony and soft tissue protuberances were well padded throughout the duration of the procedure.  Throughout the procedure, the patient had SCDs on her bilateral lower extremities.    Prior to skin incision, intravenous antibiotics were administered, and a formal timeout was performed.  At the end of procedure, sponge and needle counts were correct x2.    After prepping and draping, an incision was made posteriorly over the L3 to S1 levels in the midline.  Dissection was carried down through the subcutaneous tissue down to the level of the deep fascia.  Deep fascia was incised with electrocautery.  In   subperiosteal fashion, the  posterior elements of L3 to S1 were exposed.  Intraoperative x-ray was taken to confirm level.  Following this, soft tissue retractors were placed.  A large rongeur was then used to remove the spinous processes of L3, L4, L5   and S1.  A large rongeur was then used to thin the lamina at these levels.  A 3 mm Kerrison rongeur was used to remove ligamentum flavum and perform laminectomy, removing a small amount of bone from the superior aspect of left S1 lamina, the entire L5   lamina, the entire L4 lamina, and the inferior two-thirds of the L3 lamina.  There were bilateral pars defects at L5 and the large rongeur was used to remove the inferior articular processes of L5 bilaterally through the pars defects using a large   rongeur.  A 3 mm Kerrison rongeur was used to remove ligamentum flavum and perform lateral recess decompression, performing a partial medial facetectomy bilaterally at L3-L4 and L4-L5, and undercutting the superior articular processes of S1 bilaterally.    A 3 mm Kerrison rongeur was used to perform bilateral foraminotomies at L3-L4, L4-L5 and L5-S1.  Following this, a 15 blade scalpel was used to sharply incise the annulus on the right side at L5-S1.  Through the annulotomy, the L5-S1 interspace was   completely debrided free of all disk and cartilaginous material back to bleeding endplate bone using a series of curved and straight curettes, pituitary rongeurs, and endplate yashira.  The interspaces were irrigated and inspected to ensure the bleeding   bony endplates were exposed.  Local autograft bone from laminectomy was morcellized and combined with crushed cancellous allograft bone, and bone morphogenic protein from the large BMP kit.  This bone graft combination was impacted using a tamp and a   mallet in the L5-S1 interspace.  Additional amounts of this bone graft combination were placed in the Globus Caliber cage of appropriate size.  The cage was impacted using a tamp and a mallet in the  L5-S1 interspace and expanded up to appropriate height.    Secure fit was noted to the cage.  Attention was turned towards instrumentation.  Pedicles were identified from within the spinal canal bilaterally at L3 to S1.  A high speed bur was used to make starting point for the pedicle screws followed by   advancement of the pedicle probe through the isthmus, the pedicle and vertebral bodies to appropriate depth.  The holes were tapped and felt with the ball-tipped probe to ensure there were no breaches.  Following this, screws of appropriate width and   length were placed bilaterally at L3 to S1.  Secure fixation was noted with each screw purchase.  All screws tested well with EMG without evidence of medial breach.  Following this, the bilateral transverse processes, L3, L4 and L5, bilateral sacral ala   and the remaining facet joints bilaterally at L3 to S1 were decorticated using a high-speed bur.  The remainder of the bone graft combination was packed out of the decorticated bony surfaces bilaterally at L3 to S1.  Precut and contoured rods were placed   in screws bilaterally at L3 to S1.  Caps were placed over the rods into the screws and finally tightened in place according to the 's recommendations.  Intraoperative AP and lateral x-rays were taken, which showed adequate positioning of   instrumentation and interbody cage.  The adequacy of the decompression was confirmed with a [place], which was passed out the foramen along lateral recesses bilaterally at L3 to S1.  There was no remaining stenosis or compression noted.  The exiting and   traversing nerves bilaterally at L3 to S1 were fully decompressed.  Hemostasis was obtained using electrocautery, bipolar, and FloSeal.  Under Valsalva maneuver, there was no significant bleeding noted.  No evidence of any spinal fluid leakage.  The   wound was copiously irrigated with antibiotic-impregnated solution.  Hemostasis was felt to be adequate.  A drain  was placed deep to the deep fascia.  Deep fascia was closed in interrupted fashion.  Subcutaneous tissue was closed in interrupted fashion.    Skin was closed in running fashion followed by a dry dressing and tape.  The patient was turned supine on the hospital bed, extubated, and transferred to the PACU in stable condition.  There were no complications.      Melchor Lee MD    DD: 11/04/2021 17:35  DT: 11/04/2021 17:36  Voice ID: [id number]/Report ID: [id number]  [email or web address]

## 2021-11-06 LAB
ANION GAP SERPL CALC-SCNC: 11 MEQ/L (ref 3–15)
BUN SERPL-MCNC: 17 MG/DL (ref 8–20)
CALCIUM SERPL-MCNC: 9.1 MG/DL (ref 8.9–10.3)
CHLORIDE SERPL-SCNC: 99 MEQ/L (ref 98–109)
CO2 SERPL-SCNC: 26 MEQ/L (ref 22–32)
CREAT SERPL-MCNC: 0.6 MG/DL (ref 0.6–1.1)
ERYTHROCYTE [DISTWIDTH] IN BLOOD BY AUTOMATED COUNT: 13.2 % (ref 11.7–14.4)
GFR SERPL CREATININE-BSD FRML MDRD: >60 ML/MIN/1.73M*2
GLUCOSE SERPL-MCNC: 107 MG/DL (ref 70–99)
HCT VFR BLDCO AUTO: 32.7 % (ref 35–45)
HGB BLD-MCNC: 10.5 G/DL (ref 11.8–15.7)
MCH RBC QN AUTO: 30.4 PG (ref 28–33.2)
MCHC RBC AUTO-ENTMCNC: 32.1 G/DL (ref 32.2–35.5)
MCV RBC AUTO: 94.8 FL (ref 83–98)
PDW BLD AUTO: 9.9 FL (ref 9.4–12.3)
PLATELET # BLD AUTO: 231 K/UL (ref 150–369)
POTASSIUM SERPL-SCNC: 4 MEQ/L (ref 3.6–5.1)
RBC # BLD AUTO: 3.45 M/UL (ref 3.93–5.22)
SODIUM SERPL-SCNC: 136 MEQ/L (ref 136–144)
WBC # BLD AUTO: 8.4 K/UL (ref 3.8–10.5)

## 2021-11-06 PROCEDURE — 12000000 HC ROOM AND CARE MED/SURG

## 2021-11-06 PROCEDURE — 25000000 HC PHARMACY GENERAL: Performed by: STUDENT IN AN ORGANIZED HEALTH CARE EDUCATION/TRAINING PROGRAM

## 2021-11-06 PROCEDURE — 63700000 HC SELF-ADMINISTRABLE DRUG: Performed by: STUDENT IN AN ORGANIZED HEALTH CARE EDUCATION/TRAINING PROGRAM

## 2021-11-06 PROCEDURE — 63700000 HC SELF-ADMINISTRABLE DRUG: Performed by: NURSE PRACTITIONER

## 2021-11-06 PROCEDURE — 25800000 HC PHARMACY IV SOLUTIONS: Performed by: STUDENT IN AN ORGANIZED HEALTH CARE EDUCATION/TRAINING PROGRAM

## 2021-11-06 PROCEDURE — 63600000 HC DRUGS/DETAIL CODE: Performed by: STUDENT IN AN ORGANIZED HEALTH CARE EDUCATION/TRAINING PROGRAM

## 2021-11-06 PROCEDURE — 85027 COMPLETE CBC AUTOMATED: CPT | Performed by: STUDENT IN AN ORGANIZED HEALTH CARE EDUCATION/TRAINING PROGRAM

## 2021-11-06 PROCEDURE — 36415 COLL VENOUS BLD VENIPUNCTURE: CPT | Performed by: STUDENT IN AN ORGANIZED HEALTH CARE EDUCATION/TRAINING PROGRAM

## 2021-11-06 PROCEDURE — 80048 BASIC METABOLIC PNL TOTAL CA: CPT | Performed by: STUDENT IN AN ORGANIZED HEALTH CARE EDUCATION/TRAINING PROGRAM

## 2021-11-06 PROCEDURE — 99232 SBSQ HOSP IP/OBS MODERATE 35: CPT | Performed by: INTERNAL MEDICINE

## 2021-11-06 RX ADMIN — OXYCODONE HYDROCHLORIDE 10 MG: 5 TABLET ORAL at 15:50

## 2021-11-06 RX ADMIN — OXYCODONE HYDROCHLORIDE 10 MG: 5 TABLET ORAL at 00:15

## 2021-11-06 RX ADMIN — CEFAZOLIN SODIUM 2 G: 10 POWDER, FOR SOLUTION INTRAVENOUS at 10:11

## 2021-11-06 RX ADMIN — CEFAZOLIN SODIUM 2 G: 10 POWDER, FOR SOLUTION INTRAVENOUS at 01:43

## 2021-11-06 RX ADMIN — ROSUVASTATIN CALCIUM 20 MG: 20 TABLET, FILM COATED ORAL at 08:37

## 2021-11-06 RX ADMIN — PANTOPRAZOLE SODIUM 20 MG: 20 TABLET, DELAYED RELEASE ORAL at 08:37

## 2021-11-06 RX ADMIN — DIAZEPAM 10 MG: 5 TABLET ORAL at 13:01

## 2021-11-06 RX ADMIN — OXYCODONE HYDROCHLORIDE 10 MG: 5 TABLET ORAL at 20:00

## 2021-11-06 RX ADMIN — DIAZEPAM 10 MG: 5 TABLET ORAL at 20:00

## 2021-11-06 RX ADMIN — OXYCODONE HYDROCHLORIDE 10 MG: 5 TABLET ORAL at 08:37

## 2021-11-06 RX ADMIN — SERTRALINE HYDROCHLORIDE 50 MG: 25 TABLET, FILM COATED ORAL at 08:37

## 2021-11-06 RX ADMIN — LOSARTAN POTASSIUM 50 MG: 50 TABLET, FILM COATED ORAL at 08:37

## 2021-11-06 RX ADMIN — DOCUSATE SODIUM AND SENNOSIDES 1 TABLET: 8.6; 5 TABLET, FILM COATED ORAL at 08:37

## 2021-11-06 RX ADMIN — CEFAZOLIN SODIUM 2 G: 10 POWDER, FOR SOLUTION INTRAVENOUS at 17:49

## 2021-11-06 RX ADMIN — DIAZEPAM 10 MG: 5 TABLET ORAL at 06:32

## 2021-11-06 RX ADMIN — DIAZEPAM 10 MG: 5 TABLET ORAL at 00:14

## 2021-11-06 RX ADMIN — OXYCODONE HYDROCHLORIDE 10 MG: 5 TABLET ORAL at 04:05

## 2021-11-06 RX ADMIN — OXYCODONE HYDROCHLORIDE 10 MG: 5 TABLET ORAL at 23:59

## 2021-11-06 RX ADMIN — LOSARTAN POTASSIUM 50 MG: 50 TABLET, FILM COATED ORAL at 19:59

## 2021-11-06 RX ADMIN — DOCUSATE SODIUM AND SENNOSIDES 1 TABLET: 8.6; 5 TABLET, FILM COATED ORAL at 19:59

## 2021-11-06 RX ADMIN — FENOFIBRATE 67 MG: 67 CAPSULE ORAL at 08:37

## 2021-11-06 RX ADMIN — POLYETHYLENE GLYCOL (3350) 17 G: 17 POWDER, FOR SOLUTION ORAL at 08:39

## 2021-11-06 NOTE — PROGRESS NOTES
Hospital Medicine Service -  Daily Progress Note       SUBJECTIVE   Interval History: no overnight events  Feeling ok, back pain controlled, no other complaints, afebrile      OBJECTIVE      Vital signs in last 24 hours:  Temp:  [36.2 °C (97.1 °F)-37.1 °C (98.7 °F)] 36.2 °C (97.2 °F)  Heart Rate:  [76-87] 76  Resp:  [16] 16  BP: (137-176)/(62-75) 139/62    Intake/Output Summary (Last 24 hours) at 11/6/2021 1029  Last data filed at 11/6/2021 0622  Gross per 24 hour   Intake 50 ml   Output 170 ml   Net -120 ml       PHYSICAL EXAMINATION      Physical Exam   Constitutional: NAD  Neck: Supple. No JVD.    Cardiovascular: Normal rate, regular rhythm and normal heart sounds.    Pulmonary/Chest: Effort normal and breath sounds normal. No wheezing or crackles.  Abdominal: Soft. Bowel sounds are normal. There is no tenderness, no rebound and no guarding.   Musculoskeletal: No edema  Neurological: Alert and oriented     LINES, CATHETERS, DRAINS, AIRWAYS, AND WOUNDS   Lines, Drains, Airways, Wounds:  Peripheral IV (Adult) 11/03/21 Distal;Left;Posterior Forearm (Active)   Number of days: 3       Peripheral IV (Adult) 11/03/21 Right Hand (Active)   Number of days: 3       Drain 1 Left Back 10 Fr. (Active)   Number of days: 3       Surgical Incision Back (Active)   Number of days: 3       Comments:      LABS / IMAGING / TELE      Labs  Lab Results   Component Value Date    WBC 8.40 11/06/2021    HGB 10.5 (L) 11/06/2021    HCT 32.7 (L) 11/06/2021     11/06/2021    CHOL 127 07/27/2018    TRIG 119 07/27/2018    HDL 37 (L) 07/27/2018    ALT 20 10/05/2018    AST 24 10/05/2018     11/06/2021    K 4.0 11/06/2021    CL 99 11/06/2021    CREATININE 0.6 11/06/2021    BUN 17 11/06/2021    CO2 26 11/06/2021    TSH 1.96 07/27/2018    INR 1.1 11/01/2021    HGBA1C 5.4 07/27/2018       Imaging  X-RAY LUMBAR SPINE 2 OR 3 VIEWS    Result Date: 11/3/2021  IMPRESSION: Lumbar fusion.    MRI LUMBAR SPINE WITHOUT CONTRAST    Addendum  Date: 10/11/2021    Impression 2.  Incompletely characterized lesion in the right kidney, for which initial follow-up with ultrasound is recommended.    Result Date: 10/11/2021  IMPRESSION: Multilevel degenerative changes of the lumbar spine as described, most pronounced at L3-L4.    ULTRASOUND KIDNEYS    Addendum Date: 10/26/2021    Margarita ALLISON spoke to Bharti in the office of Dr. Melchor Lee on 10/26/21 at 1:15pm and she confirmed receipt of the report and would give to the doctor to review.    Result Date: 10/26/2021  IMPRESSION: 1.4 cm hypoechoic structure with no internal flow in the mid to lower pole the right kidney and 1.2 cm exophytic hypoechoic structure at the upper pole of the left kidney. These are incompletely characterized but could represent complex cysts. Further evaluation with MRI with and without intravenous contrast is recommended. In accordance with [place] Act 112,  the patient will receive a letter notifying them to follow up with their physician.         ECG/Telemetry  Patient is not on telemetry.    ASSESSMENT AND PLAN      Essential hypertension  Assessment & Plan  Continue home medications   Monitor blood pressures.       Mixed hyperlipidemia  Assessment & Plan  Continue home meds    * Lumbar stenosis with neurogenic claudication  Assessment & Plan  S/p OR  Pain control, DVT ppx and PT/OT eval per ortho         VTE Assessment: Padua    VTE Prophylaxis Plan: per ortho  Code Status: Full Code  Estimated Discharge Date: 11/6/2021  Disposition Planning: per ortho     Gen Mayela MD  11/6/2021

## 2021-11-06 NOTE — PROGRESS NOTES
Pt without new complaints, doing well  AFVSS Reji in place  5/5 motor BLE all muscle groups  SILT BLE all sens dist  Dressing dry     A/P Stable post op day 3  -pt, oob  -pain control  -abx/drain  -scds  -med management

## 2021-11-06 NOTE — PROGRESS NOTES
Ortho Daily Progress Note    Subjective   Pt seen sitting in chair no events overnight. Doing very well this morning. Pain well controlled. No numbness, tingling, fevers or chills.    Objective     Vital signs in last 24 hours:  Temp:  [36.2 °C (97.1 °F)-37.1 °C (98.7 °F)] 37.1 °C (98.7 °F)  Heart Rate:  [75-87] 87  Resp:  [16] 16  BP: (127-176)/(58-75) 146/75      Intake/Output Summary (Last 24 hours) at 11/5/2021 2334  Last data filed at 11/5/2021 2252  Gross per 24 hour   Intake 100 ml   Output 195 ml   Net -95 ml     Intake/Output this shift:  I/O this shift:  In: -   Out: 55 [Drains:55]    AVSS   L spine:  JPs in place, serosanguinous drainage, 90cc last shift, 170 cc last 24 hr  Dressing clean  5/5 motor in all muscle groups LE  SILT B/L LE     Post op images reviewed     A/P  70 y/o F s/p L3-S1 PLIF with Dr Lee 11/3     Doing well post op  Pain controlled  Abx until drain discontinued  SCDs for dvt ppx  Continue CARLOS drain, close monitoring of output  Continue PT/OT WBAT B/L LE OOB  Continue hospitalization    Christo Joya, DO  Orthopedic Surgery  Please page [phone] with questions or concerns

## 2021-11-07 VITALS
OXYGEN SATURATION: 97 % | SYSTOLIC BLOOD PRESSURE: 167 MMHG | BODY MASS INDEX: 36.54 KG/M2 | WEIGHT: 214 LBS | TEMPERATURE: 97.6 F | RESPIRATION RATE: 17 BRPM | HEART RATE: 78 BPM | HEIGHT: 64 IN | DIASTOLIC BLOOD PRESSURE: 77 MMHG

## 2021-11-07 PROCEDURE — 25000000 HC PHARMACY GENERAL: Performed by: STUDENT IN AN ORGANIZED HEALTH CARE EDUCATION/TRAINING PROGRAM

## 2021-11-07 PROCEDURE — 63600000 HC DRUGS/DETAIL CODE: Performed by: STUDENT IN AN ORGANIZED HEALTH CARE EDUCATION/TRAINING PROGRAM

## 2021-11-07 PROCEDURE — 99232 SBSQ HOSP IP/OBS MODERATE 35: CPT | Performed by: INTERNAL MEDICINE

## 2021-11-07 PROCEDURE — 63700000 HC SELF-ADMINISTRABLE DRUG: Performed by: STUDENT IN AN ORGANIZED HEALTH CARE EDUCATION/TRAINING PROGRAM

## 2021-11-07 PROCEDURE — 63700000 HC SELF-ADMINISTRABLE DRUG: Performed by: NURSE PRACTITIONER

## 2021-11-07 PROCEDURE — 25800000 HC PHARMACY IV SOLUTIONS: Performed by: STUDENT IN AN ORGANIZED HEALTH CARE EDUCATION/TRAINING PROGRAM

## 2021-11-07 RX ORDER — OXYCODONE HYDROCHLORIDE 10 MG/1
10 TABLET ORAL EVERY 4 HOURS PRN
Qty: 84 TABLET | Refills: 0 | Status: CANCELLED | OUTPATIENT
Start: 2021-11-07 | End: 2021-11-21

## 2021-11-07 RX ORDER — POLYETHYLENE GLYCOL 3350 17 G/17G
17 POWDER, FOR SOLUTION ORAL DAILY
Qty: 3 PACKET | Refills: 0 | Status: CANCELLED | OUTPATIENT
Start: 2021-11-08 | End: 2021-11-11

## 2021-11-07 RX ADMIN — DIAZEPAM 10 MG: 5 TABLET ORAL at 01:46

## 2021-11-07 RX ADMIN — PANTOPRAZOLE SODIUM 20 MG: 20 TABLET, DELAYED RELEASE ORAL at 09:20

## 2021-11-07 RX ADMIN — CEFAZOLIN SODIUM 2 G: 10 POWDER, FOR SOLUTION INTRAVENOUS at 01:46

## 2021-11-07 RX ADMIN — LOSARTAN POTASSIUM 50 MG: 50 TABLET, FILM COATED ORAL at 09:20

## 2021-11-07 RX ADMIN — ROSUVASTATIN CALCIUM 20 MG: 20 TABLET, FILM COATED ORAL at 09:20

## 2021-11-07 RX ADMIN — OXYCODONE HYDROCHLORIDE 10 MG: 5 TABLET ORAL at 03:23

## 2021-11-07 RX ADMIN — DOCUSATE SODIUM AND SENNOSIDES 1 TABLET: 8.6; 5 TABLET, FILM COATED ORAL at 09:20

## 2021-11-07 RX ADMIN — POLYETHYLENE GLYCOL (3350) 17 G: 17 POWDER, FOR SOLUTION ORAL at 09:21

## 2021-11-07 RX ADMIN — CEFAZOLIN SODIUM 2 G: 10 POWDER, FOR SOLUTION INTRAVENOUS at 10:08

## 2021-11-07 RX ADMIN — OXYCODONE HYDROCHLORIDE 10 MG: 5 TABLET ORAL at 09:20

## 2021-11-07 RX ADMIN — SERTRALINE HYDROCHLORIDE 50 MG: 25 TABLET, FILM COATED ORAL at 09:21

## 2021-11-07 RX ADMIN — OXYCODONE HYDROCHLORIDE 10 MG: 5 TABLET ORAL at 14:19

## 2021-11-07 RX ADMIN — FENOFIBRATE 67 MG: 67 CAPSULE ORAL at 09:20

## 2021-11-07 RX ADMIN — DIAZEPAM 10 MG: 5 TABLET ORAL at 12:15

## 2021-11-07 NOTE — NURSING NOTE
Pt and family given d/c instructions. Iv access removed. All questions and concerns addressed. Pt aware to  prescription from preferred pharmacy. Pt awaiting wheelchair for d/c.

## 2021-11-07 NOTE — PROGRESS NOTES
Ortho Daily Progress Note    Subjective   Pt seen sitting in chair no events overnight. States she is doing very well and is very happy with how she has improved. Pain well controlled. No difficulty with ambulation.    Objective     Vital signs in last 24 hours:  Temp:  [36.2 °C (97.2 °F)-37.1 °C (98.8 °F)] 37.1 °C (98.8 °F)  Heart Rate:  [76-89] 89  Resp:  [14-17] 17  BP: (134-154)/(62-75) 154/75      Intake/Output Summary (Last 24 hours) at 11/7/2021 0616  Last data filed at 11/6/2021 2359  Gross per 24 hour   Intake 0 ml   Output 90 ml   Net -90 ml     Intake/Output this shift:  I/O this shift:  In: 0   Out: 20 [Drains:20]    AVSS   L spine:  JPs in place, serosanguinous drainage, 20 cc overnight/55 total for 24hr.  Dressing clean  5/5 motor in all muscle groups LE  SILT B/L LE     Post op images reviewed     A/P  72 y/o F s/p L3-S1 PLIF with Dr Lee 11/3     Doing well post op  Pain controlled  Abx until drain discontinued  SCDs for dvt ppx  CARLOS drain output with 20 cc serosanguinous fluid overnight  Discharge pending likely today after drain pull and dressing change    Kar Carranza, DO  Orthopedic Surgery PGY-4  Pager [phone]

## 2021-11-07 NOTE — PROGRESS NOTES
Hospital Medicine Service -  Daily Progress Note       SUBJECTIVE   Interval History: no overnight events  Feeling well, pain controlled, no other complaints, afebrile      OBJECTIVE      Vital signs in last 24 hours:  Temp:  [36.4 °C (97.6 °F)-37.1 °C (98.8 °F)] 36.4 °C (97.6 °F)  Heart Rate:  [78-89] 78  Resp:  [14-17] 17  BP: (134-167)/(63-77) 167/77    Intake/Output Summary (Last 24 hours) at 11/7/2021 1000  Last data filed at 11/7/2021 0600  Gross per 24 hour   Intake 0 ml   Output 80 ml   Net -80 ml       PHYSICAL EXAMINATION      Physical Exam   Constitutional: NAD  Neck: Supple. No JVD.    Cardiovascular: Normal rate, regular rhythm and normal heart sounds.    Pulmonary/Chest: Effort normal and breath sounds normal. No wheezing or crackles.  Abdominal: Soft. Bowel sounds are normal. There is no tenderness, no rebound and no guarding.   Musculoskeletal: No edema  Neurological: Alert and oriented     LINES, CATHETERS, DRAINS, AIRWAYS, AND WOUNDS   Lines, Drains, Airways, Wounds:  Peripheral IV (Adult) 11/03/21 Distal;Left;Posterior Forearm (Active)   Number of days: 3       Peripheral IV (Adult) 11/03/21 Right Hand (Active)   Number of days: 3       Drain 1 Left Back 10 Fr. (Active)   Number of days: 3       Surgical Incision Back (Active)   Number of days: 3       Comments:      LABS / IMAGING / TELE      Labs  Lab Results   Component Value Date    WBC 8.40 11/06/2021    HGB 10.5 (L) 11/06/2021    HCT 32.7 (L) 11/06/2021     11/06/2021    CHOL 127 07/27/2018    TRIG 119 07/27/2018    HDL 37 (L) 07/27/2018    ALT 20 10/05/2018    AST 24 10/05/2018     11/06/2021    K 4.0 11/06/2021    CL 99 11/06/2021    CREATININE 0.6 11/06/2021    BUN 17 11/06/2021    CO2 26 11/06/2021    TSH 1.96 07/27/2018    INR 1.1 11/01/2021    HGBA1C 5.4 07/27/2018       Imaging  X-RAY LUMBAR SPINE 2 OR 3 VIEWS    Result Date: 11/3/2021  IMPRESSION: Lumbar fusion.    MRI LUMBAR SPINE WITHOUT CONTRAST    Addendum Date:  10/11/2021    Impression 2.  Incompletely characterized lesion in the right kidney, for which initial follow-up with ultrasound is recommended.    Result Date: 10/11/2021  IMPRESSION: Multilevel degenerative changes of the lumbar spine as described, most pronounced at L3-L4.    ULTRASOUND KIDNEYS    Addendum Date: 10/26/2021    Margarita ALLISON spoke to Bharti in the office of Dr. Melchor Lee on 10/26/21 at 1:15pm and she confirmed receipt of the report and would give to the doctor to review.    Result Date: 10/26/2021  IMPRESSION: 1.4 cm hypoechoic structure with no internal flow in the mid to lower pole the right kidney and 1.2 cm exophytic hypoechoic structure at the upper pole of the left kidney. These are incompletely characterized but could represent complex cysts. Further evaluation with MRI with and without intravenous contrast is recommended. In accordance with [place] Act 112,  the patient will receive a letter notifying them to follow up with their physician.         ECG/Telemetry  Patient is not on telemetry.    ASSESSMENT AND PLAN      Essential hypertension  Assessment & Plan  Continue home medications   Monitor blood pressures.       Mixed hyperlipidemia  Assessment & Plan  Continue home meds    * Lumbar stenosis with neurogenic claudication  Assessment & Plan  S/p OR  Pain control, DVT ppx and PT/OT eval per ortho       VTE Assessment: Padua    VTE Prophylaxis Plan: per ortho  Code Status: Full Code  Estimated Discharge Date: 11/7/2021  Disposition Planning: per ortho     Gen Mayela MD  11/7/2021

## 2021-11-14 NOTE — DISCHARGE SUMMARY
Inpatient Discharge Summary    BRIEF OVERVIEW  Admitting Provider:      Attending Provider: No att. providers found Attending phys phone: N/A  Primary Care Physician at Discharge: Lubna Kerr [phone]    Admission Date: 11/3/2021     Discharge Date: 11/14/2021    Primary Discharge Diagnosis  Lumbar stenosis with neurogenic claudication    Secondary Discharge Diagnosis  Active Hospital Problems    Diagnosis Date Noted   • Lumbar stenosis with neurogenic claudication 11/03/2021   • Essential hypertension 10/09/2018   • Mixed hyperlipidemia 10/09/2018      Resolved Hospital Problems   No resolved problems to display.       DETAILS OF HOSPITAL STAY    Operative Procedures Performed  Procedure(s):  L3-S1 Posterior Lumbar Decompression, Posterior Lumbar Interbody Fusion, Instrumentation, Cage, Allograft, Autograft, BMP    Consults:   Consult Notes 10/15/2021 to 11/14/2021         Date of Service   Author Author Type Status Note Type File Time    11/03/21 1725  Addison Ladd MD Physician Signed Consults 11/03/21 1759    11/01/21 1030  Jose Juan White MD Physician Signed Consults 11/01/21 1640          Consult Orders During Admission:  IP CONSULT TO CASE MANAGEMENT     Imaging  X-RAY LUMBAR SPINE 2 OR 3 VIEWS    Result Date: 11/3/2021  IMPRESSION: Lumbar fusion.    ULTRASOUND KIDNEYS    Addendum Date: 10/26/2021    Margarita ALLISON spoke to Bharti in the office of Dr. Melchor Lee on 10/26/21 at 1:15pm and she confirmed receipt of the report and would give to the doctor to review.    Result Date: 10/26/2021  IMPRESSION: 1.4 cm hypoechoic structure with no internal flow in the mid to lower pole the right kidney and 1.2 cm exophytic hypoechoic structure at the upper pole of the left kidney. These are incompletely characterized but could represent complex cysts. Further evaluation with MRI with and without intravenous contrast is recommended. In accordance with [place] Act 112,  the patient will receive a letter notifying them  to follow up with their physician.       Presenting Problem/History of Present Illness  Spondylolisthesis of lumbosacral region [M43.17]  Spinal stenosis of lumbar region with neurogenic claudication [M48.062]  Lumbar stenosis with neurogenic claudication [M48.062]     Exam on Day of Discharge  Patient seen and examined on day of discharge.  AVSS   L spine:  JPs in place, serosanguinous drainage, 20 cc overnight/55 total for 24hr.  Dressing clean  5/5 motor in all muscle groups LE  [facility]        [facility]  70 y/o F s/p L3-S1 PLIF with Dr Lee 11/3. The patient tolerated the procedure well, there were no perioperative complications, see operative report for details. The patient recovered as expected in the postoperative period. At the time of discharge the patient was cleared by PT/OT, the medical consultant, pain was well controlled, tolerated a regular diet, and was voiding on spontaneously. Patient was advised to seek medical attention for worsening pain, fevers, chest pain, shortness of breath, increased weakness to lower extremity, bowel/bladder changes/incontinence.      Discharge Orders     Medication List      CONTINUE taking these medications    fenofibrate 48 mg tablet  Commonly known as: TRICOR  Take 48 mg by mouth daily.  Dose: 48 mg     gabapentin 100 mg capsule  Commonly known as: NEURONTIN  Take 100 mg by mouth 3 (three) times a day.  Dose: 100 mg     irbesartan 150 mg tablet  Commonly known as: AVAPRO  Take 150 mg by mouth 2 (two) times a day.  Dose: 150 mg     multivitamin capsule  Take 1 capsule by mouth daily.  Dose: 1 capsule     rosuvastatin 20 mg tablet  Commonly known as: CRESTOR  Take 20 mg by mouth daily.  Dose: 20 mg     sertraline 50 mg tablet  Commonly known as: ZOLOFT  Take 50 mg by mouth every morning.  Dose: 50 mg          Outpatient Follow-Ups  Encounter Information    This patient does not currently have any appointments scheduled.       Referrals:  No orders of the defined  types were placed in this encounter.      Active Issues Requiring Follow-up  Daily dressing changes as instructed    Follow up with:  Melchor Lee MD in 2 weeks    Test Results Pending at Discharge  Unresulted Labs (From admission, onward)            None          Discharge Disposition  Disposition: Home   Destination:        Code Status at Discharge: Prior  Physician Order for Life-Sustaining Treatment Document Status      No documents found

## 2022-01-18 ENCOUNTER — TRANSCRIBE ORDERS (OUTPATIENT)
Dept: SCHEDULING | Age: 72
End: 2022-01-18

## 2022-01-18 DIAGNOSIS — N28.9 DISORDER OF KIDNEY AND URETER, UNSPECIFIED: Primary | ICD-10-CM

## 2022-01-25 ENCOUNTER — HOSPITAL ENCOUNTER (OUTPATIENT)
Dept: RADIOLOGY | Age: 72
Discharge: HOME | End: 2022-01-25
Attending: INTERNAL MEDICINE
Payer: COMMERCIAL

## 2022-01-25 DIAGNOSIS — N28.9 DISORDER OF KIDNEY AND URETER, UNSPECIFIED: ICD-10-CM

## 2022-01-25 RX ORDER — GADOBUTROL 604.72 MG/ML
9.5 INJECTION INTRAVENOUS ONCE
Status: COMPLETED | OUTPATIENT
Start: 2022-01-25 | End: 2022-01-25

## 2022-01-25 RX ADMIN — GADOBUTROL 9.5 ML: 604.72 INJECTION INTRAVENOUS at 10:06

## 2022-02-09 ENCOUNTER — TRANSCRIBE ORDERS (OUTPATIENT)
Dept: SCHEDULING | Facility: REHABILITATION | Age: 72
End: 2022-02-09

## 2022-02-09 DIAGNOSIS — Z98.1 ARTHRODESIS STATUS: Primary | ICD-10-CM

## 2022-02-09 DIAGNOSIS — M54.50 LOW BACK PAIN: ICD-10-CM

## 2022-03-07 ENCOUNTER — HOSPITAL ENCOUNTER (OUTPATIENT)
Dept: PHYSICAL THERAPY | Facility: REHABILITATION | Age: 72
Setting detail: THERAPIES SERIES
Discharge: HOME | End: 2022-03-07
Attending: NURSE PRACTITIONER
Payer: COMMERCIAL

## 2022-03-07 DIAGNOSIS — Z98.1 ARTHRODESIS STATUS: ICD-10-CM

## 2022-03-07 DIAGNOSIS — Z98.890 HX OF LUMBOSACRAL SPINE SURGERY: Primary | ICD-10-CM

## 2022-03-07 DIAGNOSIS — M54.50 LOW BACK PAIN: ICD-10-CM

## 2022-03-07 PROCEDURE — 97162 PT EVAL MOD COMPLEX 30 MIN: CPT | Mod: GP

## 2022-03-07 NOTE — OP PT TREATMENT LOG
ORTHO PT FLOWSHEET    Exercises Current Session Time    MODALITIES- HEAT/ICE  CPT 81775 TOTAL TIME FOR SESSION Not performed    Heat     Ice/Vasocompression     MODALITIES - E-STIM  CPT 19527   TOTAL TIME FOR SESSION Not performed    E-stim/H-Wave     MODALITIES - US TOTAL TIME FOR SESSION Not performed    US (CPT 86425)     Iontophoresis (CPT 50078)     MODALITIES - TRACTION  CPT 61905 TOTAL TIME FOR SESSION Not performed    Mechanical Traction     THER ACT  CPT 23881 TOTAL TIME FOR SESSION Not performed    Bed mobility     Transfer training     Body Mechanics/Work Training     THER EX  CPT 30781 TOTAL TIME FOR SESSION Not performed    CARDIOVASCULAR  No forwards flexion, rotation, stop is pain present with manual or mechanical per script    Nu Step nustep    Stationary Bike     Elliptical     Treadmill     UBE     STRENGTHENING     Supine ther-ex TAC     TAC with knee fall outs     TAC with march     LAQ     3 way hip 10x    Seated ther-ex     Standing ther-ex 3 way hip    STRETCHING     LE stretching      Aquatic exs- no forwards flexion or rotation- precautions    Entry/ exit via ramp     Walking forwards with bar bell     Walk sideways with bar bell     Retro walk with bar bell     Heel raises     3 way hip     Calf stretch     Hamstrings stretch gently    Shoulder flexion stretch    Shoulder horizontal abduction          NEURO RE-ED  CPT 31104 TOTAL TIME FOR SESSION Not performed    COORDINATION     POSTURAL RE-ED        PRE-GAIT ACTIVITIES      BALANCE TRAINING      Sitting balance     Standing balance     GAIT TRAINING  CPT 54652 TOTAL TIME FOR SESSION Not performed    Ambulation      Dynamic gait      Stair negotiation     Curb negotiation     Ramp negotiation     Outdoor ambulation     BWS/vector training     MANUAL  CPT 36844 TOTAL TIME FOR SESSION Not performed    Stretching     Mobilization      Massage     Taping

## 2022-03-07 NOTE — Clinical Note
[address]  [facility] [address]  [phone]      Dear DR. Terrazas,      Thank you for this referral. Please review the attached notes and plan of care for your approval.  Please contact our department with any questions.     Sincerely,     Ana Aranda, PT      Referring Provider: By co-signing this Plan of Care (POC) either electronically or physically you agree to the following:    I have reviewed the the Plan of Care established by the therapist within this document and certify that the services are skilled and medically necessary. I have reviewed the plan and recommend that these services continue to meet the goals stated in this document.       EXTERNAL PROVIDER FAXING BACK:    PHYSICIAN SIGNATURE: __________________________________     DATE: ___________________  TIME: _____________    IMPORTANT:  If returning this Plan of Care by fax, please fax back ONLY the signature page.   _________________________________________________________________________      [facility] Fax: [phone]        PT EVALUATION FOR OUTPATIENT THERAPY    Patient: Reyna Castillo    MRN: [id number]  : 1950 71 y.o.   Referring Physician: Анна Terrazas CRNP  Date of Visit: 3/7/2022      Certification Dates:  22 through 22         Recommended Frequency & Duration:  2 times/week for up to 3 months     Diagnosis:   1. Hx of lumbosacral spine surgery    2. Arthrodesis status    3. Low back pain        Chief Complaints:   Chief Complaint   Patient presents with   • Pain     R lumbar spine   • Difficulty Walking   • Decreased Endurance   • Other     pain with cooking, baking, house work       Precautions:      Past Medical History:   Past Medical History:   Diagnosis Date   • Abnormal ECG 10/9/2018    Nonspecific T wave flattening inferiorly minimal ST depression laterally and anterolateral T wave inversion   • Agatston CAC score 100-199 10/9/2018    8/17 coronary calcium score 101 LAD 13, circumflex 68, RCA  20   • Anxiety    • Class 2 severe obesity due to excess calories with serious comorbidity and body mass index (BMI) of 36.0 to 36.9 in adult (CMS/[facility]) 10/9/2018    2017 weight 232 pounds   • DJD (degenerative joint disease)    • Family history of early CAD 10/9/2018    Father  of MI at age 59   • Hypertension    • Lumbar stenosis    • Tran-Juan tear    • Mixed hyperlipidemia 10/9/2018    2020 Lipid panel  LDL 91 HDL 47 Trigs 176       Past Surgical History:   Past Surgical History:   Procedure Laterality Date   • CHOLECYSTECTOMY     • COLONOSCOPY     • INNER EAR SURGERY     • JOINT REPLACEMENT      bilateral 2021   • KNEE ARTHROSCOPY     • TRANSUMBILICAL AUGMENTATION MAMMAPLASTY     • UPPER GASTROINTESTINAL ENDOSCOPY     • WISDOM TOOTH EXTRACTION           LEARNING ASSESSMENT    Assessment completed:  Yes    Learner name:  Reyna Castillo    Relationship: Patient    Learning Barriers:  Learning barriers: No Barriers    Preferred Language: English     Needed: No    Learning New Concepts: Listening, Reading, Demonstration and Pictures/Video    Education Provided: plan of care      CO-LEARNER ASSESSMENT:    Completed: No            OBJECTIVE MEASUREMENTS/DATA:    Time In Session:  Start Time: 1303  Stop Time: 1400  Time Calculation (min): 57 min   Assessment and Plan - 22 1550        Assessment    Plan of Care reviewed and patient/family in agreement Yes     System Pathology/Pathophysiology Noted musculoskeletal;cardiovascular   GI system    Functional Limitations in Following Categories (PT Eval) self-care;home management;community/leisure     Rehab Potential/Prognosis good, to achieve stated therapy goals     Problem List decreased flexibility;decreased endurance;pain;decreased strength;impaired postural control     Clinical Assessment Reyna Castillo is a 71 year old female s/pL3-S1 Posterior Lumbar Decompression, Posterior Lumbar Interbody Fusion, Instrumentation, Cage, Allograft,  Autograft, BMP done on 11/3/2021, H/o bilat TKR in 2021. On evalaution, patient had pain in lumbar spine but no neurological deficits1  or T/N. She does has decreased LE and core strength, pain affecting her ADLs and home management, ability to exercise for fitness. Her WOMAC score was 37/96 and her Oswsetry score was 20/50 or 40% indicating moderate pain and disability. She will benefit from PT to improve all of the above and maximize physical  functioning.     Plan and Recommendations land and aquatic exs.     Planned Services CPT 18889 Aquatic therapy/exercises;CPT 22324 Gait training;CPT 31110 Manual therapy;CPT 83052 Neuromuscular Reeducation;CPT 05808 Therapeutic activities;CPT 88546 Therapeutic exercises;CPT 05443 Therapeutic Massage;CPT 64935 Electrical stimulation UNATTENDED;CPT 96695 Hot/Cold Packs therapy    TENS if needed            General Information - 03/07/22 1550        General Information    History of present illness/functional impairment Reyna Castillo is here for a PT evalaution today. She had L3-S1 Posterior Lumbar Decompression, Posterior Lumbar Interbody Fusion, Instrumentation, Cage, Allograft, Autograft, BMP in Nov 2021 and had 2TKR in summet in 2021.  Currently is using a bone stimulator, which she uses 2- 4 hours every day.  Main c/o for Physical therapy are- R sided  lumbar pain with sitting, standing, early am. Pain decreases with lying down. C/o clicking with walking, on  stairs. Denies T/N . Denies weakness in LE. Referred for land and aquatic exs.     Precautions comments no forwards flexion. rotation per script. STOP if pain with manual or mechanical per script                Pain/Vitals - 03/07/22 1308        Pain Assessment    Currently in pain Yes     Preferred Pain Scale number (Numeric Rating Pain Scale)     Pain: Body location Back     Pain Rating (0-10): Pre Activity 3   aching    Pain Rating (0-10): Activity 5   varies between 3-8/10       Pain Intervention    Intervention   eval done     Post Intervention Comments eval done                Falls - 03/07/22 1308        Initial Falls Assessment    One or more falls in the last year Yes     How many times 1     Was the patient injured in any fall No     Fall prevention interventions recommended Educate and re-educate the patient on safety strategies;Keep personal items within reach;Instruct the patient to change position slowly     Recommended plan to address falls tripped on hockey gear at home, no injuries.                PT - 03/07/22 1550        Physical Therapy    Physical Therapy Ortho        PT Plan    Frequency of treatment 2 times/week     PT Duration 3 months     PT Cert From 03/07/22     PT Cert To 06/05/22     Date PT POC was sent to provider 03/07/22     Signed PT Plan of Care received?  No                   Living Environment    Living Environment - 03/07/22 1308        Living Environment    People in Home spouse     Living Environment Comment Lives with  2 YAS, FF to bedroom and to basement, with unilateral railing.               PLOF:    Prior Level of Function - 03/07/22 1308        OTHER    Previous level of function Loves to shopping , baker . cooking.               Outcome Measures     Special Tests - 03/07/22 1550        Outcome measures tracking    Outcome measure used: WOMAC 37/96  HILLARY 20/50 or 40%               ROM    Range of Motion - 03/07/22 1300        RIGHT: Lower Extremity AROM Assessment    Right LE AROM normal --   tight rotators    Hip Flexion   80 degrees     Hip Abduction   20 degrees     Hip Internal Rotation   15 degrees     Hip External Rotation   20 degrees        LEFT: Lower Extremity AROM Assessment    Left LE AROM normal --     Hip Flexion   80 degrees     Hip Abduction   20 degrees     Hip Internal Rotation   15 degrees     Hip External Rotation   30 degrees        LEFT: Upper Extremity AROM Assessment    Shoulder Flexion   120 degrees     Shoulder Abduction   70 degrees        RIGHT:  Upper Extremity AROM Assessment    Shoulder Flexion   120 degrees     Shoulder Abduction   75 degrees        Lumbar AROM    Comments not tested due to precautions               MMT    Manual Muscle Tests - 03/07/22 1308        RIGHT: Lower Extremity Manual Muscle Test Assessment    Hip Flexion gross movement (4/5) good     Hip Abduction gross movement (3/5) fair     Knee Flexion strength (4/5) good     Knee Extension strength (4/5) good     Ankle Dorsiflexion gross movement (4/5) good     Ankle Plantarflexion gross movement (4/5) good        LEFT: Lower Extremity Manual Muscle Test Assessment    Hip Flexion gross movement (4/5) good     Hip Abduction gross movement (3/5) fair     Knee Flexion strength (4/5) good     Knee Extension strength (4/5) good     Ankle Plantarflexion gross movement (4/5) good     Ankle Inversion gross movement (4/5) good               Palpation    Palpation - 03/07/22 1308        Palpation    Back Palpation  R sided lumbar pain, tender to touch.     Trunk Palpation  sits with slight lean if trunk to right side.               Ortho Special Tests     Gait and Mobility    Gait and Mobility - 03/07/22 1550        Gait Training    Susan, Gait independent     Variable surfaces Flat surface     Assistive Device none     Comment (Gait/Stairs) walks with slight lean of trunk to right side.     Gait Speed (m/sec) 0.97 m/sec   self selected gait velocity              Balance/Posture    Balance and Posture - 03/07/22 1550        Balance Assessment    Balance Test Results (Other) Timed Up and Go Test (TUG)        Timed Up and Go Test    Results, Timed Up and Go Test (Balance) TUG is 13.03 sec                 Goals        Patient Stated    •  <enter goal here> (pt-stated)        Other    •  [facility] PT Lumbar Goals       Short term goals   Short Term Goals Time Frame Result Comment/Progress   Pt will increase core, LE and Hip MMT >/= ½ grade 4 weeks for all STGs     Pt will increase hip ROM by 5 deg in  rotations and abduction      Pt will demonstrate improvements in Oswestry  to 15/50 indicating functional improvement       Pt will be independent with HEP to increase carryover at home      Pt will be independent with postural correction management for pain management      Pt will be able to stand >/= 10  min without onset of LBP to cook            •  [facility] PT Lumbar Goals       Long term goals   Long Term Goals Time Frame Result Comment/Progress   Pt will increase core, LE and Hip MMT >/= full grade All LTGare for 12 weeks     Pt will increase lumbar ROM >/= WF into flexion, extension, lateral flexion and rotation       Pt will improve WOMAC score to less than 20/96 indicating better function   IE 37/96   Pt will demonstrate improvements in Oswestry >/=  8/50 or 16 % functional improvement   IE 40%   Pt will be able to stand and cook for 45 minutes to an hour with small breaks in between      Improve SSv to 1.2 m/s   IE 0.97 m/s   Be able to do stairs with 1 rail and alternate legs      Walk 20 minutes for exercises/ fitness and be able to independent pool exs at home.             •  Mutually agreed upon pain goal       Mutually agreed upon pain goal: - lower pain to 2/10 or lower with ADLs, walking, house work.                TREATMENT PLAN:    ORTHO PT FLOWSHEET    Exercises Current Session Time    MODALITIES- HEAT/ICE  CPT 36631 TOTAL TIME FOR SESSION Not performed    Heat     Ice/Vasocompression     MODALITIES - E-STIM  CPT 59603   TOTAL TIME FOR SESSION Not performed    E-stim/H-Wave     MODALITIES - US TOTAL TIME FOR SESSION Not performed    US (CPT 14988)     Iontophoresis (CPT 01966)     MODALITIES - TRACTION  CPT 73325 TOTAL TIME FOR SESSION Not performed    Mechanical Traction     THER ACT  CPT 84275 TOTAL TIME FOR SESSION Not performed    Bed mobility     Transfer training     Body Mechanics/Work Training     THER EX  CPT 04226 TOTAL TIME FOR SESSION Not performed    CARDIOVASCULAR  No forwards flexion,  rotation, stop is pain present with manual or mechanical per script    Nu Step nustep    Stationary Bike     Elliptical     Treadmill     UBE     STRENGTHENING     Supine ther-ex TAC     TAC with knee fall outs     TAC with march     LAQ     3 way hip 10x    Seated ther-ex     Standing ther-ex 3 way hip    STRETCHING     LE stretching      Aquatic exs- no forwards flexion or rotation- precautions    Entry/ exit via ramp     Walking forwards with bar bell     Walk sideways with bar bell     Retro walk with bar bell     Heel raises     3 way hip     Calf stretch     Hamstrings stretch gently    Shoulder flexion stretch    Shoulder horizontal abduction          NEURO RE-ED  CPT 07033 TOTAL TIME FOR SESSION Not performed    COORDINATION     POSTURAL RE-ED        PRE-GAIT ACTIVITIES      BALANCE TRAINING      Sitting balance     Standing balance     GAIT TRAINING  CPT 21007 TOTAL TIME FOR SESSION Not performed    Ambulation      Dynamic gait      Stair negotiation     Curb negotiation     Ramp negotiation     Outdoor ambulation     BWS/vector training     MANUAL  CPT 26222 TOTAL TIME FOR SESSION Not performed    Stretching     Mobilization      Massage     Taping              ASSESSMENT:    This 71 y.o. year old female presents to PT with above stated diagnosis. Physical Therapy evaluation reveals decreased flexibility,decreased endurance,pain,decreased strength,impaired postural control resulting in self-care,home management,community/leisure limitations. Reyna Castillo will benefit from skilled PT services to address limitation, work towards rehab and patient goals and maximize PLOF of chosen ADLs.     Planned Services: The patient's treatment will include CPT 79895 Aquatic therapy/exercises,CPT 89889 Gait training,CPT 02992 Manual therapy,CPT 77529 Neuromuscular Reeducation,CPT 79677 Therapeutic activities,CPT 60600 Therapeutic exercises,CPT 15367 Therapeutic Massage,CPT 12002 Electrical stimulation UNATTENDED,CPT  09047 Hot/Cold Packs therapy, .

## 2022-03-07 NOTE — PROGRESS NOTES
Referring Provider: By co-signing this Plan of Care (POC) either electronically or physically you agree to the following:    I have reviewed the the Plan of Care established by the therapist within this document and certify that the services are skilled and medically necessary. I have reviewed the plan and recommend that these services continue to meet the goals stated in this document.       EXTERNAL PROVIDER FAXING BACK:    PHYSICIAN SIGNATURE: __________________________________     DATE: ___________________  TIME: _____________    IMPORTANT:  If returning this Plan of Care by fax, please fax back ONLY the signature page.   _________________________________________________________________________      [facility] Fax: [phone]        PT EVALUATION FOR OUTPATIENT THERAPY    Patient: Reyna Castillo    MRN: [id number]  : 1950 71 y.o.   Referring Physician: Анна Terrazas CRNP  Date of Visit: 3/7/2022      Certification Dates:  22 through 22         Recommended Frequency & Duration:  2 times/week for up to 3 months     Diagnosis:   1. Hx of lumbosacral spine surgery    2. Arthrodesis status    3. Low back pain        Chief Complaints:   Chief Complaint   Patient presents with   • Pain     R lumbar spine   • Difficulty Walking   • Decreased Endurance   • Other     pain with cooking, baking, house work       Precautions:      Past Medical History:   Past Medical History:   Diagnosis Date   • Abnormal ECG 10/9/2018    Nonspecific T wave flattening inferiorly minimal ST depression laterally and anterolateral T wave inversion   • Agatston CAC score 100-199 10/9/2018    8/17 coronary calcium score 101 LAD 13, circumflex 68, RCA 20   • Anxiety    • Class 2 severe obesity due to excess calories with serious comorbidity and body mass index (BMI) of 36.0 to 36.9 in adult (CMS/[facility]) 10/9/2018    2017 weight 232 pounds   • DJD (degenerative joint disease)    • Family history of early CAD  10/9/2018    Father  of MI at age 59   • Hypertension    • Lumbar stenosis    • Tran-Juan tear    • Mixed hyperlipidemia 10/9/2018    2020 Lipid panel  LDL 91 HDL 47 Trigs 176       Past Surgical History:   Past Surgical History:   Procedure Laterality Date   • CHOLECYSTECTOMY     • COLONOSCOPY     • INNER EAR SURGERY     • JOINT REPLACEMENT      bilateral 2021   • KNEE ARTHROSCOPY     • TRANSUMBILICAL AUGMENTATION MAMMAPLASTY     • UPPER GASTROINTESTINAL ENDOSCOPY     • WISDOM TOOTH EXTRACTION           LEARNING ASSESSMENT    Assessment completed:  Yes    Learner name:  Reyna Castillo    Relationship: Patient    Learning Barriers:  Learning barriers: No Barriers    Preferred Language: English     Needed: No    Learning New Concepts: Listening, Reading, Demonstration and Pictures/Video    Education Provided: plan of care      CO-LEARNER ASSESSMENT:    Completed: No            OBJECTIVE MEASUREMENTS/DATA:    Time In Session:  Start Time: 1303  Stop Time: 1400  Time Calculation (min): 57 min   Assessment and Plan - 22 1550        Assessment    Plan of Care reviewed and patient/family in agreement Yes     System Pathology/Pathophysiology Noted musculoskeletal;cardiovascular   GI system    Functional Limitations in Following Categories (PT Eval) self-care;home management;community/leisure     Rehab Potential/Prognosis good, to achieve stated therapy goals     Problem List decreased flexibility;decreased endurance;pain;decreased strength;impaired postural control     Clinical Assessment Reyna Castillo is a 71 year old female s/pL3-S1 Posterior Lumbar Decompression, Posterior Lumbar Interbody Fusion, Instrumentation, Cage, Allograft, Autograft, BMP done on 11/3/2021, H/o bilat TKR in . On evalaution, patient had pain in lumbar spine but no neurological deficits1  or T/N. She does has decreased LE and core strength, pain affecting her ADLs and home management, ability to exercise for  fitness. Her WOMAC score was 37/96 and her Oswsetry score was 20/50 or 40% indicating moderate pain and disability. She will benefit from PT to improve all of the above and maximize physical  functioning.     Plan and Recommendations land and aquatic exs.     Planned Services CPT 81102 Aquatic therapy/exercises;CPT 12197 Gait training;CPT 00101 Manual therapy;CPT 84825 Neuromuscular Reeducation;CPT 79929 Therapeutic activities;CPT 75706 Therapeutic exercises;CPT 65306 Therapeutic Massage;CPT 52146 Electrical stimulation UNATTENDED;CPT 66530 Hot/Cold Packs therapy    TENS if needed            General Information - 03/07/22 1550        General Information    History of present illness/functional impairment Reyna Castillo is here for a PT evalaution today. She had L3-S1 Posterior Lumbar Decompression, Posterior Lumbar Interbody Fusion, Instrumentation, Cage, Allograft, Autograft, BMP in Nov 2021 and had 2TKR in summet in 2021.  Currently is using a bone stimulator, which she uses 2- 4 hours every day.  Main c/o for Physical therapy are- R sided  lumbar pain with sitting, standing, early am. Pain decreases with lying down. C/o clicking with walking, on  stairs. Denies T/N . Denies weakness in LE. Referred for land and aquatic exs.     Precautions comments no forwards flexion. rotation per script. STOP if pain with manual or mechanical per script                Pain/Vitals - 03/07/22 1308        Pain Assessment    Currently in pain Yes     Preferred Pain Scale number (Numeric Rating Pain Scale)     Pain: Body location Back     Pain Rating (0-10): Pre Activity 3   aching    Pain Rating (0-10): Activity 5   varies between 3-8/10       Pain Intervention    Intervention  eval done     Post Intervention Comments eval done                Falls - 03/07/22 1308        Initial Falls Assessment    One or more falls in the last year Yes     How many times 1     Was the patient injured in any fall No     Fall prevention  interventions recommended Educate and re-educate the patient on safety strategies;Keep personal items within reach;Instruct the patient to change position slowly     Recommended plan to address falls tripped on hockey gear at home, no injuries.                PT - 03/07/22 1550        Physical Therapy    Physical Therapy Ortho        PT Plan    Frequency of treatment 2 times/week     PT Duration 3 months     PT Cert From 03/07/22     PT Cert To 06/05/22     Date PT POC was sent to provider 03/07/22     Signed PT Plan of Care received?  No                   Living Environment    Living Environment - 03/07/22 1308        Living Environment    People in Home spouse     Living Environment Comment Lives with  2 YAS, FF to bedroom and to basement, with unilateral railing.               PLOF:    Prior Level of Function - 03/07/22 1308        OTHER    Previous level of function Loves to shopping , baker . cooking.               Outcome Measures     Special Tests - 03/07/22 1550        Outcome measures tracking    Outcome measure used: WOMAC 37/96  HILLARY 20/50 or 40%               ROM    Range of Motion - 03/07/22 1300        RIGHT: Lower Extremity AROM Assessment    Right LE AROM normal --   tight rotators    Hip Flexion   80 degrees     Hip Abduction   20 degrees     Hip Internal Rotation   15 degrees     Hip External Rotation   20 degrees        LEFT: Lower Extremity AROM Assessment    Left LE AROM normal --     Hip Flexion   80 degrees     Hip Abduction   20 degrees     Hip Internal Rotation   15 degrees     Hip External Rotation   30 degrees        LEFT: Upper Extremity AROM Assessment    Shoulder Flexion   120 degrees     Shoulder Abduction   70 degrees        RIGHT: Upper Extremity AROM Assessment    Shoulder Flexion   120 degrees     Shoulder Abduction   75 degrees        Lumbar AROM    Comments not tested due to precautions               MMT    Manual Muscle Tests - 03/07/22 1308        RIGHT: Lower Extremity  Manual Muscle Test Assessment    Hip Flexion gross movement (4/5) good     Hip Abduction gross movement (3/5) fair     Knee Flexion strength (4/5) good     Knee Extension strength (4/5) good     Ankle Dorsiflexion gross movement (4/5) good     Ankle Plantarflexion gross movement (4/5) good        LEFT: Lower Extremity Manual Muscle Test Assessment    Hip Flexion gross movement (4/5) good     Hip Abduction gross movement (3/5) fair     Knee Flexion strength (4/5) good     Knee Extension strength (4/5) good     Ankle Plantarflexion gross movement (4/5) good     Ankle Inversion gross movement (4/5) good               Palpation    Palpation - 03/07/22 1308        Palpation    Back Palpation  R sided lumbar pain, tender to touch.     Trunk Palpation  sits with slight lean if trunk to right side.               Ortho Special Tests     Gait and Mobility    Gait and Mobility - 03/07/22 1550        Gait Training    [place], Gait independent     Variable surfaces Flat surface     Assistive Device none     Comment (Gait/Stairs) walks with slight lean of trunk to right side.     Gait Speed (m/sec) 0.97 m/sec   self selected gait velocity              Balance/Posture    Balance and Posture - 03/07/22 1550        Balance Assessment    Balance Test Results (Other) Timed Up and Go Test (TUG)        Timed Up and Go Test    Results, Timed Up and Go Test (Balance) TUG is 13.03 sec                 Goals        Patient Stated    •  <enter goal here> (pt-stated)        Other    •  [facility] PT Lumbar Goals       Short term goals   Short Term Goals Time Frame Result Comment/Progress   Pt will increase core, LE and Hip MMT >/= ½ grade 4 weeks for all STGs     Pt will increase hip ROM by 5 deg in rotations and abduction      Pt will demonstrate improvements in Oswestry  to 15/50 indicating functional improvement       Pt will be independent with HEP to increase carryover at home      Pt will be independent with postural correction management  for pain management      Pt will be able to stand >/= 10  min without onset of LBP to cook            •  [facility] PT Lumbar Goals       Long term goals   Long Term Goals Time Frame Result Comment/Progress   Pt will increase core, LE and Hip MMT >/= full grade All LTGare for 12 weeks     Pt will increase lumbar ROM >/= WF into flexion, extension, lateral flexion and rotation       Pt will improve WOMAC score to less than 20/96 indicating better function   IE 37/96   Pt will demonstrate improvements in Oswestry >/=  8/50 or 16 % functional improvement   IE 40%   Pt will be able to stand and cook for 45 minutes to an hour with small breaks in between      Improve SSv to 1.2 m/s   IE 0.97 m/s   Be able to do stairs with 1 rail and alternate legs      Walk 20 minutes for exercises/ fitness and be able to independent pool exs at home.             •  Mutually agreed upon pain goal       Mutually agreed upon pain goal: - lower pain to 2/10 or lower with ADLs, walking, house work.                TREATMENT PLAN:    ORTHO PT FLOWSHEET    Exercises Current Session Time    MODALITIES- HEAT/ICE  CPT 56398 TOTAL TIME FOR SESSION Not performed    Heat     Ice/Vasocompression     MODALITIES - E-STIM  CPT 72713   TOTAL TIME FOR SESSION Not performed    E-stim/H-Wave     MODALITIES - US TOTAL TIME FOR SESSION Not performed    US (CPT 55614)     Iontophoresis (CPT 48522)     MODALITIES - TRACTION  CPT 79838 TOTAL TIME FOR SESSION Not performed    Mechanical Traction     THER ACT  CPT 54281 TOTAL TIME FOR SESSION Not performed    Bed mobility     Transfer training     Body Mechanics/Work Training     THER EX  CPT 50493 TOTAL TIME FOR SESSION Not performed    CARDIOVASCULAR  No forwards flexion, rotation, stop is pain present with manual or mechanical per script    Nu Step nustep    Stationary Bike     Elliptical     Treadmill     UBE     STRENGTHENING     Supine ther-ex TAC     TAC with knee fall outs     TAC with march     LAQ     3 way hip  10x    Seated ther-ex     Standing ther-ex 3 way hip    STRETCHING     LE stretching      Aquatic exs- no forwards flexion or rotation- precautions    Entry/ exit via ramp     Walking forwards with bar bell     Walk sideways with bar bell     Retro walk with bar bell     Heel raises     3 way hip     Calf stretch     Hamstrings stretch gently    Shoulder flexion stretch    Shoulder horizontal abduction          NEURO RE-ED  CPT 43643 TOTAL TIME FOR SESSION Not performed    COORDINATION     POSTURAL RE-ED        PRE-GAIT ACTIVITIES      BALANCE TRAINING      Sitting balance     Standing balance     GAIT TRAINING  CPT 10694 TOTAL TIME FOR SESSION Not performed    Ambulation      Dynamic gait      Stair negotiation     Curb negotiation     Ramp negotiation     Outdoor ambulation     BWS/vector training     MANUAL  CPT 64566 TOTAL TIME FOR SESSION Not performed    Stretching     Mobilization      Massage     Taping              ASSESSMENT:    This 71 y.o. year old female presents to PT with above stated diagnosis. Physical Therapy evaluation reveals decreased flexibility,decreased endurance,pain,decreased strength,impaired postural control resulting in self-care,home management,community/leisure limitations. Reyna Castillo will benefit from skilled PT services to address limitation, work towards rehab and patient goals and maximize PLOF of chosen ADLs.     Planned Services: The patient's treatment will include CPT 47896 Aquatic therapy/exercises,CPT 39749 Gait training,CPT 32913 Manual therapy,CPT 31545 Neuromuscular Reeducation,CPT 96730 Therapeutic activities,CPT 30327 Therapeutic exercises,CPT 56497 Therapeutic Massage,CPT 68163 Electrical stimulation UNATTENDED,CPT 20355 Hot/Cold Packs therapy, .

## 2022-03-09 ENCOUNTER — TELEPHONE (OUTPATIENT)
Dept: ADMISSIONS | Facility: REHABILITATION | Age: 72
End: 2022-03-09
Payer: COMMERCIAL

## 2022-03-15 ENCOUNTER — HOSPITAL ENCOUNTER (OUTPATIENT)
Dept: PHYSICAL THERAPY | Facility: REHABILITATION | Age: 72
Setting detail: THERAPIES SERIES
Discharge: HOME | End: 2022-03-15
Attending: NURSE PRACTITIONER
Payer: COMMERCIAL

## 2022-03-15 DIAGNOSIS — Z98.1 ARTHRODESIS STATUS: Primary | ICD-10-CM

## 2022-03-15 PROCEDURE — 97010 HOT OR COLD PACKS THERAPY: CPT | Mod: GP

## 2022-03-15 PROCEDURE — 97110 THERAPEUTIC EXERCISES: CPT | Mod: GP

## 2022-03-15 NOTE — OP PT TREATMENT LOG
ORTHO PT FLOWSHEET    Exercises Current Session Time    MODALITIES- HEAT/ICE  CPT 98210 TOTAL TIME FOR SESSION 8-22 Minutes    Heat     Ice/Vasocompression Seated Cold packs lumbar spine 10 min y   MODALITIES - E-STIM  CPT 62530   TOTAL TIME FOR SESSION Not performed    E-stim/H-Wave     MODALITIES - US TOTAL TIME FOR SESSION Not performed    US (CPT 33226)     Iontophoresis (CPT 01290)     MODALITIES - TRACTION  CPT 88121 TOTAL TIME FOR SESSION Not performed    Mechanical Traction     THER ACT  CPT 98861 TOTAL TIME FOR SESSION Not performed    Bed mobility     Transfer training     Body Mechanics/Work Training     THER EX  CPT 64090 TOTAL TIME FOR SESSION 38-52 Minutes    CARDIOVASCULAR  No forwards flexion, rotation, stop is pain present with manual or mechanical per script    Nu Step Nu step level 1 arms 8, seat 8 - 5 minutes y   Stationary Bike     Elliptical     Treadmill     UBE     STRENGTHENING     Supine ther-ex TAC 10x y    TAC with knee fall outs 10x     TAC with ball squeezes 10x y    TAC with march 5 x2 y    Heel raises 10 x2  y    Standing hip abduction 10x y   Seated ther-ex Standing hip extension 10x y   Standing ther-ex Knee flexion 10x standing y   STRETCHING     LE stretching Supine Hamstrings stretch 30 sec, 3x bilat y         Aquatic exs- no forwards flexion or rotation- precautions    Entry/ exit via ramp     Walking forwards with bar bell     Walk sideways with bar bell     Retro walk with bar bell     Heel raises     3 way hip     Calf stretch     Hamstrings stretch gently    Shoulder flexion stretch    Shoulder horizontal abduction          NEURO RE-ED  CPT 29183 TOTAL TIME FOR SESSION Not performed    COORDINATION     POSTURAL RE-ED        PRE-GAIT ACTIVITIES      BALANCE TRAINING      Sitting balance     Standing balance     GAIT TRAINING  CPT 32732 TOTAL TIME FOR SESSION Not performed    Ambulation      Dynamic gait      Stair negotiation     Curb negotiation     Ramp negotiation      Outdoor ambulation     BWS/vector training     MANUAL  CPT 00676 TOTAL TIME FOR SESSION Not performed    Stretching     Mobilization      Massage     Taping

## 2022-03-15 NOTE — PROGRESS NOTES
PT DAILY NOTE FOR OUTPATIENT THERAPY    Patient: Reyna Castillo   MRN: [id number]  : 1950 71 y.o.  Referring Physician: Анна Terrazas CRNP  Date of Visit: 3/15/2022    Certification Dates: 22 through 22    Diagnosis:   1. Arthrodesis status        Chief Complaints:  s/p lumbar surgery, pain lumbar spine R side    Precautions:   Precautions comments: no forwards flexion. rotation per script. STOP if pain with manual or mechanical per script     TODAY'S VISIT    Time In Session:  Start Time: 1105  Stop Time: 1205  Time Calculation (min): 60 min   History/Vitals/Pain/Encounter Info - 03/15/22 1109        Injury History/Precautions/Daily Required Info    Document Type daily treatment     Primary Therapist Ana Aranda, PT     Chief Complaint/Reason for Visit  s/p lumbar surgery, pain lumbar spine R side     Referring Physician TATE Mathew     Precautions comments no forwards flexion. rotation per script. STOP if pain with manual or mechanical per script     History of present illness/functional impairment Reyna Castillo is here for a PT evalaution today. She had L3-S1 Posterior Lumbar Decompression, Posterior Lumbar Interbody Fusion, Instrumentation, Cage, Allograft, Autograft, BMP in 2021 and had 2TKR in summet in .  Currently is using a bone stimulator, which she uses 2- 4 hours every day.  Main c/o for Physical therapy are- R sided  lumbar pain with sitting, standing, early am. Pain decreases with lying down. C/o clicking with walking, on  stairs. Denies T/N . Denies weakness in LE. Referred for land and aquatic exs.     [facility] Specialty Orthopedics     Patient reported fall since last visit No        Pain Assessment    Currently in pain Yes     Preferred Pain Scale number (Numeric Rating Pain Scale)     Pain Side/Orientation right     Pain: Body location Back     Pain Rating (0-10): Pre Activity 4     Pain Rating (0-10): Activity 4     Pain Rating (0-10): Post Activity 3         Pain Intervention    Intervention  heat with exs     Post Intervention Comments pain tolerable                Daily Treatment Assessment and Plan - 03/15/22 6719        Daily Treatment Assessment and Plan    Progress toward goals Progressing     Daily Outcome Summary First FU after eval today.Initiated core stabilization exs and standing exs. Tredelenberg's gait present, scoliosis present. Tolerated all exs well. Provided HEP and Reyna verbalized ability to do it at home. Next session is palnned as aquaric session.     Plan and Recommendations Aquatic exsercises are listed below in flow sheet.                     OBJECTIVE DATA TAKEN TODAY:    None taken    Today's Treatment:    ORTHO PT FLOWSHEET    Exercises Current Session Time    MODALITIES- HEAT/ICE  CPT 19007 TOTAL TIME FOR SESSION 8-22 Minutes    Heat     Ice/Vasocompression Seated Cold packs lumbar spine 10 min y   MODALITIES - E-STIM  CPT 58754   TOTAL TIME FOR SESSION Not performed    E-stim/H-Wave     MODALITIES - US TOTAL TIME FOR SESSION Not performed    US (CPT 85912)     Iontophoresis (CPT 06400)     MODALITIES - TRACTION  CPT 48129 TOTAL TIME FOR SESSION Not performed    Mechanical Traction     THER ACT  CPT 57928 TOTAL TIME FOR SESSION Not performed    Bed mobility     Transfer training     Body Mechanics/Work Training     THER EX  CPT 89824 TOTAL TIME FOR SESSION 38-52 Minutes    CARDIOVASCULAR  No forwards flexion, rotation, stop is pain present with manual or mechanical per script    Nu Step Nu step level 1 arms 8, seat 8 - 5 minutes y   Stationary Bike     Elliptical     Treadmill     UBE     STRENGTHENING     Supine ther-ex TAC 10x y    TAC with knee fall outs 10x     TAC with ball squeezes 10x y    TAC with march 5 x2 y    Heel raises 10 x2  y    Standing hip abduction 10x y   Seated ther-ex Standing hip extension 10x y   Standing ther-ex Knee flexion 10x standing y   STRETCHING     LE stretching Supine Hamstrings stretch 30 sec, 3x  bilat y         Aquatic exs- no forwards flexion or rotation- precautions    Entry/ exit via ramp     Walking forwards with bar bell     Walk sideways with bar bell     Retro walk with bar bell     Heel raises     3 way hip     Calf stretch     Hamstrings stretch gently    Shoulder flexion stretch    Shoulder horizontal abduction          NEURO RE-ED  CPT 88516 TOTAL TIME FOR SESSION Not performed    COORDINATION     POSTURAL RE-ED        PRE-GAIT ACTIVITIES      BALANCE TRAINING      Sitting balance     Standing balance     GAIT TRAINING  CPT 52106 TOTAL TIME FOR SESSION Not performed    Ambulation      Dynamic gait      Stair negotiation     Curb negotiation     Ramp negotiation     Outdoor ambulation     BWS/vector training     MANUAL  CPT 86850 TOTAL TIME FOR SESSION Not performed    Stretching     Mobilization      Massage     Taping

## 2022-03-18 ENCOUNTER — HOSPITAL ENCOUNTER (OUTPATIENT)
Dept: PHYSICAL THERAPY | Facility: REHABILITATION | Age: 72
Setting detail: THERAPIES SERIES
Discharge: HOME | End: 2022-03-18
Attending: NURSE PRACTITIONER
Payer: COMMERCIAL

## 2022-03-18 DIAGNOSIS — Z98.1 ARTHRODESIS STATUS: Primary | ICD-10-CM

## 2022-03-18 PROCEDURE — 97113 AQUATIC THERAPY/EXERCISES: CPT | Mod: GP,CQ

## 2022-03-18 NOTE — PROGRESS NOTES
PT DAILY NOTE FOR OUTPATIENT THERAPY    Patient: Reyna Castillo   MRN: [id number]  : 1950 71 y.o.  Referring Physician: Анна Terrazas CRNP  Date of Visit: 3/18/2022    Certification Dates: 22 through 22    Diagnosis:   1. Arthrodesis status        Chief Complaints:  s/p lumbar surgery, pain lumbar spine R side    Precautions:   Precautions comments: no forward flexion rotation per script STOP if pain with manual or mechanical per script     TODAY'S VISIT    Time In Session:  Start Time: 1000  Stop Time: 1045  Time Calculation (min): 45 min   History/Vitals/Pain/Encounter Info - 22 1202        Injury History/Precautions/Daily Required Info    Document Type daily treatment     Primary Therapist Ana Aranda, PT     Chief Complaint/Reason for Visit  s/p lumbar surgery, pain lumbar spine R side     Referring Physician TATE Mathew     Precautions comments no forward flexion rotation per script STOP if pain with manual or mechanical per script     History of present illness/functional impairment Reyna Castillo is here for a PT evalaution today. She had L3-S1 Posterior Lumbar Decompression, Posterior Lumbar Interbody Fusion, Instrumentation, Cage, Allograft, Autograft, BMP in 2021 and had 2TKR in summet in .  Currently is using a bone stimulator, which she uses 2- 4 hours every day.  Main c/o for Physical therapy are- R sided  lumbar pain with sitting, standing, early am. Pain decreases with lying down. C/o clicking with walking, on  stairs. Denies T/N . Denies weakness in LE. Referred for land and aquatic exs.     [facility]ty Orthopedics     Patient/Family/Caregiver Comments/Observations No falls, no changes in meds.  Pt reported 3-4 LBP.     Patient reported fall since last visit No        Pain Assessment    Currently in pain Yes     Preferred Pain Scale number (Numeric Rating Pain Scale)     Pain Side/Orientation bilateral     Pain: Body location Back     Pain  Rating (0-10): Pre Activity 4     Pain Rating (0-10): Activity 3     Pain Rating (0-10): Post Activity 3        Pain Intervention    Intervention  aquatic therapy     Post Intervention Comments decrease  to 2-3 pain from 3-4        Pre Activity Vital Signs    Pulse 67     SpO2 98 %     /74     BP Location Left upper arm     BP Method Manual     Patient Position Sitting                Daily Treatment Assessment and Plan - 03/18/22 1202        Daily Treatment Assessment and Plan    Progress toward goals Progressing     Daily Outcome Summary Pt enjoyed her first aquatic session.  Pt reports decrease in LBP from 3-4 to 2-3 while in the water. Pt reported pain when she initially began hip extension exercises, which were immediately stopped.     Plan and Recommendations Cont w POC as directed by primary PT.                             Today's Treatment:    Aquatics exercises flow sheet Group aquatics CPT 69715  Individual aquatics 17361 Time  45 min    With BB    Walking forwards 3 roundtrip laps yes   Side stepping 3 roundtrip laps yes   kayak     Walking backwards 3 roundtrip laps yes           LE exercises    HR's 2 x 10   yes   Hip abduction 2 x 10   yes   Hip flexion 2 x 10   yes   Hip extension Unable due to increase in pain   Circles  cw/ccw    Single leg press with noodle        UE  exercises     Without paddles   Shoulder flexion/ext 2 x 10   Shoulder abduction    Biceps/triceps    Horizontal abd/add 2 x 10   Back against wall           Step up forwards    Step ups side    W's with kick boards    squats    Heel raises    Knee curls    Sitting on blue disc        Floating with noodles    Scissors, bicycling, skiers        stretching    Calf stretch Runners' stretch 30 sec x 3   Low back stretch    Hamstring stretch  On stairs 30 sec x 2

## 2022-03-18 NOTE — OP PT TREATMENT LOG
Aquatics exercises flow sheet Group aquatics CPT 41514  Individual aquatics 54675 Time  45 min    With BB    Walking forwards 3 roundtrip laps yes   Side stepping 3 roundtrip laps yes   kayak     Walking backwards 3 roundtrip laps yes           LE exercises    HR's 2 x 10   yes   Hip abduction 2 x 10   yes   Hip flexion 2 x 10   yes   Hip extension Unable due to increase in pain   Circles  cw/ccw    Single leg press with noodle        UE  exercises     Without paddles   Shoulder flexion/ext 2 x 10   Shoulder abduction    Biceps/triceps    Horizontal abd/add 2 x 10   Back against wall           Step up forwards    Step ups side    W's with kick boards    squats    Heel raises    Knee curls    Sitting on blue disc        Floating with noodles    Scissors, bicycling, skiers        stretching    Calf stretch Runners' stretch 30 sec x 3   Low back stretch    Hamstring stretch  On stairs 30 sec x 2

## 2022-03-22 ENCOUNTER — HOSPITAL ENCOUNTER (OUTPATIENT)
Dept: PHYSICAL THERAPY | Facility: REHABILITATION | Age: 72
Setting detail: THERAPIES SERIES
Discharge: HOME | End: 2022-03-22
Attending: NURSE PRACTITIONER
Payer: COMMERCIAL

## 2022-03-22 DIAGNOSIS — Z98.1 ARTHRODESIS STATUS: Primary | ICD-10-CM

## 2022-03-22 PROCEDURE — 97530 THERAPEUTIC ACTIVITIES: CPT | Mod: GP,CQ

## 2022-03-22 NOTE — OP PT TREATMENT LOG
Aquatics exercises flow sheet Group aquatics CPT 30093  Individual aquatics [id number] Time  0 min    With BB    Walking forwards 3 roundtrip laps yes   Side stepping 3 roundtrip laps yes   kayak     Walking backwards 3 roundtrip laps yes           LE exercises    HR's 2 x 10   yes   Hip abduction 2 x 10   yes   Hip flexion 2 x 10   yes   Hip extension Unable due to increase in pain   Circles  cw/ccw    Single leg press with noodle        UE  exercises     Without paddles   Shoulder flexion/ext 2 x 10   Shoulder abduction    Biceps/triceps    Horizontal abd/add 2 x 10   Back against wall           Step up forwards    Step ups side    W's with kick boards    squats    Heel raises    Knee curls    Sitting on blue disc        Floating with noodles    Scissors, bicycling, skiers        stretching    Calf stretch Runners' stretch 30 sec x 3   Low back stretch    Hamstring stretch  On stairs 30 sec x 2           Therapeutic Activity  CPT 71745                                                                                           15 min    Pt's BP was 176/80 and 180/84.  Pt reported she forgot to take her medications today.  Pt did have one of her two BP meds on her and took one med.  Due to the high BP, pt was not seen in the water today.

## 2022-03-22 NOTE — PROGRESS NOTES
PT DAILY NOTE FOR OUTPATIENT THERAPY    Patient: Reyna Castillo   MRN: [id number]  : 1950 71 y.o.  Referring Physician: Анна Terrazas CRNP  Date of Visit: 3/22/2022    Certification Dates: 22 through 22    Diagnosis:   1. Arthrodesis status        Chief Complaints:  s/p lumbar surgery, pain lumbar spine R side    Precautions:   Precautions comments: no forward flexion rotation per script STOP if pain with manual or mechanical per script     TODAY'S VISIT    Time In Session:  Start Time: 1400  Stop Time: 1415  Time Calculation (min): 15 min   History/Vitals/Pain/Encounter Info - 22 1424        Injury History/Precautions/Daily Required Info    Document Type daily treatment     Primary Therapist Ana Aranda, PT     Chief Complaint/Reason for Visit  s/p lumbar surgery, pain lumbar spine R side     Referring Physician TATE Mathew     Precautions comments no forward flexion rotation per script STOP if pain with manual or mechanical per script     History of present illness/functional impairment Reyna Castillo is here for a PT evalaution today. She had L3-S1 Posterior Lumbar Decompression, Posterior Lumbar Interbody Fusion, Instrumentation, Cage, Allograft, Autograft, BMP in 2021 and had 2TKR in summet in .  Currently is using a bone stimulator, which she uses 2- 4 hours every day.  Main c/o for Physical therapy are- R sided  lumbar pain with sitting, standing, early am. Pain decreases with lying down. C/o clicking with walking, on  stairs. Denies T/N . Denies weakness in LE. Referred for land and aquatic exs.     [facility] Specialty Orthopedics     Patient/Family/Caregiver Comments/Observations NO falls, no changes in meds.  Pt reported 3-4 LBP.     Patient reported fall since last visit No        Pain Assessment    Currently in pain Yes     Preferred Pain Scale number (Numeric Rating Pain Scale)     Pain Side/Orientation bilateral     Pain: Body location Back     Pain  Rating (0-10): Pre Activity 4        Pain Intervention    Intervention  na     Post Intervention Comments na        Pre Activity Vital Signs    Pulse 57     SpO2 98 %     /84     BP Location Left upper arm     BP Method Manual     Patient Position Sitting                Daily Treatment Assessment and Plan - 03/22/22 1424        Daily Treatment Assessment and Plan    Progress toward goals Progressing     Daily Outcome Summary Pt's BP was taken prior to getting in the pool.  Pt's BP was 176/80 and 180/84.  Pt reported she forgot to take her medication today.  Pt promptly took one of her two BP meds, which she had in her purse.  In water activity cancelled today due to high BP.  Pt agreeable.     Plan and Recommendations Cont w POC as directed by primary PT.                             Today's Treatment:    Aquatics exercises flow sheet Group aquatics CPT 91691  Individual aquatics [id number] Time  0 min    With BB    Walking forwards 3 roundtrip laps yes   Side stepping 3 roundtrip laps yes   kayak     Walking backwards 3 roundtrip laps yes           LE exercises    HR's 2 x 10   yes   Hip abduction 2 x 10   yes   Hip flexion 2 x 10   yes   Hip extension Unable due to increase in pain   Circles  cw/ccw    Single leg press with noodle        UE  exercises     Without paddles   Shoulder flexion/ext 2 x 10   Shoulder abduction    Biceps/triceps    Horizontal abd/add 2 x 10   Back against wall           Step up forwards    Step ups side    W's with kick boards    squats    Heel raises    Knee curls    Sitting on blue disc        Floating with noodles    Scissors, bicycling, skiers        stretching    Calf stretch Runners' stretch 30 sec x 3   Low back stretch    Hamstring stretch  On stairs 30 sec x 2           Therapeutic Activity  CPT 20796                                                                                           15 min    Pt's BP was 176/80 and 180/84.  Pt reported she forgot to take her medications  today.  Pt did have one of her two BP meds on her and took one med.  Due to the high BP, pt was not seen in the water today.

## 2022-03-29 ENCOUNTER — HOSPITAL ENCOUNTER (OUTPATIENT)
Dept: PHYSICAL THERAPY | Facility: REHABILITATION | Age: 72
Setting detail: THERAPIES SERIES
Discharge: HOME | End: 2022-03-29
Attending: NURSE PRACTITIONER
Payer: COMMERCIAL

## 2022-03-29 DIAGNOSIS — Z98.1 ARTHRODESIS STATUS: Primary | ICD-10-CM

## 2022-03-29 PROCEDURE — 97530 THERAPEUTIC ACTIVITIES: CPT | Mod: GP

## 2022-03-29 PROCEDURE — 97110 THERAPEUTIC EXERCISES: CPT | Mod: GP

## 2022-03-29 NOTE — PROGRESS NOTES
PT DAILY NOTE FOR OUTPATIENT THERAPY    Patient: Reyna Castillo   MRN: [id number]  : 1950 71 y.o.  Referring Physician: Анна Terrazas CRNP  Date of Visit: 3/29/2022    Certification Dates: 22 through 22    Diagnosis:   1. Arthrodesis status        Chief Complaints:  s/p lumbar surgery, pain lumbar spine R side, weakness    Precautions:   Precautions comments: no forward flex/rotat and to stop if pain  Existing Precautions/Restrictions: spinal     TODAY'S VISIT    Time In Session:  Start Time: 1405  Stop Time: 1500  Time Calculation (min): 55 min   History/Vitals/Pain/Encounter Info - 22 1404        Injury History/Precautions/Daily Required Info    Document Type daily treatment     Primary Therapist Ana Aranda, PT     Chief Complaint/Reason for Visit  s/p lumbar surgery, pain lumbar spine R side, weakness     Onset of Illness/Injury or Date of Surgery --   2021    Referring Physician TATE Mathew     Existing Precautions/Restrictions spinal     Precautions comments no forward flex/rotat and to stop if pain     History of present illness/functional impairment Reyna Castillo is here for a PT evalaution today. She had L3-S1 Posterior Lumbar Decompression, Posterior Lumbar Interbody Fusion, Instrumentation, Cage, Allograft, Autograft, BMP in 2021 and had 2TKR in sum[place] in .  Currently is using a bone stimulator, which she uses 2- 4 hours every day.  Main c/o for Physical therapy are- R sided  lumbar pain with sitting, standing, early am. Pain decreases with lying down. C/o clicking with walking, on  stairs. Denies T/N . Denies weakness in LE. Referred for land and aquatic exs.     [facility] Special[facility] Orthopedics     Patient/Family/Caregiver Comments/Observations Pt reports sl increase in pain today at 4/10 level (thinks weather may be causing?)  Reports she does get occasional clicking in her R lower back with fatigue.  Reports she has been able to go out and go  shopping at stores due to increased distance amb with a cart to push/lean on.  She has not been able to go out and go walking - wants to be able to return to 2 miles per day.     Patient reported fall since last visit No        Pain Assessment    Currently in pain Yes     Pain Side/Orientation right     Pain: Body location Back     Pain Rating (0-10): Pre Activity 4     Pain Rating (0-10): Post Activity 3        Pain Intervention    Intervention  ther ex     Post Intervention Comments no significant increase or decrease        Pre Activity Vital Signs    /80   blue cuff    BP Location Left upper arm     BP Method Manual     Patient Position Sitting        Activity Vital Signs    Patient activity Walking     Activity Pulse 103   max at 5 min on TM               Daily Treatment Assessment and Plan - 03/29/22 1718        Daily Treatment Assessment and Plan    Progress toward goals Progressing     Daily Outcome Summary Pt BP reasonable today.  Focus on increased knowledge of practical body mechanics and identified need to continue to focus on pulling in abdominals, scap retraction to improve gait pattern, use appropriate facing of objects to lift, decrease weight of purse.  Patient  was very happy to be starting work on treadmill, and will benefit from continued increase for endurance and strengthening.     Plan and Recommendations Continue to promote body mechanics, pelvic stability, and LE strengthening.  Consider beginning some low rise step ups given her knee replacement history.  Requires frequent redirection.                     OBJECTIVE DATA TAKEN TODAY:    None taken    Today's Treatment:    ORTHO PT FLOWSHEET    Exercises Current Session Time   MODALITIES- HEAT/ICE  CPT 97167 TOTAL TIME FOR SESSION Not performed   Heat    Ice/Vasocompression    MODALITIES - E-STIM  CPT 87099   TOTAL TIME FOR SESSION Not performed   E-stim/H-Wave    THER ACT  CPT 39738 TOTAL TIME FOR SESSION 8-22 Minutes   Bed mobility     Transfer training    Body Mechanics/Work Training Pt education on appropriate use of LEs and trunk to decrease twist, be sure to face object to , use LE, engage abdominals.   THER EX  CPT 31849 TOTAL TIME FOR SESSION 38-52 Minutes   CARDIOVASCULAR     Nu Step    Stationary Bike    Elliptical    Treadmill Initiated TM at 1.4mph for 5 min.  RPE 9-11  Sl increase in SOB .  HR max 103 avg 94   UBE    STRENGTHENING    Supine ther-ex Transverse Abdominal Contraction x 10 @ 5 sec hold    TAC with march x 10    TAC with heel slides x 10    TAC  With adductor squeeze x 10 with 5 sec hold    TAC with fall outs (controlled to 45 degrees) x 10 ea            Standing ther-ex Heel Raises/ Toe raises  2 x 10    Hip abduction with TAC 2 x 10  (1 set alternating)                   STRETCHING    LE stretching    Other stretching        NEURO RE-ED  CPT 15692 TOTAL TIME FOR SESSION Not performed   COORDINATION    POSTURAL RE-ED       PRE-GAIT ACTIVITIES     BALANCE TRAINING     Sitting balance    Standing balance    GAIT TRAINING  CPT 16617 TOTAL TIME FOR SESSION Not performed   Ambulation     Dynamic gait     Stair negotiation    Curb negotiation    Ramp negotiation    Outdoor ambulation    BWS/vector training    MANUAL  CPT 52196 TOTAL TIME FOR SESSION Not performed   Stretching    Mobilization     Massage    Taping

## 2022-03-29 NOTE — OP PT TREATMENT LOG
ORTHO PT FLOWSHEET    Exercises Current Session Time   MODALITIES- HEAT/ICE  CPT 43133 TOTAL TIME FOR SESSION Not performed   Heat    Ice/Vasocompression    MODALITIES - E-STIM  CPT 52697   TOTAL TIME FOR SESSION Not performed   E-stim/H-Wave    THER ACT  CPT 59240 TOTAL TIME FOR SESSION 8-22 Minutes   Bed mobility    Transfer training    Body Mechanics/Work Training Pt education on appropriate use of LEs and trunk to decrease twist, be sure to face object to , use LE, engage abdominals.   THER EX  CPT 48222 TOTAL TIME FOR SESSION 38-52 Minutes   CARDIOVASCULAR     Nu Step    Stationary Bike    Elliptical    Treadmill Initiated TM at 1.4mph for 5 min.  RPE 9-11  Sl increase in SOB .  HR max 103 avg 94   UBE    STRENGTHENING    Supine ther-ex Transverse Abdominal Contraction x 10 @ 5 sec hold    TAC with march x 10    TAC with heel slides x 10    TAC  With adductor squeeze x 10 with 5 sec hold    TAC with fall outs (controlled to 45 degrees) x 10 ea            Standing ther-ex Heel Raises/ Toe raises  2 x 10    Hip abduction with TAC 2 x 10  (1 set alternating)                   STRETCHING    LE stretching    Other stretching        NEURO RE-ED  CPT 48566 TOTAL TIME FOR SESSION Not performed   COORDINATION    POSTURAL RE-ED       PRE-GAIT ACTIVITIES     BALANCE TRAINING     Sitting balance    Standing balance    GAIT TRAINING  CPT 75094 TOTAL TIME FOR SESSION Not performed   Ambulation     Dynamic gait     Stair negotiation    Curb negotiation    Ramp negotiation    Outdoor ambulation    BWS/vector training    MANUAL  CPT 12090 TOTAL TIME FOR SESSION Not performed   Stretching    Mobilization     Massage    Taping

## 2022-03-29 NOTE — OP PT TREATMENT LOG
Supine ther-ex TAC 10x y     TAC with knee fall outs 10x       TAC with ball squeezes 10x y     TAC with march 5 x2 y     Heel raises 10 x2  y     Standing hip abduction 10x y   Seated ther-ex Standing hip extension 10x y   Standing ther-ex Knee flexion 10x standing y   STRETCHING       LE stretching Supine Hamstrings stretch 30 sec, 3x bilat y             Aquatic exs- no forwards flexion or rotation- precautions

## 2022-04-01 ENCOUNTER — HOSPITAL ENCOUNTER (OUTPATIENT)
Dept: PHYSICAL THERAPY | Facility: REHABILITATION | Age: 72
Setting detail: THERAPIES SERIES
Discharge: HOME | End: 2022-04-01
Attending: NURSE PRACTITIONER
Payer: COMMERCIAL

## 2022-04-01 DIAGNOSIS — Z98.890 HX OF LUMBOSACRAL SPINE SURGERY: ICD-10-CM

## 2022-04-01 DIAGNOSIS — Z98.1 ARTHRODESIS STATUS: Primary | ICD-10-CM

## 2022-04-01 PROCEDURE — 97113 AQUATIC THERAPY/EXERCISES: CPT | Mod: GP

## 2022-04-01 NOTE — PROGRESS NOTES
PT DAILY NOTE FOR OUTPATIENT THERAPY    Patient: Reyna Castillo   MRN: [id number]  : 1950 71 y.o.  Referring Physician: Анна Terrazas CRNP  Date of Visit: 2022    Certification Dates:   through      Diagnosis:   1. Arthrodesis status    2. Hx of lumbosacral spine surgery        Chief Complaints:  s/p lumbar surgery, pain lumbar spine R side, weakness    Precautions:   Existing Precautions/Restrictions: spinal     TODAY'S VISIT    Time In Session:  Start Time: 906  Stop Time: 955  Time Calculation (min): 49 min   History/Vitals/Pain/Encounter Info - 22 0917        Injury History/Precautions/Daily Required Info    Document Type daily treatment     Primary Therapist Ana Aranda, PT     Chief Complaint/Reason for Visit  s/p lumbar surgery, pain lumbar spine R side, weakness     Onset of Illness/Injury or Date of Surgery --   2021    Referring Physician TATE Mathew     Existing Precautions/Restrictions spinal     History of present illness/functional impairment Reyna Castillo is here for a PT evalaution today. She had L3-S1 Posterior Lumbar Decompression, Posterior Lumbar Interbody Fusion, Instrumentation, Cage, Allograft, Autograft, BMP in 2021 and had 2TKR in sum[place] in .  Currently is using a bone stimulator, which she uses 2- 4 hours every day.  Main c/o for Physical therapy are- R sided  lumbar pain with sitting, standing, early am. Pain decreases with lying down. C/o clicking with walking, on  stairs. Denies T/N . Denies weakness in LE. Referred for land and aquatic exs.     [facility] Specialty Orthopedics     Patient/Family/Caregiver Comments/Observations Pain on arrival 3-4/10     Patient reported fall since last visit No        Pain Assessment    Currently in pain Yes     Preferred Pain Scale number (Numeric Rating Pain Scale)     Pain Side/Orientation right     Pain: Body location Back     Pain Rating (0-10): Pre Activity 4        Pain Intervention     Intervention  aquatic exercises     Post Intervention Comments pain tolerable        Pre Activity Vital Signs    Pulse 76     SpO2 97 %     /70     BP Method Manual                Daily Treatment Assessment and Plan - 04/01/22 0917        Daily Treatment Assessment and Plan    Progress toward goals Progressing     Daily Outcome Summary Pain on arrival was 4/10 in r side of lumbar spine. Tolerated all exercises well in the pool, with cues to keep core stabilized with leg exsercises. Added step ups today, progress step ups, add hip circles as tolerated next visit. Pain at end of session was 3/10, slightly lower.     Plan and Recommendations progress step ups, add hamstrings stretch, hip circles.                     OBJECTIVE DATA TAKEN TODAY:    None taken    Today's Treatment:    Aquatics exercises flow sheet Group aquatics CPT 33460  Individual aquatics [id number] Time  45 min    With BB    Walking forwards 5 roundtrip laps yes   Side stepping 5 roundtrip laps yes   kayak donot do, no rotation per mD    Walking backwards 5 roundtrip laps yes           LE exercises    HR's 2 x 10   yes   Hip abduction 2 x 10   yes   Hip flexion 2 x 10   yes   Hip extension Unable due to increase in pain   Circles  cw/ccw    Transverse abdominus contraction 10 x2 yes   Calf stretch against wall  10 sec, 3x yes   UE  exercises        Shoulder flexion/ext 2 x 10- yes   Shoulder abduction 10 x2 with paddles- yes   Biceps/triceps    Horizontal abd/add 2 x 10   Back against wall- yes           Step up forwards 5x on stairs in pool 1 hand rail- yes   Step ups side    W's with kick boards    squats 5x- yes   Heel raises    Knee curls    Sitting on blue disc        Floating with noodles    Scissors, bicycling, skiers        stretching    Calf stretch Runners' stretch 30 sec x 3   Low back stretch    Hamstring stretch  On stairs 30 sec x 2- no

## 2022-04-01 NOTE — OP PT TREATMENT LOG
Aquatics exercises flow sheet Group aquatics CPT 87808  Individual aquatics [id number] Time  45 min    With BB    Walking forwards 5 roundtrip laps yes   Side stepping 5 roundtrip laps yes   kayak donot do, no rotation per mD    Walking backwards 5 roundtrip laps yes           LE exercises    HR's 2 x 10   yes   Hip abduction 2 x 10   yes   Hip flexion 2 x 10   yes   Hip extension Unable due to increase in pain   Circles  cw/ccw    Transverse abdominus contraction 10 x2 yes   Calf stretch against wall  10 sec, 3x yes   UE  exercises        Shoulder flexion/ext 2 x 10- yes   Shoulder abduction 10 x2 with paddles- yes   Biceps/triceps    Horizontal abd/add 2 x 10   Back against wall- yes           Step up forwards 5x on stairs in pool 1 hand rail- yes   Step ups side    W's with kick boards    squats 5x- yes   Heel raises    Knee curls    Sitting on blue disc        Floating with noodles    Scissors, bicycling, skiers        stretching    Calf stretch Runners' stretch 30 sec x 3   Low back stretch    Hamstring stretch  On stairs 30 sec x 2- no

## 2022-04-05 ENCOUNTER — HOSPITAL ENCOUNTER (OUTPATIENT)
Dept: PHYSICAL THERAPY | Facility: REHABILITATION | Age: 72
Setting detail: THERAPIES SERIES
Discharge: HOME | End: 2022-04-05
Attending: NURSE PRACTITIONER
Payer: COMMERCIAL

## 2022-04-05 DIAGNOSIS — Z98.1 ARTHRODESIS STATUS: Primary | ICD-10-CM

## 2022-04-05 DIAGNOSIS — Z98.890 HX OF LUMBOSACRAL SPINE SURGERY: ICD-10-CM

## 2022-04-05 PROCEDURE — 97113 AQUATIC THERAPY/EXERCISES: CPT | Mod: GP,CQ

## 2022-04-05 NOTE — OP PT TREATMENT LOG
"Aquatics exercises flow sheet Group aquatics CPT 84298  Individual aquatics [id number] Time  45 min    With BB    Walking forwards 5 roundtrip laps yes   Side stepping 5 roundtrip laps yes   kayak donot do, no rotation per mD    Walking backwards 5 roundtrip laps yes           LE exercises    HR's 2 x 10   yes   Hip abduction 2 x 10   yes   Hip flexion 2 x 10   yes   Hip extension Unable due to increase in pain   Circles  cw/ccw    Transverse abdominus contraction 10 x2 yes   Calf stretch against wall  10 sec, 3x yes   UE  exercises NO PADDLES TODAY       Shoulder flexion/ext 2 x 10- yes   Shoulder abduction 10 x2  yes   Biceps/triceps    Horizontal abd/add 2 x 10   Back against wall- yes within tolerable ROM           Step up forwards 10x B  8\" block, B UE support   Step ups side    W's with kick boards    squats 5x-    Heel raises    Knee curls    Sitting on blue disc        Floating with noodles    Scissors, bicycling, skiers        stretching    Calf stretch Runners' stretch 30 sec x 3   Low back stretch    Hamstring stretch  On stairs 30 sec x 2- no                          "

## 2022-04-05 NOTE — PROGRESS NOTES
PT DAILY NOTE FOR OUTPATIENT THERAPY    Patient: Reyna Castillo   MRN: [id number]  : 1950 71 y.o.  Referring Physician: Анна Terrazas CRNP  Date of Visit: 2022    Certification Dates: 22 through 22    Diagnosis:   1. Arthrodesis status    2. Hx of lumbosacral spine surgery        Chief Complaints:  s/p lumbar surgery, pain lumbar spine R side, weakness    Precautions:   Precautions comments: no fwd flex/rotation and to stop if pain  Existing Precautions/Restrictions: spinal     TODAY'S VISIT    Time In Session:  Start Time: 1300  Stop Time: 1345  Time Calculation (min): 45 min   History/Vitals/Pain/Encounter Info - 22 1411        Injury History/Precautions/Daily Required Info    Document Type daily treatment     Primary Therapist Ana Aranda, PT     Chief Complaint/Reason for Visit  s/p lumbar surgery, pain lumbar spine R side, weakness     Onset of Illness/Injury or Date of Surgery --   2021    Referring Physician TATE Mathew     Existing Precautions/Restrictions spinal     Precautions comments no fwd flex/rotation and to stop if pain     History of present illness/functional impairment Reyna Castillo is here for a PT evalaution today. She had L3-S1 Posterior Lumbar Decompression, Posterior Lumbar Interbody Fusion, Instrumentation, Cage, Allograft, Autograft, BMP in 2021 and had 2TKR in sum[place] in .  Currently is using a bone stimulator, which she uses 2- 4 hours every day.  Main c/o for Physical therapy are- R sided  lumbar pain with sitting, standing, early am. Pain decreases with lying down. C/o clicking with walking, on  stairs. Denies T/N . Denies weakness in LE. Referred for land and aquatic exs.     [facility]     Patient/Family/Caregiver Comments/Observations No falls, no changes in meds.  Pt reports back pain of 3/10.  Pt reports B shdr pain of 3, however, 5 w/ movement. Pt reports her lawn was aerated and she went out to  "\"break up the muddy plugs\" and clean the dirty driveway. Pt feels this is probably what caused her shdr pain.     Patient reported fall since last visit No        Pain Assessment    Currently in pain Yes     Preferred Pain Scale number (Numeric Rating Pain Scale)     Pain Side/Orientation bilateral     Pain: Body location Back;Shoulder     Pain Rating (0-10): Pre Activity 3     Pain Rating (0-10): Activity 5     Pain Rating (0-10): Post Activity 5     Pain Onset/Duration Pt feels her B shdr pain was caused by breaking up the soil plugs from her recently aerated lawn and cleaning her driveway.  Some decrease in back pain from 3 to 2, shdr pain remained constant.        Pain Intervention    Intervention  aquatic exercises     Post Intervention Comments some decrease in back pain, no relief in shdr pain        Pre Activity Vital Signs    Pulse 62     SpO2 99 %     /80     BP Location Left upper arm     BP Method Manual     Patient Position Sitting                Daily Treatment Assessment and Plan - 04/05/22 1411        Daily Treatment Assessment and Plan    Progress toward goals Progressing     Daily Outcome Summary Pt requires cueing to stay on target for correct number of reps.  Pt has a tendency to be verbose and therefore lose count.  Pt did report slight decrease from 3 to 2 for her back pain.  No decrease number in B shdr pain was noted today.  Pt performed UE exercises in deeper water and did not use paddles.  Pt was also cued to perform UE exercises that were tolerable and not to perform if pain was increased.     Plan and Recommendations Cont w POC as directed by primary PT.                             Today's Treatment:    Aquatics exercises flow sheet Group aquatics CPT 92374  Individual aquatics 27351 Time  45 min    With BB    Walking forwards 5 roundtrip laps yes   Side stepping 5 roundtrip laps yes   kayak donot do, no rotation per mD    Walking backwards 5 roundtrip laps yes           LE " "exercises    HR's 2 x 10   yes   Hip abduction 2 x 10   yes   Hip flexion 2 x 10   yes   Hip extension Unable due to increase in pain   Circles  cw/ccw    Transverse abdominus contraction 10 x2 yes   Calf stretch against wall  10 sec, 3x yes   UE  exercises NO PADDLES TODAY       Shoulder flexion/ext 2 x 10- yes   Shoulder abduction 10 x2  yes   Biceps/triceps    Horizontal abd/add 2 x 10   Back against wall- yes within tolerable ROM           Step up forwards 10x B  8\" block, B UE support   Step ups side    W's with kick boards    squats 5x-    Heel raises    Knee curls    Sitting on blue disc        Floating with noodles    Scissors, bicycling, skiers        stretching    Calf stretch Runners' stretch 30 sec x 3   Low back stretch    Hamstring stretch  On stairs 30 sec x 2- no                                               "

## 2022-04-08 ENCOUNTER — HOSPITAL ENCOUNTER (OUTPATIENT)
Dept: PHYSICAL THERAPY | Facility: REHABILITATION | Age: 72
Setting detail: THERAPIES SERIES
Discharge: HOME | End: 2022-04-08
Attending: NURSE PRACTITIONER
Payer: COMMERCIAL

## 2022-04-08 DIAGNOSIS — Z98.890 HX OF LUMBOSACRAL SPINE SURGERY: ICD-10-CM

## 2022-04-08 DIAGNOSIS — Z98.1 ARTHRODESIS STATUS: Primary | ICD-10-CM

## 2022-04-08 PROCEDURE — 97110 THERAPEUTIC EXERCISES: CPT | Mod: GP

## 2022-04-08 PROCEDURE — 97140 MANUAL THERAPY 1/> REGIONS: CPT | Mod: GP

## 2022-04-08 NOTE — OP PT TREATMENT LOG
ORTHO PT FLOWSHEET    Exercises Current Session Time    MODALITIES- HEAT/ICE  CPT 75508 TOTAL TIME FOR SESSION Not performed    Heat     Ice/Vasocompression     MODALITIES - E-STIM  CPT 76860   TOTAL TIME FOR SESSION Not performed    E-stim/H-Wave     THER ACT  CPT 02290 TOTAL TIME FOR SESSION Not performed    Bed mobility     Transfer training     Body Mechanics/Work Training Pt education on appropriate use of LEs and trunk to decrease twist, be sure to face object to , use LE, engage abdominals.    THER EX  CPT 05873 TOTAL TIME FOR SESSION 38-52 Minutes    CARDIOVASCULAR      Nu Step     Stationary Bike     Elliptical     Treadmill Initiated TM at 1.4mph for 5 min.  RPE 9-11  Sl increase in SOB .  HR max 103 avg 94 no   UBE     STRENGTHENING     Supine ther-ex Transverse Abdominal Contraction x 10 @ 5 sec hold yes    TAC with march x 10 yes    TAC with heel slides x 10     TAC with straight leg raises 5 x2 y    TAC  With adductor squeeze x 10 with 5 sec hold     TAC with fall outs (controlled to 45 degrees) 2 x10, pink TB  y    Lateral leg raises 5x bilat  y        Standing ther-ex Heel Raises/ Toe raises  2 x 10 y    Hip abduction with TAC 2 x 10  (1 set alternating) y    [place] of 1/8 heel lift in Left heel - ambulation with same 200 feet y                  STRETCHING     LE stretching     Other  Oswestry and WOMAC done, gait speed y        NEURO RE-ED  CPT 05120 TOTAL TIME FOR SESSION Not performed    COORDINATION     POSTURAL RE-ED        PRE-GAIT ACTIVITIES      BALANCE TRAINING      Sitting balance     Standing balance     GAIT TRAINING  CPT 61697 TOTAL TIME FOR SESSION Not performed    Ambulation      Dynamic gait      Stair negotiation     Curb negotiation     Ramp negotiation     Outdoor ambulation     BWS/vector training     MANUAL  CPT 47142 TOTAL TIME FOR SESSION 8-22 Minutes    Stretching     Mobilization      Massage Prone lying- soft tissue mobilization lumbo- sacral paraspinals yes   Taping

## 2022-04-09 NOTE — PROGRESS NOTES
PT PROGRESS NOTE FOR OUTPATIENT THERAPY    Patient: Reyna Castillo   MRN: [id number]  : 1950 71 y.o.  Referring Physician: Анна Terrazas CRNP  Date of Visit: 2022      Certification Dates: 22 through 22    Recommended Frequency & Duration:  2 times/week for up to 3 months          Diagnosis:   1. Arthrodesis status    2. Hx of lumbosacral spine surgery        Chief Complaints:  Chief Complaint   Patient presents with   • Pain     In lumbar spine   • Dec Strength     Core and legs         Precautions:   Precautions comments: no forwards flexion or rotation, spinal  Existing Precautions/Restrictions: spinal     TODAY'S VISIT:    Time In Session:  Start Time: 1403  Stop Time: 1500  Time Calculation (min): 57 min   General Information - 22 1405        Session Details    Document Type progress note     Mode of Treatment individual therapy;physical therapy     [facility]        General Information    Referring Physician TATE Mathew     History of present illness/functional impairment Reyna Castillo is here for a PT evalaution today. She had L3-S1 Posterior Lumbar Decompression, Posterior Lumbar Interbody Fusion, Instrumentation, Cage, Allograft, Autograft, BMP in 2021 and had 2TKR in [place] in .  Currently is using a bone stimulator, which she uses 2- 4 hours every day.  Main c/o for Physical therapy are- R sided  lumbar pain with sitting, standing, early am. Pain decreases with lying down. C/o clicking with walking, on  stairs. Denies T/N . Denies weakness in LE. Referred for land and aquatic exs.     Existing Precautions/Restrictions spinal     Precautions comments no forwards flexion or rotation, spinal                Daily Falls Screen - 22 2220        Daily Falls Assessment    Patient reported fall since last visit No                Pain/Vitals - 22 1405        Pain Assessment    Currently in pain Yes     Preferred Pain Scale number  (Numeric Rating Pain Scale)     Pain Side/Orientation bilateral     Pain: Body location Back     Pain Rating (0-10): Pre Activity 2     Pain Rating (0-10): Activity 3     Pain Rating (0-10): Post Activity 3     Pain Onset/Duration Reyna reports pain overall is decreasing.        Pain Intervention    Intervention  land exs     Post Intervention Comments pain slightly higher, will apply cold packs at home                PT - 04/08/22 1405        Physical Therapy    Physical Therapy Ortho        PT Plan    Frequency of treatment 2 times/week     PT Duration 3 months     PT Cert From 03/07/22     PT Cert To 06/05/22     Date PT POC was sent to provider 03/07/22     Signed PT Plan of Care received?  Yes                   OBJECTIVE MEASUREMENTS/DATA:    ROM    Range of Motion - 04/08/22 1400        RIGHT: Lower Extremity AROM Assessment    Hip Flexion   95 degrees     Hip Abduction   38 degrees     Hip Internal Rotation   20 degrees     Hip External Rotation   30 degrees        LEFT: Lower Extremity AROM Assessment    Hip Flexion   95 degrees     Hip Abduction   40 degrees     Hip Internal Rotation   20 degrees     Hip External Rotation   30 degrees               Outcome Measures    PT Outcome Measures - 04/08/22 2220        Objective Outcome Measures    Gait Speed (m/sec) 1.05 m/sec        Subjective Outcome Measures    Oswestry Oswestry 20/50        Other Outcome Measures Used/Comments    Other outcome measure used: WOMAC 52/96                 Today's Treatment::    Education provided:  Yes: See treatment log for details of education provided  Methods: Discussion- discussed goals and findings of assessment today, patient understands goals and further plan of care of physical therapy.    ORTHO PT FLOWSHEET    Exercises Current Session Time    MODALITIES- HEAT/ICE  CPT 94509 TOTAL TIME FOR SESSION Not performed    Heat     Ice/Vasocompression     MODALITIES - E-STIM  CPT 65767   TOTAL TIME FOR SESSION Not performed     E-stim/H-Wave     THER ACT  CPT 96521 TOTAL TIME FOR SESSION Not performed    Bed mobility     Transfer training     Body Mechanics/Work Training Pt education on appropriate use of LEs and trunk to decrease twist, be sure to face object to , use LE, engage abdominals.    THER EX  CPT 80172 TOTAL TIME FOR SESSION 38-52 Minutes    CARDIOVASCULAR      Nu Step     Stationary Bike     Elliptical     Treadmill Initiated TM at 1.4mph for 5 min.  RPE 9-11  Sl increase in SOB .  HR max 103 avg 94 no   UBE     STRENGTHENING     Supine ther-ex Transverse Abdominal Contraction x 10 @ 5 sec hold yes    TAC with march x 10 yes    TAC with heel slides x 10     TAC with straight leg raises 5 x2 y    TAC  With adductor squeeze x 10 with 5 sec hold     TAC with fall outs (controlled to 45 degrees) 2 x10, pink TB  y    Lateral leg raises 5x bilat  y        Standing ther-ex Heel Raises/ Toe raises  2 x 10 y    Hip abduction with TAC 2 x 10  (1 set alternating) y    [place] of 1/8 heel lift in Left heel - ambulation with same 200 feet y                  STRETCHING     LE stretching     Other  Oswestry and WOMAC done, gait speed y        NEURO RE-ED  CPT 35106 TOTAL TIME FOR SESSION Not performed    COORDINATION     POSTURAL RE-ED        PRE-GAIT ACTIVITIES      BALANCE TRAINING      Sitting balance     Standing balance     GAIT TRAINING  CPT 54876 TOTAL TIME FOR SESSION Not performed    Ambulation      Dynamic gait      Stair negotiation     Curb negotiation     Ramp negotiation     Outdoor ambulation     BWS/vector training     MANUAL  CPT 88596 TOTAL TIME FOR SESSION 8-22 Minutes    Stretching     Mobilization      Massage Prone lying- soft tissue mobilization lumbo- sacral paraspinals yes   Taping               Goals Addressed                 This Visit's Progress    • [facility] PT Lumbar Goals        Short term goals   Short Term Goals Time Frame Result Comment/Progress   Pt will increase core, LE and Hip MMT >/= ½ grade 4 weeks  for all STGs met    Pt will increase hip ROM by 5 deg in rotations and abduction  met    Pt will demonstrate improvements in Oswestry  to 15/50 indicating functional improvement   ongoing IE and progress note 20/50   Pt will be independent with HEP to increase carryover at home  met    Pt will be independent with postural correction management for pain management  met    Pt will be able to stand >/= 10  min without onset of LBP to cook  met            • [facility] PT Lumbar Goals        Long term goals   Long Term Goals Time Frame Result Comment/Progress   Pt will increase core, LE and Hip MMT >/= full grade All [facility] for 12 weeks All long term goals are ongoing    Pt will increase lumbar ROM >/= WF into flexion, extension, lateral flexion and rotation       Pt will improve WOMAC score to less than 20/96 indicating better function   IE 37/96   Pt will demonstrate improvements in Oswestry >/=  8/50 or 16 % functional improvement   IE 40%   Pt will be able to stand and cook for 45 minutes to an hour with small breaks in between      Improve SSv to 1.2 m/s   IE 0.97 m/s   Be able to do stairs with 1 rail and alternate legs      Walk 20 minutes for exercises/ fitness and be able to independent pool exs at home.

## 2022-04-11 ENCOUNTER — HOSPITAL ENCOUNTER (OUTPATIENT)
Dept: PHYSICAL THERAPY | Facility: REHABILITATION | Age: 72
Setting detail: THERAPIES SERIES
Discharge: HOME | End: 2022-04-11
Attending: NURSE PRACTITIONER
Payer: COMMERCIAL

## 2022-04-11 DIAGNOSIS — Z98.1 ARTHRODESIS STATUS: Primary | ICD-10-CM

## 2022-04-11 PROCEDURE — 97113 AQUATIC THERAPY/EXERCISES: CPT | Mod: GP,CQ

## 2022-04-11 NOTE — OP PT TREATMENT LOG
"Aquatics exercises flow sheet Group aquatics CPT 22671  Individual aquatics 50984 Time  45 min    With BB    Walking forwards 5 roundtrip laps yes   Side stepping 5 roundtrip laps yes   kayak donot do, no rotation per mD    Walking backwards 5 roundtrip laps yes           LE exercises    HR's 2 x 10   yes   Hip abduction 2 x 10   yes   Hip flexion 2 x 10   yes   Hip extension Unable due to increase in pain   Squats 5x   Circles  cw/ccw    Transverse abdominus contraction 10 x2 yes   Calf stretch against wall  10 sec, 3x yes   UE  exercises NO PADDLES TODAY       Shoulder flexion/ext 2 x 10- yes   Shoulder abduction 10 x2  yes   Biceps/triceps    Horizontal abd/add 2 x 10   Back against wall- yes within tolerable ROM           Step up forwards 10x B  8\" block, B UE support   Step ups side    W's with kick boards    squats 5x-    Heel raises    Knee curls    Sitting on blue disc        Floating with noodles    Scissors, bicycling, skiers        stretching    Calf stretch Runners' stretch 30 sec x 3   Low back stretch    Hamstring stretch  On stairs 30 sec x 2- no                          "

## 2022-04-11 NOTE — PROGRESS NOTES
PT DAILY NOTE FOR OUTPATIENT THERAPY    Patient: Reyna Castillo   MRN: [id number]  : 1950 71 y.o.  Referring Physician: Анна Terrazas CRNP  Date of Visit: 2022    Certification Dates: 22 through 22    Diagnosis:   1. Arthrodesis status        Chief Complaints:  s/p lumbar surgery, pain lumbar spine R side, weakness    Precautions:   Precautions comments: no fwd flex/rotation and to stop if pain  Existing Precautions/Restrictions: spinal     TODAY'S VISIT    Time In Session:  Start Time: 900  Stop Time: 945  Time Calculation (min): 45 min   History/Vitals/Pain/Encounter Info - 22 1022        Injury History/Precautions/Daily Required Info    Document Type daily treatment     Primary Therapist Ana Aranda, PT     Chief Complaint/Reason for Visit  s/p lumbar surgery, pain lumbar spine R side, weakness     Onset of Illness/Injury or Date of Surgery --   2021    Referring Physician TATE Mathew     Existing Precautions/Restrictions spinal     Precautions comments no fwd flex/rotation and to stop if pain     History of present illness/functional impairment Reyna Castillo is here for a PT evalaution today. She had L3-S1 Posterior Lumbar Decompression, Posterior Lumbar Interbody Fusion, Instrumentation, Cage, Allograft, Autograft, BMP in 2021 and had 2TKR in [place] in .  Currently is using a bone stimulator, which she uses 2- 4 hours every day.  Main c/o for Physical therapy are- R sided  lumbar pain with sitting, standing, early am. Pain decreases with lying down. C/o clicking with walking, on  stairs. Denies T/N . Denies weakness in LE. Referred for land and aquatic exs.     [facility]     Patient/Family/Caregiver Comments/Observations No falls, no changes in meds.  LBP of 5/10.     Patient reported fall since last visit No        Pain Assessment    Currently in pain Yes     Preferred Pain Scale number (Numeric Rating Pain Scale)     Pain  Side/Orientation bilateral     Pain: Body location Back     Pain Rating (0-10): Pre Activity 5     Pain Rating (0-10): Activity 5     Pain Rating (0-10): Post Activity 5     Pain Onset/Duration Pt reports 5/10 LBP iniitally.  In the water, pt reports she has no pain with some of the exercises and pain with others. Asked pt to inform therapist of any pain w/ any of the exercises, when it is occuring, rather than at end of session.        Pain Intervention    Intervention  aquatic therapy     Post Intervention Comments no pain most of the time in the water, however, pain with some of the exercises.  Pt asked to inform therapist when this is occurring rather than at end of session.        Pre Activity Vital Signs    Pulse 63     SpO2 98 %     /64     BP Location Left upper arm     BP Method Manual     Patient Position Sitting                Daily Treatment Assessment and Plan - 04/11/22 1022        Daily Treatment Assessment and Plan    Progress toward goals Progressing     Daily Outcome Summary Pt tolerated session well.  Pt reports she was cleaning the trim around her garage door yesterday and therefore she is unable to use paddles today to perform UE exercises.  Pt was able to perform them without the added resistance from the paddles.  Pt also reported she now is using the pain scale from the [facility] which explains each number and therefore her pain is actually a 5/10 rather than the 3/10 she has been reporting in the past.     Plan and Recommendations Cont w POC as directed by primary PT.                             Today's Treatment:    Aquatics exercises flow sheet Group aquatics CPT 62227  Individual aquatics 03854 Time  45 min    With BB    Walking forwards 5 roundtrip laps yes   Side stepping 5 roundtrip laps yes   kayak donot do, no rotation per mD    Walking backwards 5 roundtrip laps yes           LE exercises    HR's 2 x 10   yes   Hip abduction 2 x 10   yes   Hip flexion 2 x 10   yes   Hip extension  "Unable due to increase in pain   Squats 5x   Circles  cw/ccw    Transverse abdominus contraction 10 x2 yes   Calf stretch against wall  10 sec, 3x yes   UE  exercises NO PADDLES TODAY       Shoulder flexion/ext 2 x 10- yes   Shoulder abduction 10 x2  yes   Biceps/triceps    Horizontal abd/add 2 x 10   Back against wall- yes within tolerable ROM           Step up forwards 10x B  8\" block, B UE support   Step ups side    W's with kick boards    squats 5x-    Heel raises    Knee curls    Sitting on blue disc        Floating with noodles    Scissors, bicycling, skiers        stretching    Calf stretch Runners' stretch 30 sec x 3   Low back stretch    Hamstring stretch  On stairs 30 sec x 2- no                                               "

## 2022-04-25 ENCOUNTER — HOSPITAL ENCOUNTER (OUTPATIENT)
Dept: PHYSICAL THERAPY | Facility: REHABILITATION | Age: 72
Setting detail: THERAPIES SERIES
Discharge: HOME | End: 2022-04-25
Attending: NURSE PRACTITIONER
Payer: COMMERCIAL

## 2022-04-25 DIAGNOSIS — Z98.890 HX OF LUMBOSACRAL SPINE SURGERY: ICD-10-CM

## 2022-04-25 DIAGNOSIS — Z98.1 ARTHRODESIS STATUS: Primary | ICD-10-CM

## 2022-04-25 PROCEDURE — 97110 THERAPEUTIC EXERCISES: CPT | Mod: GP,CQ

## 2022-04-25 NOTE — OP PT TREATMENT LOG
Exercises Current Session Time     MODALITIES- HEAT/ICE  CPT 96561 TOTAL TIME FOR SESSION Not performed     Heat       Ice/Vasocompression       MODALITIES - E-STIM  CPT 87007    TOTAL TIME FOR SESSION Not performed     E-stim/H-Wave       THER ACT  CPT 72636 TOTAL TIME FOR SESSION Not performed     Bed mobility       Transfer training       Body Mechanics/Work Training Pt education on appropriate use of LEs and trunk to decrease twist, be sure to face object to , use LE, engage abdominals.     THER EX  CPT 19102 TOTAL TIME FOR SESSION 53-60 Minutes     CARDIOVASCULAR        Nu Step       Stationary Bike       Elliptical       Treadmill Initiated TM at 1.4mph for 6 min, B Hrs;     Some SOB; improvement noted w/ rest yes   UBE       STRENGTHENING       Supine ther-ex Transverse Abdominal Contraction x 10 @ 5 sec hold yes     TAC with march x 10 yes     TAC with heel slides x 10       TAC with straight leg raises 5 x2 y     TAC  With adductor squeeze w/ ball, x 10 with 5 sec hold  y     TAC with fall outs (controlled to 45 degrees) 2 x10, pink TB  y     Lateral leg raises 5x bilat  y           Standing ther-ex Heel Raises/ Toe raises  2 x 10 y     Hip abduction with TAC 2 x 10  (1 set alternating) y     [place] of 1/8 heel lift in Left heel - ambulation with same 200 feet 0-                           STRETCHING       LE stretching       Other  Oswestry and WOMAC done, gait speed            NEURO RE-ED  CPT 01549 TOTAL TIME FOR SESSION Not performed     COORDINATION       POSTURAL RE-ED           PRE-GAIT ACTIVITIES        BALANCE TRAINING        Sitting balance       Standing balance       GAIT TRAINING  CPT 29381 TOTAL TIME FOR SESSION Not performed     Ambulation        Dynamic gait        Stair negotiation       Curb negotiation       Ramp negotiation       Outdoor ambulation       BWS/vector training       MANUAL  CPT 36306 TOTAL TIME FOR SESSION 8-22 Minutes     Stretching       Mobilization         Massage Prone lying- soft tissue mobilization lumbo- sacral paraspinals yes   Taping

## 2022-04-25 NOTE — PROGRESS NOTES
PT DAILY NOTE FOR OUTPATIENT THERAPY    Patient: Reyna Castillo   MRN: [id number]  : 1950 71 y.o.  Referring Physician: Анна Terrazas CRNP  Date of Visit: 2022    Certification Dates: 22 through 22    Diagnosis:   1. Arthrodesis status    2. Hx of lumbosacral spine surgery        Chief Complaints:  s/p lumbar surgery, pain lumbar spine R side, weakness    Precautions:   Precautions comments: no fwd flex/rotation and to stop if pain  Existing Precautions/Restrictions: spinal     TODAY'S VISIT    Time In Session:  Start Time: 1200  Stop Time: 1300  Time Calculation (min): 60 min   History/Vitals/Pain/Encounter Info - 22 1144        Injury History/Precautions/Daily Required Info    Document Type daily treatment     Primary Therapist Ana Aranda, PT     Chief Complaint/Reason for Visit  s/p lumbar surgery, pain lumbar spine R side, weakness     Referring Physician TATE Mathew     Existing Precautions/Restrictions spinal     Precautions comments no fwd flex/rotation and to stop if pain     History of present illness/functional impairment Reyna Castillo is here for a PT evalaution today. She had L3-S1 Posterior Lumbar Decompression, Posterior Lumbar Interbody Fusion, Instrumentation, Cage, Allograft, Autograft, BMP in 2021 and had 2TKR in [place] .  Currently is using a bone stimulator, which she uses 2- 4 hours every day.  Main c/o for Physical therapy are- R sided  lumbar pain with sitting, standing, early am. Pain decreases with lying down. C/o clicking with walking, on  stairs. Denies T/N . Denies weakness in LE. Referred for land and aquatic exs.     [facility]     Patient/Family/Caregiver Comments/Observations Pt upset about brother in law dying yesterday of colon cancer. No falls, no med changes.  Pt reports 4-5/10 LBP. Pt also reports 8/10 B shdr pain.     Patient reported fall since last visit No        Pain Assessment    Currently in  pain Yes     Preferred Pain Scale number (Numeric Rating Pain Scale)     Pain Side/Orientation bilateral     Pain: Body location Back;Shoulder     Pain Rating (0-10): Pre Activity 8     Pain Rating (0-10): Activity 8     Pain Rating (0-10): Post Activity 8     Pain Radiation to shoulder, left;shoulder, right     Pain Description constant     Pain Onset/Duration Pt reports 4-5 LBP and 8/10 B shdr pain. Pt to see an orthpedic surgeon for her shdrs.        Pain Intervention    Intervention  ther ex, monitor     Post Intervention Comments same        Pre Activity Vital Signs    Pulse 64     SpO2 96 %     /78     BP Location Left upper arm     BP Method Manual     Patient Position Sitting                Daily Treatment Assessment and Plan - 04/25/22 1144        Daily Treatment Assessment and Plan    Progress toward goals Progressing     Daily Outcome Summary Pt tolerated session well.  Pt reports she will be seeing an orthopedist regarding her B shdr pain and limited ROM.  No increase in pain w/ exercises.  Pt reported she felt no pain w/ most of the supine ther ex.  Pt did become slightly SOB on TM, however, quickly recovered when off of TM.     Plan and Recommendations Cont w POC as directed by primary PT.                             Today's Treatment:         Exercises Current Session Time     MODALITIES- HEAT/ICE  CPT 36703 TOTAL TIME FOR SESSION Not performed     Heat       Ice/Vasocompression       MODALITIES - E-STIM  CPT 37161    TOTAL TIME FOR SESSION Not performed     E-stim/H-Wave       THER ACT  CPT 07894 TOTAL TIME FOR SESSION Not performed     Bed mobility       Transfer training       Body Mechanics/Work Training Pt education on appropriate use of LEs and trunk to decrease twist, be sure to face object to , use LE, engage abdominals.     THER EX  CPT 79670 TOTAL TIME FOR SESSION 53-60 Minutes     CARDIOVASCULAR        Nu Step       Stationary Bike       Elliptical       Treadmill Initiated TM  at 1.4mph for 6 min, B Hrs;     Some SOB; improvement noted w/ rest yes   UBE       STRENGTHENING       Supine ther-ex Transverse Abdominal Contraction x 10 @ 5 sec hold yes     TAC with march x 10 yes     TAC with heel slides x 10       TAC with straight leg raises 5 x2 y     TAC  With adductor squeeze w/ ball, x 10 with 5 sec hold  y     TAC with fall outs (controlled to 45 degrees) 2 x10, pink TB  y     Lateral leg raises 5x bilat  y           Standing ther-ex Heel Raises/ Toe raises  2 x 10 y     Hip abduction with TAC 2 x 10  (1 set alternating) y     [place] of 1/8 heel lift in Left heel - ambulation with same 200 feet 0-                           STRETCHING       LE stretching       Other  Oswestry and WOMAC done, gait speed            NEURO RE-ED  CPT 62708 TOTAL TIME FOR SESSION Not performed     COORDINATION       POSTURAL RE-ED           PRE-GAIT ACTIVITIES        BALANCE TRAINING        Sitting balance       Standing balance       GAIT TRAINING  CPT 50584 TOTAL TIME FOR SESSION Not performed     Ambulation        Dynamic gait        Stair negotiation       Curb negotiation       Ramp negotiation       Outdoor ambulation       BWS/vector training       MANUAL  CPT 84744 TOTAL TIME FOR SESSION 8-22 Minutes     Stretching       Mobilization        Massage Prone lying- soft tissue mobilization lumbo- sacral paraspinals yes   Taping

## 2022-04-29 ENCOUNTER — HOSPITAL ENCOUNTER (OUTPATIENT)
Dept: PHYSICAL THERAPY | Facility: REHABILITATION | Age: 72
Setting detail: THERAPIES SERIES
Discharge: HOME | End: 2022-04-29
Attending: NURSE PRACTITIONER
Payer: COMMERCIAL

## 2022-04-29 DIAGNOSIS — Z98.890 HX OF LUMBOSACRAL SPINE SURGERY: ICD-10-CM

## 2022-04-29 DIAGNOSIS — Z98.1 ARTHRODESIS STATUS: Primary | ICD-10-CM

## 2022-04-29 PROCEDURE — 97113 AQUATIC THERAPY/EXERCISES: CPT | Mod: GP

## 2022-04-29 NOTE — PROGRESS NOTES
PT DAILY NOTE FOR OUTPATIENT THERAPY    Patient: Reyna Castillo   MRN: [id number]  : 1950 72 y.o.  Referring Physician: Анна Terrazas CRNP  Date of Visit: 2022    Certification Dates: 22 through 22    Diagnosis:   1. Arthrodesis status    2. Hx of lumbosacral spine surgery        Chief Complaints:  s/p lumbar surgery, pain lumbar spine R side, weakness    Precautions:   Precautions comments: no flex or rotation  Existing Precautions/Restrictions: spinal     TODAY'S VISIT    Time In Session:  Start Time: 1005  Stop Time: 1050  Time Calculation (min): 45 min   History/Vitals/Pain/Encounter Info - 22 1020        Injury History/Precautions/Daily Required Info    Document Type daily treatment     Primary Therapist Ana Aranda, PT     Chief Complaint/Reason for Visit  s/p lumbar surgery, pain lumbar spine R side, weakness     Referring Physician TATE Mathew     Existing Precautions/Restrictions spinal     Precautions comments no flex or rotation     History of present illness/functional impairment Reyna Castillo is here for a PT evalaution today. She had L3-S1 Posterior Lumbar Decompression, Posterior Lumbar Interbody Fusion, Instrumentation, Cage, Allograft, Autograft, BMP in 2021 and had 2TKR in [place] .  Currently is using a bone stimulator, which she uses 2- 4 hours every day.  Main c/o for Physical therapy are- R sided  lumbar pain with sitting, standing, early am. Pain decreases with lying down. C/o clicking with walking, on  stairs. Denies T/N . Denies weakness in LE. Referred for land and aquatic exs.     Patient/Family/Caregiver Comments/Observations Reyna upset for death in family, emotional crying, hence BP higher 160/80        Pain Assessment    Currently in pain Yes     Pain: Body location Back     Pain Rating (0-10): Pre Activity 3        Pain Intervention    Intervention  aquatic exs     Post Intervention Comments pain 2-3/10 at end of session.  "       Pre Activity Vital Signs    /80     BP Method Manual                Daily Treatment Assessment and Plan - 04/29/22 1020        Daily Treatment Assessment and Plan    Progress toward goals Progressing     Daily Outcome Summary Reyna with high BP today as very emotional from death in family. Worked on aquatic exercises per log. Added single knee to chest and W with blue kick board for core stabilist exs. Pain low today and tolerable, rated 3/10. Also tolerated hip extension in small range.     Plan and Recommendations continue per pOV, with land and aquatic exs.                     OBJECTIVE DATA TAKEN TODAY:    None taken    Today's Treatment:      Aquatics exercises flow sheet Group aquatics CPT 51650  Individual aquatics 88607 Time  45 min     With BB     Walking forwards 5 roundtrip laps yes   Side stepping 5 roundtrip laps yes   kayak donot do, no rotation per mD     Walking backwards 5 roundtrip laps yes               LE exercises     HR's 2 x 10   yes   Hip abduction 2 x 10   yes   Hip flexion 2 x 10   yes   Hip extension 10x yes   March with TAC Yes 10x 2   TAC with knee flex 10x2   yes   Squats 5x 2 yes   Single knee to chest 5x, 5 sec hold- yes   Transverse abdominus contraction 10 x2 yes   Calf stretch against wall  10 sec, 3x yes   UE  exercises NO PADDLES TODAY         Shoulder flexion/ext 2 x 10- y   Shoulder abduction 10 x1  yes   Biceps/triceps     Horizontal abd/add 2 x 10   Back against wall- yes within tolerable ROM - no    shoulder ER  10x , no money - yes         Step up forwards 10x B  6\" block, B UE support  yes   Step ups side     W's with kick boards     squats 10x  yes   Heel raises     Knee curls     Sitti           Floating with noodles     Scissors, bicycling, skiers           stretching     Calf stretch Runners' stretch 30 sec x 3 - yes   Low back stretch     Hamstring stretch  On stairs 30 sec x 2- no                                                             "

## 2022-04-29 NOTE — OP PT TREATMENT LOG
"  Aquatics exercises flow sheet Group aquatics CPT 47159  Individual aquatics 36291 Time  45 min     With BB     Walking forwards 5 roundtrip laps yes   Side stepping 5 roundtrip laps yes   kayak donot do, no rotation per mD     Walking backwards 5 roundtrip laps yes               LE exercises     HR's 2 x 10   yes   Hip abduction 2 x 10   yes   Hip flexion 2 x 10   yes   Hip extension 10x yes   March with TAC Yes 10x 2   TAC with knee flex 10x2   yes   Squats 5x 2 yes   Single knee to chest 5x, 5 sec hold- yes   Transverse abdominus contraction 10 x2 yes   Calf stretch against wall  10 sec, 3x yes   UE  exercises NO PADDLES TODAY         Shoulder flexion/ext 2 x 10- y   Shoulder abduction 10 x1  yes   Biceps/triceps     Horizontal abd/add 2 x 10   Back against wall- yes within tolerable ROM - no    shoulder ER  10x , no money - yes         Step up forwards 10x B  6\" block, B UE support  yes   Step ups side     W's with kick boards     squats 10x  yes   Heel raises     Knee curls     Sitti           Floating with noodles     Scissors, bicycling, skiers           stretching     Calf stretch Runners' stretch 30 sec x 3 - yes   Low back stretch     Hamstring stretch  On stairs 30 sec x 2- no                                        "

## 2022-05-04 ENCOUNTER — HOSPITAL ENCOUNTER (OUTPATIENT)
Dept: PHYSICAL THERAPY | Facility: REHABILITATION | Age: 72
Setting detail: THERAPIES SERIES
Discharge: HOME | End: 2022-05-04
Attending: NURSE PRACTITIONER
Payer: COMMERCIAL

## 2022-05-04 DIAGNOSIS — Z98.890 HX OF LUMBOSACRAL SPINE SURGERY: ICD-10-CM

## 2022-05-04 DIAGNOSIS — Z98.1 ARTHRODESIS STATUS: Primary | ICD-10-CM

## 2022-05-04 PROCEDURE — 97113 AQUATIC THERAPY/EXERCISES: CPT | Mod: GP

## 2022-05-04 NOTE — PROGRESS NOTES
PT DAILY NOTE FOR OUTPATIENT THERAPY    Patient: Reyna Castillo   MRN: [id number]  : 1950 72 y.o.  Referring Physician: Анна Terrazas CRNP  Date of Visit: 2022    Certification Dates: 22 through 22    Diagnosis:   1. Arthrodesis status    2. Hx of lumbosacral spine surgery        Chief Complaints:  s/p lumbar surgery, pain lumbar spine R side, weakness    Precautions:   Precautions comments: no flex or rotation  Existing Precautions/Restrictions: spinal     TODAY'S VISIT    Time In Session:  Start Time: 1402  Stop Time: 1450  Time Calculation (min): 48 min   History/Vitals/Pain/Encounter Info - 22 1417        Injury History/Precautions/Daily Required Info    Document Type daily treatment     Primary Therapist Ana Aranda, PT     Chief Complaint/Reason for Visit  s/p lumbar surgery, pain lumbar spine R side, weakness     Referring Physician TATE Mathew     Existing Precautions/Restrictions spinal     Precautions comments no flex or rotation     History of present illness/functional impairment Reyna Castillo is here for a PT evalaution today. She had L3-S1 Posterior Lumbar Decompression, Posterior Lumbar Interbody Fusion, Instrumentation, Cage, Allograft, Autograft, BMP in 2021 and had 2TKR [place] .  Currently is using a bone stimulator, which she uses 2- 4 hours every day.  Main c/o for Physical therapy are- R sided  lumbar pain with sitting, standing, early am. Pain decreases with lying down. C/o clicking with walking, on  stairs. Denies T/N . Denies weakness in LE. Referred for land and aquatic exs.     [facility]     Patient reported fall since last visit No        Pain Assessment    Currently in pain Yes     Preferred Pain Scale number (Numeric Rating Pain Scale)     Pain: Body location Back     Pain Rating (0-10): Pre Activity 3     Pain Rating (0-10): Activity 3     Pain Rating (0-10): Post Activity 3        Pain Intervention     "Intervention  aquatic exs     Post Intervention Comments pain still 3/10        Pre Activity Vital Signs    /72     BP Method Manual                Daily Treatment Assessment and Plan - 05/04/22 1417        Daily Treatment Assessment and Plan    Progress toward goals Progressing     Daily Outcome Summary Pain low on arrival rated 3/10. Worked on aquatic exercises, doing better with  core stabilization , reports being able to go up the stairs now with reciprocal legs , had difficulty prior due to decreased LE strength. Overall doing better, still has lumbar pain but low levels and manageable.     Plan and Recommendations continue per poc, with land and aquatic exs. Add hamstrings stretch on block in pool next visit, add seated lAQ and sit to satnd from bench.                     OBJECTIVE DATA TAKEN TODAY:    None taken    Today's Treatment:      Aquatics exercises flow sheet Group aquatics CPT 12634  Individual aquatics 97096 Time  45 min     With BB     Walking forwards 5 roundtrip laps yes   Side stepping 3 roundtrip laps yes   kayak donot do, no rotation per mD     Walking backwards 3 roundtrip laps yes               LE exercises     HR's 2 x 10   yes   Hip abduction 2 x 10   yes   Hip flexion 2 x 10   yes   Hip extension 10x yes   March with TAC Yes 10x 2   TAC with knee flex 10x2   yes   Squats 5x 2 yes   Single knee to chest 5x, 5 sec hold- yes   Transverse abdominus contraction 10 x2 yes   Calf stretch against wall  10 sec, 3x yes   UE  exercises NO PADDLES TODAY         Shoulder flexion/ext 1  x 10- y   Shoulder abduction 10 x1  yes   Biceps/triceps     Horizontal abd/add 1 x 10   Back against wall- yes within tolerable ROM - no    shoulder ER  10x , no money          Step up forwards 10x B  8 \" block, B UE support  yes   Step ups side     W's with kick boards     squats 10x  yes   Heel raises     Knee curls     Sitti           Floating with noodles     Scissors, bicycling, skiers           stretching "     Calf stretch Runners' stretch 30 sec x 3 - yes   Low back stretch     Hamstring stretch  On stairs 30 sec x 2- no

## 2022-05-04 NOTE — OP PT TREATMENT LOG
"  Aquatics exercises flow sheet Group aquatics CPT 17914  Individual aquatics 31268 Time  45 min     With BB     Walking forwards 5 roundtrip laps yes   Side stepping 3 roundtrip laps yes   kayak donot do, no rotation per mD     Walking backwards 3 roundtrip laps yes               LE exercises     HR's 2 x 10   yes   Hip abduction 2 x 10   yes   Hip flexion 2 x 10   yes   Hip extension 10x yes   March with TAC Yes 10x 2   TAC with knee flex 10x2   yes   Squats 5x 2 yes   Single knee to chest 5x, 5 sec hold- yes   Transverse abdominus contraction 10 x2 yes   Calf stretch against wall  10 sec, 3x yes   UE  exercises NO PADDLES TODAY         Shoulder flexion/ext 1  x 10- y   Shoulder abduction 10 x1  yes   Biceps/triceps     Horizontal abd/add 1 x 10   Back against wall- yes within tolerable ROM - no    shoulder ER  10x , no money          Step up forwards 10x B  8 \" block, B UE support  yes   Step ups side     W's with kick boards     squats 10x  yes   Heel raises     Knee curls     Sitti           Floating with noodles     Scissors, bicycling, skiers           stretching     Calf stretch Runners' stretch 30 sec x 3 - yes   Low back stretch     Hamstring stretch  On stairs 30 sec x 2- no                                        "

## 2022-05-23 ENCOUNTER — HOSPITAL ENCOUNTER (OUTPATIENT)
Dept: PHYSICAL THERAPY | Facility: REHABILITATION | Age: 72
Setting detail: THERAPIES SERIES
Discharge: HOME | End: 2022-05-23
Attending: NURSE PRACTITIONER
Payer: COMMERCIAL

## 2022-05-23 DIAGNOSIS — Z98.890 HX OF LUMBOSACRAL SPINE SURGERY: ICD-10-CM

## 2022-05-23 DIAGNOSIS — Z98.1 ARTHRODESIS STATUS: Primary | ICD-10-CM

## 2022-05-23 PROCEDURE — 97530 THERAPEUTIC ACTIVITIES: CPT | Mod: GP

## 2022-05-23 PROCEDURE — 97113 AQUATIC THERAPY/EXERCISES: CPT | Mod: GP

## 2022-05-24 NOTE — PROGRESS NOTES
PT DISCHARGE NOTE FOR OUTPATIENT THERAPY    Patient: Reyna Castillo MRN: [id number]  : 1950 72 y.o.  Referring Physician: Анна Terrazas CRNP  Date of Visit: 2022      Certification Dates: 22 through 22    Total Visit Count: 13    Chief Complaints:  Chief Complaint   Patient presents with   • Pain     Improved, most days lumbar pain is 2/10       Precautions:  Precautions comments: no flex or rotation  Existing Precautions/Restrictions: spinal     TODAY'S VISIT:    Time In Session:  Start Time: 1402  Stop Time: 1500  Time Calculation (min): 58 min   General Information - 22        Session Details    Document Type discharge evaluation     Mode of Treatment individual therapy;physical therapy     [facility]        General Information    Referring Physician TATE Mathew     History of present illness/functional impairment Reyna Castillo is here for a PT evalaution today. She had L3-S1 Posterior Lumbar Decompression, Posterior Lumbar Interbody Fusion, Instrumentation, Cage, Allograft, Autograft, BMP in 2021 and had 2TKR in [facility] in .  Currently is using a bone stimulator, which she uses 2- 4 hours every day.  Main c/o for Physical therapy are- R sided  lumbar pain with sitting, standing, early am. Pain decreases with lying down. C/o clicking with walking, on  stairs. Denies T/N . Denies weakness in LE. Referred for land and aquatic exs.     Existing Precautions/Restrictions spinal     Precautions comments no flex or rotation                Daily Falls Screen - 22        Daily Falls Assessment    Patient reported fall since last visit No                Pain/Vitals - 22        Pain Assessment    Currently in pain Yes     Preferred Pain Scale number (Numeric Rating Pain Scale)     Pain: Body location Back     Pain Rating (0-10): Pre Activity 2     Pain Rating (0-10): Activity 2     Pain Rating (0-10): Post Activity 1         Pain Intervention    Intervention  aquatics     Post Intervention Comments pain low                PT - 05/23/22 2037        Physical Therapy    Physical Therapy Ortho        PT Plan    Frequency of treatment 2 times/week     PT Duration 3 months     PT Cert From 03/07/22     PT Cert To 06/05/22     Date PT POC was sent to provider 03/07/22     Signed PT Plan of Care received?  Yes                Assessment and Plan - 05/23/22 2037        Assessment    Clinical Assessment Reyna has not been able to attend physical therapy as she had a bad cold and MD held therapy as a precaut, even though Covid test was negative. Reyna has made nice gains in physical therapy. Her Oswestry score has improved from 20/50 to 10/50 on discharge. Her WOMAC score has improved from 37/96 to 26/96. Both these scores indicate that her pain has lowered and function has improved. Her ROM of her spine and hips is within functional limits. She's now able to walk 20 minutes for excercise and will be going to her daughter's pool for fitness excercise in the summer. She has met her goals in physical therapy and is being discharged. She has some pain in her shoulder joints for which she has an appointment with an orthopedic doctor.     Plan and Recommendations Dc with home exercises, pool exercises.                 OBJECTIVE MEASUREMENTS/DATA:    ROM    Range of Motion - 05/23/22 2100        RIGHT: Lower Extremity AROM Assessment    Hip Flexion   100 degrees   unable to check as this was an aquatic session       LEFT: Lower Extremity AROM Assessment    Hip Flexion   100 degrees        Lumbar AROM    Lumbar Flexion --   WFL    Lumbar Extension --   neutral    Lumbar Left SB --   2 inches above knee joint line L and R side              MMT    Manual Muscle Tests - 05/23/22 2037        RIGHT: Lower Extremity Manual Muscle Test Assessment    Hip Flexion gross movement (4/5) good     Hip Abduction gross movement (3+/5) fair plus     Knee Flexion strength  (4+/5) good plus     Knee Extension strength (4+/5) good plus     Ankle Dorsiflexion gross movement (4+/5) good plus     Ankle Plantarflexion gross movement (4+/5) good plus        LEFT: Lower Extremity Manual Muscle Test Assessment    Hip Flexion gross movement (4+/5) good plus     Hip Abduction gross movement (3+/5) fair plus     Knee Flexion strength (4+/5) good plus     Knee Extension strength (4+/5) good plus     Ankle Dorsiflexion gross movement (4+/5) good plus     Ankle Plantarflexion gross movement (4+/5) good plus               Outcome Measures    PT Outcome Measures - 05/23/22 2037        Objective Outcome Measures    Gait Speed (m/sec) 1.2 m/sec        Subjective Outcome Measures    Oswestry Oswestry on dc is  10/50        Other Outcome Measures Used/Comments    Other outcome measure used: WOMAC on dc is 26/96                 Today's Treatment:    Education provided:  Discussed importance of home exercises, continuing aquatic and land exercises. Patient reports she is independent with home exercises, and will continue exercises. She verbalized being able to continue exercises on her own on Dc.     Aquatics exercises flow sheet Group aquatics CPT 41973  Individual aquatics 18670 Time  40 min      With BB      Walking forwards 5 roundtrip laps  yes   Side stepping 3 roundtrip laps  yes   kayak donot do, no rotation per mD      Walking backwards 3 roundtrip laps  yes                 LE exercises      HR's 2 x 10  y   Hip abduction 2 x 10     y   Hip flexion 2 x 10     y   Hip extension 10x yes  y   March with TAC  10x 2    TAC with knee flex 10x2   yes    Squats 5x 2 yes    Single knee to chest 5x, 5 sec hold-     Transverse abdominus contraction 10 x2  y   Calf stretch against wall  10 sec, 3x  y   UE  exercises NO PADDLES TODAY           Shoulder flexion/ext 2  x 10- y y   Shoulder abduction 10 x2  yes y   Biceps/triceps      Horizontal abd/add 1 x 10   Back against wall- yes within tolerable ROM - no y  "   shoulder ER  10x , no money            Step up forwards 10x B  8 \" block, B UE support  yes y   Step ups side      W's with kick boards      squats 10x      Heel raises      Knee curls      Sitti             Floating with noodles       bicycling  with noodles 5 min y          stretching      Calf stretch Runners' stretch 30 sec x 3 - yes y   Low back stretch      Hamstring stretch  On stairs 30 sec x 2-             Therapeutic activity   CPT 31151  Oswestry, WOMAC and gait speed done y- 10 minutes                                Goals Addressed                 This Visit's Progress    • [facility] PT Lumbar Goals        Short term goals   Short Term Goals Time Frame Result Comment/Progress   Pt will increase core, LE and Hip MMT >/= ½ grade 4 weeks for all STGs met    Pt will increase hip ROM by 5 deg in rotations and abduction  met    Pt will demonstrate improvements in Oswestry  to 15/50 indicating functional improvement   10/50 on dc IE and progress note 20/50   Pt will be independent with HEP to increase carryover at home  met    Pt will be independent with postural correction management for pain management  met    Pt will be able to stand >/= 10  min without onset of LBP to cook  met            • [facility] PT Lumbar Goals        Long term goals   Long Term Goals Time Frame Result Comment/Progress   Pt will increase core, LE and Hip MMT >/= full grade All LTGare for 12 weeks met    Pt will increase lumbar ROM >/= WF into flexion, extension, lateral flexion and rotation   Not tested as still not cleared from spinal precautions    Pt will improve WOMAC score to less than 20/96 indicating better function  Score improved IE 37/96  Dc 26/96   Pt will demonstrate improvements in Oswestry >/=  8/50 or 16 % functional improvement  Score improved IE 40%    DC 20%   Pt will be able to stand and cook for 45 minutes to an hour with small breaks in between  met    Improve SSv to 1.2 m/s  met IE 0.97 m/s  Dc 1.2 m/s   Be able to do " stairs with 1 rail and alternate legs  met    Walk 20 minutes for exercises/ fitness and be able to independent pool exs at home.   met            • Mutually agreed upon pain goal        Mutually agreed upon pain goal: - lower pain to 2/10 or lower with ADLs, walking, house work- met on dc.              Discharge information for [facility]:    Reason for D/C: Goals met  Hospitalization during treatment: No  Increased independence: [place] in HEP, Increased functional activity  Interrupted for medical reason: No  Skin integrity: Intact

## 2022-05-24 NOTE — OP PT TREATMENT LOG
"Aquatics exercises flow sheet Group aquatics CPT 33281  Individual aquatics 74899 Time  40 min      With BB      Walking forwards 5 roundtrip laps  yes   Side stepping 3 roundtrip laps  yes   kayak donot do, no rotation per mD      Walking backwards 3 roundtrip laps  yes                 LE exercises      HR's 2 x 10  y   Hip abduction 2 x 10     y   Hip flexion 2 x 10     y   Hip extension 10x yes  y   March with TAC  10x 2    TAC with knee flex 10x2   yes    Squats 5x 2 yes    Single knee to chest 5x, 5 sec hold-     Transverse abdominus contraction 10 x2  y   Calf stretch against wall  10 sec, 3x  y   UE  exercises NO PADDLES TODAY           Shoulder flexion/ext 2  x 10- y y   Shoulder abduction 10 x2  yes y   Biceps/triceps      Horizontal abd/add 1 x 10   Back against wall- yes within tolerable ROM - no y    shoulder ER  10x , no money            Step up forwards 10x B  8 \" block, B UE support  yes y   Step ups side      W's with kick boards      squats 10x      Heel raises      Knee curls      Sitti             Floating with noodles       bicycling  with noodles 5 min y          stretching      Calf stretch Runners' stretch 30 sec x 3 - yes y   Low back stretch      Hamstring stretch  On stairs 30 sec x 2-             Therapeutic activity   CPT 18597  Oswestry, WOMAC and gait speed done y- 10 minutes                           "

## 2022-06-14 ENCOUNTER — TRANSCRIBE ORDERS (OUTPATIENT)
Dept: SCHEDULING | Age: 72
End: 2022-06-14

## 2022-06-14 DIAGNOSIS — Z12.31 ENCOUNTER FOR SCREENING MAMMOGRAM FOR MALIGNANT NEOPLASM OF BREAST: Primary | ICD-10-CM

## 2022-06-15 ENCOUNTER — HOSPITAL ENCOUNTER (OUTPATIENT)
Dept: RADIOLOGY | Age: 72
Discharge: HOME | End: 2022-06-15
Attending: INTERNAL MEDICINE
Payer: COMMERCIAL

## 2022-06-15 DIAGNOSIS — Z12.31 ENCOUNTER FOR SCREENING MAMMOGRAM FOR MALIGNANT NEOPLASM OF BREAST: ICD-10-CM

## 2022-06-15 PROCEDURE — 77067 SCR MAMMO BI INCL CAD: CPT

## 2022-06-30 ENCOUNTER — TRANSCRIBE ORDERS (OUTPATIENT)
Dept: SCHEDULING | Age: 72
End: 2022-06-30

## 2022-06-30 DIAGNOSIS — M51.379 OTHER INTERVERTEBRAL DISC DEGENERATION, LUMBOSACRAL REGION: ICD-10-CM

## 2022-06-30 DIAGNOSIS — M54.50 LOW BACK PAIN: Primary | ICD-10-CM

## 2022-07-06 ENCOUNTER — HOSPITAL ENCOUNTER (OUTPATIENT)
Dept: RADIOLOGY | Age: 72
Discharge: HOME | End: 2022-07-06
Attending: NURSE PRACTITIONER
Payer: COMMERCIAL

## 2022-07-06 DIAGNOSIS — M54.50 LOW BACK PAIN: ICD-10-CM

## 2022-07-06 DIAGNOSIS — M51.379 OTHER INTERVERTEBRAL DISC DEGENERATION, LUMBOSACRAL REGION: ICD-10-CM

## 2022-09-12 ENCOUNTER — TRANSCRIBE ORDERS (OUTPATIENT)
Dept: SCHEDULING | Age: 72
End: 2022-09-12

## 2022-09-12 ENCOUNTER — HOSPITAL ENCOUNTER (OUTPATIENT)
Dept: RADIOLOGY | Facility: HOSPITAL | Age: 72
Discharge: HOME | End: 2022-09-12
Attending: INTERNAL MEDICINE
Payer: COMMERCIAL

## 2022-09-12 ENCOUNTER — TRANSCRIBE ORDERS (OUTPATIENT)
Dept: REGISTRATION | Facility: HOSPITAL | Age: 72
End: 2022-09-12

## 2022-09-12 DIAGNOSIS — M79.605 PAIN IN LEFT LEG: Primary | ICD-10-CM

## 2022-09-12 DIAGNOSIS — M79.605 PAIN IN LEFT LEG: ICD-10-CM

## 2022-09-12 PROCEDURE — 93971 EXTREMITY STUDY: CPT | Mod: LT

## 2022-09-12 PROCEDURE — 73502 X-RAY EXAM HIP UNI 2-3 VIEWS: CPT | Mod: LT

## 2022-10-06 ENCOUNTER — TELEPHONE (OUTPATIENT)
Dept: SCHEDULING | Facility: CLINIC | Age: 72
End: 2022-10-06
Payer: COMMERCIAL

## 2022-10-06 NOTE — TELEPHONE ENCOUNTER
Cardiac Clearance     Name of caller: Reyna Castillo    Relationship to patient: Self    Name of patient: Reyna Castillo    Name of physician: Bruce Casey DO    Date of Procedure/Surgery: 10/12    Type of Procedure/Surgery: spinal surgery    Name of surgeon: Dr. Melchor Lee    Office contact number: [phone] (Rosie, coordinator)    Office fax number: [phone]    Additional notes: Pt needs to be scheduled for an appt. Dr. Lee's office is requesting a recent EKG prior to her procedure.

## 2022-10-06 NOTE — TELEPHONE ENCOUNTER
Unfortunately she will need to be seen before clearance can be given. We can keep an eye out for cancellations.   right upper extremity/right lower extremity

## 2022-10-07 ENCOUNTER — OFFICE VISIT (OUTPATIENT)
Dept: CARDIOLOGY | Facility: CLINIC | Age: 72
End: 2022-10-07
Payer: COMMERCIAL

## 2022-10-07 VITALS
HEART RATE: 78 BPM | BODY MASS INDEX: 36.88 KG/M2 | RESPIRATION RATE: 16 BRPM | DIASTOLIC BLOOD PRESSURE: 64 MMHG | WEIGHT: 216 LBS | SYSTOLIC BLOOD PRESSURE: 120 MMHG | OXYGEN SATURATION: 98 % | HEIGHT: 64 IN

## 2022-10-07 DIAGNOSIS — R93.1 AGATSTON CAC SCORE 100-199: ICD-10-CM

## 2022-10-07 DIAGNOSIS — Z82.49 FAMILY HISTORY OF EARLY CAD: ICD-10-CM

## 2022-10-07 DIAGNOSIS — Z01.810 PREOPERATIVE CARDIOVASCULAR EXAMINATION: Primary | ICD-10-CM

## 2022-10-07 DIAGNOSIS — R94.31 ABNORMAL ECG: ICD-10-CM

## 2022-10-07 DIAGNOSIS — E78.2 MIXED HYPERLIPIDEMIA: ICD-10-CM

## 2022-10-07 DIAGNOSIS — R94.31 ABNORMAL EKG: ICD-10-CM

## 2022-10-07 DIAGNOSIS — I10 ESSENTIAL HYPERTENSION: ICD-10-CM

## 2022-10-07 PROCEDURE — 3008F BODY MASS INDEX DOCD: CPT | Performed by: INTERNAL MEDICINE

## 2022-10-07 PROCEDURE — 99215 OFFICE O/P EST HI 40 MIN: CPT | Performed by: INTERNAL MEDICINE

## 2022-10-07 PROCEDURE — 93000 ELECTROCARDIOGRAM COMPLETE: CPT | Performed by: INTERNAL MEDICINE

## 2022-10-07 ASSESSMENT — ENCOUNTER SYMPTOMS
BOWEL INCONTINENCE: 0
HEMATEMESIS: 0
SUSPICIOUS LESIONS: 0
PALPITATIONS: 0
PERSISTENT INFECTIONS: 0
CLAUDICATION: 0
HOARSE VOICE: 0
JOINT SWELLING: 0
SYNCOPE: 0
WHEEZING: 0
LOSS OF BALANCE: 0
CHANGE IN BOWEL HABIT: 0
ANOREXIA: 0
DIZZINESS: 0
PARESTHESIAS: 1
POOR WOUND HEALING: 0
BLURRED VISION: 0
DECREASED APPETITE: 0
BACK PAIN: 1
FLANK PAIN: 0
LEFT EYE: 0
NIGHT SWEATS: 0
MYALGIAS: 0
DIFFICULTY WITH CONCENTRATION: 0
NEAR-SYNCOPE: 0
SENSORY CHANGE: 0
PND: 0
ORTHOPNEA: 0
TREMORS: 0
WEAKNESS: 0
EXCESSIVE DAYTIME SLEEPINESS: 0
SLEEP DISTURBANCES DUE TO BREATHING: 0
LIGHT-HEADEDNESS: 0
COUGH: 0
RIGHT EYE: 0
NUMBNESS: 0
SHORTNESS OF BREATH: 0
IRREGULAR HEARTBEAT: 0
DYSPNEA ON EXERTION: 0

## 2022-10-07 NOTE — ASSESSMENT & PLAN NOTE
RCRI 0 with previously did well with surgery.  Can ambulate a flight of stairs >4METS and prior great functional status.  Can proceed to elective spinal surgery without further testing.      Hold Avapro day of surgery, continue perioperative Crestor 20mg.

## 2022-10-07 NOTE — PROGRESS NOTES
Reyna Castillo is a 72 y.o. female is present in the office today for preoperative assessment.        She was exercising and doing so well but the pain returned in her LE.  She has lumbar radiculopathy, numbness and tingling with a lightning bolt around the leg and into the groin.  Now able to do less of what she wants.  Her son works in orthopedic devices.  She will do surgery with Dr. Lee of [facility].         Past Medical History:   Diagnosis Date    Abnormal ECG 10/09/2018    Nonspecific T wave flattening inferiorly minimal ST depression laterally and anterolateral T wave inversion    Agatston CAC score 100-199 10/09/2018    8/17 coronary calcium score 101 LAD 13, circumflex 68, RCA 20    Anxiety     Lopez's esophagus without dysplasia     Class 2 severe obesity due to excess calories with serious comorbidity and body mass index (BMI) of 36.0 to 36.9 in adult (CMS/HCC) 10/09/2018    2017 weight 232 pounds    Depression     DJD (degenerative joint disease)     Family history of early CAD 10/09/2018    Father  of MI at age 59    History of hepatitis A     Hypertension     Kidney lesion     Lumbar myelopathy (CMS/HCC)     Tran-Juan tear     Mixed hyperlipidemia 10/09/2018    2020 Lipid panel  LDL 91 HDL 47 Trigs 176    Spinal enthesopathy of lumbar region (CMS/HCC)        Past Surgical History:   Procedure Laterality Date    ADENOIDECTOMY      CHOLECYSTECTOMY      COLONOSCOPY      FOOT SURGERY Right     HAND SURGERY Right     INNER EAR SURGERY Right     JOINT REPLACEMENT Bilateral     bilateral 2021    KNEE ARTHROSCOPY Bilateral     LUMBAR LAMINECTOMY      SIGMOIDECTOMY      TONSILLECTOMY      TRANSUMBILICAL AUGMENTATION MAMMAPLASTY      UPPER GASTROINTESTINAL ENDOSCOPY      WISDOM TOOTH EXTRACTION          Social History     Socioeconomic History    Marital status:      Spouse name: None    Number of children: None    Years of education: None     Highest education level: None   Tobacco Use    Smoking status: Former     Packs/day: 0.50     Years: 30.00     Pack years: 15.00     Types: Cigarettes     Quit date:      Years since quittin.7    Smokeless tobacco: Never   Vaping Use    Vaping Use: Never used   Substance and Sexual Activity    Alcohol use: Yes     Comment: Rare/holidays    Drug use: No    Sexual activity: Defer       Family History   Problem Relation Age of Onset    Heart attack Biological Father     Coronary artery disease Biological Father     Stroke Biological Father     Heart disease Biological Father     Hyperlipidemia Biological Mother     Hypertension Biological Mother        Adhesive tape-silicones and Nsaids (non-steroidal anti-inflammatory drug)    Current Outpatient Medications   Medication Sig Dispense Refill    fenofibrate (TRICOR) 48 mg tablet Take 48 mg by mouth daily.        gabapentin (NEURONTIN) 300 mg capsule Take 300 mg by mouth 3 (three) times a day.      irbesartan (AVAPRO) 150 mg tablet Take 150 mg by mouth 2 (two) times a day.        multivitamin capsule Take 1 capsule by mouth daily.      rosuvastatin (CRESTOR) 20 mg tablet Take 20 mg by mouth daily.      sertraline 100 mg tablet Take 100 mg by mouth once daily.      vitamin B complex (B COMPLEX 1 ORAL) Take by mouth daily.       No current facility-administered medications for this visit.       Review of Systems   Constitutional: Negative for decreased appetite and night sweats.   HENT: Negative for congestion, hoarse voice and tinnitus.    Eyes: Negative for blurred vision, vision loss in left eye and vision loss in right eye.   Cardiovascular: Negative for chest pain, claudication, cyanosis, dyspnea on exertion, irregular heartbeat, leg swelling, near-syncope, orthopnea, palpitations, paroxysmal nocturnal dyspnea and syncope.   Respiratory: Negative for cough, shortness of breath, sleep disturbances due to breathing and wheezing.     Hematologic/Lymphatic: Negative for bleeding problem.   Skin: Negative for poor wound healing, skin cancer and suspicious lesions.   Musculoskeletal: Positive for back pain and joint pain. Negative for joint swelling and myalgias.   Gastrointestinal: Negative for anorexia, change in bowel habit, bowel incontinence, dysphagia, hematemesis and melena.   Genitourinary: Negative for flank pain and nocturia.   Neurological: Positive for paresthesias. Negative for difficulty with concentration, excessive daytime sleepiness, dizziness, light-headedness, loss of balance, numbness, sensory change, tremors and weakness.   Allergic/Immunologic: Negative for HIV exposure, hives and persistent infections.          Vitals:    10/07/22 1534   BP: 120/64   Pulse: 78   Resp: 16   SpO2: 98%       Body mass index is 37.08 kg/m².      Physical Exam  Constitutional:       Appearance: Normal appearance. She is normal weight.   HENT:      Head: Normocephalic.      Mouth/Throat:      Mouth: Mucous membranes are moist.      Pharynx: No posterior oropharyngeal erythema.   Eyes:      Extraocular Movements: Extraocular movements intact.   Cardiovascular:      Rate and Rhythm: Normal rate and regular rhythm.      Chest Wall: PMI is not displaced.      Pulses: Normal pulses.      Heart sounds: S1 normal and S2 normal. No murmur heard.    No gallop. No S3 or S4 sounds.   Pulmonary:      Effort: Pulmonary effort is normal.      Breath sounds: Normal breath sounds.   Abdominal:      General: Abdomen is flat. Bowel sounds are normal. There is no distension.      Tenderness: There is no abdominal tenderness.   Musculoskeletal:      Cervical back: Neck supple.      Comments: Functional status is:  Good, going slowly with walker.   Skin:     General: Skin is warm.   Neurological:      Mental Status: She is alert and oriented to person, place, and time. Mental status is at baseline.   Psychiatric:         Attention and Perception: Attention normal.          Mood and Affect: Mood and affect normal.         Speech: Speech normal.          Lab Results   Component Value Date    WBC 8.40 11/06/2021    RBC 3.45 (L) 11/06/2021    HGB 10.5 (L) 11/06/2021    HCT 32.7 (L) 11/06/2021    MCV 94.8 11/06/2021    MCH 30.4 11/06/2021    MCHC 32.1 (L) 11/06/2021    RDW 13.2 11/06/2021     11/06/2021        Lab Results   Component Value Date    GLUCOSE 107 (H) 11/06/2021    BUN 17 11/06/2021    CREATININE 0.6 11/06/2021    EGFR >60.0 11/06/2021     11/06/2021    K 4.0 11/06/2021    CL 99 11/06/2021    CO2 26 11/06/2021    CALCIUM 9.1 11/06/2021    PROTEIN 6.9 10/05/2018    ALBUMIN 4.0 10/05/2018    BILITOT 0.3 10/05/2018    ALKPHOS 60 10/05/2018    AST 24 10/05/2018    ALT 20 10/05/2018        Lab Results   Component Value Date    HGBA1C 5.4 07/27/2018    ESTAVGGLUC 108 07/27/2018        Lab Results   Component Value Date    ALBUMIN 4.0 10/05/2018        Lab Results   Component Value Date    CHOL 127 07/27/2018    TRIG 119 07/27/2018    HDL 37 (L) 07/27/2018    LDLCALC 66 07/27/2018            No results found for this or any previous visit.               Assessment/Plan        EKG: NSR Normal EKG      Preoperative cardiovascular examination  RCRI 0 with previously did well with surgery.  Can ambulate a flight of stairs >4METS and prior great functional status.  Can proceed to elective spinal surgery without further testing.      Hold Avapro day of surgery, continue perioperative Crestor 20mg.      Essential hypertension  Well controlled.  She has white coat HTN with a reactive component.  Manual pressure today was normal.  Continue Avapro until day of procedure.      Mixed hyperlipidemia  Continue Crestor.    Family history of early CAD  Continue high intensity statin.       No orders of the defined types were placed in this encounter.  I spent 30 minutes face to face time, 5 minutes on record review and 10minutes on documentation.  All questions answered.  Pt will  followup with Dr. Casey.  There are no discontinued medications.     Orders Placed This Encounter   Procedures    ECG 12 LEAD-OFFICE PERFORMED     Scheduling Instructions:      PLEASE USE THIS ORDER FOR ECG'S PERFORMED IN PHYSICIAN OFFICES     Order Specific Question:   Release to patient     Answer:   Immediate        Mark Doe MD  10/9/2022

## 2022-10-09 NOTE — ASSESSMENT & PLAN NOTE
Well controlled.  She has white coat HTN with a reactive component.  Manual pressure today was normal.  Continue Avapro until day of procedure.

## 2022-10-10 ENCOUNTER — TRANSCRIBE ORDERS (OUTPATIENT)
Dept: LAB | Facility: HOSPITAL | Age: 72
End: 2022-10-10

## 2022-10-10 ENCOUNTER — APPOINTMENT (OUTPATIENT)
Dept: PREADMISSION TESTING | Facility: HOSPITAL | Age: 72
DRG: 455 | End: 2022-10-10
Attending: ORTHOPAEDIC SURGERY
Payer: COMMERCIAL

## 2022-10-10 ENCOUNTER — APPOINTMENT (OUTPATIENT)
Dept: LAB | Facility: HOSPITAL | Age: 72
DRG: 455 | End: 2022-10-10
Attending: ORTHOPAEDIC SURGERY
Payer: COMMERCIAL

## 2022-10-10 DIAGNOSIS — F41.9 ANXIETY: ICD-10-CM

## 2022-10-10 DIAGNOSIS — M48.062 LUMBAR STENOSIS WITH NEUROGENIC CLAUDICATION: ICD-10-CM

## 2022-10-10 DIAGNOSIS — M54.16 RADICULOPATHY, LUMBAR REGION: ICD-10-CM

## 2022-10-10 DIAGNOSIS — Z01.810 PREOPERATIVE CARDIOVASCULAR EXAMINATION: Primary | ICD-10-CM

## 2022-10-10 DIAGNOSIS — M48.062 SPINAL STENOSIS, LUMBAR REGION WITH NEUROGENIC CLAUDICATION: ICD-10-CM

## 2022-10-10 DIAGNOSIS — Z11.59 ENCOUNTER FOR SCREENING FOR OTHER VIRAL DISEASES: ICD-10-CM

## 2022-10-10 DIAGNOSIS — I10 ESSENTIAL HYPERTENSION: ICD-10-CM

## 2022-10-10 DIAGNOSIS — Z11.59 ENCOUNTER FOR SCREENING FOR OTHER VIRAL DISEASES: Primary | ICD-10-CM

## 2022-10-10 DIAGNOSIS — E78.2 MIXED HYPERLIPIDEMIA: ICD-10-CM

## 2022-10-10 LAB
ABO + RH BLD: NORMAL
ANION GAP SERPL CALC-SCNC: 13 MEQ/L (ref 3–15)
APTT PPP: 26 SEC (ref 23–35)
BASOPHILS # BLD: 0.07 K/UL (ref 0.01–0.1)
BASOPHILS NFR BLD: 0.8 %
BLD GP AB SCN SERPL QL: NEGATIVE
BLOOD BANK CMNT PATIENT-IMP: NORMAL
BUN SERPL-MCNC: 28 MG/DL (ref 8–20)
CALCIUM SERPL-MCNC: 10.8 MG/DL (ref 8.9–10.3)
CHLORIDE SERPL-SCNC: 101 MEQ/L (ref 98–109)
CO2 SERPL-SCNC: 24 MEQ/L (ref 22–32)
CREAT SERPL-MCNC: 0.8 MG/DL (ref 0.6–1.1)
D AG BLD QL: NEGATIVE
DIFFERENTIAL METHOD BLD: ABNORMAL
EOSINOPHIL # BLD: 0.65 K/UL (ref 0.04–0.36)
EOSINOPHIL NFR BLD: 7.6 %
ERYTHROCYTE [DISTWIDTH] IN BLOOD BY AUTOMATED COUNT: 14 % (ref 11.7–14.4)
GFR SERPL CREATININE-BSD FRML MDRD: >60 ML/MIN/1.73M*2
GLUCOSE SERPL-MCNC: 100 MG/DL (ref 70–99)
HCT VFR BLDCO AUTO: 43.1 % (ref 35–45)
HGB BLD-MCNC: 13.8 G/DL (ref 11.8–15.7)
IMM GRANULOCYTES # BLD AUTO: 0.03 K/UL (ref 0–0.08)
IMM GRANULOCYTES NFR BLD AUTO: 0.4 %
INR PPP: 1
LABORATORY COMMENT REPORT: NORMAL
LYMPHOCYTES # BLD: 1.59 K/UL (ref 1.2–3.5)
LYMPHOCYTES NFR BLD: 18.6 %
MCH RBC QN AUTO: 30.1 PG (ref 28–33.2)
MCHC RBC AUTO-ENTMCNC: 32 G/DL (ref 32.2–35.5)
MCV RBC AUTO: 94.1 FL (ref 83–98)
MONOCYTES # BLD: 0.59 K/UL (ref 0.28–0.8)
MONOCYTES NFR BLD: 6.9 %
NEUTROPHILS # BLD: 5.6 K/UL (ref 1.7–7)
NEUTS SEG NFR BLD: 65.7 %
NRBC BLD-RTO: 0 %
PDW BLD AUTO: 10.6 FL (ref 9.4–12.3)
PLATELET # BLD AUTO: 365 K/UL (ref 150–369)
POTASSIUM SERPL-SCNC: 5.1 MEQ/L (ref 3.6–5.1)
PROTHROMBIN TIME: 13.4 SEC (ref 12.2–14.5)
RBC # BLD AUTO: 4.58 M/UL (ref 3.93–5.22)
SARS-COV-2 RNA RESP QL NAA+PROBE: NEGATIVE
SODIUM SERPL-SCNC: 138 MEQ/L (ref 136–144)
SPECIMEN EXP DATE BLD: NORMAL
WBC # BLD AUTO: 8.53 K/UL (ref 3.8–10.5)

## 2022-10-10 PROCEDURE — 85025 COMPLETE CBC W/AUTO DIFF WBC: CPT

## 2022-10-10 PROCEDURE — 80048 BASIC METABOLIC PNL TOTAL CA: CPT

## 2022-10-10 PROCEDURE — 85610 PROTHROMBIN TIME: CPT | Mod: GA

## 2022-10-10 PROCEDURE — C9803 HOPD COVID-19 SPEC COLLECT: HCPCS

## 2022-10-10 PROCEDURE — U0003 INFECTIOUS AGENT DETECTION BY NUCLEIC ACID (DNA OR RNA); SEVERE ACUTE RESPIRATORY SYNDROME CORONAVIRUS 2 (SARS-COV-2) (CORONAVIRUS DISEASE [COVID-19]), AMPLIFIED PROBE TECHNIQUE, MAKING USE OF HIGH THROUGHPUT TECHNOLOGIES AS DESCRIBED BY CMS-2020-01-R: HCPCS

## 2022-10-10 PROCEDURE — 99214 OFFICE O/P EST MOD 30 MIN: CPT | Performed by: HOSPITALIST

## 2022-10-10 PROCEDURE — 86901 BLOOD TYPING SEROLOGIC RH(D): CPT

## 2022-10-10 PROCEDURE — 36415 COLL VENOUS BLD VENIPUNCTURE: CPT

## 2022-10-10 PROCEDURE — 85730 THROMBOPLASTIN TIME PARTIAL: CPT | Mod: GA

## 2022-10-10 NOTE — H&P (VIEW-ONLY)
[facility] Medicine Service -  Pre-Operative Consultation       Patient Name: Reyna Castillo  Referring Surgeon: Dr. Melchor Lee    Reason for Referral: Pre-Operative Evaluation  Surgical Procedure: T10-S1 posterior thoracolumbosacral decompression/fusion  Operative Date: 10/12/22  Other Providers:      PCP: Lubna Kerr MD     Cardiology: Dr. Mark Doe     HISTORY OF PRESENT ILLNESS      Reyna Castillo is a 72 y.o. female PMH HTN, hyperlipidemia who is presenting today to the Main [facility] at [facility] for pre-operative evaluation prior to planned surgery.    In regards to medical history:  Patient reports a long history of low back pain. She had her initial surgery last November 2021 with improvement. She noticed recurrence and progressive worsening of her low back pain starting 7/2022. Described as a lightning bolt and burning sensation to her groin. Radiates down her LLE. Some improvement with Percocet and Gabapentin. She has been using a walker for ambulation and cannot sit in one position for longer than a few minutes.     She is scheduled for T10-S1 posterior thoracolumbosacral decompression/fusion with Dr. Lee on 10/12/22. Of note, she had pre-op cardiac clearance by cardiologist, Dr. Mark Doe, on 10/7/22.     The patient denies any current or recent chest pain or pressure, dyspnea, cough, sputum, fevers, chills, abdominal pain, nausea, vomiting, diarrhea or other symptoms.     Functionally, the patient is able to ascend a flight or so of stairs with no dyspnea or chest pain.     The patient denies, on specific questioning, the following:  No history of MI, arrhythmia,or CHF.  No history of SUSHIL.  No history of DVT/PE.  No history of COPD.  No history of CVA.  No history of DM.   No history of CKD.     PAST MEDICAL AND SURGICAL HISTORY      Past Medical History:   Diagnosis Date    Abnormal ECG 10/09/2018    Nonspecific T wave flattening  inferiorly minimal ST depression laterally and anterolateral T wave inversion    Acid reflux     Agatston CAC score 100-199 10/09/2018    8/17 coronary calcium score 101 LAD 13, circumflex 68, RCA 20    Anxiety     Lopez's esophagus without dysplasia     Class 2 severe obesity due to excess calories with serious comorbidity and body mass index (BMI) of 36.0 to 36.9 in adult (CMS/[facility]) 10/09/2018    2017 weight 232 pounds    Depression     DJD (degenerative joint disease)     Family history of early CAD 10/09/2018    Father  of MI at age 59    Hearing loss     History of hepatitis A     Hypertension     Kidney lesion     Lumbar myelopathy (CMS/[facility])     Tran-Juan tear     Mixed hyperlipidemia 10/09/2018    2020 Lipid panel  LDL 91 HDL 47 Trigs 176    Spinal enthesopathy of lumbar region (CMS/[facility])        Past Surgical History:   Procedure Laterality Date    ADENOIDECTOMY      CHOLECYSTECTOMY      COLONOSCOPY      FOOT SURGERY Right     HAND SURGERY Right     INNER EAR SURGERY Right     JOINT REPLACEMENT Bilateral     bilateral , ; knees    KNEE ARTHROSCOPY Bilateral     LUMBAR LAMINECTOMY      SIGMOIDECTOMY      TONSILLECTOMY      TRANSUMBILICAL AUGMENTATION MAMMAPLASTY      UPPER GASTROINTESTINAL ENDOSCOPY      WISDOM TOOTH EXTRACTION         MEDICATIONS        Current Outpatient Medications:     fenofibrate (TRICOR) 48 mg tablet, Take 48 mg by mouth daily.  , Disp: , Rfl:     gabapentin (NEURONTIN) 300 mg capsule, Take 300 mg by mouth 3 (three) times a day., Disp: , Rfl:     irbesartan (AVAPRO) 150 mg tablet, Take 150 mg by mouth 2 (two) times a day.  , Disp: , Rfl:     multivitamin capsule, Take 1 capsule by mouth daily., Disp: , Rfl:     rosuvastatin (CRESTOR) 20 mg tablet, Take 20 mg by mouth daily., Disp: , Rfl:     sertraline 100 mg tablet, Take 100 mg by mouth once daily., Disp: , Rfl:     vitamin B complex (B COMPLEX 1 ORAL), Take by mouth daily., Disp: ,  Rfl:     ALLERGIES      Adhesive tape-silicones and Nsaids (non-steroidal anti-inflammatory drug)    FAMILY HISTORY      family history includes Coronary artery disease in her biological father; Heart attack in her biological father; Heart disease in her biological father; Hyperlipidemia in her biological mother; Hypertension in her biological mother; Stroke in her biological father.    Denies any prior known family history of DVTs/PEs/clotting disorder    SOCIAL HISTORY      Social History     Tobacco Use    Smoking status: Former     Packs/day: 0.50     Years: 30.00     Pack years: 15.00     Types: Cigarettes     Quit date:      Years since quittin.7    Smokeless tobacco: Never   Vaping Use    Vaping Use: Never used   Substance Use Topics    Alcohol use: Yes     Comment: Rare/holidays    Drug use: No       REVIEW OF SYSTEMS      All other systems reviewed and negative except as noted in HPI    PHYSICAL EXAMINATION      Vitals: T98, /72, HR75, Pox 99% on RA, R20    Physical Exam  Vitals and nursing note reviewed.   Constitutional:       General: She is not in acute distress.     Appearance: Normal appearance. She is well-developed.   HENT:      Head: Normocephalic and atraumatic.   Eyes:      Extraocular Movements: Extraocular movements intact.      Pupils: Pupils are equal, round, and reactive to light.   Cardiovascular:      Rate and Rhythm: Normal rate and regular rhythm.      Heart sounds: Normal heart sounds.   Pulmonary:      Effort: Pulmonary effort is normal. No respiratory distress.      Breath sounds: Normal breath sounds. No wheezing, rhonchi or rales.   Abdominal:      General: Bowel sounds are normal. There is no distension.      Palpations: Abdomen is soft.      Tenderness: There is no abdominal tenderness.   Musculoskeletal:         General: No swelling. Normal range of motion.      Cervical back: Normal range of motion and neck supple.      Comments: Decreased sensation LLE. (+)  lumbar/left paralumbar tenderness to palpation.    Skin:     General: Skin is warm and dry.      Capillary Refill: Capillary refill takes less than 2 seconds.   Neurological:      Mental Status: She is alert and oriented to person, place, and time.         LABS / EKG        Labs  Results from last 7 days   Lab Units 10/10/22  1005   WBC K/uL 8.53   HEMOGLOBIN g/dL 13.8   HEMATOCRIT % 43.1   PLATELETS K/uL 365     Results from last 7 days   Lab Units 10/10/22  1005   SODIUM mEQ/L 138   POTASSIUM mEQ/L 5.1   CHLORIDE mEQ/L 101   CO2 mEQ/L 24   BUN mg/dL 28*   CREATININE mg/dL 0.8   GLUCOSE mg/dL 100*   CALCIUM mg/dL 10.8*     ECG/Telemetry  EKG 10/7/22 NSR rate 82, no ST changes.     ASSESSMENT AND PLAN         Preoperative cardiovascular examination  -Please see my note for RCRI.  -Defer grazyna-operative DVT ppx, pain control, and antibiotics to primary team.   -No NSAIDS, MVI, or herbal supplements for 1 week prior to surgery.   -Of note, she had pre-op cardiac clearance by cardiologist, Dr. Mark Doe, on 10/7/22.     Lumbar stenosis with neurogenic claudication  -No improvement with current therapy.   -She is scheduled for T10-S1 posterior thoracolumbosacral decompression/fusion with Dr. Lee on 10/12/22.    Essential hypertension  -Pre-op BP acceptable.  -Restart ARB post-op if BP and Cr acceptable.     Mixed hyperlipidemia  -Continue statin, fenofibrate.     Anxiety  -Continue Sertraline.        In regards to perioperative cardiac risk:  The patient denies any history of ischemic heart disease, denies any history of CHF, denies any history of CVA, is not on pre-operative treatment with insulin, and does not have a pre-operative creatinine > 2 mg/dL.   The Revised Cardiac Risk Index (RCRI) for this patient indicates 0.4% risk of MI, pulmonary edema, ventricular fibrillation, cardiac arrest, or complete heart block. Patient is at very low risk of adverse outcome with non-cardiac surgery.    Further  comments:  Resume supplements when OK with surgical team.  I would encourage incentive spirometry to assist with minimizing grazyna-operative pulmonary risk.  DVT prophylaxis and timing of such per the discretion of the surgeon.     Please do not hesitate to contact [facility] during the upcoming hospitalization with any questions or concerns.     Maira Dumont, DO  10/11/2022

## 2022-10-10 NOTE — ASSESSMENT & PLAN NOTE
-Please see my note for RCRI.  -Defer grazyna-operative DVT ppx, pain control, and antibiotics to primary team.   -No NSAIDS, MVI, or herbal supplements for 1 week prior to surgery.   -Of note, she had pre-op cardiac clearance by cardiologist, Dr. Mark Doe, on 10/7/22.

## 2022-10-10 NOTE — ASSESSMENT & PLAN NOTE
-No improvement with current therapy.   -She is scheduled for T10-S1 posterior thoracolumbosacral decompression/fusion with Dr. Lee on 10/12/22.

## 2022-10-10 NOTE — CONSULTS
[facility] Medicine Service -  Pre-Operative Consultation       Patient Name: Reyna Castillo  Referring Surgeon: Dr. Melchor Lee    Reason for Referral: Pre-Operative Evaluation  Surgical Procedure: T10-S1 posterior thoracolumbosacral decompression/fusion  Operative Date: 10/12/22  Other Providers:      PCP: Lubna Kerr MD     Cardiology: Dr. Mark Doe     HISTORY OF PRESENT ILLNESS      Reyna Castillo is a 72 y.o. female PMH HTN, hyperlipidemia who is presenting today to the Main [facility] at [facility] for pre-operative evaluation prior to planned surgery.    In regards to medical history:  Patient reports a long history of low back pain. She had her initial surgery last November 2021 with improvement. She noticed recurrence and progressive worsening of her low back pain starting 7/2022. Described as a lightning bolt and burning sensation to her groin. Radiates down her LLE. Some improvement with Percocet and Gabapentin. She has been using a walker for ambulation and cannot sit in one position for longer than a few minutes.     She is scheduled for T10-S1 posterior thoracolumbosacral decompression/fusion with Dr. Lee on 10/12/22. Of note, she had pre-op cardiac clearance by cardiologist, Dr. Mark Doe, on 10/7/22.     The patient denies any current or recent chest pain or pressure, dyspnea, cough, sputum, fevers, chills, abdominal pain, nausea, vomiting, diarrhea or other symptoms.     Functionally, the patient is able to ascend a flight or so of stairs with no dyspnea or chest pain.     The patient denies, on specific questioning, the following:  No history of MI, arrhythmia,or CHF.  No history of SUSHIL.  No history of DVT/PE.  No history of COPD.  No history of CVA.  No history of DM.   No history of CKD.     PAST MEDICAL AND SURGICAL HISTORY      Past Medical History:   Diagnosis Date    Abnormal ECG 10/09/2018    Nonspecific T wave flattening  inferiorly minimal ST depression laterally and anterolateral T wave inversion    Acid reflux     Agatston CAC score 100-199 10/09/2018    8/17 coronary calcium score 101 LAD 13, circumflex 68, RCA 20    Anxiety     Lopez's esophagus without dysplasia     Class 2 severe obesity due to excess calories with serious comorbidity and body mass index (BMI) of 36.0 to 36.9 in adult (CMS/[facility]) 10/09/2018    2017 weight 232 pounds    Depression     DJD (degenerative joint disease)     Family history of early CAD 10/09/2018    Father  of MI at age 59    Hearing loss     History of hepatitis A     Hypertension     Kidney lesion     Lumbar myelopathy (CMS/[facility])     Tran-Juan tear     Mixed hyperlipidemia 10/09/2018    2020 Lipid panel  LDL 91 HDL 47 Trigs 176    Spinal enthesopathy of lumbar region (CMS/[facility])        Past Surgical History:   Procedure Laterality Date    ADENOIDECTOMY      CHOLECYSTECTOMY      COLONOSCOPY      FOOT SURGERY Right     HAND SURGERY Right     INNER EAR SURGERY Right     JOINT REPLACEMENT Bilateral     bilateral , ; knees    KNEE ARTHROSCOPY Bilateral     LUMBAR LAMINECTOMY      SIGMOIDECTOMY      TONSILLECTOMY      TRANSUMBILICAL AUGMENTATION MAMMAPLASTY      UPPER GASTROINTESTINAL ENDOSCOPY      WISDOM TOOTH EXTRACTION         MEDICATIONS        Current Outpatient Medications:     fenofibrate (TRICOR) 48 mg tablet, Take 48 mg by mouth daily.  , Disp: , Rfl:     gabapentin (NEURONTIN) 300 mg capsule, Take 300 mg by mouth 3 (three) times a day., Disp: , Rfl:     irbesartan (AVAPRO) 150 mg tablet, Take 150 mg by mouth 2 (two) times a day.  , Disp: , Rfl:     multivitamin capsule, Take 1 capsule by mouth daily., Disp: , Rfl:     rosuvastatin (CRESTOR) 20 mg tablet, Take 20 mg by mouth daily., Disp: , Rfl:     sertraline 100 mg tablet, Take 100 mg by mouth once daily., Disp: , Rfl:     vitamin B complex (B COMPLEX 1 ORAL), Take by mouth daily., Disp: ,  Rfl:     ALLERGIES      Adhesive tape-silicones and Nsaids (non-steroidal anti-inflammatory drug)    FAMILY HISTORY      family history includes Coronary artery disease in her biological father; Heart attack in her biological father; Heart disease in her biological father; Hyperlipidemia in her biological mother; Hypertension in her biological mother; Stroke in her biological father.    Denies any prior known family history of DVTs/PEs/clotting disorder    SOCIAL HISTORY      Social History     Tobacco Use    Smoking status: Former     Packs/day: 0.50     Years: 30.00     Pack years: 15.00     Types: Cigarettes     Quit date:      Years since quittin.7    Smokeless tobacco: Never   Vaping Use    Vaping Use: Never used   Substance Use Topics    Alcohol use: Yes     Comment: Rare/holidays    Drug use: No       REVIEW OF SYSTEMS      All other systems reviewed and negative except as noted in HPI    PHYSICAL EXAMINATION      Vitals: T98, /72, HR75, Pox 99% on RA, R20    Physical Exam  Vitals and nursing note reviewed.   Constitutional:       General: She is not in acute distress.     Appearance: Normal appearance. She is well-developed.   HENT:      Head: Normocephalic and atraumatic.   Eyes:      Extraocular Movements: Extraocular movements intact.      Pupils: Pupils are equal, round, and reactive to light.   Cardiovascular:      Rate and Rhythm: Normal rate and regular rhythm.      Heart sounds: Normal heart sounds.   Pulmonary:      Effort: Pulmonary effort is normal. No respiratory distress.      Breath sounds: Normal breath sounds. No wheezing, rhonchi or rales.   Abdominal:      General: Bowel sounds are normal. There is no distension.      Palpations: Abdomen is soft.      Tenderness: There is no abdominal tenderness.   Musculoskeletal:         General: No swelling. Normal range of motion.      Cervical back: Normal range of motion and neck supple.      Comments: Decreased sensation LLE. (+)  lumbar/left paralumbar tenderness to palpation.    Skin:     General: Skin is warm and dry.      Capillary Refill: Capillary refill takes less than 2 seconds.   Neurological:      Mental Status: She is alert and oriented to person, place, and time.         LABS / EKG        Labs  Results from last 7 days   Lab Units 10/10/22  1005   WBC K/uL 8.53   HEMOGLOBIN g/dL 13.8   HEMATOCRIT % 43.1   PLATELETS K/uL 365     Results from last 7 days   Lab Units 10/10/22  1005   SODIUM mEQ/L 138   POTASSIUM mEQ/L 5.1   CHLORIDE mEQ/L 101   CO2 mEQ/L 24   BUN mg/dL 28*   CREATININE mg/dL 0.8   GLUCOSE mg/dL 100*   CALCIUM mg/dL 10.8*     ECG/Telemetry  EKG 10/7/22 NSR rate 82, no ST changes.     ASSESSMENT AND PLAN         Preoperative cardiovascular examination  -Please see my note for RCRI.  -Defer grazyna-operative DVT ppx, pain control, and antibiotics to primary team.   -No NSAIDS, MVI, or herbal supplements for 1 week prior to surgery.   -Of note, she had pre-op cardiac clearance by cardiologist, Dr. Mark Doe, on 10/7/22.     Lumbar stenosis with neurogenic claudication  -No improvement with current therapy.   -She is scheduled for T10-S1 posterior thoracolumbosacral decompression/fusion with Dr. Lee on 10/12/22.    Essential hypertension  -Pre-op BP acceptable.  -Restart ARB post-op if BP and Cr acceptable.     Mixed hyperlipidemia  -Continue statin, fenofibrate.     Anxiety  -Continue Sertraline.        In regards to perioperative cardiac risk:  The patient denies any history of ischemic heart disease, denies any history of CHF, denies any history of CVA, is not on pre-operative treatment with insulin, and does not have a pre-operative creatinine > 2 mg/dL.   The Revised Cardiac Risk Index (RCRI) for this patient indicates 0.4% risk of MI, pulmonary edema, ventricular fibrillation, cardiac arrest, or complete heart block. Patient is at very low risk of adverse outcome with non-cardiac surgery.    Further  comments:  Resume supplements when OK with surgical team.  I would encourage incentive spirometry to assist with minimizing grazyna-operative pulmonary risk.  DVT prophylaxis and timing of such per the discretion of the surgeon.     Please do not hesitate to contact [facility] during the upcoming hospitalization with any questions or concerns.     Maira Dumont, DO  10/11/2022

## 2022-10-11 ENCOUNTER — ANESTHESIA EVENT (OUTPATIENT)
Dept: OPERATING ROOM | Facility: HOSPITAL | Age: 72
Setting detail: SURGERY ADMIT
DRG: 455 | End: 2022-10-11
Payer: COMMERCIAL

## 2022-10-12 ENCOUNTER — HOSPITAL ENCOUNTER (INPATIENT)
Facility: HOSPITAL | Age: 72
LOS: 5 days | Discharge: HOME | DRG: 455 | End: 2022-10-17
Attending: ORTHOPAEDIC SURGERY | Admitting: ORTHOPAEDIC SURGERY
Payer: COMMERCIAL

## 2022-10-12 ENCOUNTER — ANESTHESIA (OUTPATIENT)
Dept: OPERATING ROOM | Facility: HOSPITAL | Age: 72
Setting detail: SURGERY ADMIT
DRG: 455 | End: 2022-10-12
Payer: COMMERCIAL

## 2022-10-12 ENCOUNTER — APPOINTMENT (OUTPATIENT)
Dept: RADIOLOGY | Facility: HOSPITAL | Age: 72
Setting detail: SURGERY ADMIT
DRG: 455 | End: 2022-10-12
Attending: ORTHOPAEDIC SURGERY
Payer: COMMERCIAL

## 2022-10-12 PROBLEM — M54.16 LUMBAR RADICULOPATHY: Status: ACTIVE | Noted: 2022-10-12

## 2022-10-12 PROCEDURE — 63700000 HC SELF-ADMINISTRABLE DRUG: Performed by: STUDENT IN AN ORGANIZED HEALTH CARE EDUCATION/TRAINING PROGRAM

## 2022-10-12 PROCEDURE — 71000001 HC PACU PHASE 1 INITIAL 30MIN: Performed by: ORTHOPAEDIC SURGERY

## 2022-10-12 PROCEDURE — 63600000 HC DRUGS/DETAIL CODE: Performed by: ORTHOPAEDIC SURGERY

## 2022-10-12 PROCEDURE — 25800000 HC PHARMACY IV SOLUTIONS: Performed by: NURSE ANESTHETIST, CERTIFIED REGISTERED

## 2022-10-12 PROCEDURE — 72080 X-RAY EXAM THORACOLMB 2/> VW: CPT

## 2022-10-12 PROCEDURE — 63600000 HC DRUGS/DETAIL CODE: Performed by: STUDENT IN AN ORGANIZED HEALTH CARE EDUCATION/TRAINING PROGRAM

## 2022-10-12 PROCEDURE — 71000011 HC PACU PHASE 1 EA ADDL MIN: Performed by: ORTHOPAEDIC SURGERY

## 2022-10-12 PROCEDURE — 0SG00AJ FUSION OF LUMBAR VERTEBRAL JOINT WITH INTERBODY FUSION DEVICE, POSTERIOR APPROACH, ANTERIOR COLUMN, OPEN APPROACH: ICD-10-PCS | Performed by: ORTHOPAEDIC SURGERY

## 2022-10-12 PROCEDURE — 37000001 HC ANESTHESIA GENERAL: Performed by: ORTHOPAEDIC SURGERY

## 2022-10-12 PROCEDURE — 12000000 HC ROOM AND CARE MED/SURG

## 2022-10-12 PROCEDURE — 01NB0ZZ RELEASE LUMBAR NERVE, OPEN APPROACH: ICD-10-PCS | Performed by: ORTHOPAEDIC SURGERY

## 2022-10-12 PROCEDURE — 36000025 HC CELL SAVER PROCED

## 2022-10-12 PROCEDURE — C1713 ANCHOR/SCREW BN/BN,TIS/BN: HCPCS | Performed by: ORTHOPAEDIC SURGERY

## 2022-10-12 PROCEDURE — 0RGA071 FUSION OF THORACOLUMBAR VERTEBRAL JOINT WITH AUTOLOGOUS TISSUE SUBSTITUTE, POSTERIOR APPROACH, POSTERIOR COLUMN, OPEN APPROACH: ICD-10-PCS | Performed by: ORTHOPAEDIC SURGERY

## 2022-10-12 PROCEDURE — 36000014 HC OR LEVEL 4 EA ADDL MIN: Performed by: ORTHOPAEDIC SURGERY

## 2022-10-12 PROCEDURE — 63600000 HC DRUGS/DETAIL CODE: Performed by: NURSE ANESTHETIST, CERTIFIED REGISTERED

## 2022-10-12 PROCEDURE — 25000000 HC PHARMACY GENERAL: Performed by: NURSE ANESTHETIST, CERTIFIED REGISTERED

## 2022-10-12 PROCEDURE — 0SG0071 FUSION OF LUMBAR VERTEBRAL JOINT WITH AUTOLOGOUS TISSUE SUBSTITUTE, POSTERIOR APPROACH, POSTERIOR COLUMN, OPEN APPROACH: ICD-10-PCS | Performed by: ORTHOPAEDIC SURGERY

## 2022-10-12 PROCEDURE — 27800000 HC SUPPLY/IMPLANTS: Performed by: ORTHOPAEDIC SURGERY

## 2022-10-12 PROCEDURE — 63600000 HC DRUGS/DETAIL CODE: Performed by: ANESTHESIOLOGY

## 2022-10-12 PROCEDURE — 0RG6071 FUSION OF THORACIC VERTEBRAL JOINT WITH AUTOLOGOUS TISSUE SUBSTITUTE, POSTERIOR APPROACH, POSTERIOR COLUMN, OPEN APPROACH: ICD-10-PCS | Performed by: ORTHOPAEDIC SURGERY

## 2022-10-12 PROCEDURE — 27200000 HC STERILE SUPPLY: Performed by: ORTHOPAEDIC SURGERY

## 2022-10-12 PROCEDURE — 36000004 HC OR LEVEL 4 INITIAL 30MIN: Performed by: ORTHOPAEDIC SURGERY

## 2022-10-12 PROCEDURE — 25800000 HC PHARMACY IV SOLUTIONS: Performed by: STUDENT IN AN ORGANIZED HEALTH CARE EDUCATION/TRAINING PROGRAM

## 2022-10-12 PROCEDURE — 3E0U0GB INTRODUCTION OF RECOMBINANT BONE MORPHOGENETIC PROTEIN INTO JOINTS, OPEN APPROACH: ICD-10-PCS | Performed by: ORTHOPAEDIC SURGERY

## 2022-10-12 DEVICE — SET SCREWS EXPEDIUM: Type: IMPLANTABLE DEVICE | Site: SPINE THORACIC | Status: FUNCTIONAL

## 2022-10-12 DEVICE — OSTEOSPONGE 3DEMIN 50X25MM: Type: IMPLANTABLE DEVICE | Site: SPINE THORACIC | Status: FUNCTIONAL

## 2022-10-12 DEVICE — ILIAC TI OPN 5.5 X 40MM IMP, CONNECTOR: Type: IMPLANTABLE DEVICE | Site: SPINE THORACIC | Status: FUNCTIONAL

## 2022-10-12 DEVICE — CAGE 12 X 26MM/9-13MM EXPANDABLE CALIBUR: Type: IMPLANTABLE DEVICE | Site: SPINE THORACIC | Status: FUNCTIONAL

## 2022-10-12 DEVICE — SCREW EXPEDIUM 6 X 40MM: Type: IMPLANTABLE DEVICE | Site: SPINE THORACIC | Status: FUNCTIONAL

## 2022-10-12 DEVICE — ROD 480MM: Type: IMPLANTABLE DEVICE | Site: SPINE THORACIC | Status: FUNCTIONAL

## 2022-10-12 DEVICE — SCREW EXPEDIUM 7.5 X 45MM: Type: IMPLANTABLE DEVICE | Site: SPINE THORACIC | Status: FUNCTIONAL

## 2022-10-12 DEVICE — BONE CHIPS 30CC FINE FILLER: Type: IMPLANTABLE DEVICE | Site: SPINE THORACIC | Status: FUNCTIONAL

## 2022-10-12 DEVICE — SCREW EXPEDIUM 7.0 X 45MM: Type: IMPLANTABLE DEVICE | Site: SPINE THORACIC | Status: FUNCTIONAL

## 2022-10-12 DEVICE — SCREW 8X80 SAI FULL THREAD: Type: IMPLANTABLE DEVICE | Site: SPINE THORACIC | Status: FUNCTIONAL

## 2022-10-12 DEVICE — INFUSE BONE GRAFT MEDIUM KIT: Type: IMPLANTABLE DEVICE | Site: SPINE THORACIC | Status: FUNCTIONAL

## 2022-10-12 DEVICE — SCREW EXPEDIUM 6 X 45MM: Type: IMPLANTABLE DEVICE | Site: SPINE THORACIC | Status: FUNCTIONAL

## 2022-10-12 RX ORDER — IBUPROFEN 200 MG
16-32 TABLET ORAL AS NEEDED
Status: DISCONTINUED | OUTPATIENT
Start: 2022-10-12 | End: 2022-10-17 | Stop reason: HOSPADM

## 2022-10-12 RX ORDER — PROPOFOL 10 MG/ML
INJECTION, EMULSION INTRAVENOUS AS NEEDED
Status: DISCONTINUED | OUTPATIENT
Start: 2022-10-12 | End: 2022-10-12 | Stop reason: SURG

## 2022-10-12 RX ORDER — OXYCODONE HYDROCHLORIDE 5 MG/1
5 TABLET ORAL EVERY 4 HOURS PRN
Status: DISCONTINUED | OUTPATIENT
Start: 2022-10-12 | End: 2022-10-14

## 2022-10-12 RX ORDER — SODIUM CHLORIDE, SODIUM GLUCONATE, SODIUM ACETATE, POTASSIUM CHLORIDE AND MAGNESIUM CHLORIDE 30; 37; 368; 526; 502 MG/100ML; MG/100ML; MG/100ML; MG/100ML; MG/100ML
INJECTION, SOLUTION INTRAVENOUS CONTINUOUS PRN
Status: DISCONTINUED | OUTPATIENT
Start: 2022-10-12 | End: 2022-10-12 | Stop reason: SURG

## 2022-10-12 RX ORDER — ONDANSETRON HYDROCHLORIDE 2 MG/ML
4 INJECTION, SOLUTION INTRAVENOUS
Status: DISCONTINUED | OUTPATIENT
Start: 2022-10-12 | End: 2022-10-12 | Stop reason: HOSPADM

## 2022-10-12 RX ORDER — DEXAMETHASONE SODIUM PHOSPHATE 4 MG/ML
INJECTION, SOLUTION INTRA-ARTICULAR; INTRALESIONAL; INTRAMUSCULAR; INTRAVENOUS; SOFT TISSUE AS NEEDED
Status: DISCONTINUED | OUTPATIENT
Start: 2022-10-12 | End: 2022-10-12 | Stop reason: SURG

## 2022-10-12 RX ORDER — METOCLOPRAMIDE HYDROCHLORIDE 5 MG/ML
INJECTION INTRAMUSCULAR; INTRAVENOUS AS NEEDED
Status: DISCONTINUED | OUTPATIENT
Start: 2022-10-12 | End: 2022-10-12 | Stop reason: SURG

## 2022-10-12 RX ORDER — ACETAMINOPHEN 325 MG/1
650 TABLET ORAL ONCE AS NEEDED
Status: DISCONTINUED | OUTPATIENT
Start: 2022-10-12 | End: 2022-10-12 | Stop reason: HOSPADM

## 2022-10-12 RX ORDER — SODIUM CHLORIDE 9 MG/ML
INJECTION, SOLUTION INTRAVENOUS CONTINUOUS
Status: ACTIVE | OUTPATIENT
Start: 2022-10-12 | End: 2022-10-13

## 2022-10-12 RX ORDER — DEXTROSE 50 % IN WATER (D50W) INTRAVENOUS SYRINGE
25 AS NEEDED
Status: DISCONTINUED | OUTPATIENT
Start: 2022-10-12 | End: 2022-10-17 | Stop reason: HOSPADM

## 2022-10-12 RX ORDER — ONDANSETRON HYDROCHLORIDE 2 MG/ML
INJECTION, SOLUTION INTRAVENOUS AS NEEDED
Status: DISCONTINUED | OUTPATIENT
Start: 2022-10-12 | End: 2022-10-12 | Stop reason: SURG

## 2022-10-12 RX ORDER — POLYETHYLENE GLYCOL 3350 17 G/17G
17 POWDER, FOR SOLUTION ORAL DAILY
Status: DISCONTINUED | OUTPATIENT
Start: 2022-10-13 | End: 2022-10-17 | Stop reason: HOSPADM

## 2022-10-12 RX ORDER — DEXTROSE 40 %
15-30 GEL (GRAM) ORAL AS NEEDED
Status: DISCONTINUED | OUTPATIENT
Start: 2022-10-12 | End: 2022-10-12 | Stop reason: HOSPADM

## 2022-10-12 RX ORDER — OXYCODONE HYDROCHLORIDE 5 MG/1
5 TABLET ORAL ONCE AS NEEDED
Status: DISCONTINUED | OUTPATIENT
Start: 2022-10-12 | End: 2022-10-12 | Stop reason: HOSPADM

## 2022-10-12 RX ORDER — VANCOMYCIN HYDROCHLORIDE
1250 ONCE
Status: COMPLETED | OUTPATIENT
Start: 2022-10-12 | End: 2022-10-12

## 2022-10-12 RX ORDER — FENTANYL CITRATE 50 UG/ML
50 INJECTION, SOLUTION INTRAMUSCULAR; INTRAVENOUS EVERY 5 MIN PRN
Status: COMPLETED | OUTPATIENT
Start: 2022-10-12 | End: 2022-10-12

## 2022-10-12 RX ORDER — FENTANYL CITRATE 50 UG/ML
INJECTION, SOLUTION INTRAMUSCULAR; INTRAVENOUS AS NEEDED
Status: DISCONTINUED | OUTPATIENT
Start: 2022-10-12 | End: 2022-10-12 | Stop reason: SURG

## 2022-10-12 RX ORDER — DIAZEPAM 5 MG/1
5 TABLET ORAL EVERY 8 HOURS PRN
Status: DISCONTINUED | OUTPATIENT
Start: 2022-10-12 | End: 2022-10-17 | Stop reason: HOSPADM

## 2022-10-12 RX ORDER — HYDROMORPHONE HYDROCHLORIDE 1 MG/ML
0.25 INJECTION, SOLUTION INTRAMUSCULAR; INTRAVENOUS; SUBCUTANEOUS EVERY 4 HOURS PRN
Status: DISCONTINUED | OUTPATIENT
Start: 2022-10-12 | End: 2022-10-14

## 2022-10-12 RX ORDER — KETAMINE HYDROCHLORIDE 10 MG/ML
INJECTION, SOLUTION INTRAMUSCULAR; INTRAVENOUS AS NEEDED
Status: DISCONTINUED | OUTPATIENT
Start: 2022-10-12 | End: 2022-10-12 | Stop reason: SURG

## 2022-10-12 RX ORDER — AMOXICILLIN 250 MG
1 CAPSULE ORAL 2 TIMES DAILY
Status: DISCONTINUED | OUTPATIENT
Start: 2022-10-12 | End: 2022-10-17 | Stop reason: HOSPADM

## 2022-10-12 RX ORDER — PANTOPRAZOLE SODIUM 40 MG/10ML
40 INJECTION, POWDER, LYOPHILIZED, FOR SOLUTION INTRAVENOUS EVERY 24 HOURS
Status: DISCONTINUED | OUTPATIENT
Start: 2022-10-13 | End: 2022-10-17 | Stop reason: HOSPADM

## 2022-10-12 RX ORDER — ROCURONIUM BROMIDE 10 MG/ML
INJECTION, SOLUTION INTRAVENOUS AS NEEDED
Status: DISCONTINUED | OUTPATIENT
Start: 2022-10-12 | End: 2022-10-12 | Stop reason: SURG

## 2022-10-12 RX ORDER — MIDAZOLAM HYDROCHLORIDE 2 MG/2ML
INJECTION, SOLUTION INTRAMUSCULAR; INTRAVENOUS AS NEEDED
Status: DISCONTINUED | OUTPATIENT
Start: 2022-10-12 | End: 2022-10-12 | Stop reason: SURG

## 2022-10-12 RX ORDER — DEXTROSE 40 %
15-30 GEL (GRAM) ORAL AS NEEDED
Status: DISCONTINUED | OUTPATIENT
Start: 2022-10-12 | End: 2022-10-17 | Stop reason: HOSPADM

## 2022-10-12 RX ORDER — CEFAZOLIN SODIUM/WATER 2 G/20 ML
2 SYRINGE (ML) INTRAVENOUS ONCE
Status: COMPLETED | OUTPATIENT
Start: 2022-10-12 | End: 2022-10-12

## 2022-10-12 RX ORDER — ACETAMINOPHEN 325 MG/1
650 TABLET ORAL EVERY 6 HOURS
Status: DISPENSED | OUTPATIENT
Start: 2022-10-13 | End: 2022-10-16

## 2022-10-12 RX ORDER — LIDOCAINE HYDROCHLORIDE 10 MG/ML
INJECTION, SOLUTION INFILTRATION; PERINEURAL AS NEEDED
Status: DISCONTINUED | OUTPATIENT
Start: 2022-10-12 | End: 2022-10-12 | Stop reason: SURG

## 2022-10-12 RX ORDER — DEXTROSE 50 % IN WATER (D50W) INTRAVENOUS SYRINGE
25 AS NEEDED
Status: DISCONTINUED | OUTPATIENT
Start: 2022-10-12 | End: 2022-10-12 | Stop reason: HOSPADM

## 2022-10-12 RX ORDER — IBUPROFEN 200 MG
16-32 TABLET ORAL AS NEEDED
Status: DISCONTINUED | OUTPATIENT
Start: 2022-10-12 | End: 2022-10-12 | Stop reason: HOSPADM

## 2022-10-12 RX ORDER — GLYCOPYRROLATE 0.6MG/3ML
SYRINGE (ML) INTRAVENOUS AS NEEDED
Status: DISCONTINUED | OUTPATIENT
Start: 2022-10-12 | End: 2022-10-12 | Stop reason: SURG

## 2022-10-12 RX ORDER — PHENYLEPHRINE HCL IN 0.9% NACL 50MG/250ML
PLASTIC BAG, INJECTION (ML) INTRAVENOUS CONTINUOUS PRN
Status: DISCONTINUED | OUTPATIENT
Start: 2022-10-12 | End: 2022-10-12 | Stop reason: SURG

## 2022-10-12 RX ORDER — EPHEDRINE SULFATE 50 MG/ML
INJECTION, SOLUTION INTRAVENOUS AS NEEDED
Status: DISCONTINUED | OUTPATIENT
Start: 2022-10-12 | End: 2022-10-12 | Stop reason: SURG

## 2022-10-12 RX ORDER — HYDROMORPHONE HYDROCHLORIDE 1 MG/ML
0.5 INJECTION, SOLUTION INTRAMUSCULAR; INTRAVENOUS; SUBCUTANEOUS
Status: DISCONTINUED | OUTPATIENT
Start: 2022-10-12 | End: 2022-10-12 | Stop reason: HOSPADM

## 2022-10-12 RX ORDER — FAMOTIDINE 10 MG/ML
INJECTION INTRAVENOUS AS NEEDED
Status: DISCONTINUED | OUTPATIENT
Start: 2022-10-12 | End: 2022-10-12 | Stop reason: SURG

## 2022-10-12 RX ORDER — MIDAZOLAM HYDROCHLORIDE 2 MG/2ML
1 INJECTION, SOLUTION INTRAMUSCULAR; INTRAVENOUS EVERY 5 MIN PRN
Status: COMPLETED | OUTPATIENT
Start: 2022-10-12 | End: 2022-10-12

## 2022-10-12 RX ORDER — VANCOMYCIN HYDROCHLORIDE 1 G/20ML
INJECTION, POWDER, LYOPHILIZED, FOR SOLUTION INTRAVENOUS
Status: DISCONTINUED | OUTPATIENT
Start: 2022-10-12 | End: 2022-10-12 | Stop reason: HOSPADM

## 2022-10-12 RX ORDER — DIAZEPAM 10 MG/2ML
5 INJECTION INTRAMUSCULAR EVERY 8 HOURS PRN
Status: DISCONTINUED | OUTPATIENT
Start: 2022-10-12 | End: 2022-10-12

## 2022-10-12 RX ADMIN — FENTANYL CITRATE 50 MCG: 50 INJECTION INTRAMUSCULAR; INTRAVENOUS at 16:18

## 2022-10-12 RX ADMIN — VANCOMYCIN HYDROCHLORIDE 1250 MG: 1 INJECTION, POWDER, LYOPHILIZED, FOR SOLUTION INTRAVENOUS at 09:10

## 2022-10-12 RX ADMIN — HYDROMORPHONE HYDROCHLORIDE 0.5 MG: 1 INJECTION, SOLUTION INTRAMUSCULAR; INTRAVENOUS; SUBCUTANEOUS at 17:52

## 2022-10-12 RX ADMIN — EPHEDRINE SULFATE 20 MG: 50 INJECTION, SOLUTION INTRAVENOUS at 12:28

## 2022-10-12 RX ADMIN — LIDOCAINE HYDROCHLORIDE 8 ML: 10 INJECTION, SOLUTION INFILTRATION; PERINEURAL at 11:55

## 2022-10-12 RX ADMIN — PROPOFOL 150 MCG/KG/MIN: 10 INJECTION, EMULSION INTRAVENOUS at 11:58

## 2022-10-12 RX ADMIN — DEXAMETHASONE SODIUM PHOSPHATE 10 MG: 4 INJECTION, SOLUTION INTRA-ARTICULAR; INTRALESIONAL; INTRAMUSCULAR; INTRAVENOUS; SOFT TISSUE at 12:00

## 2022-10-12 RX ADMIN — MIDAZOLAM HYDROCHLORIDE 2 MG: 1 INJECTION, SOLUTION INTRAMUSCULAR; INTRAVENOUS at 11:51

## 2022-10-12 RX ADMIN — REMIFENTANIL HYDROCHLORIDE 0.1 MCG/KG/MIN: 1 INJECTION, POWDER, LYOPHILIZED, FOR SOLUTION INTRAVENOUS at 11:58

## 2022-10-12 RX ADMIN — Medication 2 G: at 12:30

## 2022-10-12 RX ADMIN — MIDAZOLAM HYDROCHLORIDE 1 MG: 1 INJECTION, SOLUTION INTRAMUSCULAR; INTRAVENOUS at 17:15

## 2022-10-12 RX ADMIN — SENNOSIDES AND DOCUSATE SODIUM 1 TABLET: 50; 8.6 TABLET ORAL at 20:54

## 2022-10-12 RX ADMIN — HYDROMORPHONE HYDROCHLORIDE 0.5 MG: 1 INJECTION, SOLUTION INTRAMUSCULAR; INTRAVENOUS; SUBCUTANEOUS at 18:25

## 2022-10-12 RX ADMIN — METOCLOPRAMIDE 10 MG: 5 INJECTION, SOLUTION INTRAMUSCULAR; INTRAVENOUS at 12:10

## 2022-10-12 RX ADMIN — MIDAZOLAM HYDROCHLORIDE 1 MG: 1 INJECTION, SOLUTION INTRAMUSCULAR; INTRAVENOUS at 17:34

## 2022-10-12 RX ADMIN — ROCURONIUM BROMIDE 50 MG: 10 INJECTION INTRAVENOUS at 12:50

## 2022-10-12 RX ADMIN — FENTANYL CITRATE 50 MCG: 50 INJECTION INTRAMUSCULAR; INTRAVENOUS at 16:10

## 2022-10-12 RX ADMIN — FENTANYL CITRATE 100 MCG: 50 INJECTION INTRAMUSCULAR; INTRAVENOUS at 11:55

## 2022-10-12 RX ADMIN — PROPOFOL 200 MG: 10 INJECTION, EMULSION INTRAVENOUS at 11:55

## 2022-10-12 RX ADMIN — HYDROMORPHONE HYDROCHLORIDE 0.5 MG: 1 INJECTION, SOLUTION INTRAMUSCULAR; INTRAVENOUS; SUBCUTANEOUS at 18:44

## 2022-10-12 RX ADMIN — FAMOTIDINE 20 MG: 10 INJECTION INTRAVENOUS at 12:15

## 2022-10-12 RX ADMIN — SODIUM CHLORIDE, SODIUM GLUCONATE, SODIUM ACETATE, POTASSIUM CHLORIDE AND MAGNESIUM CHLORIDE: 526; 502; 368; 37; 30 INJECTION, SOLUTION INTRAVENOUS at 12:00

## 2022-10-12 RX ADMIN — HYDROMORPHONE HYDROCHLORIDE 0.5 MG: 1 INJECTION, SOLUTION INTRAMUSCULAR; INTRAVENOUS; SUBCUTANEOUS at 17:28

## 2022-10-12 RX ADMIN — DIAZEPAM 5 MG: 5 TABLET ORAL at 18:06

## 2022-10-12 RX ADMIN — PROPOFOL 50 MG: 10 INJECTION, EMULSION INTRAVENOUS at 14:02

## 2022-10-12 RX ADMIN — SUGAMMADEX 200 MG: 100 INJECTION, SOLUTION INTRAVENOUS at 14:36

## 2022-10-12 RX ADMIN — OXYCODONE HYDROCHLORIDE 5 MG: 5 TABLET ORAL at 20:53

## 2022-10-12 RX ADMIN — SUCCINYLCHOLINE CHLORIDE 120 MG: 20 INJECTION, SOLUTION INTRAMUSCULAR; INTRAVENOUS at 11:56

## 2022-10-12 RX ADMIN — Medication 20 MG: at 13:55

## 2022-10-12 RX ADMIN — HYDROMORPHONE HYDROCHLORIDE 0.5 MG: 1 INJECTION, SOLUTION INTRAMUSCULAR; INTRAVENOUS; SUBCUTANEOUS at 19:40

## 2022-10-12 RX ADMIN — FENTANYL CITRATE 50 MCG: 50 INJECTION, SOLUTION INTRAMUSCULAR; INTRAVENOUS at 17:09

## 2022-10-12 RX ADMIN — SODIUM CHLORIDE 1 G: 900 INJECTION INTRAVENOUS at 20:54

## 2022-10-12 RX ADMIN — SODIUM CHLORIDE: 9 INJECTION, SOLUTION INTRAVENOUS at 17:54

## 2022-10-12 RX ADMIN — ONDANSETRON 4 MG: 2 INJECTION INTRAMUSCULAR; INTRAVENOUS at 15:10

## 2022-10-12 RX ADMIN — HYDROMORPHONE HYDROCHLORIDE 0.25 MG: 1 INJECTION, SOLUTION INTRAMUSCULAR; INTRAVENOUS; SUBCUTANEOUS at 22:26

## 2022-10-12 RX ADMIN — SODIUM CHLORIDE: 9 INJECTION, SOLUTION INTRAVENOUS at 09:10

## 2022-10-12 RX ADMIN — PHENYLEPHRINE HYDROCHLORIDE 50 MCG/MIN: 10 INJECTION INTRAVENOUS at 12:28

## 2022-10-12 RX ADMIN — Medication 30 MG: at 12:01

## 2022-10-12 RX ADMIN — FENTANYL CITRATE 50 MCG: 50 INJECTION, SOLUTION INTRAMUSCULAR; INTRAVENOUS at 16:59

## 2022-10-12 RX ADMIN — GLYCOPYRROLATE 0.2 MG: 0.2 INJECTION, SOLUTION INTRAMUSCULAR; INTRAVITREAL at 12:33

## 2022-10-12 ASSESSMENT — PAIN SCALES - GENERAL: PAIN_LEVEL: 2

## 2022-10-12 NOTE — ANESTHESIA POSTPROCEDURE EVALUATION
Patient: Reyna Castillo    Procedure Summary     Date: 10/12/22 Room / Location:  OR 9 / PH OR    Anesthesia Start: 1151 Anesthesia Stop: 1627    Procedure: T10-S1 Posterior Thorocolumbosacral Decompression and Interbody Fusion, Instrumentation, Pelvic Fization, Allograft, Autograft, BMP, Revision of Existing Instrumentation (Spine Thoracic) Diagnosis:       Lumbar radiculopathy      Spinal stenosis, lumbar region, with neurogenic claudication      (Lumbar radiculopathy [M54.16])      (Spinal stenosis, lumbar region, with neurogenic claudication [M48.062])    Surgeons: Melchor Lee MD Responsible Provider: Chao Hurley MD    Anesthesia Type: general ASA Status: 2          Anesthesia Type: general  PACU Vitals  10/12/2022 1619 - 10/12/2022 1654      10/12/2022  1625             BP: 169/87    Temp: 36.4 °C (97.6 °F)    Pulse: 114    Resp: 17    SpO2: 99 %            Anesthesia Post Evaluation    Pain score: 2  Pain management: adequate  Patient location during evaluation: PACU  Patient participation: complete - patient participated  Level of consciousness: awake and alert  Cardiovascular status: acceptable  Airway Patency: adequate  Respiratory status: acceptable  Hydration status: acceptable  Anesthetic complications: no

## 2022-10-12 NOTE — DISCHARGE INSTRUCTIONS
DISCHARGE INSTRUCTIONS:  POSTERIOR FUSION  INCISON  Please make sure your incisions are checked at least twice daily for signs and symptoms of infection. If any of the below should occur, please call the office.  ? Draining of incisional site  ? Opening of incision  ? Fevers greater than 101.5 (low grade fevers post op are normal)  ? Flu-like symptoms  ? Increased redness and/or tenderness around the incision  If you have staples or sutures (not tape) in your incision they may be removed 2 weeks following your surgery. This may be done by a visiting nurse, family physician or by making an appointment to come into the office.  SHOWERING and the GAUZE DRESSING  ? You should change the gauze dressing over your incision every day and keep the incision  covered with dry sterile gauze and tape.  ? You may stop covering the incision with gauze five days after your surgery, as long as  the incision site is not leaking or draining fluid,  ? You may shower starting five days after your surgery (if incision is dry and intact). After  showering, gently dry the incision site.  ? Hair washing is permissible while in the shower. No tub baths, hot tubs or whirlpools  until seen in the office. Do not rub cream or lotion on the incision until seen in the  office.  EXERCISE  ? Only lift objects weighing less than 10-15 lbs  ? Do not bend or twist at the waist. Always bend your knees!!  ? Limit your sitting to 20-30 minute intervals. You should lie down or walk in between  sitting periods. There are no limitations for sitting in a recliner chair.  ? Walk as much as possible-let discomfort be your guide.  You may also go up and down  stairs as much as you can tolerate.  ? Walking outside (as long as it is nice weather) or walking on a treadmill is permitted (no  incline).  PAIN  ? Pain is expected after surgery. You should have a pain medication and muscle relaxer when you leave the hospital. Take the pain medication as needed. Start  with 1 and supplement with extra strength Tylenol before using another. Take 1 muscle relaxer for muscle spasms in the back, you can take it 3 times a day if needed.  It is usually most helpful at night and in the morning.  ? Take your pain medication as needed. As your pain level decreases, you may begin to take over-the counter Extra Strength Tylenol (3000mg/day maximum).  ? DO NOT take any anti-inflammatories (like Motrin, Ibuprofen, Advil, Aleve, Celebrex, etc) for 12 weeks after surgery. Taking anti-inflammatories can interfere with fusion healing.  ? Do not resume taking Fosamax or Actonel for 12 weeks after your fusion surgery.  ? You must avoid nicotine exposure for at least 12 weeks after surgery including smoking, the patch, nicotine gum and second hand smoke.  BLOODCLOTS  ? Some swelling is normal post operatively. If you notice swelling in one or both of your legs that is painful and/or hot to the touch and/or you have shortness of breath - please give us a call.  CONSTIPATION  ? It is normal to be constipated after surgery due to anesthesia and narcotics. Make sure to stay hydrated, eat fiber, get up and walk and use the Colace that is prescribed. You can also use warm prune juice and get over the counter Miralax, suppositories, enemas and Magnesium Citrate if constipation persists.  DRIVING  ? You may not drive a car until told otherwise by your physician. You may be a passenger  for short distances (20-30 minutes).  ? If you must take a longer trip make sure to make several stops so that you can walk  around and stretch your legs. Reclining the passenger seat seems to be the most  comfortable position for most patients.        FOLLOW-UP APPOINTMENTS  ? Your first post-op visit will be with Анна Terrazas, MOUNA, ANP-C, ONP-C. This appointment was made for you when you signed up for surgery. If you do not have a post op appointment, please call to make one.  ? If you have any additional  questions/concerns please contact Анна Terrazas DNP, ANP-C, ONP-C., or VERÓNICA Ivory at [phone].

## 2022-10-12 NOTE — ANESTHESIA PREPROCEDURE EVALUATION
Relevant Problems   CARDIOVASCULAR   (+) Essential hypertension      NEUROLOGY   (+) Anxiety       ROS/Med Hx     Past Surgical History:   Procedure Laterality Date    ADENOIDECTOMY      CHOLECYSTECTOMY      COLONOSCOPY      FOOT SURGERY Right     HAND SURGERY Right     INNER EAR SURGERY Right     JOINT REPLACEMENT Bilateral     bilateral 2020, 2021; knees    KNEE ARTHROSCOPY Bilateral     LUMBAR LAMINECTOMY      SIGMOIDECTOMY      TONSILLECTOMY      TRANSUMBILICAL AUGMENTATION MAMMAPLASTY      UPPER GASTROINTESTINAL ENDOSCOPY      WISDOM TOOTH EXTRACTION         Physical Exam    Airway   Mallampati: II   TM distance: >3 FB   Neck ROM: full  Cardiovascular - normal   Rhythm: regular   Rate: normalPulmonary - normal   clear to auscultation  Dental - normal        Anesthesia Plan    Plan: general    Technique: general endotracheal     Lines and Monitors: additional IV   ASA 2  Anesthetic plan and risks discussed with: patient  Postop Plan:   Patient Disposition: inpatient floor planned admission

## 2022-10-12 NOTE — OR SURGEON
Pre-Procedure patient identification:  I am the primary operating surgeon/proceduralist and I have identified the patient on 10/12/22 at 9:54 AM Melchor Lee MD  Phone Number: [phone]

## 2022-10-12 NOTE — BRIEF OP NOTE
T10-S1 Posterior Thorocolumbosacral Decompression and Interbody Fusion, Instrumentation, Pelvic Fization, Allograft, Autograft, BMP, Revision of Existing Instrumentation Procedure Note    Procedure:    T10-S1 Posterior Thorocolumbosacral Decompression and Interbody Fusion, Instrumentation, Pelvic Fization, Allograft, Autograft, BMP, Revision of Existing Instrumentation  CPT(R) Code:  77378 - ARTHRD PST TQ 1NTRSPC EA ADD      Pre-op Diagnosis     * Lumbar radiculopathy [M54.16]     * Spinal stenosis, lumbar region, with neurogenic claudication [M48.062]       Post-op Diagnosis     * Lumbar radiculopathy [M54.16]     * Spinal stenosis, lumbar region, with neurogenic claudication [M48.062]    Surgeon(s) and Role:     * Melchor Lee MD - Primary     * Sita Medel DO - Resident - Assisting    Anesthesia: General    Staff:   Circulator: Michelle Gimenez, DANIE; Madie Rueda RN  Scrub Person: Bee Ventura RN; Judith Sr RN  Registered Nurse First Assistant: Eva Skinner RN    Procedure Details    T10-S1 Posterior Thorocolumbosacral Decompression and Interbody Fusion, Instrumentation, Pelvic Fization, Allograft, Autograft, BMP, Revision of Existing Instrumentation      Estimated Blood Loss: 450 mL    Specimens:                No specimens collected during this procedure.      Drains:   Drain 1 Right;Medial Back 10 Fr. (Active)       Drain 1 Left;Medial Back 10 Fr. (Active)       Urethral Catheter Latex 16 Fr (Active)       Implants:   Implant Name Type Inv. Item Serial No.  Lot No. LRB No. Used Action   BONE CHIPS 30CC FINE FILLER - [id number] - [id number] Bone graft extender or substitute BONE CHIPS 30CC FINE FILLER [id number] BACTERIN  N/A 1 Implanted   OSTEOSPONGE 3DEMIN 39S15WT - [id number] - [id number] Bone graft extender or substitute OSTEOSPONGE 3DEMIN [id number] BACTERIN  N/A 1 Implanted   INFUSE BONE GRAFT MEDIUM KIT - [id number] Bone graft extender or  substitute INFUSE BONE GRAFT MEDIUM KIT  [employer] [id number] N/A 1 Implanted   CAGE 12 X 26MM/9-13MM EXPANDABLE CALIBUR - [id number]  CAGE 12 X 26MM/9-13MM EXPANDABLE CALIBUR  [employer]  N/A 1 Implanted   SCREW 8X80 KINDRA FULL THREAD - [id number]  SCREW 8X80 KINDRA FULL THREAD  DEPUY SPINE  N/A 2 Implanted   ILIAC TI OPN 5.5 X 40MM IMP, CONNECTOR - [id number]  ILIAC TI OPN 5.5 X 40MM IMP, CONNECTOR  DEPUY SPINE  N/A 2 Implanted   SCREW EXPEDIUM 6 X 45MM - [id number] Spinal screw SCREW EXPEDIUM 6 X 45MM  DEPUY SPINE  N/A 5 Implanted   SCREW EXPEDIUM 7.0 X 45MM - [id number] Spinal screw SCREW EXPEDIUM 7.0 X 45MM  DEPUY SPINE  N/A 2 Implanted   SET SCREWS EXPEDIUM - [id number] Spinal screw SET SCREWS EXPEDIUM  DEPUY SPINE  N/A 20 Implanted   SCREW EXPEDIUM 7.5 X 45MM - [id number] Spinal screw SCREW EXPEDIUM 7.5 X 45MM  DEPUY SPINE  N/A 2 Implanted   REAL 480MM - [id number] Spinal real REAL 480MM  DEPUY SPINE  N/A 1 Implanted   SCREW EXPEDIUM 6 X 40MM - [id number] Spinal screw SCREW EXPEDIUM 6 X 40MM  DEPUY SPINE  N/A 1 Implanted              Complications:  None; patient tolerated the procedure well.           Disposition: PACU - hemodynamically stable.           Condition: stable    Melchor Lee MD  Phone Number: [phone]

## 2022-10-12 NOTE — ADDENDUM NOTE
Addendum  created 10/12/22 1714 by Ian Donnelly MD    Order list changed, Pharmacy for encounter modified

## 2022-10-12 NOTE — PERIOPERATIVE NURSING NOTE
Patient was rolling in bed, unable to settle.  Patient is extremely anxious.  Unable to obtain a bp reading because of pt tensing and moving.  Dr. Donnelly present at bedside.  IV versed given.

## 2022-10-12 NOTE — ANESTHESIA PROCEDURE NOTES
Airway  Urgency: elective    Start Time: 10/12/2022 11:58 AM  Airway not difficult    General Information and Staff    Patient location during procedure: OR  Anesthesiologist: Chao Hurley MD  Resident/CRNA: Lety Crowell CRNA  Performed: resident/CRNA     Indications and Patient Condition  Indications for airway management: anesthesia  Sedation level: deep  Preoxygenated: yes  Patient position: sniffing  Mask difficulty assessment: 1 - vent by mask    Final Airway Details  Final airway type: endotracheal airway      Successful airway: ETT  Cuffed: yes   Successful intubation technique: direct laryngoscopy  Facilitating devices/methods: intubating stylet  Endotracheal tube insertion site: oral  Blade: Be  Blade size: #3  ETT size (mm): 7.0  Cormack-Lehane Classification: grade I - full view of glottis  Placement verified by: chest auscultation and capnometry   Measured from: lips  ETT to lips (cm): 22  Number of attempts at approach: 1  Number of other approaches attempted: 0  Atraumatic airway insertion

## 2022-10-13 LAB
ANION GAP SERPL CALC-SCNC: 8 MEQ/L (ref 3–15)
BUN SERPL-MCNC: 27 MG/DL (ref 8–20)
CALCIUM SERPL-MCNC: 8.7 MG/DL (ref 8.9–10.3)
CHLORIDE SERPL-SCNC: 105 MEQ/L (ref 98–109)
CO2 SERPL-SCNC: 20 MEQ/L (ref 22–32)
CREAT SERPL-MCNC: 0.8 MG/DL (ref 0.6–1.1)
ERYTHROCYTE [DISTWIDTH] IN BLOOD BY AUTOMATED COUNT: 13.7 % (ref 11.7–14.4)
GFR SERPL CREATININE-BSD FRML MDRD: >60 ML/MIN/1.73M*2
GLUCOSE SERPL-MCNC: 124 MG/DL (ref 70–99)
HCT VFR BLDCO AUTO: 31.5 % (ref 35–45)
HGB BLD-MCNC: 10.4 G/DL (ref 11.8–15.7)
MCH RBC QN AUTO: 30.3 PG (ref 28–33.2)
MCHC RBC AUTO-ENTMCNC: 33 G/DL (ref 32.2–35.5)
MCV RBC AUTO: 91.8 FL (ref 83–98)
PDW BLD AUTO: 10.6 FL (ref 9.4–12.3)
PLATELET # BLD AUTO: 323 K/UL (ref 150–369)
POTASSIUM SERPL-SCNC: 4.6 MEQ/L (ref 3.6–5.1)
RBC # BLD AUTO: 3.43 M/UL (ref 3.93–5.22)
SODIUM SERPL-SCNC: 133 MEQ/L (ref 136–144)
WBC # BLD AUTO: 13.98 K/UL (ref 3.8–10.5)

## 2022-10-13 PROCEDURE — 12000000 HC ROOM AND CARE MED/SURG

## 2022-10-13 PROCEDURE — 80048 BASIC METABOLIC PNL TOTAL CA: CPT | Performed by: STUDENT IN AN ORGANIZED HEALTH CARE EDUCATION/TRAINING PROGRAM

## 2022-10-13 PROCEDURE — 97161 PT EVAL LOW COMPLEX 20 MIN: CPT | Mod: GP

## 2022-10-13 PROCEDURE — 85027 COMPLETE CBC AUTOMATED: CPT | Performed by: STUDENT IN AN ORGANIZED HEALTH CARE EDUCATION/TRAINING PROGRAM

## 2022-10-13 PROCEDURE — 63600000 HC DRUGS/DETAIL CODE: Performed by: STUDENT IN AN ORGANIZED HEALTH CARE EDUCATION/TRAINING PROGRAM

## 2022-10-13 PROCEDURE — 25000000 HC PHARMACY GENERAL: Performed by: STUDENT IN AN ORGANIZED HEALTH CARE EDUCATION/TRAINING PROGRAM

## 2022-10-13 PROCEDURE — 36415 COLL VENOUS BLD VENIPUNCTURE: CPT | Performed by: STUDENT IN AN ORGANIZED HEALTH CARE EDUCATION/TRAINING PROGRAM

## 2022-10-13 PROCEDURE — 63700000 HC SELF-ADMINISTRABLE DRUG: Performed by: STUDENT IN AN ORGANIZED HEALTH CARE EDUCATION/TRAINING PROGRAM

## 2022-10-13 PROCEDURE — 25800000 HC PHARMACY IV SOLUTIONS: Performed by: STUDENT IN AN ORGANIZED HEALTH CARE EDUCATION/TRAINING PROGRAM

## 2022-10-13 PROCEDURE — 97535 SELF CARE MNGMENT TRAINING: CPT | Mod: GO

## 2022-10-13 PROCEDURE — 63600000 HC DRUGS/DETAIL CODE: Mod: JW | Performed by: NURSE PRACTITIONER

## 2022-10-13 PROCEDURE — 97166 OT EVAL MOD COMPLEX 45 MIN: CPT | Mod: GO

## 2022-10-13 PROCEDURE — 97116 GAIT TRAINING THERAPY: CPT | Mod: GP

## 2022-10-13 RX ORDER — DEXAMETHASONE SODIUM PHOSPHATE 4 MG/ML
10 INJECTION, SOLUTION INTRA-ARTICULAR; INTRALESIONAL; INTRAMUSCULAR; INTRAVENOUS; SOFT TISSUE
Status: COMPLETED | OUTPATIENT
Start: 2022-10-13 | End: 2022-10-14

## 2022-10-13 RX ADMIN — SENNOSIDES AND DOCUSATE SODIUM 1 TABLET: 50; 8.6 TABLET ORAL at 08:29

## 2022-10-13 RX ADMIN — ACETAMINOPHEN 650 MG: 325 TABLET ORAL at 12:39

## 2022-10-13 RX ADMIN — OXYCODONE HYDROCHLORIDE 5 MG: 5 TABLET ORAL at 01:13

## 2022-10-13 RX ADMIN — DIAZEPAM 5 MG: 5 TABLET ORAL at 08:29

## 2022-10-13 RX ADMIN — ACETAMINOPHEN 650 MG: 325 TABLET ORAL at 23:06

## 2022-10-13 RX ADMIN — DEXAMETHASONE SODIUM PHOSPHATE 10 MG: 4 INJECTION, SOLUTION INTRA-ARTICULAR; INTRALESIONAL; INTRAMUSCULAR; INTRAVENOUS; SOFT TISSUE at 08:35

## 2022-10-13 RX ADMIN — ACETAMINOPHEN 650 MG: 325 TABLET ORAL at 05:02

## 2022-10-13 RX ADMIN — SODIUM CHLORIDE 1 G: 900 INJECTION INTRAVENOUS at 05:02

## 2022-10-13 RX ADMIN — DIAZEPAM 5 MG: 5 TABLET ORAL at 21:33

## 2022-10-13 RX ADMIN — OXYCODONE HYDROCHLORIDE 5 MG: 5 TABLET ORAL at 23:07

## 2022-10-13 RX ADMIN — OXYCODONE HYDROCHLORIDE 5 MG: 5 TABLET ORAL at 12:39

## 2022-10-13 RX ADMIN — DEXAMETHASONE SODIUM PHOSPHATE 10 MG: 4 INJECTION, SOLUTION INTRA-ARTICULAR; INTRALESIONAL; INTRAMUSCULAR; INTRAVENOUS; SOFT TISSUE at 18:05

## 2022-10-13 RX ADMIN — OXYCODONE HYDROCHLORIDE 5 MG: 5 TABLET ORAL at 18:03

## 2022-10-13 RX ADMIN — POLYETHYLENE GLYCOL (3350) 17 G: 17 POWDER, FOR SOLUTION ORAL at 08:29

## 2022-10-13 RX ADMIN — SODIUM CHLORIDE 1 G: 900 INJECTION INTRAVENOUS at 22:18

## 2022-10-13 RX ADMIN — ACETAMINOPHEN 650 MG: 325 TABLET ORAL at 18:04

## 2022-10-13 RX ADMIN — SODIUM CHLORIDE 1 G: 900 INJECTION INTRAVENOUS at 12:44

## 2022-10-13 RX ADMIN — PANTOPRAZOLE SODIUM 40 MG: 40 INJECTION, POWDER, FOR SOLUTION INTRAVENOUS at 08:39

## 2022-10-13 RX ADMIN — SODIUM CHLORIDE: 9 INJECTION, SOLUTION INTRAVENOUS at 01:43

## 2022-10-13 RX ADMIN — OXYCODONE HYDROCHLORIDE 5 MG: 5 TABLET ORAL at 05:02

## 2022-10-13 RX ADMIN — SENNOSIDES AND DOCUSATE SODIUM 1 TABLET: 50; 8.6 TABLET ORAL at 21:33

## 2022-10-13 RX ADMIN — ACETAMINOPHEN 650 MG: 325 TABLET ORAL at 00:22

## 2022-10-13 ASSESSMENT — COGNITIVE AND FUNCTIONAL STATUS - GENERAL
HELP NEEDED FOR PERSONAL GROOMING: 3 - A LITTLE
WALKING IN HOSPITAL ROOM: 4 - NONE
HELP NEEDED FOR BATHING: 3 - A LITTLE
TOILETING: 4 - NONE
DRESSING REGULAR LOWER BODY CLOTHING: 3 - A LITTLE
EATING MEALS: 4 - NONE
DRESSING REGULAR UPPER BODY CLOTHING: 4 - NONE
AFFECT: WFL
STANDING UP FROM CHAIR USING ARMS: 4 - NONE
AFFECT: WFL
CLIMB 3 TO 5 STEPS WITH RAILING: 3 - A LITTLE
MOVING TO AND FROM BED TO CHAIR: 4 - NONE

## 2022-10-13 ASSESSMENT — PATIENT HEALTH QUESTIONNAIRE - PHQ9: SUM OF ALL RESPONSES TO PHQ9 QUESTIONS 1 & 2: 0

## 2022-10-13 NOTE — NURSING NOTE
Patient is having severe back pain post-op, not adequately covered by 5mg oxy or 0.25mg dilaudid. Not due for either medication or valium yet. [facility] [place] to see if a range order for her pain medications could be placed to provide better pain management.     Giving scheduled tylenol now. Repositioning done to increase comfort levels.

## 2022-10-13 NOTE — PLAN OF CARE
Plan of Care Review  Plan of Care Reviewed With: patient  Progress: improving  Outcome Summary: Pain is becoming more managable per pt. Has been OOB to the bathroom and now in the chair, alarm set. Brantley catheter removed, now DTV. Measuring hat is in the BR. IS teaching done with pt. IVF are running, IV ancef infused. Decreased strength in the LLE, but no numbness or tingling which she had prior.

## 2022-10-13 NOTE — PROGRESS NOTES
Patient: Reyna Castillo  Location:  [facility] 4B 4208  MRN:  [id number]  Today's date:  10/13/2022    Pt left in locked bedside recliner. Pt is oriented to their call bell and their chair alarm is on. RN made aware of pt session.     Reyna is a 72 y.o. female admitted on 10/12/2022 with Lumbar radiculopathy [M54.16]  Spinal stenosis, lumbar region, with neurogenic claudication [M48.062]. Principal problem is Lumbar radiculopathy.    Past Medical History  Reyna has a past medical history of Abnormal ECG (10/09/2018), Acid reflux, Agatston CAC score 100-199 (10/09/2018), Anxiety, Lopez's esophagus without dysplasia, Class 2 severe obesity due to excess calories with serious comorbidity and body mass index (BMI) of 36.0 to 36.9 in adult (CMS/[facility]) (10/09/2018), Depression, DJD (degenerative joint disease), Family history of early CAD (10/09/2018), Hearing loss, History of hepatitis A, Hypertension, Kidney lesion, Lumbar myelopathy (CMS/[facility]), Tran-Juan tear, Mixed hyperlipidemia (10/09/2018), and Spinal enthesopathy of lumbar region (CMS/[facility]).    History of Present Illness    72F S/P T10-S1 PSF with Dr. Lee 10/12        PT Vitals    Date/Time Pulse HR Source SpO2 Pt Activity O2 Therapy BP BP Location BP Method Pt Position [facility]   10/13/22 0840 88 Monitor 97 % At rest None (Room air) 153/77 Right upper arm Automatic Sitting PER   10/13/22 0841 103 Monitor -- -- -- 132/58 Right upper arm Automatic Sitting PER      PT Pain    Date/Time Pain Type Location Rating: Rest Rating: Activity Interventions [facility]   10/13/22 0840 Pain Assessment back 4 5 care clustered;position adjusted;premedicated for activity PER          Prior Living Environment    Flowsheet Row Most Recent Value   People in Home spouse   Current Living Arrangements home   Living Environment Comment 2STH w/ spouse,  1+1 YAS,  1/2 bathroom on 1st FL,  FF to second floor bedroom,  tub shower w/ shower chair + grab bars,  standard toilets        Prior  Level of Function    Flowsheet Row Most Recent Value   Dominant Hand right   Ambulation assistive equipment   Transferring assistive equipment   Toileting independent   Bathing assistive equipment   Dressing independent   Eating independent   Prior Level of Function Comment Use of RW for transfers/ambulation since previous spinal surgery in November 2021. Manages ADLs at mod (I) level. Reports mostly staying in the house since spinal surgery   Assistive Device Currently Used at Home walker, standard, raised toilet, dressing device           PT Evaluation and Treatment - 10/13/22 0834        PT Time Calculation    Start Time 0834     Stop Time 0919     Time Calculation (min) 45 min        Session Details    Document Type initial evaluation     Mode of Treatment physical therapy        General Information    Patient Profile Reviewed yes     Patient/Family/Caregiver Comments/Observations RN cleared for PT initial evaluation     General Observations of Patient pt received sitting inbedside chair. TLSO donned. CARLOS drain x2 in place     Existing Precautions/Restrictions fall;brace worn when out of bed;spinal   TLSO    Limitations/Impairments hearing;safety/cognitive        Cognition/Psychosocial    Affect/Mental Status (Cognition) WFL     Orientation Status (Cognition) oriented x 3     Follows Commands (Cognition) follows one-step commands;75-90% accuracy        Sensory Assessment (Somatosensory)    Left LE Sensory Assessment light touch awareness;intact     Right LE Sensory Assessment light touch awareness;intact        Range of Motion (ROM)    Left Lower Extremity (ROM) left LE ROM is WFL     Right Lower Extremity (ROM) right LE ROM is WFL     Comment: Range of Motion grossly assessed sitting in bedside chair B LE        Strength (Manual Muscle Testing)    Hip, Left (Strength) 2/5     Knee, Left (Strength) 2+/5        Strength Comprehensive (MMT)    Comment grossly assessed sitting in bedside chair B LE 3+/5 except L  knee and hip        Bed Mobility    Comment (Bed Mobility) pt received in bedside chair.        Transfers    Comment gait belt not donned due to TLSO and surgery        Sit to Stand Transfer    [place], Sit to Stand Transfer modified independence     Assistive Device walker, front-wheeled     Comment from bedside chair (x3). from low surfae (x2). from EOB (x1).        Stand to Sit Transfer    [place], Stand to Sit Transfer modified independence     Assistive Device walker, front-wheeled     Comment to low surface (x2). to EOB (x1) and to bedside recliner (x3)        Gait Training    [place], Gait modified independence     Assistive Device walker, front-wheeled     Distance in Feet 350 feet   300'x1 and 50'x1    Pattern (Gait) step-to;step-through     Deviations/Abnormal Patterns (Gait) leoncio decreased;gait speed decreased;step length decreased     Comment (Gait/Stairs) pt ambulated 300'x1 and 50'x1. pt initially demonstrated step-to gait pattern progressing to step-through with inc repetition. pt dec heel strike on L foot        Stairs Training    [place], Stairs minimum assist (75% or more patient effort);1 person assist;close supervision     Assistive Device none;railing     Handrail Location (Stairs) right side (ascending);left side (descending)     Number of Stairs 10     Stair Height 6 inches     Ascending Stairs Technique step-to-step     Descending Stairs Technique step-to-step     Comment pt neogtiated 2 without railings requring minAx1 HHA and at trunk for stability. pt reports spouse and dtr can provide minAx1 for assist. pt then negotiated 6 steps close supervision        Safety Issues, Functional Mobility    Comment, Safety Issues/Impairments (Mobility) pt easily distracted throughout session likely baseline.        Balance    Static Sitting Balance WFL;sitting in chair;unsupported     Dynamic Sitting Balance WFL;unsupported;sitting in chair     Static Standing Balance WFL;supported      Dynamic Standing Balance WFL;supported     Comment, Balance w/ RW        Motor Skills    Functional Endurance fair+        Orthotics & Prosthetics Management    Orthosis Location spinal orthosis        Spinal Orthosis Management    Type (Spinal Orthosis) TLSO (thoracic lumbosacral orthosis)     Functional Design (Spinal Orthosis) static orthosis     Therapeutic Indications Spinal Orthosis post-op positioning/protection     Wearing Schedule (Spinal Orthosis) wear when out of bed only     Orthosis Training (Spinal Orthosis) all orthosis skills        AM-PAC (TM) - Mobility (Current Function)    Turning from your back to your side while in a flat bed without using bedrails? 4 - None     Moving from lying on your back to sitting on the side of a flat bed without using bedrails? 4 - None     Moving to and from a bed to a chair? 4 - None     Standing up from a chair using your arms? 4 - None     To walk in a hospital room? 4 - None     Climbing 3-5 steps with a railing? 3 - A Little     AM-PAC (TM) Mobility Score 23        Assessment/Plan (PT)    Daily Outcome Statement pt seen for physical therapy initial evalaution. [facility] 23. pt Hanny sit<>stand. pt Hanny ambulation 300'x1 and 50'x1 w/ RW. pt initially step through progressing to step-through with inc repetition. pt negotiated 10 steps requiring minAx1 without railings and close supervision with railings. pt reports spouse and dtr can provide minAx1 at home. due to pt being close to baseline PLOF, pt no longer in need of skilled acute PT. recommend home with inc family assistance and home health upon d/c when medically stable. d/c PT. pt reports no concerns.     Rehab Potential --   d/c PT    Therapy Frequency evaluation only               PT Assessment/Plan    Flowsheet Row Most Recent Value   PT Recommended Discharge Disposition home with home health, home with assistance at 10/13/2022 0834   Anticipated Equipment Needs at Discharge (PT) none at 10/13/2022 0834    Patient/Family Therapy Goals Statement to go home at 10/13/2022 0834                    Education Documentation  Treatment Plan, taught by Larry Guevara at 10/13/2022  3:28 PM.  Learner: Patient  Readiness: Acceptance  Method: Explanation  Response: Verbalizes Understanding  Comment: PT POC, call bell, goals, safe transfers

## 2022-10-13 NOTE — PROGRESS NOTES
Pt without new complaints, doing well  AFVSS CARLOS in place  5/5 motor BLE all muscle groups  SILT BLE all sens dist  Dressing dry     A/P stable post op day 1  -pt, oob  -pain control  -abx/drain  -scds  -med management  -TLSO Brace when oob

## 2022-10-13 NOTE — PLAN OF CARE
Problem: Adult Inpatient Plan of Care  Goal: Plan of Care Review  Outcome: Progressing  Flowsheets (Taken 10/13/2022 0279)  Progress: improving  Plan of Care Reviewed With: patient  Outcome Summary: AAOx4, AVSS. Pleasant, pain well controlled with ATC and PRN regimen. Ambulating SV with walker and TLSO brace. CARLOS drains x2 in place with bloody drainage R upper > R lower. IV abx as ordered while drain in. Lower back dressing CDI. Performed daily care with minimal assistance. Voiding without difficulty. Tolerating regular diet. Alarms on, call bell within reach.

## 2022-10-13 NOTE — PLAN OF CARE
Problem: Adult Inpatient Plan of Care  Goal: Plan of Care Review  Outcome: Progressing  Flowsheets (Taken 10/13/2022 1244)  Plan of Care Reviewed With: patient  Outcome Summary: OT IE completed. No further acute care OT needs indicated. Recommend home w/ spouse once stable.

## 2022-10-13 NOTE — PLAN OF CARE
Problem: Adult Inpatient Plan of Care  Goal: Readiness for Transition of Care  Intervention: Mutually Develop Transition Plan  Flowsheets (Taken 10/13/2022 0554)  Anticipated Discharge Disposition: home without assistance or services  Discharge Coordination/Progress: Dc to home with no needs per Ortho NP.  Assistive Device/Animal Currently Used at Home:   walker, standard   raised toilet   dressing device  Anticipated Changes Related to Illness: none  Concerns Comments: Spoke with Ortho NP, No HHC needed.  Transportation Concerns: car, none  Readmission Within the Last 30 Days: no previous admission in last 30 days  Patient/Family Anticipated Services at Transition: none  Patient/Family Anticipates Transition to:   home   home with family  Transportation Anticipated: family or friend will provide  Concerns to be Addressed: no discharge needs identified     Chart reviewed SW met with pt at bedside to discuss dc.  Pt lives with spouse in 2 story home with 2 steps to the entrance.  Bed and bath on 2nd floor with half-bath on 1st floor.  Pt used walker prior to admission and has raised toilet.  Pt independents os IADLs.  Drives, retired, and no HHC prior to admission.  Pt does not need HHC per Ortho NP.  PLAN: dc to home with spouse, no needs.  Mayuri Rodríguez MSW, LSW

## 2022-10-13 NOTE — NURSING NOTE
Valium in on MAR hold from receiving medication in the PACU. Paged [facility] to have medication taken off hold.

## 2022-10-13 NOTE — PROGRESS NOTES
Patient:  Reyna Castillo  Location:  [facility] 4B 4208  MRN:  [id number]  Today's date:  10/13/2022     RN cleared for OT session.     Patient rec'd resting in bedside recliner. Session ended w/ patient resting in bedside recliner, all needs within reach, NAD/VSS, chair alarm activated.     RN notified of patient's status and performance.     Reyna is a 72 y.o. female admitted on 10/12/2022 with Lumbar radiculopathy [M54.16]  Spinal stenosis, lumbar region, with neurogenic claudication [M48.062]. Principal problem is Lumbar radiculopathy.    Past Medical History  Reyna has a past medical history of Abnormal ECG (10/09/2018), Acid reflux, Agatston CAC score 100-199 (10/09/2018), Anxiety, Lopez's esophagus without dysplasia, Class 2 severe obesity due to excess calories with serious comorbidity and body mass index (BMI) of 36.0 to 36.9 in adult (CMS/[facility]) (10/09/2018), Depression, DJD (degenerative joint disease), Family history of early CAD (10/09/2018), Hearing loss, History of hepatitis A, Hypertension, Kidney lesion, Lumbar myelopathy (CMS/[facility]), Tran-Juan tear, Mixed hyperlipidemia (10/09/2018), and Spinal enthesopathy of lumbar region (CMS/[facility]).    History of Present Illness    72F S/P T10-S1 PSF with Dr. Lee 10/12        OT Vitals    Date/Time Pulse HR Source SpO2 Pt Activity O2 Therapy BP BP Location BP Method Pt Position [facility]   10/13/22 0840 88 Monitor 97 % At rest None (Room air) 153/77 Right upper arm Automatic Sitting PER   10/13/22 0841 103 Monitor -- -- -- 132/58 Right upper arm Automatic Sitting PER      OT Pain    Date/Time Pain Type Location Rating: Rest Rating: Activity Interventions [facility]   10/13/22 0840 Pain Assessment back 4 5 care clustered;position adjusted;premedicated for activity PER          Prior Living Environment    Flowsheet Row Most Recent Value   People in Home spouse   Current Living Arrangements home   Living Environment Comment 2STH w/ spouse,  1+1 YAS,  1/2 bathroom on  1st FL,  FF to second floor bedroom,  tub shower w/ shower chair + grab bars,  standard toilets        Prior Level of Function    Flowsheet Row Most Recent Value   Dominant Hand right   Ambulation assistive equipment   Transferring assistive equipment   Toileting independent   Bathing assistive equipment   Dressing independent   Eating independent   Prior Level of Function Comment Use of RW for transfers/ambulation since previous spinal surgery in November 2021. Manages ADLs at mod (I) level. Reports mostly staying in the house since spinal surgery   Assistive Device Currently Used at Home walker, standard, raised toilet, dressing device        Occupational Profile    Flowsheet Row Most Recent Value   Reason for Services/Referral S/P T10-S1 PSF   Successful Occupations Mod (I) PTA   Occupational History/Life Experiences    Performance Patterns Sedentary lifestyle - mostly stays indoors   Environmental Supports and Barriers Lives w/ spouse           OT Evaluation and Treatment - 10/13/22 0835        OT Time Calculation    Start Time 0835     Stop Time 0919     Time Calculation (min) 44 min        Session Details    Document Type initial evaluation     Mode of Treatment occupational therapy        General Information    Patient Profile Reviewed yes     Onset of Illness/Injury or Date of Surgery 10/12/22     Referring Physician Jesus     Patient/Family/Caregiver Comments/Observations RN cleared for OT session; present for AM meds     General Observations of Patient Pt rec'd resting OOB in recliner; TLSO on; CARLOS drain x2 in place     Existing Precautions/Restrictions brace worn when out of bed;fall;spinal     Limitations/Impairments hearing;safety/cognitive        Cognition/Psychosocial    Affect/Mental Status (Cognition) WFL     Orientation Status (Cognition) oriented x 3     Follows Commands (Cognition) follows one-step commands     Cognitive Function attention deficit;executive function deficit;safety deficit      Attention Deficit (Cognition) focused/sustained attention     Executive Function Deficit (Cognition) information processing;insight/awareness of deficits;problem-solving/reasoning     Safety Deficit (Cognition) at risk behavior observed;safety precautions follow-through/compliance        Vision Assessment/Intervention    Visual Impairment/Limitations WFL;corrective lenses for reading        Sensory Assessment    Left UE Sensory Assessment intact     Right UE Sensory Assessment intact        Range of Motion (ROM)    Left Upper Extremity (ROM) left UE ROM is WFL     Right Upper Extremity (ROM) right UE ROM is WFL        Strength (Manual Muscle Testing)    Strength (Manual Muscle Testing) strength is WFL;bilateral upper extremities        Bed Mobility    Comment (Bed Mobility) Rec'd OOB in recliner        Transfers    Transfers toilet transfer;tub transfer     Comment Mod (I) - supervision for transfers/ambulation w/ RW. TLSO donned prior to mobility; gait belt deferred due to spinal injuries        Sit to Stand Transfer    [place], Sit to Stand Transfer modified independence;verbal cues     Verbal Cues hand placement     Assistive Device walker, front-wheeled     Comment Multiple trials from recliner, toilet + tub bench        Stand to Sit Transfer    [place], Stand to Sit Transfer modified independence;verbal cues     Verbal Cues hand placement     Assistive Device walker, front-wheeled     Comment To various surface heights w/ good hand placement        Toilet Transfer    Transfer Technique stand-sit;sit-stand     [place], Toilet Transfer modified independence     Verbal Cues hand placement;preparatory posture     Assistive Device grab bars/safety frame;walker, front-wheeled        Tub Transfer    Transfer Technique sit and swing     [place], Tub Transfer supervision;verbal cues;set up     Verbal Cues proper use of assistive device;safety     Assistive Device tub bench;walker, front-wheeled      Comment W/ recommendations for spouse, Dwayne, to be present for transfer - patient verbalizes understanding        Balance    Static Sitting Balance WFL;supported;sitting in chair     Dynamic Sitting Balance WFL;supported;sitting in chair     Static Standing Balance WFL;supported     Dynamic Standing Balance mild impairment;supported     Comment, Balance Benefits from RW; supervision for standing balance during LBD task        Motor Skills    Motor Skills functional endurance     Functional Endurance Within expected limits for age/status - vitals stable; pain ranges between 4-5/10        Lower Body Dressing    Tasks don;underwear;pants/bottoms     Self-Performance obtains clothes     [place] Assistance threads left leg, pants/shorts     [place] supervision;verbal cues     Position edge of bed sitting;supported standing     Adaptive Equipment reacher     Comment Education provided re: use of LH AE for LBD. Required < 25% assist to thread distal BLE through clothing - reports spouse can assist at home        Toileting    Comment Education provided re: integration of spinal precautions into toileting ADL - patient verbalizes understanding        BADL Safety/Performance    Impairments, BADL Safety/Performance balance;cognition;pain        ADL Interventions    Energy Conservation Techniques activity pacing encouraged;correct body mechanics utilized;equipment and device use facilitated        AM-PAC (TM) - ADL (Current Function)    Putting on and taking off regular lower body clothing? 3 - A Little     Bathing? 3 - A Little     Toileting? 4 - None     Putting on/taking off regular upper body clothing? 4 - None     How much help for taking care of personal grooming? 3 - A Little     Eating meals? 4 - None     AM-PAC (TM) ADL Score 21        Assessment/Plan (OT)    Daily Outcome Statement Reyna was seen today for OT IE s/p T10-S1 PSF. At baseline, she lives w/ her  and functions at mod (I) level. On eval  today, she requires mod (I) - supervision w/ RW w/ cueing for safety. She was educated on use of reacher (which she owns) for LBD and will require assist from spouse for LBD + supervision for tub transfer. At this time, no further acute care OT needs are indicated - will D/C orders and recommend home w/ spouse once stable.     Rehab Potential good, to achieve stated therapy goals     Therapy Frequency evaluation only               OT Assessment/Plan    Flowsheet Row Most Recent Value   OT Recommended Discharge Disposition home with assistance at 10/13/2022 0835   Anticipated Equipment Needs At Discharge (OT) none at 10/13/2022 0835                    Education Documentation  Treatment Plan, taught by Mack Correa OT at 10/13/2022 12:43 PM.  Learner: Patient  Readiness: Acceptance  Method: Explanation  Response: Verbalizes Understanding  Comment: OT role/POC; safety w/ transfers + ambulation; integration of spinal precautions; use of LH AE

## 2022-10-13 NOTE — HOSPITAL COURSE
Reyna is a 72 y.o. female admitted on 10/12/2022 with Lumbar radiculopathy [M54.16]  Spinal stenosis, lumbar region, with neurogenic claudication [M48.062]. Principal problem is Lumbar radiculopathy.    Past Medical History  Reyna has a past medical history of Abnormal ECG (10/09/2018), Acid reflux, Agatston CAC score 100-199 (10/09/2018), Anxiety, Lopez's esophagus without dysplasia, Class 2 severe obesity due to excess calories with serious comorbidity and body mass index (BMI) of 36.0 to 36.9 in adult (CMS/[facility]) (10/09/2018), Depression, DJD (degenerative joint disease), Family history of early CAD (10/09/2018), Hearing loss, History of hepatitis A, Hypertension, Kidney lesion, Lumbar myelopathy (CMS/[facility]), Tran-Juan tear, Mixed hyperlipidemia (10/09/2018), and Spinal enthesopathy of lumbar region (CMS/[facility]).    History of Present Illness    72F S/P T10-S1 PSF with Dr. Lee 10/12

## 2022-10-13 NOTE — OP NOTE
REPORT TYPE: Operative Note    DATE OF OPERATION: 10/12/2022    ADDENDUM    The bilateral S1 screws were removed.  The fusion mass was explored posterolaterally L3 to S1.  There was noted to be a solid posterolateral fusion L3 to S1 when explored with electrocautery and 3-0 curved curette.  The bilateral L3, L4 and L5 screws   were well fixed.  A large rongeur was then used to remove the spinous processes of L1, L2 and thin the lamina at L1-L2 and the remainder of the lamina at L3.  A 3 mm Kerrison rongeur was used to remove ligamentum flavum and perform laminectomy, removing   the remainder of the L3 lamina and the entire L2 lamina, and inferior two-thirds of L1 lamina.  Lateral recess decompression was performed using a 3 mm Kerrison rongeur, removing ligamentum flavum and performing a partial medial facetectomy bilaterally   at L1-L2 and L2-L3.  Bilateral foraminotomies were performed at L1-L2 and L2-L3 using a 3 mm Kerrison rongeur.  An osteotome and a mallet was used to osteotomize through the left L2 pars.  The left L2 pars and inferior articular process of L2 was removed   using a large rongeur.  There was a facet cyst on the left side at L2-L3, which was removed.  A 15 blade scalpel was used to sharply incise the annulus and through the annulotomy, the L2-L3 interspace was completely debrided free of all disk and   cartilaginous material back to bleeding endplate bone using a series of curved and straight curettes, pituitary rongeurs and endplate yashira.  The interspaces were irrigated and inspected to ensure the bleeding bony endplates were exposed.  Local   autograft bone from laminectomy was morcellized, it was combined with crushed cancellous allograft bone and bone morphogenic protein.  This bone graft combination was impacted using a tamp and a mallet in the L2-L3 interspace.  Additional amounts of this   bone graft combination were placed in a Globus Caliber cage of appropriate size.  The cage was  impacted using a tamp and a mallet in the L2-L3 interspace and expanded up to an appropriate height.  Secure fit was noted with the cage.  A high-speed bur   was then used to make starting point for the pedicle screws bilaterally at T11, T12, L1 and L2 followed by advancement of the pedicle probe through the isthmus of the pedicle into the vertebral bodies to an appropriate depth.  The holes were tapped and   felt with the ball-tipped probe to ensure there were no breaches.  Following this, screws of appropriate width and length were placed bilaterally in the above-mentioned levels and replaced bilaterally at S1.  Secure fixation was noted with each screw   purchase.  All lumbar screws tested well with EMG without evidence of medial breach.  Following this, through separate fascial incisions the posterior superior iliac spines were exposed bilaterally.  A large rongeur was used to remove the bilateral   superior iliac spines and a Steffee probe was advanced into the ilium between the inner and outer tables of the ilium bilaterally.  The holes were tapped and felt with the ball-tipped probe to ensure there were no breaches.  Following this, screws of   appropriate width and length were placed in the bilateral ilium and offset connectors were placed in the bilateral iliac screws and finally tightened in place according to the 's recommendation.  Rods were cut and contoured and placed in the   screws bilaterally at T11 to S1 and the ilium.  Caps were placed over the rods into the screws as well as the offset connector and finally tightened in place according to the 's recommendations.  Intraoperative AP and lateral x-rays were   taken, which showed adequate position of instrumentation and interbody cage.  The adequacy of the decompression was confirmed with a Hillary, which was passed out of the foramen along lateral recess bilaterally L1 to L3.  There was no remaining stenosis   or compression  noted.  The exiting and traversing nerve roots bilaterally L1 to L3 were fully decompressed.  Hemostasis was obtained using electrocautery, bipolar and FloSeal.  Under Valsalva maneuver, there was no significant bleeding noted.  No   evidence of any spinal fluid leakage.  The wound was copiously irrigated with antibiotic-impregnated solution.  Hemostasis was felt to be adequate.  A high-speed bur was then used to decorticate the remaining posterior elements T11 to L2 including the   remaining lamina, facet joints, transverse processes. The remainder of the bone graft combination including local autograft bone, crushed cancellous allograft bone, Bacterin bone matrix and BMP was packed out of the decorticated bone surfaces bilaterally   at T11 to L2 and L3.  The bilateral L3 fusion mass and transverse processes as well as the right-sided L2-L3 facet joint were also decorticated. Following this, hemostasis was felt to be adequate.  A drain was placed deep to the deep fascia.  Deep   fascia was closed in interrupted fashion.  Drain was placed superficial to the deep fascia.  Subcutaneous tissue was closed in interrupted fashion.  Skin was closed in running fashion followed by a dry dressing and tape.  The patient was turned supine on   the hospital bed, extubated and transferred to the PACU in stable condition.  There were no complications.      Melchor Lee MD    DD: 10/13/2022 16:13  DT: 10/13/2022 16:32  Voice ID: [id number]/Report ID: [id number]  argenis

## 2022-10-13 NOTE — PROGRESS NOTES
Ortho Daily Progress Note    Subjective     Interval History: Patient seen and examined, sitting up in recliner. Pain controlled. Some soreness overnight, also c/o new R thigh numbness. Denies numbness/tingling/fevers/chills/ha/cp/sob       Objective     Vital signs in last 24 hours:  Temp:  [36.3 °C (97.3 °F)-36.8 °C (98.2 °F)] 36.8 °C (98.2 °F)  Heart Rate:  [] 97  Resp:  [11-28] 18  BP: ()/() 97/55      Intake/Output Summary (Last 24 hours) at 10/13/2022 0620  Last data filed at 10/13/2022 0502  Gross per 24 hour   Intake 1740 ml   Output 2166 ml   Net -426 ml     Intake/Output this shift:  I/O this shift:  In: -   Out: 1241 [Urine:800; Drains:441]    Labs  Am labs pending    Imaging  No new imaging    VTE Assessment: TBD    Physical Exam:  Spine:  Dressing C/D/I - re-inforced bedside  CARLOS drain intact w/ serosang output  Appropriately TTP around incision     LE:  Motor 5/5 strength L2-S1 bilaterally  SILT L2-S1 bilaterally, subjectively decreased R L3-4 distribution  No clonus. downgoing babinski bilaterally  2+DP/PT pulses  BCR      72F S/P T10-S1 PSF with Dr. Lee 10/12    Plan      -Ancef q8 while drain intact  -monitor drain OP-Drains w/ 121 overnight, 616 cc since OR.   -DVT ppx: SCDs  -WBAT  -PT/OT  -Pain control  -Dispo: pending       Bubba Sykes, DO  PGY-5  Orthopedic surgery

## 2022-10-13 NOTE — PATIENT CARE CONFERENCE
Care Progression Rounds Note  Date: 10/13/2022  Time: 8:53 AM     Patient Name: Reyna Castillo     Medical Record Number: [id number]   YOB: 1950  Sex: Female      Room/Bed: 4208    Admitting Diagnosis: Lumbar radiculopathy [M54.16]  Spinal stenosis, lumbar region, with neurogenic claudication [M48.062]   Admit Date/Time: 10/12/2022  8:34 AM    Primary Diagnosis: Lumbar radiculopathy  Principal Problem: Lumbar radiculopathy    GMLOS: pending  Anticipated Discharge Date: 10/15/2022    AM-PAC:  Mobility Score:      Discharge Planning:  Anticipated Discharge Disposition: home without assistance or services    Barriers to Discharge:  Medical issues not resolved    Comments:       Participants:  nursing, social work/services (CRNP)

## 2022-10-13 NOTE — PLAN OF CARE
Problem: Adult Inpatient Plan of Care  Goal: Plan of Care Review  Outcome: Progressing  Flowsheets (Taken 10/13/2022 9288)  Progress: improving  Plan of Care Reviewed With: patient  Outcome Summary: pt seen for physical therapy initial evalaution. [facility] 23. pt Hanny sit<>stand. pt Hanny ambulation 300'x1 and 50'x1 w/ RW. pt initially step through progressing to step-through with inc repetition. pt negotiated 10 steps requiring minAx1 without railings and close supervision with railings. pt reports spouse and dtr can provide minAx1 at home. due to pt being close to baseline PLOF, pt no longer in need of skilled acute PT. recommend home with inc family assistance and home health upon d/c when medically stable. d/c PT. pt reports no concerns.  Goal: Patient-Specific Goal (Individualized)  Outcome: Progressing     Problem: Functional Ability Impaired (Spinal Surgery)  Goal: Optimal Functional Ability  Outcome: Progressing

## 2022-10-13 NOTE — PROGRESS NOTES
Ortho Daily Progress Note    Subjective     Interval History: Patient seen and examined. Pain controlled. No complaints at this time. Denies numbness/tingling/fevers/chills/ha/cp/sob       Objective     Vital signs in last 24 hours:  Temp:  [36.3 °C (97.3 °F)-36.8 °C (98.2 °F)] 36.8 °C (98.2 °F)  Heart Rate:  [] 105  Resp:  [11-28] 18  BP: (105-169)/() 128/58      Intake/Output Summary (Last 24 hours) at 10/13/2022 0303  Last data filed at 10/13/2022 0025  Gross per 24 hour   Intake 1740 ml   Output 1731 ml   Net 9 ml     Intake/Output this shift:  I/O this shift:  In: -   Out: 806 [Urine:450; Drains:356]    Labs  Am labs pending    Imaging  No new imaging    VTE Assessment: TBD    Physical Exam:  Spine:  Dressing C/D/I  CARLOS drain intact w/ serosang output  Appropriately TTP around incision     LE:  Motor 5/5 strength L2-S1 bilaterally  SILT L2-S1 bilaterally  No clonus. downgoing babinski bilaterally  2+DP/PT pulses  BCR      72F S/P T10-S1 PSF with Dr. Lee 10/12    Plan      -Ancef q8 while drain intact  -monitor drain OP-Drains w/ 531 cc since OR.   -DVT ppx: SCDs  -WBAT  -PT/OT  -Pain control  -Dispo: pending       Bubba Sykes DO  PGY-5  Orthopedic surgery

## 2022-10-13 NOTE — CONSULTS
Brief visit with Mrs. Castillo at the request of her nurse.  She stated she wanted to sleep.  She was happy to have received the Eucharist this morning.  She also requested, and we spoke the Our Father together.    Jenelle Ferreira  Spiritual Care Specialist  #[phone]

## 2022-10-13 NOTE — PROGRESS NOTES
Ortho Daily Progress Note    Subjective     Interval History: Patient seen and examined, resting comfortably. No events overnight. Ambulated 350 feet with PT yesterday, tolerated well. Pain is improving everyday, left leg pain much improved from pre-op. Voiding and having BM.     Objective     Vital signs in last 24 hours:  Temp:  [36.5 °C (97.7 °F)-37.1 °C (98.8 °F)] 37 °C (98.6 °F)  Heart Rate:  [] 82  Resp:  [12-21] 18  BP: ()/(54-79) 153/72      Intake/Output Summary (Last 24 hours) at 10/13/2022 1815  Last data filed at 10/13/2022 1750  Gross per 24 hour   Intake 120 ml   Output 3921 ml   Net -3801 ml     Intake/Output this shift:  I/O this shift:  In: -   Out: 2590 [Urine:2280; Drains:310]    Labs  Am labs pending    Imaging  No new imaging      Physical Exam:  Spine:  Dressing C/D/I  CARLOS drain intact w/ serosang output  Appropriately TTP around incision     LE:  Motor 5/5 strength L2-S1 bilaterally  SILT L2-S1 bilaterally, subjectively decreased R L3-4 distribution  No clonus. downgoing babinski bilaterally  2+DP/PT pulses  BCR    Assessment     72F S/P T10-S1 PSF with Dr. Lee 10/12    Plan      -Ancef q8 while drain intact  -Monitor drain OP-Drains w/ 90 & 290 cc last 24h   -DVT ppx: SCDs  -WBAT  -PT/OT  -Pain control  -Dispo: pending       Wallace Renteria DO, PGY-4  Orthopedic Surgery  Pager: [phone]

## 2022-10-14 LAB
ANION GAP SERPL CALC-SCNC: 10 MEQ/L (ref 3–15)
BUN SERPL-MCNC: 23 MG/DL (ref 8–20)
CALCIUM SERPL-MCNC: 9.5 MG/DL (ref 8.9–10.3)
CHLORIDE SERPL-SCNC: 102 MEQ/L (ref 98–109)
CO2 SERPL-SCNC: 22 MEQ/L (ref 22–32)
CREAT SERPL-MCNC: 0.7 MG/DL (ref 0.6–1.1)
ERYTHROCYTE [DISTWIDTH] IN BLOOD BY AUTOMATED COUNT: 13.7 % (ref 11.7–14.4)
GFR SERPL CREATININE-BSD FRML MDRD: >60 ML/MIN/1.73M*2
GLUCOSE SERPL-MCNC: 131 MG/DL (ref 70–99)
HCT VFR BLDCO AUTO: 31 % (ref 35–45)
HGB BLD-MCNC: 10.6 G/DL (ref 11.8–15.7)
MCH RBC QN AUTO: 31.1 PG (ref 28–33.2)
MCHC RBC AUTO-ENTMCNC: 34.2 G/DL (ref 32.2–35.5)
MCV RBC AUTO: 90.9 FL (ref 83–98)
PDW BLD AUTO: 10.1 FL (ref 9.4–12.3)
PLATELET # BLD AUTO: 334 K/UL (ref 150–369)
POTASSIUM SERPL-SCNC: 4.1 MEQ/L (ref 3.6–5.1)
RBC # BLD AUTO: 3.41 M/UL (ref 3.93–5.22)
SODIUM SERPL-SCNC: 134 MEQ/L (ref 136–144)
WBC # BLD AUTO: 14.46 K/UL (ref 3.8–10.5)

## 2022-10-14 PROCEDURE — 63600000 HC DRUGS/DETAIL CODE: Performed by: NURSE PRACTITIONER

## 2022-10-14 PROCEDURE — 36415 COLL VENOUS BLD VENIPUNCTURE: CPT | Performed by: STUDENT IN AN ORGANIZED HEALTH CARE EDUCATION/TRAINING PROGRAM

## 2022-10-14 PROCEDURE — 63700000 HC SELF-ADMINISTRABLE DRUG: Performed by: STUDENT IN AN ORGANIZED HEALTH CARE EDUCATION/TRAINING PROGRAM

## 2022-10-14 PROCEDURE — 80048 BASIC METABOLIC PNL TOTAL CA: CPT | Performed by: STUDENT IN AN ORGANIZED HEALTH CARE EDUCATION/TRAINING PROGRAM

## 2022-10-14 PROCEDURE — 85027 COMPLETE CBC AUTOMATED: CPT | Performed by: STUDENT IN AN ORGANIZED HEALTH CARE EDUCATION/TRAINING PROGRAM

## 2022-10-14 PROCEDURE — 63700000 HC SELF-ADMINISTRABLE DRUG: Performed by: HOSPITALIST

## 2022-10-14 PROCEDURE — 12000000 HC ROOM AND CARE MED/SURG

## 2022-10-14 PROCEDURE — 63700000 HC SELF-ADMINISTRABLE DRUG: Performed by: NURSE PRACTITIONER

## 2022-10-14 PROCEDURE — 25000000 HC PHARMACY GENERAL: Performed by: STUDENT IN AN ORGANIZED HEALTH CARE EDUCATION/TRAINING PROGRAM

## 2022-10-14 PROCEDURE — 25800000 HC PHARMACY IV SOLUTIONS: Performed by: STUDENT IN AN ORGANIZED HEALTH CARE EDUCATION/TRAINING PROGRAM

## 2022-10-14 PROCEDURE — 63600000 HC DRUGS/DETAIL CODE: Performed by: STUDENT IN AN ORGANIZED HEALTH CARE EDUCATION/TRAINING PROGRAM

## 2022-10-14 PROCEDURE — 99233 SBSQ HOSP IP/OBS HIGH 50: CPT | Performed by: HOSPITALIST

## 2022-10-14 RX ORDER — OXYCODONE HYDROCHLORIDE 5 MG/1
5-10 TABLET ORAL EVERY 4 HOURS PRN
Status: DISCONTINUED | OUTPATIENT
Start: 2022-10-14 | End: 2022-10-17 | Stop reason: HOSPADM

## 2022-10-14 RX ORDER — GABAPENTIN 300 MG/1
300 CAPSULE ORAL 3 TIMES DAILY
Status: DISCONTINUED | OUTPATIENT
Start: 2022-10-14 | End: 2022-10-17 | Stop reason: HOSPADM

## 2022-10-14 RX ORDER — ROSUVASTATIN CALCIUM 20 MG/1
20 TABLET, COATED ORAL DAILY
Status: DISCONTINUED | OUTPATIENT
Start: 2022-10-14 | End: 2022-10-17 | Stop reason: HOSPADM

## 2022-10-14 RX ORDER — FENOFIBRATE 67 MG/1
67 CAPSULE ORAL
Status: DISCONTINUED | OUTPATIENT
Start: 2022-10-15 | End: 2022-10-17 | Stop reason: HOSPADM

## 2022-10-14 RX ORDER — LOSARTAN POTASSIUM 50 MG/1
50 TABLET ORAL DAILY
Status: DISCONTINUED | OUTPATIENT
Start: 2022-10-14 | End: 2022-10-17 | Stop reason: HOSPADM

## 2022-10-14 RX ORDER — SERTRALINE HYDROCHLORIDE 100 MG/1
100 TABLET, FILM COATED ORAL
Status: DISCONTINUED | OUTPATIENT
Start: 2022-10-14 | End: 2022-10-17 | Stop reason: HOSPADM

## 2022-10-14 RX ADMIN — PANTOPRAZOLE SODIUM 40 MG: 40 INJECTION, POWDER, FOR SOLUTION INTRAVENOUS at 09:34

## 2022-10-14 RX ADMIN — GABAPENTIN 300 MG: 300 CAPSULE ORAL at 20:53

## 2022-10-14 RX ADMIN — SODIUM CHLORIDE 1 G: 900 INJECTION INTRAVENOUS at 04:40

## 2022-10-14 RX ADMIN — ACETAMINOPHEN 650 MG: 325 TABLET ORAL at 18:12

## 2022-10-14 RX ADMIN — SODIUM CHLORIDE 1 G: 900 INJECTION INTRAVENOUS at 14:59

## 2022-10-14 RX ADMIN — GABAPENTIN 300 MG: 300 CAPSULE ORAL at 11:59

## 2022-10-14 RX ADMIN — ROSUVASTATIN CALCIUM 20 MG: 20 TABLET, FILM COATED ORAL at 11:59

## 2022-10-14 RX ADMIN — ACETAMINOPHEN 650 MG: 325 TABLET ORAL at 23:54

## 2022-10-14 RX ADMIN — SENNOSIDES AND DOCUSATE SODIUM 1 TABLET: 50; 8.6 TABLET ORAL at 09:33

## 2022-10-14 RX ADMIN — DEXAMETHASONE SODIUM PHOSPHATE 10 MG: 4 INJECTION, SOLUTION INTRA-ARTICULAR; INTRALESIONAL; INTRAMUSCULAR; INTRAVENOUS; SOFT TISSUE at 00:40

## 2022-10-14 RX ADMIN — ACETAMINOPHEN 650 MG: 325 TABLET ORAL at 05:12

## 2022-10-14 RX ADMIN — OXYCODONE HYDROCHLORIDE 5 MG: 5 TABLET ORAL at 23:53

## 2022-10-14 RX ADMIN — SODIUM CHLORIDE 1 G: 900 INJECTION INTRAVENOUS at 20:52

## 2022-10-14 RX ADMIN — LOSARTAN POTASSIUM 50 MG: 50 TABLET, FILM COATED ORAL at 11:59

## 2022-10-14 RX ADMIN — ACETAMINOPHEN 650 MG: 325 TABLET ORAL at 11:59

## 2022-10-14 RX ADMIN — OXYCODONE HYDROCHLORIDE 5 MG: 5 TABLET ORAL at 09:33

## 2022-10-14 RX ADMIN — POLYETHYLENE GLYCOL (3350) 17 G: 17 POWDER, FOR SOLUTION ORAL at 10:00

## 2022-10-14 RX ADMIN — HYDROMORPHONE HYDROCHLORIDE 0.25 MG: 1 INJECTION, SOLUTION INTRAMUSCULAR; INTRAVENOUS; SUBCUTANEOUS at 00:49

## 2022-10-14 RX ADMIN — OXYCODONE HYDROCHLORIDE 5 MG: 5 TABLET ORAL at 18:12

## 2022-10-14 RX ADMIN — SERTRALINE 100 MG: 100 TABLET, FILM COATED ORAL at 11:59

## 2022-10-14 RX ADMIN — OXYCODONE HYDROCHLORIDE 5 MG: 5 TABLET ORAL at 05:12

## 2022-10-14 NOTE — OP NOTE
REPORT TYPE: Operative Note    DATE OF OPERATION: 10/12/2022    PREOPERATIVE DIAGNOSES:  Adjacent level lumbar stenosis, L1 to L3, adjacent to a previous L3 to S1 fusion.  POSTOPERATIVE DIAGNOSES:  Adjacent level lumbar stenosis, L1 to L3, adjacent to a previous L3 to S1 fusion.  PROCEDURES:  Bilateral lumbar laminectomy L1 to L3, posterior spinal fusion T11 to L3, transforaminal lumbar interbody fusion L2-L3, exploration of spinal fusion L3 to S1, use of local autograft bone, crushed cancellous allograft bone, and bone   morphogenic protein, use of interbody cage L2-L3, use of iliac screw fixation.  SURGEON:  Melchor Lee MD.  ASSISTANT:  None.  ANESTHESIA:  General endotracheal anesthesia.  COMPLICATIONS:  None.  INSTRUMENTATION:  DePuy Expedium screws and Globus Caliber cage.  For a complete discussion of indication of procedure as well as the discussion I had with the patient in my office regarding risks, benefits, and alternatives of treatment, please refer to my office notes and the consent forms, which were signed.    DESCRIPTION OF PROCEDURE:  The patient was identified in the holding area.  Operative site was marked.  The patient was taken to the operating room.  Neurophysiologic monitoring leads were applied.  General anesthesia was administered.  The patient was   then prepped and draped in a prone position on a Alvino table.  All bony and soft tissue protuberances were well padded throughout the duration of the procedure.  Throughout the procedure, the patient had SCDs on her bilateral lower extremities.  Prior   to skin incision, intravenous antibiotics were administered and a formal timeout was performed.  At the end of the procedure, sponge and needle counts were correct x2.  After prepping and draping, an incision was made posteriorly over the T11 to S2 levels in the midline.  Dissection was carried down through the subcutaneous tissue down to the level of the deep fascia.  Deep fascia was incised  with electrocautery.  In   subperiosteal fashion, the posterior elements of T11 to S2 were exposed.  Soft tissue retractors were placed.  The previous caps and rods were removed bilaterally.    The bilateral S1 screws were removed.  The fusion mass was explored posterolaterally L3 to S1.  There was noted to be a solid posterolateral fusion L3 to S1 when explored with electrocautery and 3-0 curved curette.  The bilateral L3, L4 and L5 screws   were well fixed.  A large rongeur was then used to remove the spinous processes of L1, L2 and thin the lamina at L1-L2 and the remainder of the lamina at L3.  A 3 mm Kerrison rongeur was used to remove ligamentum flavum and perform laminectomy, removing   the remainder of the L3 lamina and the entire L2 lamina, and inferior two-thirds of L1 lamina.  Lateral recess decompression was performed using a 3 mm Kerrison rongeur, removing ligamentum flavum and performing a partial medial facetectomy bilaterally   at L1-L2 and L2-L3.  Bilateral foraminotomies were performed at L1-L2 and L2-L3 using a 3 mm Kerrison rongeur.  An osteotome and a mallet was used to osteotomize through the left L2 pars.  The left L2 pars and inferior articular process of L2 was removed   using a large rongeur.  There was a facet cyst on the left side at L2-L3, which was removed.  A 15 blade scalpel was used to sharply incise the annulus and through the annulotomy, the L2-L3 interspace was completely debrided free of all disk and   cartilaginous material back to bleeding endplate bone using a series of curved and straight curettes, pituitary rongeurs and endplate yashira.  The interspaces were irrigated and inspected to ensure the bleeding bony endplates were exposed.  Local   autograft bone from laminectomy was morcellized, it was combined with crushed cancellous allograft bone and bone morphogenic protein.  This bone graft combination was impacted using a tamp and a mallet in the L2-L3 interspace.   Additional amounts of this   bone graft combination were placed in a Globus Caliber cage of appropriate size.  The cage was impacted using a tamp and a mallet in the L2-L3 interspace and expanded up to an appropriate height.  Secure fit was noted with the cage.  A high-speed bur   was then used to make starting point for the pedicle screws bilaterally at T11, T12, L1 and L2 followed by advancement of the pedicle probe through the isthmus of the pedicle into the vertebral bodies to an appropriate depth.  The holes were tapped and   felt with the ball-tipped probe to ensure there were no breaches.  Following this, screws of appropriate width and length were placed bilaterally in the above-mentioned levels and replaced bilaterally at S1.  Secure fixation was noted with each screw   purchase.  All lumbar screws tested well with EMG without evidence of medial breach.  Following this, through separate fascial incisions the posterior superior iliac spines were exposed bilaterally.  A large rongeur was used to remove the bilateral   superior iliac spines and a Steffee probe was advanced into the ilium between the inner and outer tables of the ilium bilaterally.  The holes were tapped and felt with the ball-tipped probe to ensure there were no breaches.  Following this, screws of   appropriate width and length were placed in the bilateral ilium and offset connectors were placed in the bilateral iliac screws and finally tightened in place according to the 's recommendation.  Rods were cut and contoured and placed in the   screws bilaterally at T11 to S1 and the ilium.  Caps were placed over the rods into the screws as well as the offset connector and finally tightened in place according to the 's recommendations.  Intraoperative AP and lateral x-rays were   taken, which showed adequate position of instrumentation and interbody cage.  The adequacy of the decompression was confirmed with a Candler, which  was passed out of the foramen along lateral recess bilaterally L1 to L3.  There was no remaining stenosis   or compression noted.  The exiting and traversing nerve roots bilaterally L1 to L3 were fully decompressed.  Hemostasis was obtained using electrocautery, bipolar and FloSeal.  Under Valsalva maneuver, there was no significant bleeding noted.  No   evidence of any spinal fluid leakage.  The wound was copiously irrigated with antibiotic-impregnated solution.  Hemostasis was felt to be adequate.  A high-speed bur was then used to decorticate the remaining posterior elements T11 to L2 including the   remaining lamina, facet joints, transverse processes. The remainder of the bone graft combination including local autograft bone, crushed cancellous allograft bone, Bacterin bone matrix and BMP was packed out of the decorticated bone surfaces bilaterally   at T11 to L2 and L3.  The bilateral L3 fusion mass and transverse processes as well as the right-sided L2-L3 facet joint were also decorticated. Following this, hemostasis was felt to be adequate.  A drain was placed deep to the deep fascia.  Deep   fascia was closed in interrupted fashion.  Drain was placed superficial to the deep fascia.  Subcutaneous tissue was closed in interrupted fashion.  Skin was closed in running fashion followed by a dry dressing and tape.  The patient was turned supine on   the hospital bed, extubated and transferred to the PACU in stable condition.  There were no complications.      Melchor Lee MD    DD: 10/13/2022 16:13  DT: 10/13/2022 16:32  Voice ID: [id number]/Report ID: [id number]  argenis

## 2022-10-14 NOTE — ASSESSMENT & PLAN NOTE
Lumbar radiculopathy S/p lumbar surgery  Doing well post op  Post op management per primary team  Pain control prn  Discharge per deny

## 2022-10-14 NOTE — CONSULTS
[facility] Medicine Service -  Daily Progress Note       SUBJECTIVE   Interval History: no acute events overnight. Patient feeling ok. No chest pain or sob. No n/.      OBJECTIVE      Vital signs in last 24 hours:  Temp:  [36.5 °C (97.7 °F)-37 °C (98.6 °F)] 36.5 °C (97.7 °F)  Heart Rate:  [82-91] 88  Resp:  [16-18] 16  BP: (153-167)/(67-74) 164/67    Intake/Output Summary (Last 24 hours) at 10/14/2022 0958  Last data filed at 10/14/2022 0915  Gross per 24 hour   Intake --   Output 2460 ml   Net -2460 ml       PHYSICAL EXAMINATION      Physical Exam    GEN: well-developed and well-nourished; not in acute distress  HEENT: normocephalic; atraumatic  NECK: no JVD; no bruits  CARDIO: regular rate and rhythm; no murmurs or rubs  RESP: clear to auscultation bilaterally; no rales, rhonchi, or wheezes  ABD: soft, non-distended, non-tender, normal bowel sounds  EXT: no cyanosis, clubbing, or edema  SKIN: clean, dry, warm, and intact  MUSCULOSKELETAL: no injury or deformity; +Reji drain in the back  NEURO: alert and oriented x 3; nonfocal  BEHAVIOR/EMOTIONAL: appropriate; cooperative     LINES, CATHETERS, DRAINS, AIRWAYS, AND WOUNDS   Lines, Drains, and Airways:  Wounds (agree with documentation and present on admission):  Drain 1 Right;Medial;Upper Back 10 Fr. (Active)   Number of days: 2       Drain 1 Medial;Lower;Right Back 10 Fr. (Active)   Number of days: 2       Surgical Incision Back (Active)   Number of days: 2         Comments:      LABS / IMAGING / TELE      Labs  Lab Results   Component Value Date    WBC 13.98 (H) 10/13/2022    HGB 10.4 (L) 10/13/2022    HCT 31.5 (L) 10/13/2022    MCV 91.8 10/13/2022     10/13/2022     Lab Results   Component Value Date    GLUCOSE 124 (H) 10/13/2022    CALCIUM 8.7 (L) 10/13/2022     (L) 10/13/2022    K 4.6 10/13/2022    CO2 20 (L) 10/13/2022     10/13/2022    BUN 27 (H) 10/13/2022    CREATININE 0.8 10/13/2022     Lab Results   Component Value Date    INR 1.0  10/10/2022    INR 1.1 11/01/2021    INR 1.6 08/23/2018       Micro  Microbiology Results     Procedure Component Value Units Date/Time    COVID-19 PAT/Pre-procedural [[id number]]  (Normal) Collected: 10/10/22 1005    Specimen: Nasopharyngeal Swab from Nasopharynx Updated: 10/10/22 1714    Narrative:      The following orders were created for panel order COVID-19 PAT/Pre-procedural.  Procedure                               Abnormality         Status                     ---------                               -----------         ------                     SARS-CoV-2 (COVID-19), PCR[[id number]]   Normal              Final result                 Please view results for these tests on the individual orders.    SARS-CoV-2 (COVID-19), PCR [[id number]]  (Normal) Collected: 10/10/22 1005    Specimen: Nasopharyngeal Swab from Nasopharynx Updated: 10/10/22 1714     SARS-CoV-2 (COVID-19) Negative    Narrative:      SARS-CoV-2 nucleic acid amplification diagnostic test approved under [employer].          Imaging  X-RAY THORACOLUMBAR SPINE 2 VIEWS    Result Date: 10/12/2022  IMPRESSION: Posterior spinal fusion.  No evidence for hardware complication.      ECG/Telemetry  Patient is not on telemetry.    ASSESSMENT AND PLAN      * Lumbar radiculopathy  Assessment & Plan  S/p lumbar surgery  POD #2  Management per priimary    Essential hypertension  Assessment & Plan  BP elevated  Resume home meds    Mixed hyperlipidemia  Assessment & Plan  Continue statin and fenofibrate        Plan of care discussed with patient, nurse     VTE Assessment: Padua    VTE Prophylaxis:  Current anticoagulants:    None      Code Status: Full Code      Estimated Discharge Date: 10/16/2022     Disposition Planning: pending clinical course     Jose Juan White MD  10/14/2022

## 2022-10-14 NOTE — PLAN OF CARE
Problem: Adult Inpatient Plan of Care  Goal: Plan of Care Review  Flowsheets (Taken 10/14/2022 6490)  Progress: improving  Plan of Care Reviewed With: patient  Outcome Summary: Pt currently in bed with bed alarm on. PRN med meds given throughout night with 1 breakthough dose of dilaudid given. Lumbar dressing intact. IV abx given and last dose of iv decardon completed. oob with walker supervision. CARLOS drains still putting out a lot of bloody drainage (see I&O report for exact output.) VSS. Bed alarm on. Call bell within reach.   Plan of Care Review  Plan of Care Reviewed With: patient  Progress: improving  Outcome Summary: Pt currently in bed with bed alarm on. PRN med meds given throughout night with 1 breakthough dose of dilaudid given. Lumbar dressing intact. IV abx given and last dose of iv decardon completed. oob with walker supervision. CARLOS drains still putting out a lot of bloody drainage (see I&O report for exact output.) VSS. Bed alarm on. Call bell within reach.

## 2022-10-14 NOTE — PROGRESS NOTES
Patient sitting in chair in NAD  Pain well controlled, tolerating diet, +flatus  TLSO on  Postop dressing removed  Sutures to thoracolumbar spine CDI  JPx2 with serosang drainage  Mepilex dressings applied  Continue to monitor CARLOS outputs  Plan to dc over weekend when JPs are able to be removed

## 2022-10-14 NOTE — PLAN OF CARE
Problem: Adult Inpatient Plan of Care  Goal: Plan of Care Review  Outcome: Progressing  Flowsheets (Taken 10/14/2022 1926)  Progress: improving  Plan of Care Reviewed With: patient  Outcome Summary: Patient tolerated being OOB, in chair well. Ambulated within her room with back brace utilized, supervision only. Patient does randomly rise from chair on occassion, therefore she is considered a fall risk. Pain has been managed with oxycodone and tylenol per MAR. Use of incentive spirometer encouraged and performed independently. Lumbar dressing was dry and intact.  Currently in bed with alarm active and call bell within reach.   Plan of Care Review  Plan of Care Reviewed With: patient  Progress: improving  Outcome Summary: Patient tolerated being OOB, in chair well. Ambulated within her room with back brace utilized, supervision only. Patient does randomly rise from chair on occassion, therefore she is considered a fall risk. Pain has been managed with oxycodone and tylenol per MAR. Use of incentive spirometer encouraged and performed independently. Lumbar dressing was dry and intact.  Currently in bed with alarm active and call bell within reach.

## 2022-10-14 NOTE — PATIENT CARE CONFERENCE
Care Progression Rounds Note  Date: 10/14/2022  Time: 8:41 AM     Patient Name: Reyna Castillo     Medical Record Number: [id number]   YOB: 1950  Sex: Female      Room/Bed: 4208    Admitting Diagnosis: Lumbar radiculopathy [M54.16]  Spinal stenosis, lumbar region, with neurogenic claudication [M48.062]   Admit Date/Time: 10/12/2022  8:34 AM    Primary Diagnosis: Lumbar radiculopathy  Principal Problem: Lumbar radiculopathy    GMLOS: pending  Anticipated Discharge Date: 10/16/2022    AM-PAC:  Mobility Score: 23    Discharge Planning:  Current Living Arrangements: home  Concerns to be Addressed: no discharge needs identified  Anticipated Discharge Disposition: home with home health, home without assistance or services    Barriers to Discharge:  Medical issues not resolved    Comments:       Participants:  nursing, social work/services (CRNP)

## 2022-10-15 PROCEDURE — 63700000 HC SELF-ADMINISTRABLE DRUG: Performed by: STUDENT IN AN ORGANIZED HEALTH CARE EDUCATION/TRAINING PROGRAM

## 2022-10-15 PROCEDURE — 63700000 HC SELF-ADMINISTRABLE DRUG: Performed by: NURSE PRACTITIONER

## 2022-10-15 PROCEDURE — 63600000 HC DRUGS/DETAIL CODE: Performed by: STUDENT IN AN ORGANIZED HEALTH CARE EDUCATION/TRAINING PROGRAM

## 2022-10-15 PROCEDURE — 99232 SBSQ HOSP IP/OBS MODERATE 35: CPT | Performed by: HOSPITALIST

## 2022-10-15 PROCEDURE — 12000000 HC ROOM AND CARE MED/SURG

## 2022-10-15 PROCEDURE — 63700000 HC SELF-ADMINISTRABLE DRUG: Performed by: HOSPITALIST

## 2022-10-15 PROCEDURE — 25800000 HC PHARMACY IV SOLUTIONS: Performed by: STUDENT IN AN ORGANIZED HEALTH CARE EDUCATION/TRAINING PROGRAM

## 2022-10-15 PROCEDURE — 25000000 HC PHARMACY GENERAL: Performed by: STUDENT IN AN ORGANIZED HEALTH CARE EDUCATION/TRAINING PROGRAM

## 2022-10-15 RX ADMIN — GABAPENTIN 300 MG: 300 CAPSULE ORAL at 08:31

## 2022-10-15 RX ADMIN — OXYCODONE HYDROCHLORIDE 5 MG: 5 TABLET ORAL at 05:07

## 2022-10-15 RX ADMIN — ACETAMINOPHEN 650 MG: 325 TABLET ORAL at 05:08

## 2022-10-15 RX ADMIN — OXYCODONE HYDROCHLORIDE 10 MG: 5 TABLET ORAL at 21:29

## 2022-10-15 RX ADMIN — GABAPENTIN 300 MG: 300 CAPSULE ORAL at 20:43

## 2022-10-15 RX ADMIN — OXYCODONE HYDROCHLORIDE 5 MG: 5 TABLET ORAL at 13:43

## 2022-10-15 RX ADMIN — FENOFIBRATE 67 MG: 67 CAPSULE ORAL at 08:31

## 2022-10-15 RX ADMIN — PANTOPRAZOLE SODIUM 40 MG: 40 INJECTION, POWDER, FOR SOLUTION INTRAVENOUS at 08:31

## 2022-10-15 RX ADMIN — ACETAMINOPHEN 650 MG: 325 TABLET ORAL at 17:46

## 2022-10-15 RX ADMIN — OXYCODONE HYDROCHLORIDE 5 MG: 5 TABLET ORAL at 17:46

## 2022-10-15 RX ADMIN — SODIUM CHLORIDE 1 G: 900 INJECTION INTRAVENOUS at 12:57

## 2022-10-15 RX ADMIN — DIAZEPAM 5 MG: 5 TABLET ORAL at 21:29

## 2022-10-15 RX ADMIN — LOSARTAN POTASSIUM 50 MG: 50 TABLET, FILM COATED ORAL at 08:31

## 2022-10-15 RX ADMIN — SODIUM CHLORIDE 1 G: 900 INJECTION INTRAVENOUS at 20:43

## 2022-10-15 RX ADMIN — OXYCODONE HYDROCHLORIDE 5 MG: 5 TABLET ORAL at 09:38

## 2022-10-15 RX ADMIN — SODIUM CHLORIDE 1 G: 900 INJECTION INTRAVENOUS at 05:02

## 2022-10-15 RX ADMIN — ACETAMINOPHEN 650 MG: 325 TABLET ORAL at 12:57

## 2022-10-15 RX ADMIN — SERTRALINE 100 MG: 100 TABLET, FILM COATED ORAL at 05:08

## 2022-10-15 RX ADMIN — ROSUVASTATIN CALCIUM 20 MG: 20 TABLET, FILM COATED ORAL at 08:31

## 2022-10-15 RX ADMIN — GABAPENTIN 300 MG: 300 CAPSULE ORAL at 13:00

## 2022-10-15 NOTE — PROGRESS NOTES
Pt without new complaints, doing well  AFVSS CARLOS in place  5/5 motor BLE all muscle groups  SILT BLE all sens dist  Dressing dry     A/P stable post op day 3  -pt, oob  -pain control  -abx/drain  -scds  -med management  -TLSo brace when ambulating

## 2022-10-15 NOTE — PLAN OF CARE
Plan of Care Review  Plan of Care Reviewed With: patient  Progress: improving  Outcome Summary: Patient is doing well this shift, pain is moderate and is being managed effectively with oxy 5mg and scheduled tylenol. She has been ambulating in the room using her back brace and a walker. Dressing to the back is CDI. Drains continue to put out moderate amounts of serosanguineous drainage. LLE remains slightly weaker than the RLE, but otherwise neurovasculars are intact. Patient has multiple BMs yesterday, bowel meds held on night shift. IV ancef administered per order. Bed alarm is on and call bell is within reach.

## 2022-10-15 NOTE — PROGRESS NOTES
Hospital Medicine Service -  Daily Progress Note       SUBJECTIVE   Interval History: no acute events overnight. Pt feeling well. No complaints.     OBJECTIVE      Vital signs in last 24 hours:  Temp:  [36.6 °C (97.9 °F)-37.2 °C (99 °F)] 36.9 °C (98.4 °F)  Heart Rate:  [73-86] 73  Resp:  [18] 18  BP: (138-156)/(64-88) 149/88    Intake/Output Summary (Last 24 hours) at 10/15/2022 1033  Last data filed at 10/15/2022 0515  Gross per 24 hour   Intake 50 ml   Output 295 ml   Net -245 ml       PHYSICAL EXAMINATION      Physical Exam    GEN: well-developed and well-nourished; not in acute distress  HEENT: normocephalic; atraumatic  NECK: no JVD; no bruits  CARDIO: regular rate and rhythm; no murmurs or rubs  RESP: clear to auscultation bilaterally; no rales, rhonchi, or wheezes  ABD: soft, non-distended, non-tender, normal bowel sounds  EXT: no cyanosis, clubbing, or edema  SKIN: clean, dry, warm, and intact  MUSCULOSKELETAL: no injury or deformity; +Reji drain in the back  NEURO: alert and oriented x 3; nonfocal  BEHAVIOR/EMOTIONAL: appropriate; cooperative     LINES, CATHETERS, DRAINS, AIRWAYS, AND WOUNDS   Lines, Drains, and Airways:  Wounds (agree with documentation and present on admission):  Peripheral IV (Adult) 10/13/22 Right Antecubital (Active)   Number of days: 2       Drain 1 Right;Medial;Upper Back 10 Fr. (Active)   Number of days: 3       Drain 1 Medial;Lower;Right Back 10 Fr. (Active)   Number of days: 3       Surgical Incision Back (Active)   Number of days: 3         Comments:      LABS / IMAGING / TELE      Labs  Lab Results   Component Value Date    WBC 14.46 (H) 10/14/2022    HGB 10.6 (L) 10/14/2022    HCT 31.0 (L) 10/14/2022    MCV 90.9 10/14/2022     10/14/2022     Lab Results   Component Value Date    GLUCOSE 131 (H) 10/14/2022    CALCIUM 9.5 10/14/2022     (L) 10/14/2022    K 4.1 10/14/2022    CO2 22 10/14/2022     10/14/2022    BUN 23 (H) 10/14/2022    CREATININE 0.7 10/14/2022      Lab Results   Component Value Date    INR 1.0 10/10/2022    INR 1.1 11/01/2021    INR 1.6 08/23/2018       Micro  Microbiology Results     Procedure Component Value Units Date/Time    COVID-19 PAT/Pre-procedural [[id number]]  (Normal) Collected: 10/10/22 1005    Specimen: Nasopharyngeal Swab from Nasopharynx Updated: 10/10/22 1714    Narrative:      The following orders were created for panel order COVID-19 PAT/Pre-procedural.  Procedure                               Abnormality         Status                     ---------                               -----------         ------                     SARS-CoV-2 (COVID-19), PCR[[id number]]   Normal              Final result                 Please view results for these tests on the individual orders.    SARS-CoV-2 (COVID-19), PCR [[id number]]  (Normal) Collected: 10/10/22 1005    Specimen: Nasopharyngeal Swab from Nasopharynx Updated: 10/10/22 1714     SARS-CoV-2 (COVID-19) Negative    Narrative:      SARS-CoV-2 nucleic acid amplification diagnostic test approved under [employer].          Imaging  X-RAY THORACOLUMBAR SPINE 2 VIEWS    Result Date: 10/12/2022  IMPRESSION: Posterior spinal fusion.  No evidence for hardware complication.      ECG/Telemetry  Patient is not on telemetry.    ASSESSMENT AND PLAN      * Lumbar radiculopathy  Assessment & Plan  S/p lumbar surgery  POD #3  Management per priimary    Essential hypertension  Assessment & Plan  BP elevated  Resume home meds    Mixed hyperlipidemia  Assessment & Plan  Continue statin and fenofibrate        Plan of care discussed with patient, nurse     VTE Assessment: Padua    VTE Prophylaxis:  Current anticoagulants:    None      Code Status: Full Code      Estimated Discharge Date: 10/16/2022     Disposition Planning: pending clinical course     Jose Juan White MD  10/15/2022

## 2022-10-15 NOTE — PROGRESS NOTES
Ortho Daily Progress Note    Subjective     Interval History: Patient seen and examined, resting comfortably. No events overnight. Feeling very well this morning and encouraged by her progress. Voiding and having BM.     Objective     Vital signs in last 24 hours:  Temp:  [36.6 °C (97.9 °F)-37.2 °C (99 °F)] 36.9 °C (98.4 °F)  Heart Rate:  [73-86] 73  Resp:  [18] 18  BP: (138-156)/(64-88) 149/88      Intake/Output Summary (Last 24 hours) at 10/15/2022 0832  Last data filed at 10/15/2022 0515  Gross per 24 hour   Intake 50 ml   Output 395 ml   Net -345 ml     Intake/Output this shift:  No intake/output data recorded.    Labs  Am labs pending    Imaging  No new imaging      Physical Exam:  Spine:  Dressing C/D/I  CARLOS drain intact w/ serosang output  Appropriately TTP around incision     LE:  Motor 5/5 strength L2-S1 bilaterally  SILT L2-S1 bilaterally, subjectively decreased R L3-4 distribution  No clonus. downgoing babinski bilaterally  2+DP/PT pulses  BCR    Assessment     72F S/P T10-S1 PSF with Dr. Lee 10/12    Plan      -Ancef q8 while drain intact  -Monitor drain OP-Drains w/ 395 cc last 24h   -DVT ppx: SCDs  -WBAT  -PT/OT  -Pain control  -Dispo: pending decrease in drain output       Sita Medel DO, PGY-2  Orthopedic Surgery  Pager: [phone]

## 2022-10-15 NOTE — PLAN OF CARE
Problem: Adult Inpatient Plan of Care  Goal: Plan of Care Review  Outcome: Progressing  Flowsheets (Taken 10/15/2022 1630)  Progress: improving  Plan of Care Reviewed With:   patient   daughter  Outcome Summary: Patient is A&Ox3, VSS on RA. Pain well controlled on scheduled tylenol and prn Oxy. CARLOS drains to back with moderate output from upper, output slowing from lower. Ambulating with walker in halls. TLSO brace when out of bed. Strap on brace broke today. Reached out to on-call Ortho for replacement. Patient and daughter unformed of plan of care, will continue to monitor.  Goal: Absence of Hospital-Acquired Illness or Injury  Intervention: Identify and Manage Fall Risk  Flowsheets (Taken 10/15/2022 1630)  Safety Promotion/Fall Prevention:   activity supervised   safety round/check completed   nonskid shoes/slippers when out of bed   room organization consistent   clutter free environment maintained  Intervention: Prevent Skin Injury  Flowsheets (Taken 10/15/2022 1630)  Body Position: position changed independently   Plan of Care Review  Plan of Care Reviewed With: patient, daughter  Progress: improving  Outcome Summary: Patient is A&Ox3, VSS on RA. Pain well controlled on scheduled tylenol and prn Oxy. CARLOS drains to back with moderate output from upper, output slowing from lower. Ambulating with walker in halls. TLSO brace when out of bed. Strap on brace broke today. Reached out to on-call Ortho for replacement. Patient and daughter unformed of plan of care, will continue to monitor.

## 2022-10-16 PROCEDURE — 63700000 HC SELF-ADMINISTRABLE DRUG: Performed by: NURSE PRACTITIONER

## 2022-10-16 PROCEDURE — 25800000 HC PHARMACY IV SOLUTIONS: Performed by: STUDENT IN AN ORGANIZED HEALTH CARE EDUCATION/TRAINING PROGRAM

## 2022-10-16 PROCEDURE — 99232 SBSQ HOSP IP/OBS MODERATE 35: CPT | Performed by: HOSPITALIST

## 2022-10-16 PROCEDURE — 12000000 HC ROOM AND CARE MED/SURG

## 2022-10-16 PROCEDURE — 25000000 HC PHARMACY GENERAL: Performed by: STUDENT IN AN ORGANIZED HEALTH CARE EDUCATION/TRAINING PROGRAM

## 2022-10-16 PROCEDURE — 63700000 HC SELF-ADMINISTRABLE DRUG: Performed by: STUDENT IN AN ORGANIZED HEALTH CARE EDUCATION/TRAINING PROGRAM

## 2022-10-16 PROCEDURE — 63700000 HC SELF-ADMINISTRABLE DRUG: Performed by: HOSPITALIST

## 2022-10-16 PROCEDURE — 63600000 HC DRUGS/DETAIL CODE: Performed by: STUDENT IN AN ORGANIZED HEALTH CARE EDUCATION/TRAINING PROGRAM

## 2022-10-16 RX ADMIN — GABAPENTIN 300 MG: 300 CAPSULE ORAL at 13:28

## 2022-10-16 RX ADMIN — GABAPENTIN 300 MG: 300 CAPSULE ORAL at 08:41

## 2022-10-16 RX ADMIN — SENNOSIDES AND DOCUSATE SODIUM 1 TABLET: 50; 8.6 TABLET ORAL at 20:42

## 2022-10-16 RX ADMIN — ROSUVASTATIN CALCIUM 20 MG: 20 TABLET, FILM COATED ORAL at 08:41

## 2022-10-16 RX ADMIN — DIAZEPAM 5 MG: 5 TABLET ORAL at 22:30

## 2022-10-16 RX ADMIN — SERTRALINE 100 MG: 100 TABLET, FILM COATED ORAL at 04:56

## 2022-10-16 RX ADMIN — OXYCODONE HYDROCHLORIDE 5 MG: 5 TABLET ORAL at 04:56

## 2022-10-16 RX ADMIN — SENNOSIDES AND DOCUSATE SODIUM 1 TABLET: 50; 8.6 TABLET ORAL at 08:41

## 2022-10-16 RX ADMIN — LOSARTAN POTASSIUM 50 MG: 50 TABLET, FILM COATED ORAL at 08:41

## 2022-10-16 RX ADMIN — PANTOPRAZOLE SODIUM 40 MG: 40 INJECTION, POWDER, FOR SOLUTION INTRAVENOUS at 08:50

## 2022-10-16 RX ADMIN — SODIUM CHLORIDE 1 G: 900 INJECTION INTRAVENOUS at 13:28

## 2022-10-16 RX ADMIN — FENOFIBRATE 67 MG: 67 CAPSULE ORAL at 08:41

## 2022-10-16 RX ADMIN — OXYCODONE HYDROCHLORIDE 10 MG: 5 TABLET ORAL at 22:30

## 2022-10-16 RX ADMIN — OXYCODONE HYDROCHLORIDE 5 MG: 5 TABLET ORAL at 08:40

## 2022-10-16 RX ADMIN — GABAPENTIN 300 MG: 300 CAPSULE ORAL at 20:42

## 2022-10-16 RX ADMIN — SODIUM CHLORIDE 1 G: 900 INJECTION INTRAVENOUS at 04:56

## 2022-10-16 RX ADMIN — SODIUM CHLORIDE 1 G: 900 INJECTION INTRAVENOUS at 20:41

## 2022-10-16 RX ADMIN — ACETAMINOPHEN 650 MG: 325 TABLET ORAL at 04:56

## 2022-10-16 RX ADMIN — OXYCODONE HYDROCHLORIDE 5 MG: 5 TABLET ORAL at 18:49

## 2022-10-16 NOTE — PLAN OF CARE
Plan of Care Review  Plan of Care Reviewed With: patient  Outcome Summary: Pt ind in room, compliant with orthosis use, but frustrated with straps and pulls seeming to come unattached or be out of place.  suggested we will have ortho assess orthosis when they round in the a.m.  Pain is fairly well manaed, but increases after ambulation in room.  Drains still with mod amount of serosanguinous drainage.  VSS, pt ind in room

## 2022-10-16 NOTE — PROGRESS NOTES
Ortho Daily Progress Note    Subjective     Interval History: Patient seen and examined, resting comfortably. No events overnight. Feeling very well this morning and encouraged by her progress. Voiding and having BM. Has been out of bed and ambulating. Having difficulties with her brace - anterior strap not tightening so she has been tying the loose ends to compensate.    Objective     Vital signs in last 24 hours:  Temp:  [36.2 °C (97.2 °F)-36.9 °C (98.5 °F)] 36.9 °C (98.5 °F)  Heart Rate:  [73-93] 93  Resp:  [16-18] 16  BP: (117-149)/(55-88) 117/55      Intake/Output Summary (Last 24 hours) at 10/16/2022 0700  Last data filed at 10/16/2022 0541  Gross per 24 hour   Intake 300 ml   Output 225 ml   Net 75 ml     Intake/Output this shift:  No intake/output data recorded.    Labs  Results from last 7 days   Lab Units 10/14/22  1059 10/13/22  0337 10/10/22  1005   WBC K/uL 14.46* 13.98* 8.53   HEMOGLOBIN g/dL 10.6* 10.4* 13.8   PLATELETS K/uL 334 323 365     BMP Results       10/14/22 10/13/22 10/10/22     1059 0337 1005     133 138    K 4.1 4.6 5.1    Cl 102 105 101    CO2 22 20 24    Glucose 131 124 100    BUN 23 27 28    Creatinine 0.7 0.8 0.8    Calcium 9.5 8.7 10.8    Anion Gap 10 8 13    EGFR >60.0 >60.0 >60.0          Imaging  No new imaging      Physical Exam:  Spine:  Incisional dressing C/D/I  CARLOS dressing with moderate drainage and strikethrough  CARLOS drain intact w/ serosang output  Appropriately TTP around incision     LE:  Motor 5/5 strength L2-S1 bilaterally  SILT L2-S1 bilaterally, subjectively decreased R L3-4 distribution  No clonus. downgoing babinski bilaterally  2+DP/PT pulses  BCR    Assessment     72F S/P T10-S1 PSF with Dr. Lee 10/12    Plan      -Ancef q8 while drain intact  -Monitor drain OP  -DVT ppx: SCDs  -WBAT  -PT/OT  -Pain control  -Dispo: pending decrease in drain output       Christo Joya DO, PGY-3  Orthopedic Surgery  Pager: [phone]

## 2022-10-16 NOTE — PROGRESS NOTES
Hospital Medicine Service -  Daily Progress Note       SUBJECTIVE   Interval History: no acute events overnight. Pt feeling ok. No complaints.     OBJECTIVE      Vital signs in last 24 hours:  Temp:  [36.2 °C (97.1 °F)-36.9 °C (98.5 °F)] 36.2 °C (97.1 °F)  Heart Rate:  [92-93] 92  Resp:  [16-18] 18  BP: (117-133)/(55-73) 132/68    Intake/Output Summary (Last 24 hours) at 10/16/2022 1004  Last data filed at 10/16/2022 0800  Gross per 24 hour   Intake 300 ml   Output 230 ml   Net 70 ml       PHYSICAL EXAMINATION      Physical Exam    GEN: well-developed and well-nourished; not in acute distress  HEENT: normocephalic; atraumatic  NECK: no JVD; no bruits  CARDIO: regular rate and rhythm; no murmurs or rubs  RESP: clear to auscultation bilaterally; no rales, rhonchi, or wheezes  ABD: soft, non-distended, non-tender, normal bowel sounds  EXT: no cyanosis, clubbing, or edema  SKIN: clean, dry, warm, and intact  MUSCULOSKELETAL: no injury or deformity; +Reji drain in the back  NEURO: alert and oriented x 3; nonfocal  BEHAVIOR/EMOTIONAL: appropriate; cooperative     LINES, CATHETERS, DRAINS, AIRWAYS, AND WOUNDS   Lines, Drains, and Airways:  Wounds (agree with documentation and present on admission):  Peripheral IV (Adult) 10/13/22 Right Antecubital (Active)   Number of days: 2       Drain 1 Right;Medial;Upper Back 10 Fr. (Active)   Number of days: 3       Drain 1 Medial;Lower;Right Back 10 Fr. (Active)   Number of days: 3       Surgical Incision Back (Active)   Number of days: 3         Comments:      LABS / IMAGING / TELE      Labs  Lab Results   Component Value Date    WBC 14.46 (H) 10/14/2022    HGB 10.6 (L) 10/14/2022    HCT 31.0 (L) 10/14/2022    MCV 90.9 10/14/2022     10/14/2022     Lab Results   Component Value Date    GLUCOSE 131 (H) 10/14/2022    CALCIUM 9.5 10/14/2022     (L) 10/14/2022    K 4.1 10/14/2022    CO2 22 10/14/2022     10/14/2022    BUN 23 (H) 10/14/2022    CREATININE 0.7  10/14/2022     Lab Results   Component Value Date    INR 1.0 10/10/2022    INR 1.1 11/01/2021    INR 1.6 08/23/2018       Micro  Microbiology Results     Procedure Component Value Units Date/Time    COVID-19 PAT/Pre-procedural [[id number]]  (Normal) Collected: 10/10/22 1005    Specimen: Nasopharyngeal Swab from Nasopharynx Updated: 10/10/22 1714    Narrative:      The following orders were created for panel order COVID-19 PAT/Pre-procedural.  Procedure                               Abnormality         Status                     ---------                               -----------         ------                     SARS-CoV-2 (COVID-19), PCR[[id number]]   Normal              Final result                 Please view results for these tests on the individual orders.    SARS-CoV-2 (COVID-19), PCR [[id number]]  (Normal) Collected: 10/10/22 1005    Specimen: Nasopharyngeal Swab from Nasopharynx Updated: 10/10/22 1714     SARS-CoV-2 (COVID-19) Negative    Narrative:      SARS-CoV-2 nucleic acid amplification diagnostic test approved under [employer].          Imaging  X-RAY THORACOLUMBAR SPINE 2 VIEWS    Result Date: 10/12/2022  IMPRESSION: Posterior spinal fusion.  No evidence for hardware complication.      ECG/Telemetry  Patient is not on telemetry.    ASSESSMENT AND PLAN      * Lumbar radiculopathy  Assessment & Plan  S/p lumbar surgery  POD #4  Management per priimary    Essential hypertension  Assessment & Plan  BP elevated  Resume home meds    Mixed hyperlipidemia  Assessment & Plan  Continue statin and fenofibrate      Plan of care discussed with patient, nurse     VTE Assessment: Padua    VTE Prophylaxis:  Current anticoagulants:    None      Code Status: Full Code      Estimated Discharge Date: 10/16/2022       Disposition Planning: pending clinical course     Jose Juan White MD  10/16/2022

## 2022-10-16 NOTE — PLAN OF CARE
Problem: Adult Inpatient Plan of Care  Goal: Plan of Care Review  Outcome: Progressing  Flowsheets (Taken 10/16/2022 1807)  Progress: improving  Plan of Care Reviewed With: patient  Outcome Summary: VSS. Pain well controlled. Neuro check WN to bilateral lower ext. CARLOS output remains high. CARLOS drains remain inplace. Plan is too pull tomorrow and DC home   Plan of Care Review  Plan of Care Reviewed With: patient  Progress: improving  Outcome Summary: VSS. Pain well controlled. Neuro check WN to bilateral lower ext. CARLOS output remains high. CARLOS drains remain inplace. Plan is too pull tomorrow and DC home

## 2022-10-17 VITALS
HEIGHT: 64 IN | SYSTOLIC BLOOD PRESSURE: 143 MMHG | RESPIRATION RATE: 18 BRPM | BODY MASS INDEX: 37.05 KG/M2 | OXYGEN SATURATION: 94 % | TEMPERATURE: 98.8 F | DIASTOLIC BLOOD PRESSURE: 71 MMHG | WEIGHT: 217 LBS | HEART RATE: 87 BPM

## 2022-10-17 PROCEDURE — 63600000 HC DRUGS/DETAIL CODE: Performed by: STUDENT IN AN ORGANIZED HEALTH CARE EDUCATION/TRAINING PROGRAM

## 2022-10-17 PROCEDURE — 63700000 HC SELF-ADMINISTRABLE DRUG: Performed by: STUDENT IN AN ORGANIZED HEALTH CARE EDUCATION/TRAINING PROGRAM

## 2022-10-17 PROCEDURE — 25800000 HC PHARMACY IV SOLUTIONS: Performed by: STUDENT IN AN ORGANIZED HEALTH CARE EDUCATION/TRAINING PROGRAM

## 2022-10-17 PROCEDURE — 63700000 HC SELF-ADMINISTRABLE DRUG: Performed by: NURSE PRACTITIONER

## 2022-10-17 PROCEDURE — 25000000 HC PHARMACY GENERAL: Performed by: STUDENT IN AN ORGANIZED HEALTH CARE EDUCATION/TRAINING PROGRAM

## 2022-10-17 PROCEDURE — 99232 SBSQ HOSP IP/OBS MODERATE 35: CPT | Performed by: INTERNAL MEDICINE

## 2022-10-17 PROCEDURE — 63700000 HC SELF-ADMINISTRABLE DRUG: Performed by: HOSPITALIST

## 2022-10-17 RX ORDER — TIZANIDINE 2 MG/1
2 TABLET ORAL EVERY 6 HOURS PRN
Qty: 20 TABLET | Refills: 0 | Status: SHIPPED | OUTPATIENT
Start: 2022-10-17 | End: 2022-10-31

## 2022-10-17 RX ORDER — POLYETHYLENE GLYCOL 3350 17 G/17G
17 POWDER, FOR SOLUTION ORAL DAILY
Qty: 3 PACKET | Refills: 0 | COMMUNITY
Start: 2022-10-18 | End: 2022-10-21

## 2022-10-17 RX ORDER — ACETAMINOPHEN 325 MG/1
650 TABLET ORAL EVERY 4 HOURS PRN
COMMUNITY
Start: 2022-10-17 | End: 2022-11-16

## 2022-10-17 RX ORDER — OXYCODONE HYDROCHLORIDE 5 MG/1
5-10 TABLET ORAL EVERY 4 HOURS PRN
Qty: 40 TABLET | Refills: 0 | Status: SHIPPED | OUTPATIENT
Start: 2022-10-17 | End: 2022-10-24

## 2022-10-17 RX ORDER — AMOXICILLIN 250 MG
1 CAPSULE ORAL 2 TIMES DAILY
Qty: 60 TABLET | Refills: 0 | COMMUNITY
Start: 2022-10-17 | End: 2022-11-16

## 2022-10-17 RX ADMIN — SODIUM CHLORIDE 1 G: 900 INJECTION INTRAVENOUS at 05:17

## 2022-10-17 RX ADMIN — PANTOPRAZOLE SODIUM 40 MG: 40 INJECTION, POWDER, FOR SOLUTION INTRAVENOUS at 09:02

## 2022-10-17 RX ADMIN — FENOFIBRATE 67 MG: 67 CAPSULE ORAL at 09:01

## 2022-10-17 RX ADMIN — OXYCODONE HYDROCHLORIDE 10 MG: 5 TABLET ORAL at 06:03

## 2022-10-17 RX ADMIN — SENNOSIDES AND DOCUSATE SODIUM 1 TABLET: 50; 8.6 TABLET ORAL at 09:01

## 2022-10-17 RX ADMIN — OXYCODONE HYDROCHLORIDE 10 MG: 5 TABLET ORAL at 14:02

## 2022-10-17 RX ADMIN — GABAPENTIN 300 MG: 300 CAPSULE ORAL at 09:01

## 2022-10-17 RX ADMIN — ROSUVASTATIN CALCIUM 20 MG: 20 TABLET, FILM COATED ORAL at 09:01

## 2022-10-17 RX ADMIN — SERTRALINE 100 MG: 100 TABLET, FILM COATED ORAL at 05:18

## 2022-10-17 RX ADMIN — GABAPENTIN 300 MG: 300 CAPSULE ORAL at 14:02

## 2022-10-17 RX ADMIN — OXYCODONE HYDROCHLORIDE 10 MG: 5 TABLET ORAL at 10:44

## 2022-10-17 RX ADMIN — LOSARTAN POTASSIUM 50 MG: 50 TABLET, FILM COATED ORAL at 09:02

## 2022-10-17 NOTE — PROGRESS NOTES
Hospital Medicine Service -  Daily Progress Note       SUBJECTIVE   Interval History: No acute events overnight. Patient seen and examined. In good spirits, pain much improved.      OBJECTIVE      Vital signs in last 24 hours:  Temp:  [36.5 °C (97.7 °F)-37.1 °C (98.8 °F)] 37.1 °C (98.8 °F)  Heart Rate:  [] 87  Resp:  [18] 18  BP: (128-143)/(65-71) 143/71    Intake/Output Summary (Last 24 hours) at 10/17/2022 1548  Last data filed at 10/17/2022 1451  Gross per 24 hour   Intake --   Output 310 ml   Net -310 ml       PHYSICAL EXAMINATION      GEN: well-developed and well-nourished; not in acute distress  HEENT: normocephalic; atraumatic  NECK: no JVD; no bruits  CARDIO: regular rate and rhythm; no murmurs, rubs or gallops  RESP: clear to auscultation bilaterally; no rales, rhonchi, or wheezes  ABD: soft, non-distended, non-tender, normal bowel sounds  EXT: no cyanosis, clubbing, or edema  SKIN: clean, dry, warm, and intact  MUSCULOSKELETAL: no injury or deformity, +ve CARLSO drain in place  NEURO: alert and oriented x 3; nonfocal  BEHAVIOR/EMOTIONAL: appropriate; cooperative     LABS / IMAGING / TELE      Labs  Lab Results   Component Value Date    GLUCOSE 131 (H) 10/14/2022    CALCIUM 9.5 10/14/2022     (L) 10/14/2022    K 4.1 10/14/2022    CO2 22 10/14/2022     10/14/2022    BUN 23 (H) 10/14/2022    CREATININE 0.7 10/14/2022     Lab Results   Component Value Date    WBC 14.46 (H) 10/14/2022    HGB 10.6 (L) 10/14/2022    HCT 31.0 (L) 10/14/2022    MCV 90.9 10/14/2022     10/14/2022     Lab Results   Component Value Date    ALBUMIN 4.0 10/05/2018    BILITOT 0.3 10/05/2018    ALKPHOS 60 10/05/2018    AST 24 10/05/2018    ALT 20 10/05/2018    PROTEIN 6.9 10/05/2018       Imaging  X-RAY THORACOLUMBAR SPINE 2 VIEWS    Result Date: 10/12/2022  IMPRESSION: Posterior spinal fusion.  No evidence for hardware complication.      ECG/Telemetry  not on telemetry    ASSESSMENT AND PLAN      Essential  hypertension  Assessment & Plan  BP elevated  Resume home meds    Mixed hyperlipidemia  Assessment & Plan  Continue statin and fenofibrate    * Lumbar radiculopathy  Assessment & Plan  Lumbar radiculopathy S/p lumbar surgery  Doing well post op  Post op management per primary team  Pain control prn  Discharge per deny         VTE Assessment: Padua    Dispo: per primary team  charge date: 10/17/2022  Code Status: Full Code     Isamar Pryor MD  10/17/2022

## 2022-10-17 NOTE — PLAN OF CARE
Plan of Care Review  Plan of Care Reviewed With: patient  Progress: improving  Outcome Summary: Changed dressings to both CARLOS sites, as they have been saturated with serous/serosanguinous drainage.  Pt has been sleeping off and on.  She verbalizes feeling frustrated today.  VSS.  Amb ind to BR

## 2022-10-17 NOTE — PATIENT CARE CONFERENCE
Care Progression Rounds Note  Date: 10/17/2022  Time: 8:35 AM     Patient Name: Reyna Castillo     Medical Record Number: [id number]   YOB: 1950  Sex: Female      Room/Bed: 4208    Admitting Diagnosis: Lumbar radiculopathy [M54.16]  Spinal stenosis, lumbar region, with neurogenic claudication [M48.062]   Admit Date/Time: 10/12/2022  8:34 AM    Primary Diagnosis: Lumbar radiculopathy  Principal Problem: Lumbar radiculopathy    GMLOS: 2.7  Anticipated Discharge Date: 10/18/2022    AM-PAC:  Mobility Score: 23    Discharge Planning:  Current Living Arrangements: home  Concerns to be Addressed: no discharge needs identified  Anticipated Discharge Disposition: home without assistance or services    Barriers to Discharge:  Medical issues not resolved    Comments:       Participants:  nursing, social work/services (CRNP)

## 2022-10-17 NOTE — PROGRESS NOTES
Postop dressing removed  Sutures to midline lower back incision CDI  JPx2> both d'cd  Mepilex dressing applied  Pt tolerated well  Ok for discharge

## 2022-10-17 NOTE — NURSING NOTE
x2 CARLOS Drains (upper and lower) removed. IV access removed. Patient verbalized understanding of discharge instructions and education on incision care. Patient's spouse at bedside during discharge to walk her downstairs via wheelchair with staff. Follow up appointment scheduled within 2 weeks. No changes since previous assessment. No further questions or concerns.

## 2022-10-22 NOTE — DISCHARGE SUMMARY
Inpatient Discharge Summary    BRIEF OVERVIEW  Admitting Provider:      Attending Provider: No att. providers found Attending phys phone: N/A  Primary Care Physician at Discharge: Lubna Kerr [phone]    Admission Date: 10/12/2022     Discharge Date: 10/21/2022    Primary Discharge Diagnosis  Lumbar radiculopathy    Secondary Discharge Diagnosis  Active Hospital Problems    Diagnosis Date Noted    Lumbar radiculopathy 10/12/2022    Mixed hyperlipidemia 10/09/2018    Essential hypertension 10/09/2018      Resolved Hospital Problems   No resolved problems to display.       DETAILS OF HOSPITAL STAY    Operative Procedures Performed  Procedure(s):  T10-S1 Posterior Thorocolumbosacral Decompression and Interbody Fusion, Instrumentation, Pelvic Fization, Allograft, Autograft, BMP, Revision of Existing Instrumentation    Consults:   Consult Notes 9/21/2022 to 10/22/2022         Date of Service Author Author Type Status Note Type File Time    10/14/22 0958 Jose Juan White MD Physician Signed Consults 10/14/22 1026    10/13/22 1330 Shaylee Ferreira Addendum Consults 10/13/22 1712    10/10/22 1030 Maira Dumont DO Physician Signed Consults 10/11/22 0713          Consult Orders During Admission:  IP CONSULT TO HOSPITALIST       Imaging  X-RAY THORACOLUMBAR SPINE 2 VIEWS    Result Date: 10/12/2022  IMPRESSION: Posterior spinal fusion.  No evidence for hardware complication.      Presenting Problem/History of Present Illness  Lumbar radiculopathy [M54.16]  Spinal stenosis, lumbar region, with neurogenic claudication [M48.062]       Exam on Day of Discharge  Patient seen and examined on day of discharge.  Spine:  Incisional cdi  CARLOS dressing with moderate drainage and strikethrough  CARLOS drain intact w/ serosang output  Appropriately TTP around incision     LE:  Motor 5/5 strength L2-S1 bilaterally  SILT L2-S1 bilaterally, subjectively decreased R L3-4 distribution  No clonus. downgoing babinski  bilaterally  2+DP/PT pulses  BCR    Hospital Course  Very pleasant 72-year-old female with intractable back pain admitted on 10/12/2022 for an elective T10-S1 Posterior Thorocolumbosacral Decompression and Interbody Fusion, Instrumentation, Pelvic Fization, Allograft, Autograft, BMP, Revision of Existing Instrumentation. Patient tolerated procedure well with no immediate complications.  She was brought to PACU and then to post op floor without issue. She was seen by hospitalist service for medical comanagement.  Her pain was controlled, and she worked with physical therapy and Occupational Therapy.  She received adequate pain medication and IV steroids to assist. She received IV ancef every 8 hours while drains were in place.  Her dressings were changed as needed, and her incision remained clean dry and intact.  Her pain was monitored, and her drain output was also monitored.  On 10/17, her drain outputs had decreased to a reasonable level for discharge.  Her drains were removed, and drain sitea were closed with nylon suture.  Dressed with a 2 x 2 and Tegaderm dressing.  Patient tolerated drain removal well without any immediate complication.  Pain remained was controlled, and she was cleared for discharge home by physical therapy.  She was discharged home without issue on 10/17.  She will follow-up with Dr. Lee as scheduled.      Discharge Orders     Medication List      START taking these medications    acetaminophen 325 mg tablet  Commonly known as: TylenoL  Take 2 tablets (650 mg total) by mouth every 4 (four) hours as needed for mild pain.  Dose: 650 mg     oxyCODONE 5 mg immediate release tablet  Commonly known as: ROXICODONE  Take 1-2 tablets (5-10 mg total) by mouth every 4 (four) hours as needed for pain (5mg for moderate, 10mg for severe) for up to 7 days.  Dose: 5-10 mg     polyethylene glycol 17 gram packet  Commonly known as: MIRALAX  Take 17 g by mouth daily for 3 days.  Dose: 17 g      sennosides-docusate sodium 8.6-50 mg  Commonly known as: SENOKOT-S  Take 1 tablet by mouth 2 (two) times a day.  Dose: 1 tablet     tiZANidine 2 mg tablet  Commonly known as: ZANAFLEX  Take 1 tablet (2 mg total) by mouth every 6 (six) hours as needed for muscle spasms for up to 14 days.  Dose: 2 mg        CONTINUE taking these medications    B COMPLEX 1 ORAL  Take by mouth daily.     fenofibrate 48 mg tablet  Commonly known as: TRICOR  Take 48 mg by mouth daily.  Dose: 48 mg     gabapentin 300 mg capsule  Commonly known as: NEURONTIN  Take 300 mg by mouth 3 (three) times a day.  Dose: 300 mg     irbesartan 150 mg tablet  Commonly known as: AVAPRO  Take 150 mg by mouth 2 (two) times a day.  Dose: 150 mg     multivitamin capsule  Take 1 capsule by mouth daily.  Dose: 1 capsule     rosuvastatin 20 mg tablet  Commonly known as: CRESTOR  Take 20 mg by mouth daily.  Dose: 20 mg     sertraline 100 mg tablet  Commonly known as: ZOLOFT  Take 100 mg by mouth once daily.  Dose: 100 mg              Instructions for after discharge     Follow-up with primary physician (PCP)      Within 1 week    Follow-up with provider      Call now for an appointment 2 weeks from your date of surgery    Melchor Lee MD   [phone]    [address] [place] [id number]             Outpatient Follow-Ups  Encounter Information    This patient does not currently have any appointments scheduled.       Referrals:  No orders of the defined types were placed in this encounter.        Test Results Pending at Discharge  Unresulted Labs (From admission, onward)    None              Discharge Disposition  Disposition: Home   Destination: Home      Code Status at Discharge: Prior  Physician Order for Life-Sustaining Treatment Document Status      No documents found

## 2023-02-14 NOTE — PLAN OF CARE
Problem: Patient Care Overview  Goal: Plan of Care Review  Outcome: Ongoing (interventions implemented as appropriate)   07/11/18 0546   Coping/Psychosocial   Plan Of Care Reviewed With patient   Plan of Care Review   Progress improving   Outcome Summary pain controlled with dilaudid to abd/groin/back pain. no n/v. diarrhea slowing down. it is not as frequent. drain draining serosanguinous dranage       Problem: Pain, Acute (Adult)  Goal: Identify Related Risk Factors and Signs and Symptoms  Outcome: Outcome(s) Achieved Date Met: 07/11/18    Goal: Acceptable Pain Control/Comfort Level  Outcome: Ongoing (interventions implemented as appropriate)      Problem: Nausea/Vomiting (Adult)  Goal: Identify Related Risk Factors and Signs and Symptoms  Outcome: Outcome(s) Achieved Date Met: 07/11/18    Goal: Symptom Relief  Outcome: Ongoing (interventions implemented as appropriate)    Goal: Adequate Hydration  Outcome: Ongoing (interventions implemented as appropriate)      Problem: Fall Risk (Adult)  Goal: Identify Related Risk Factors and Signs and Symptoms  Outcome: Outcome(s) Achieved Date Met: 07/11/18    Goal: Absence of Falls  Outcome: Ongoing (interventions implemented as appropriate)         Alla Funk

## 2023-08-26 NOTE — PROGRESS NOTES
Ortho Daily Progress Note    Subjective     Interval History: Patient seen and examined, resting comfortably. No events overnight. Feeling very well this morning and encouraged by her progress, although did not yet feel comfortable to go home yesterday. States she will feel comfortable today if her drain output decreases. Has been out of bed and ambulating. Still having difficulties with her brace - anterior strap not tightening so she has been tying the loose ends to compensate - reached out brace rep who will be in to address this today.    Objective     Vital signs in last 24 hours:  Temp:  [36.2 °C (97.1 °F)-36.8 °C (98.2 °F)] 36.5 °C (97.7 °F)  Heart Rate:  [] 108  Resp:  [18] 18  BP: (128-141)/(65-68) 128/65      Intake/Output Summary (Last 24 hours) at 10/17/2022 0429  Last data filed at 10/16/2022 2226  Gross per 24 hour   Intake --   Output 235 ml   Net -235 ml     Intake/Output this shift:  I/O this shift:  In: -   Out: 35 [Drains:35]    Labs  Results from last 7 days   Lab Units 10/14/22  1059 10/13/22  0337 10/10/22  1005   WBC K/uL 14.46* 13.98* 8.53   HEMOGLOBIN g/dL 10.6* 10.4* 13.8   PLATELETS K/uL 334 323 365     BMP Results       10/14/22 10/13/22 10/10/22     1059 0337 1005     133 138    K 4.1 4.6 5.1    Cl 102 105 101    CO2 22 20 24    Glucose 131 124 100    BUN 23 27 28    Creatinine 0.7 0.8 0.8    Calcium 9.5 8.7 10.8    Anion Gap 10 8 13    EGFR >60.0 >60.0 >60.0          Imaging  No new imaging      Physical Exam:  Spine:  Incisional cdi  CARLOS dressing with moderate drainage and strikethrough  CARLOS drain intact w/ serosang output  Appropriately TTP around incision     LE:  Motor 5/5 strength L2-S1 bilaterally  SILT L2-S1 bilaterally, subjectively decreased R L3-4 distribution  No clonus. downgoing babinski bilaterally  2+DP/PT pulses  BCR    Assessment     72F S/P T10-S1 PSF with Dr. Lee 10/12    Plan   - drains still with large output, however stable - 90 cc o/n, 245 past  24h  -Ancef q8 while drain intact  -Monitor drain OP  -DVT ppx: SCDs  -WBAT  -PT/OT  -Pain control  - brace rep to re-fit patient brace prior to discharge  -Dispo: pending decrease in drain output - likely this afternoon regardless of output, pain medications sent to pharmacy        Sita Medel DO, PGY-2  Orthopedic Surgery  Pager: [phone]            No

## 2023-09-20 ENCOUNTER — TRANSCRIBE ORDERS (OUTPATIENT)
Dept: SCHEDULING | Age: 73
End: 2023-09-20

## 2023-09-20 DIAGNOSIS — Z12.31 ENCOUNTER FOR SCREENING MAMMOGRAM FOR MALIGNANT NEOPLASM OF BREAST: Primary | ICD-10-CM

## 2023-10-05 ENCOUNTER — HOSPITAL ENCOUNTER (OUTPATIENT)
Dept: RADIOLOGY | Facility: HOSPITAL | Age: 73
Discharge: HOME | End: 2023-10-05
Attending: INTERNAL MEDICINE
Payer: COMMERCIAL

## 2023-10-05 DIAGNOSIS — Z12.31 ENCOUNTER FOR SCREENING MAMMOGRAM FOR MALIGNANT NEOPLASM OF BREAST: ICD-10-CM

## 2023-10-05 PROCEDURE — 77067 SCR MAMMO BI INCL CAD: CPT

## 2023-10-12 ENCOUNTER — TRANSCRIBE ORDERS (OUTPATIENT)
Dept: SCHEDULING | Age: 73
End: 2023-10-12

## 2023-10-12 DIAGNOSIS — M25.512 PAIN IN LEFT SHOULDER: ICD-10-CM

## 2023-10-12 DIAGNOSIS — M25.511 PAIN IN RIGHT SHOULDER: ICD-10-CM

## 2023-10-12 DIAGNOSIS — M81.0 AGE-RELATED OSTEOPOROSIS WITHOUT CURRENT PATHOLOGICAL FRACTURE: Primary | ICD-10-CM

## 2023-10-23 ENCOUNTER — HOSPITAL ENCOUNTER (OUTPATIENT)
Dept: RADIOLOGY | Age: 73
Discharge: HOME | End: 2023-10-23
Attending: INTERNAL MEDICINE
Payer: COMMERCIAL

## 2023-10-23 DIAGNOSIS — M81.0 AGE-RELATED OSTEOPOROSIS WITHOUT CURRENT PATHOLOGICAL FRACTURE: ICD-10-CM

## 2023-10-23 DIAGNOSIS — M25.511 PAIN IN RIGHT SHOULDER: ICD-10-CM

## 2023-10-23 DIAGNOSIS — M25.512 PAIN IN LEFT SHOULDER: ICD-10-CM

## 2023-10-23 PROCEDURE — 77080 DXA BONE DENSITY AXIAL: CPT

## 2023-10-23 PROCEDURE — 73030 X-RAY EXAM OF SHOULDER: CPT | Mod: 50

## 2024-02-21 ENCOUNTER — TRANSCRIBE ORDERS (OUTPATIENT)
Dept: SCHEDULING | Age: 74
End: 2024-02-21
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
## 2024-02-21 DIAGNOSIS — Z98.1 ARTHRODESIS STATUS: ICD-10-CM

## 2024-02-21 DIAGNOSIS — M54.50 LOW BACK PAIN, UNSPECIFIED: Primary | ICD-10-CM

## 2024-02-22 ENCOUNTER — HOSPITAL ENCOUNTER (OUTPATIENT)
Dept: RADIOLOGY | Facility: CLINIC | Age: 74
Discharge: HOME | End: 2024-02-22
Attending: NURSE PRACTITIONER
Payer: COMMERCIAL

## 2024-02-22 DIAGNOSIS — Z98.1 ARTHRODESIS STATUS: ICD-10-CM

## 2024-02-22 DIAGNOSIS — M54.50 LOW BACK PAIN, UNSPECIFIED: ICD-10-CM
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
## 2024-02-28 ENCOUNTER — TRANSCRIBE ORDERS (OUTPATIENT)
Dept: REGISTRATION | Facility: REHABILITATION | Age: 74
End: 2024-02-28

## 2024-02-28 DIAGNOSIS — M54.51 VERTEBROGENIC LOW BACK PAIN: ICD-10-CM

## 2024-02-28 DIAGNOSIS — Z98.1 S/P LUMBAR SPINAL FUSION: Primary | ICD-10-CM

## 2024-03-11 NOTE — LETTER
October 9, 2018                                                                                                                                                                                                                                                                                                               Lubna Kerr MD  [address]  [address]  [facility] [address]    Patient: Reyna Castillo   YOB: 1950   Date of Visit: 10/9/2018       Dear Dr. Kerr:    Thank you for referring Reyna Castillo to me for evaluation. Below are the relevant portions of my assessment and plan of care.    If you have questions, please do not hesitate to call me. I look forward to following Reyna MARTINEZ along with you.         Sincerely,        Bruce Casey, DO        CC: No Recipients        Assessment and Plan:  Problem List Items Addressed This Visit        Cardiology Problems    Mixed hyperlipidemia    Relevant Medications    omega-3 acid ethyl esters (LOVAZA) 1 gram capsule    ezetimibe-simvastatin (VYTORIN 10-20) 10-20 mg per tablet       Other    Diverticulitis of large intestine with abscess without bleeding     She has had a drain since July 2 and now is having a sigmoid colon resection which is the procedure planned for tomorrow.         Preoperative cardiovascular examination - Primary     She has no high risk markers for perioperative complications, no history of infarction, known coronary disease, heart failure,  stroke or kidney failure.  She has good functional capacity as described above.    Therefore, there are no cardiac contraindications to proceeding with the planned surgery without further testing.  I did discuss that all surgery entails cardiac risk which cannot be predicted or prevented.         Agatston CAC score 100-199     Recommend to continue healthy lifestyles weight loss and exercise statin therapy and eventually a stress test.         Family history of early CAD     "Answers submitted by the patient for this visit:  High Blood Pressure Questionnaire (Submitted on 3/10/2024)  Chief Complaint: Hypertension  Chronicity: recurrent  Onset: more than 1 month ago  Progression since onset: rapidly worsening  Condition status: resistant  anxiety: No  blurred vision: No  chest pain: No  headaches: No  malaise/fatigue: No  neck pain: No  orthopnea: No  palpitations: No  peripheral edema: No  PND: No  shortness of breath: No  sweats: No  Agents associated with hypertension: no associated agents  CAD risks: diabetes mellitus, family history, obesity  Compliance problems: no compliance problems  Past treatments: calcium channel blockers  Improvement on treatment: moderate    Subjective:      Patient ID: Keshawn Gamez is a 63 y.o. male.    Chief Complaint: Establish Care (185/128 and no s/s. He was going to have a "minor surgery but they cancelled the procedure." ), Medication Refill, and Referral (Still needs lypoma of the neck to be removed. )    Hypertension  This is a recurrent problem. The current episode started more than 1 month ago. The problem has been rapidly worsening since onset. The problem is resistant. Pertinent negatives include no anxiety, blurred vision, chest pain, headaches, malaise/fatigue, neck pain, orthopnea, palpitations, peripheral edema, PND, shortness of breath or sweats. There are no associated agents to hypertension. Risk factors for coronary artery disease include diabetes mellitus, family history and obesity. Past treatments include calcium channel blockers. The current treatment provides moderate improvement. There are no compliance problems.        Past Medical History:   Diagnosis Date    GERD (gastroesophageal reflux disease)     Lipoma of neck     Neuropathy     Pre-diabetes     for the past 15 yrs     Past Surgical History:   Procedure Laterality Date    COLONOSCOPY      2 years ago with [facility](Dr Marlene Oglesby)    eye lids      " Essential hypertension     Reasonably well-controlled.  No change.         Class 2 severe obesity due to excess calories with serious comorbidity and body mass index (BMI) of 36.0 to 36.9 in adult (CMS/[facility])     By the above number she has lost 26 pounds in about 14 months which is great and she is continuing to modify her diet and exercise.  Congratulated continue her changes.         Abnormal ECG     These are nonspecific changes and no preoperative evaluation or other testing is needed.                                      TONSILLECTOMY           Family History   Problem Relation Age of Onset    No Known Problems Mother     No Known Problems Father          Social History     Socioeconomic History    Marital status: Unknown   Tobacco Use    Smoking status: Former     Current packs/day: 0.00     Types: Cigarettes     Quit date: 3/11/2004     Years since quittin.0     Passive exposure: Past    Smokeless tobacco: Never   Substance and Sexual Activity    Alcohol use: Yes     Comment: occ    Drug use: Never     Social Determinants of Health     Financial Resource Strain: Low Risk  (3/10/2024)    Overall Financial Resource Strain (CARDIA)     Difficulty of Paying Living Expenses: Not very hard   Food Insecurity: No Food Insecurity (3/10/2024)    Hunger Vital Sign     Worried About Running Out of Food in the Last Year: Never true     Ran Out of Food in the Last Year: Never true   Transportation Needs: No Transportation Needs (3/10/2024)    PRAPARE - Transportation     Lack of Transportation (Medical): No     Lack of Transportation (Non-Medical): No   Physical Activity: Insufficiently Active (3/10/2024)    Exercise Vital Sign     Days of Exercise per Week: 4 days     Minutes of Exercise per Session: 30 min   Stress: No Stress Concern Present (3/10/2024)    [employer] - Occupational Stress Questionnaire     Feeling of Stress : Not at all   Social Connections: Unknown (3/10/2024)    Social Connection and Isolation Panel [NHANES]     Frequency of Communication with Friends and Family: Three times a week     Frequency of Social Gatherings with Friends and Family: Twice a week     Active Member of Clubs or Organizations: No     Attends Club or Organization Meetings: Patient declined     Marital Status: Never    Housing Stability: Low Risk  (3/10/2024)    Housing Stability Vital Sign     Unable to Pay for Housing in the Last Year: No     Number of Places Lived in the Last Year: 2     Unstable Housing in the  Last Year: No     Patient with above medical problems presents to establish care, he is here from [place] and states he was supposed to get surgery there in Jan but due to high BP it was cancelled. He has moved here now so will licha a surgeon     Quit smoking in 2000, smoked 1 pack per day for 20 years  Alcohol occasionally  No recreational drug use    Denies being in the hospital for chest pain, no h/o LHC, no FH of early MI    Cologuard done in [place] 2 years ago and was negative, he had a colonoscopy when he was 51    Up to date on shingles vaccine     He used to be on amlodipine but then states once his BP returned to normal he was taken off of it, no side effects reported from it     He is a          Review of Systems   Constitutional:  Negative for malaise/fatigue.   Eyes:  Negative for blurred vision.   Respiratory:  Negative for shortness of breath.    Cardiovascular:  Negative for chest pain, palpitations, orthopnea and PND.   Musculoskeletal:  Negative for neck pain.   Neurological:  Negative for headaches.     Objective:     Physical Exam  Vitals reviewed.   Constitutional:       Appearance: Normal appearance.   HENT:      Head: Normocephalic.      Mouth/Throat:      Mouth: Mucous membranes are moist.      Pharynx: Oropharynx is clear.   Eyes:      Extraocular Movements: Extraocular movements intact.      Conjunctiva/sclera: Conjunctivae normal.      Pupils: Pupils are equal, round, and reactive to light.   Cardiovascular:      Rate and Rhythm: Normal rate and regular rhythm.      Comments: Lipoma on chest wall  Pulmonary:      Effort: Pulmonary effort is normal.      Breath sounds: Normal breath sounds.   Abdominal:      General: Bowel sounds are normal.   Musculoskeletal:      Right lower leg: No edema.      Left lower leg: No edema.   Skin:     General: Skin is warm.      Capillary Refill: Capillary refill takes less than 2 seconds.   Neurological:      General: No focal deficit  "present.      Mental Status: He is alert and oriented to person, place, and time.   Psychiatric:         Mood and Affect: Mood normal.       BP (!) 156/99 (BP Location: Left arm, Patient Position: Sitting, BP Method: Medium (Automatic))   Pulse 68   Ht 5' 9" (1.753 m)   Wt 96 kg (211 lb 11.2 oz)   SpO2 98%   BMI 31.26 kg/m²     Assessment:       ICD-10-CM ICD-9-CM   1. Screening for prostate cancer  Z12.5 V76.44   2. Pre-diabetes  R73.03 790.29   3. Lipoma of anterior chest wall  D17.1 214.8   4. Hypertension, unspecified type  I10 401.9   5. Obesity (BMI 30-39.9)  E66.9 278.00       Plan:     Medication List with Changes/Refills   New Medications    AMLODIPINE (NORVASC) 10 MG TABLET    Take 1 tablet (10 mg total) by mouth once daily.   Current Medications    CYMBALTA 60 MG CAPSULE    Take 60 mg by mouth once daily.    GABAPENTIN (NEURONTIN) 300 MG CAPSULE    Take 300 mg by mouth. 2 tabs daily    OMEPRAZOLE (PRILOSEC) 40 MG CAPSULE    Take 40 mg by mouth every morning.    TAMSULOSIN (FLOMAX) 0.4 MG CAP    Take 0.4 mg by mouth once daily.   Changed and/or Refilled Medications    Modified Medication Previous Medication    METFORMIN (GLUCOPHAGE) 500 MG TABLET metFORMIN (GLUCOPHAGE) 500 MG tablet       Take 1 tablet (500 mg total) by mouth daily with breakfast.    Take 500 mg by mouth daily with breakfast.        1. Screening for prostate cancer  -     PSA, Screening; Future; Expected date: 03/11/2024    2. Pre-diabetes  -     metFORMIN (GLUCOPHAGE) 500 MG tablet; Take 1 tablet (500 mg total) by mouth daily with breakfast.  Dispense: 90 tablet; Refill: 3  -     Hemoglobin A1C; Future; Expected date: 03/11/2024    3. Lipoma of anterior chest wall  -     Ambulatory referral/consult to General Surgery; Future; Expected date: 03/18/2024    4. Hypertension, unspecified type  -     CBC Auto Differential; Future; Expected date: 03/11/2024  -     Comprehensive Metabolic Panel; Future; Expected date: 03/11/2024  -     Lipid " Panel; Future; Expected date: 03/11/2024  -     amLODIPine (NORVASC) 10 MG tablet; Take 1 tablet (10 mg total) by mouth once daily.  Dispense: 90 tablet; Refill: 3    5. Obesity (BMI 30-39.9)

## 2024-03-18 ENCOUNTER — HOSPITAL ENCOUNTER (OUTPATIENT)
Dept: PHYSICAL THERAPY | Facility: REHABILITATION | Age: 74
Setting detail: THERAPIES SERIES
Discharge: HOME | End: 2024-03-18
Attending: NURSE PRACTITIONER
Payer: COMMERCIAL

## 2024-03-18 DIAGNOSIS — M19.011 LOCALIZED OSTEOARTHRITIS OF BOTH SHOULDERS: ICD-10-CM

## 2024-03-18 DIAGNOSIS — M54.51 VERTEBROGENIC LOW BACK PAIN: ICD-10-CM

## 2024-03-18 DIAGNOSIS — M19.012 LOCALIZED OSTEOARTHRITIS OF BOTH SHOULDERS: ICD-10-CM

## 2024-03-18 DIAGNOSIS — Z98.1 S/P LUMBAR SPINAL FUSION: Primary | ICD-10-CM

## 2024-03-18 PROCEDURE — 97163 PT EVAL HIGH COMPLEX 45 MIN: CPT | Mod: GP

## 2024-03-18 PROCEDURE — 97110 THERAPEUTIC EXERCISES: CPT | Mod: GP

## 2024-03-18 RX ORDER — BUSPIRONE HYDROCHLORIDE 7.5 MG/1
7.5 TABLET ORAL 3 TIMES DAILY
COMMUNITY

## 2024-03-18 RX ORDER — HYDROCHLOROTHIAZIDE 12.5 MG/1
12.5 TABLET ORAL DAILY
COMMUNITY

## 2024-03-18 RX ORDER — DULOXETIN HYDROCHLORIDE 20 MG/1
40 CAPSULE, DELAYED RELEASE ORAL DAILY
COMMUNITY

## 2024-03-18 RX ORDER — ALENDRONATE SODIUM 70 MG/1
70 TABLET ORAL WEEKLY
COMMUNITY

## 2024-03-18 RX ORDER — AMLODIPINE BESYLATE 5 MG/1
5 TABLET ORAL DAILY
COMMUNITY

## 2024-03-18 NOTE — LETTER
Dear DR. Terrazas,    Thank you for this referral. Please review the attached notes and plan of care for your approval.  Please contact our department with any questions.     Sincerely,     Evette Barkley, PT  [address] [place] [address]  Phone [phone]  Fax  [phone]    By co-signing this Plan of Care (POC) you agree to the following:  I have reviewed the the Plan of Care established by the therapist within this document and certify that the services are skilled and medically necessary. I have reviewed the plan and recommend that these services continue to meet the goals stated in this document.    PHYSICIAN SIGNATURE: __________________________________     DATE: ___________________  TIME: _____________           Physical Therapy Plan of Care 3/18/24   Effective from: 3/18/2024  Effective to: 2024    Plan ID: [id number]            Participants as of Finalize on 3/18/2024    Name Type Comments Contact Info    TATE Lopes Referring Provider  [phone]    Evette Barkley, PT Physical Therapist         Last Progress Notes Note     Author: Evette Barkley PT Status: Signed Last edited: 3/18/2024  2:00 PM         Physical Therapy Evaluation    [facility] Fax: [phone]    PT EVALUATION FOR OUTPATIENT THERAPY    Patient: Reyna Castillo    MRN: [id number]  : 1950 73 y.o.     Referring Physician: Анна Terrazas CRNP  Date of Visit: 3/18/2024      Certification Dates:  24 through 24  2x/week, progressing to 3x/week if pt able to tolerate      Recommended Frequency & Duration:  3 times/week for up to 3 months     Diagnosis:   1. S/P lumbar spinal fusion    2. Vertebrogenic low back pain    3. Localized osteoarthritis of both shoulders        Chief Complaints:   Chief Complaint   Patient presents with   • Dec ROM   • Dec Strength   • Difficulty Walking   • Pain   •  Decreased Community Integration   • Decreased recreational/play activity   • Abnormality Of Gait   •  "Decreased Endurance   • Joint Pain   •  Imbalance   •  Difficulty Performing Work/school Tasks   • Decreased Mobility   • Balance Deficits       SUBJECTIVE: 10/12/22: pt underwent T10-S1 Posterior Thorocolumbosacral Decompression and Interbody Fusion, Instrumentation, Pelvic Fixation, Allograft, Autograft, BMP, Revision of Existing Instrumentation at [facility] by Dr Lee. She reports about 1.5 mo ago she had a MRI that showed inflamation on her spine. She reports having chronic LBP. She reports that she has an ache in her back that increases with standing and walking. She reports difficulty turning in bed and she needs UE assist to help get out of bed. She wakes about 4x/night. She reports mikey unable to lift anything heavy. Pt able to stand as long as needed but pain increases with time. Pain with getting in/out of car but no pain wiht driving. She can walk short community distances.  MRI IMPRESSION:  1. Instrumented fusion spanning T11-S1 and the sacroiliac joints. 2. Nonspecific fluid collections in the laminectomy bed and superficial soft  Tissues. 3. No compression fracture.    Shoulders: Pt reports that she has a lot of stiffness in her shoulders and needs 2 shoulder replacements but she wants to work on shoulder mobility. She had 2 steroid injections about a month ago- which helped manage pain. She reports overall a gradual worsening. L is worse than R. Pt is RHD. She reports difficulty dressing due to back and shoulder. She uses a step stool to reach overhead. No numbness/tingling. She notes some weakness in shoulders. Pain at worst 5/10 with reaching, or doing wash. Pain at best 0/10 with rest. She had a X ray which showed OA.     Pt goal: \"I want to be able to reach to clip my toenails and reach to pick something from the floor\" \"I want to improve my shoulder mobility\"     Precautions: fall  Precautions additional comments:      Past Medical History:   Past Medical History:   Diagnosis Date   • " Abnormal ECG 10/09/2018    Nonspecific T wave flattening inferiorly minimal ST depression laterally and anterolateral T wave inversion   • Acid reflux    • Agatston CAC score 100-199 10/09/2018    8/17 coronary calcium score 101 LAD 13, circumflex 68, RCA 20   • Anxiety    • Lopez's esophagus without dysplasia    • Class 2 severe obesity due to excess calories with serious comorbidity and body mass index (BMI) of 36.0 to 36.9 in adult (CMS/Formerly [facility]) 10/09/2018    2017 weight 232 pounds   • Depression    • DJD (degenerative joint disease)    • Family history of early CAD 10/09/2018    Father  of MI at age 59   • Hearing loss    • History of hepatitis A    • Hypertension    • Kidney lesion    • Lumbar myelopathy (CMS/[facility])    • Tran-Juan tear    • Mixed hyperlipidemia 10/09/2018    2020 Lipid panel  LDL 91 HDL 47 Trigs 176   • Spinal enthesopathy of lumbar region (CMS/[facility])        Past Surgical History:   Past Surgical History:   Procedure Laterality Date   • ADENOIDECTOMY     • CHOLECYSTECTOMY     • COLONOSCOPY     • FOOT SURGERY Right    • HAND SURGERY Right    • INNER EAR SURGERY Right    • JOINT REPLACEMENT Bilateral     bilateral 2021; knees   • KNEE ARTHROSCOPY Bilateral    • LUMBAR LAMINECTOMY     • SIGMOIDECTOMY     • TONSILLECTOMY     • TRANSUMBILICAL AUGMENTATION MAMMAPLASTY     • UPPER GASTROINTESTINAL ENDOSCOPY     • WISDOM TOOTH EXTRACTION           LEARNING ASSESSMENT    Assessment completed:  Yes    Learner name:  Reyna    Learner: Patient    Learning Barriers:  Learning barriers: No Barriers    Preferred Language: English     Needed: No    Education Provided:   Method: Discussion, Handout and Demonstration  Readiness: acceptance  Response: Demonstrated understanding and Verbalizes understanding      CO-LEARNER ASSESSMENT:    Completed: No          Welcome letter discussed: Yes Patient provided with Welcome Letter, which includes attendance policy. Provided education regarding  cancellation and no-show policy. Education regarding the importance of participation and regular attendance to maximize goal attainment.         OBJECTIVE MEASUREMENTS/DATA:    Time In Session:  Start Time: 1400  Stop Time: 1507  Time Calculation (min): 67 min   Assessment and Plan - 03/18/24 1435        Assessment    Plan of Care reviewed and patient/family in agreement Yes     System Pathology/Pathophysiology Noted musculoskeletal;cardiovascular     Functional Limitations in Following Categories (PT Eval) self-care;home management;community/leisure     Rehab Potential/Prognosis good, to achieve stated therapy goals     Problem List decreased flexibility;decreased endurance;pain;decreased strength;impaired postural control;decreased ROM;impaired balance     Clinical Assessment Pt is a 72 y/o female with h/o lumbosacral anterior/posterior decompresison and fusion 10/12/22 presenting to OP PT with chronic LBP and hypomobility and B Shoulder OA and decreased mobility with functional limitations. She presents with hip and core weakness, decreased standing and walking tolerance, limitations in lifting, reaching, carrying, dressing and ADLs. She currently reports 48% impairment on LOLY and 54% impairment on RMQ. She reports 40% function on Fabian Shoulder Scale. She will benefit from skilled PT to address impairments and progress towards goals.     Plan and Recommendations Review HEP and update next session, begin gentle mobility and strength exercises as tolerated.     Planned Services CPT 48332 Aquatic therapy/exercises;CPT 50030 Gait training;CPT 85134 Manual therapy;CPT 37251 Neuromuscular Reeducation;CPT 66649 Therapeutic activities;CPT 30537 Therapeutic exercises;CPT 79467 Electrical stimulation ATTENDED;CPT 91155 Electrical stimulation UNATTENDED;CPT 36370 Hot/Cold Packs therapy                General Information - 03/18/24 1435        Session Details    Document Type initial evaluation     Mode of Treatment  individual therapy        General Information    Referring Physician Анна YBARRA     History of present illness/functional impairment Pt is a 72 y/o female who underwent 10/12/22 T10-S1 Posterior Thorocolumbosacral Decompression and Interbody Fusion, Instrumentation, Pelvic Fixation, Allograft, Autograft, BMP, Revision of Existing Instrumentation at [facility] by Dr Lee. She presents with chronic LBP with decreased mobility. She also was dx with bilateral shoulder OA/frozen shoulder with signficant limitations in shoulder mobility and function.     Patient/Family/Caregiver Comments/Observations Pt presents to OP PT to address B shoulder OA and chronic LBP s/p spinal surgery.     Existing Precautions/Restrictions fall                  Falls/Food Screening - 03/18/24 1435        Initial Falls Assessment    One or more falls in the last year Yes     How many times 1     Was the patient injured in any fall Yes     Fall prevention interventions recommended Schedule individual therapy sessions as appropriate        Food Insecurity    Within the past 12 months, you worried that your food would run out before you got the money to buy more. Never true     Within the past 12 months, the food you bought just didn't last and you didn't have money to get more. Never true                PT - 03/18/24 1435        Physical Therapy    Physical Therapy Specialty Ortho and Sports PT        PT Plan    Frequency of treatment 3 times/week     PT Duration 3 months     PT Custom Frequency and Duration 2x/week, progressing to 3x/week if pt able to tolerate     PT Cert From 03/18/24     PT Cert To 06/16/24     Date PT POC was sent to provider 03/18/24     Signed PT Plan of Care received?  No                   Living Environment    Living Environment - 03/18/24 1435        Living Environment    People in Home spouse     Living Arrangements house     Living Environment Comment 2STH w/ spouse; 1+1 YAS; 1/2 bathroom on 1st FL; FF  to second floor bedroom; tub shower w/ shower chair + grab bars; standard toilets               PLOF:     ROM    Range of Motion - 03/18/24 1600        RIGHT: Upper Extremity PROM Assessment    Shoulder Flexion  138 degrees   8/10    Shoulder Abduction  103 degrees   6/10    Shoulder Internal Rotation  36 degrees   @90 ABD    Shoulder External Rotation  50 degrees   @90 ABD       LEFT: Upper Extremity PROM Assessment    Shoulder Flexion   122 degrees   5/10    Shoulder Abduction   93 degrees   8/10    Shoulder Internal Rotation   50 degrees   @45 ABD    Shoulder External Rotation   48 degrees   @45 ABD       LEFT: Upper Extremity AROM Assessment    Shoulder Flexion   108 degrees   4/10    Shoulder Extension   52 degrees     Shoulder Abduction   62 degrees   4/10    Shoulder Internal Rotation   --   lateral sacrum 4/10    Shoulder External Rotation   22 degrees   4/10, C7       RIGHT: Upper Extremity AROM Assessment    Shoulder Flexion   106 degrees   4/10    Shoulder Extension   58 degrees     Shoulder Abduction   72 degrees   5/10    Shoulder Internal Rotation   --   Lateral sacrum    Shoulder External Rotation   32 degrees   T1       Cervical AROM    Cervical Flexion 42     Cervical Extension 42     Cervical Left SB 16     Cervical Right SB 35     Cervical Left Rotation 52     Cervical Right Rotation 62        Lumbar AROM    Lumbar Flexion 100   5/10    Lumbar Extension 23   2-3/10    Lumbar Left SB 8   4/10    Lumbar Right SB 22   3/10    Lumbar Left Rotation 38   2/10    Lumbar Right Rotation 52               MMT    Manual Muscle Tests - 03/18/24 1435        RIGHT: Upper Extremity Manual Muscle Test Assessment    Shoulder Flexion gross movement (4/5) good     Shoulder Extension gross movement (4+/5) good plus     Shoulder Abduction gross movement (4/5) good     Shoulder Internal Rotation gross movement (4+/5) good plus     Shoulder External Rotation gross movement (4/5) good     Elbow Flex gross movement (5/5)  normal     Elbow Extension gross movement (5/5) normal        LEFT: Upper Extremity Manual Muscle Test Assessment    Shoulder Flexion gross movement (4/5) good     Shoulder Extension gross movement (4+/5) good plus     Shoulder Abduction gross movement (4/5) good     Shoulder Internal Rotation gross movement (4+/5) good plus     Shoulder External Rotation gross movement (4+/5) good plus     Elbow Flexion gross movement (5/5) normal     Elbow Extension gross movement (5/5) normal        RIGHT: Lower Extremity Manual Muscle Test Assessment    Hip Flexion gross movement (4-/5) good minus     Hip Extension gross movement (4-/5) good minus     Knee Flexion strength (5/5) normal     Knee Extension strength (5/5) normal     Ankle Dorsiflexion gross movement (5/5) normal        LEFT: Lower Extremity Manual Muscle Test Assessment    Hip Flexion gross movement (4-/5) good minus     Hip Extension gross movement (4-/5) good minus     Knee Flexion strength (5/5) normal     Knee Extension strength (5/5) normal     Ankle Dorsiflexion gross movement (5/5) normal               Palpation    Palpation - 03/18/24 1435        Palpation    UE Palpation  (+) pain/hypomobility with shoulder inferior and posterior glides     Back Palpation  (+) TTP L1-L5               Ortho Special Tests    Orthopedic Special Tests - 03/18/24 1600        Shoulder    Drop Arm Right - Negative;Left - Negative               Transfers     Gait and Mobility    Gait and Mobility - 03/18/24 1435        Gait Training    Broome, Gait independent     Variable surfaces Flat surface     Assistive Device none     Deviations/Abnormal Patterns antalgic;gait speed decreased;step length decreased;stride length decreased;scissoring     Comment (Gait/Stairs) B hip drop, B knee valgus, (+) mild scissoring               Neuromuscular/Motor     Balance/Posture    Balance and Posture - 03/18/24 1435        Postural Deviations    Postural Deviations head and  neck;shoulder;upper back;thorax;low back;pelvis     Head and Neck forward head     Shoulder right shoulder forward;left shoulder forward;left shoulder elevation     Upper Back kyphosis     Low Back lordosis        Posture    Posture postural deviations               Outcome Measures    PT Outcome Measures - 03/18/24 1435        Subjective Outcome Measures    Oswestry (LOLY) 24/50=48% impaired     [place] Shoulder Score 40     RMDI Low Back 13/24=54% impaired                  Goals     • [facility] PT Lumbar Goals      Short/Long Term Goals Time Frame Result Comment/Progress   Pt will increase core, LE, UE and Hip MMT >/= full grade 8 weeks weeks     Pt will increase lumbar ROM >/= 10 degrees into flexion, extension, lateral flexion and rotation 4 weeks     Pt will increase Shoulder ROM >/= 10 degrees into flexion, extension, lateral flexion and rotation 4 weeks     Pt will improve RMQ >/= 10% functional improvement 4 weeks  IE 13/24=54% impaired   Pt will demonstrate improvements in Oswestry >/=  10 % functional improvement 4 weeks  IE: 24/50=48% impaired   Pt will demonstrate improvements in Oswestry >/=  20 % functional improvement 8 weeks     Improve [place] shoulder scale >/= 12 points 4 weeks  IE 40/100   Improve [place] shoulder scale >/= 24 points 8 weeks     Pt will be able to lift 10 lbs without back or shoulder pain 12 weeks     Increase AROM shoulder flexion, ABD >/= 120 deg 12 weeks     Pt will be independent in HEP for self management of symptoms 8 weeks     Pt will be able to don socks without back pain 12 weeks                       TREATMENT PLAN:    ORTHO PT FLOWSHEET    Exercises Current Session Time Completed today   MODALITIES- HEAT/ICE  CPT 71257 TOTAL TIME FOR SESSION Not performed    Heat     Ice/Vasocompression     MODALITIES - E-STIM  CPT 32996   TOTAL TIME FOR SESSION Not performed    E-stim/H-Wave     THER ACT  CPT 62806 TOTAL TIME FOR SESSION Not performed         Assessment     Body Mechanics/Work  Training Safe lifting education    THER EX  CPT 65088 TOTAL TIME FOR SESSION 8-22 Minutes    HEP 3/18: wand shoulder flexion, TAC with breathing yes   CARDIOVASCULAR      TM     Nustep     UBE     TAC      TAC with march     TAC with reverse march          Wand:  -flexion  -Abduction  -ER  -IR  -EXT     shoulder IR with strap          Wall slides     TB ER/IR     AROM shoulder flexion  ABD     sidelying shoulder ER                    Hip 3 way     Side stepping     Prone hip ext     clamshells     S/l hip ABD     Bridge     Modified liliana stretch     Hip ER          TB rows/EXT     Step up     NEURO RE-ED  CPT 79034 TOTAL TIME FOR SESSION Not performed    COORDINATION     POSTURAL RE-ED        PRE-GAIT ACTIVITIES      BALANCE TRAINING                GAIT TRAINING  CPT 04121 TOTAL TIME FOR SESSION Not performed    Ambulation      Dynamic gait      MANUAL  CPT 23047 TOTAL TIME FOR SESSION Not performed    Stretching Shoulder PROM    Mobilization  Shoulder GH mobilizations inferior and posterior  STM    Massage     Taping         ABDOMINAL BRACING - TA ACTIVATION    While lying on your back, press your low back into the floor as you tighten your stomach muscles moving your navel down towards the floor. Place your thumbs 2 inches inward from your pelvic bone so that you can feel the muscle tanner. Repeat 10 Times   Hold 10 Seconds   Complete 1 Set   Perform 1 Times a Day          SHOULDER FLEXION AAROM - SUPINE - CANE    Lying on your back and holding a wand or cane, slowly raise the wand towards overhead. Use your unaffected arm to assist with the movement. Repeat 20 Times   Hold 5 Seconds   Complete 1 Set   Perform 1 Times a Day               ASSESSMENT:    This 73 y.o. year old female presents to PT with above stated diagnosis. Physical Therapy evaluation reveals decreased flexibility, decreased endurance, pain, decreased strength, impaired postural control, decreased ROM, impaired balance resulting in  self-care, home management, community/leisure limitations. Reyna Castillo will benefit from skilled PT services to address limitation, work towards rehab and patient goals and maximize PLOF of chosen ADLs.     Planned Services: The patient's treatment will include CPT 06647 Aquatic therapy/exercises, CPT 02342 Gait training, CPT 40334 Manual therapy, CPT 41526 Neuromuscular Reeducation, CPT 11640 Therapeutic activities, CPT 90934 Therapeutic exercises, CPT 69020 Electrical stimulation ATTENDED, CPT 67793 Electrical stimulation UNATTENDED, CPT 41038 Hot/Cold Packs therapy, .     Evette Barkley, PT                         Current Participants as of 3/18/2024    Name Type Comments Contact Info    TATE Lopes Referring Provider  [phone]    Signature pending    Evette Barkley, PT Physical Therapist      Signature pending

## 2024-03-18 NOTE — OP PT TREATMENT LOG
ORTHO PT FLOWSHEET    Exercises Current Session Time Completed today   MODALITIES- HEAT/ICE  CPT 44974 TOTAL TIME FOR SESSION Not performed    Heat     Ice/Vasocompression     MODALITIES - E-STIM  CPT 79936   TOTAL TIME FOR SESSION Not performed    E-stim/H-Wave     THER ACT  CPT 10387 TOTAL TIME FOR SESSION Not performed         Assessment     Body Mechanics/Work Training Safe lifting education    THER EX  CPT 85009 TOTAL TIME FOR SESSION 8-22 Minutes    HEP 3/18: wand shoulder flexion, TAC with breathing yes   CARDIOVASCULAR      TM     Nustep     UBE     TAC      TAC with march     TAC with reverse march          Wand:  -flexion  -Abduction  -ER  -IR  -EXT     shoulder IR with strap          Wall slides     TB ER/IR     AROM shoulder flexion  ABD     sidelying shoulder ER                    Hip 3 way     Side stepping     Prone hip ext     clamshells     S/l hip ABD     Bridge     Modified liliana stretch     Hip ER          TB rows/EXT     Step up     NEURO RE-ED  CPT 71070 TOTAL TIME FOR SESSION Not performed    COORDINATION     POSTURAL RE-ED        PRE-GAIT ACTIVITIES      BALANCE TRAINING                GAIT TRAINING  CPT 72330 TOTAL TIME FOR SESSION Not performed    Ambulation      Dynamic gait      MANUAL  CPT 51751 TOTAL TIME FOR SESSION Not performed    Stretching Shoulder PROM    Mobilization  Shoulder GH mobilizations inferior and posterior  STM    Massage     Taping         ABDOMINAL BRACING - TA ACTIVATION    While lying on your back, press your low back into the floor as you tighten your stomach muscles moving your navel down towards the floor. Place your thumbs 2 inches inward from your pelvic bone so that you can feel the muscle tanner. Repeat 10 Times   Hold 10 Seconds   Complete 1 Set   Perform 1 Times a Day          SHOULDER FLEXION AAROM - SUPINE - CANE    Lying on your back and holding a wand or cane, slowly raise the wand towards overhead. Use your unaffected arm to assist with the  movement. Repeat 20 Times   Hold 5 Seconds   Complete 1 Set   Perform 1 Times a Day

## 2024-03-18 NOTE — PROGRESS NOTES
Physical Therapy Evaluation    [facility] Fax: [phone]    PT EVALUATION FOR OUTPATIENT THERAPY    Patient: Reyna Castillo    MRN: [id number]  : 1950 73 y.o.     Referring Physician: Анна Terrazas CRNP  Date of Visit: 3/18/2024      Certification Dates:  24 through 24  2x/week, progressing to 3x/week if pt able to tolerate      Recommended Frequency & Duration:  3 times/week for up to 3 months     Diagnosis:   1. S/P lumbar spinal fusion    2. Vertebrogenic low back pain    3. Localized osteoarthritis of both shoulders        Chief Complaints:   Chief Complaint   Patient presents with   • Dec ROM   • Dec Strength   • Difficulty Walking   • Pain   •  Decreased Community Integration   • Decreased recreational/play activity   • Abnormality Of Gait   • Decreased Endurance   • Joint Pain   •  Imbalance   •  Difficulty Performing Work/school Tasks   • Decreased Mobility   • Balance Deficits       SUBJECTIVE: 10/12/22: pt underwent T10-S1 Posterior Thorocolumbosacral Decompression and Interbody Fusion, Instrumentation, Pelvic Fixation, Allograft, Autograft, BMP, Revision of Existing Instrumentation at [facility] by Dr Lee. She reports about 1.5 mo ago she had a MRI that showed inflamation on her spine. She reports having chronic LBP. She reports that she has an ache in her back that increases with standing and walking. She reports difficulty turning in bed and she needs UE assist to help get out of bed. She wakes about 4x/night. She reports mikey unable to lift anything heavy. Pt able to stand as long as needed but pain increases with time. Pain with getting in/out of car but no pain wiht driving. She can walk short community distances.  MRI IMPRESSION:  1. Instrumented fusion spanning T11-S1 and the sacroiliac joints. 2. Nonspecific fluid collections in the laminectomy bed and superficial soft  Tissues. 3. No compression fracture.    Shoulders: Pt reports that she has a lot of  "stiffness in her shoulders and needs 2 shoulder replacements but she wants to work on shoulder mobility. She had 2 steroid injections about a month ago- which helped manage pain. She reports overall a gradual worsening. L is worse than R. Pt is RHD. She reports difficulty dressing due to back and shoulder. She uses a step stool to reach overhead. No numbness/tingling. She notes some weakness in shoulders. Pain at worst 5/10 with reaching, or doing wash. Pain at best 0/10 with rest. She had a X ray which showed OA.     Pt goal: \"I want to be able to reach to clip my toenails and reach to pick something from the floor\" \"I want to improve my shoulder mobility\"     Precautions: fall  Precautions additional comments:      Past Medical History:   Past Medical History:   Diagnosis Date   • Abnormal ECG 10/09/2018    Nonspecific T wave flattening inferiorly minimal ST depression laterally and anterolateral T wave inversion   • Acid reflux    • Agatston CAC score 100-199 10/09/2018    8/17 coronary calcium score 101 LAD 13, circumflex 68, RCA 20   • Anxiety    • Lopez's esophagus without dysplasia    • Class 2 severe obesity due to excess calories with serious comorbidity and body mass index (BMI) of 36.0 to 36.9 in adult (CMS/[facility]) 10/09/2018    2017 weight 232 pounds   • Depression    • DJD (degenerative joint disease)    • Family history of early CAD 10/09/2018    Father  of MI at age 59   • Hearing loss    • History of hepatitis A    • Hypertension    • Kidney lesion    • Lumbar myelopathy (CMS/[facility])    • Tran-Juan tear    • Mixed hyperlipidemia 10/09/2018    2020 Lipid panel  LDL 91 HDL 47 Trigs 176   • Spinal enthesopathy of lumbar region (CMS/[facility])        Past Surgical History:   Past Surgical History:   Procedure Laterality Date   • ADENOIDECTOMY     • CHOLECYSTECTOMY     • COLONOSCOPY     • FOOT SURGERY Right    • HAND SURGERY Right    • INNER EAR SURGERY Right    • JOINT REPLACEMENT Bilateral     bilateral " 2020, 2021; knees   • KNEE ARTHROSCOPY Bilateral    • LUMBAR LAMINECTOMY     • SIGMOIDECTOMY     • TONSILLECTOMY     • TRANSUMBILICAL AUGMENTATION MAMMAPLASTY     • UPPER GASTROINTESTINAL ENDOSCOPY     • WISDOM TOOTH EXTRACTION           LEARNING ASSESSMENT    Assessment completed:  Yes    Learner name:  Reyna    Learner: Patient    Learning Barriers:  Learning barriers: No Barriers    Preferred Language: English     Needed: No    Education Provided:   Method: Discussion, Handout and Demonstration  Readiness: acceptance  Response: Demonstrated understanding and Verbalizes understanding      CO-LEARNER ASSESSMENT:    Completed: No          Welcome letter discussed: Yes Patient provided with Welcome Letter, which includes attendance policy. Provided education regarding cancellation and no-show policy. Education regarding the importance of participation and regular attendance to maximize goal attainment.         OBJECTIVE MEASUREMENTS/DATA:    Time In Session:  Start Time: 1400  Stop Time: 1507  Time Calculation (min): 67 min   Assessment and Plan - 03/18/24 1435        Assessment    Plan of Care reviewed and patient/family in agreement Yes     System Pathology/Pathophysiology Noted musculoskeletal;cardiovascular     Functional Limitations in Following Categories (PT Eval) self-care;home management;community/leisure     Rehab Potential/Prognosis good, to achieve stated therapy goals     Problem List decreased flexibility;decreased endurance;pain;decreased strength;impaired postural control;decreased ROM;impaired balance     Clinical Assessment Pt is a 74 y/o female with h/o lumbosacral anterior/posterior decompresison and fusion 10/12/22 presenting to OP PT with chronic LBP and hypomobility and B Shoulder OA and decreased mobility with functional limitations. She presents with hip and core weakness, decreased standing and walking tolerance, limitations in lifting, reaching, carrying, dressing and ADLs. She  currently reports 48% impairment on LOLY and 54% impairment on RMQ. She reports 40% function on [place] Shoulder Scale. She will benefit from skilled PT to address impairments and progress towards goals.     Plan and Recommendations Review HEP and update next session, begin gentle mobility and strength exercises as tolerated.     Planned Services CPT 15629 Aquatic therapy/exercises;CPT 48725 Gait training;CPT 87291 Manual therapy;CPT 43968 Neuromuscular Reeducation;CPT 50464 Therapeutic activities;CPT 70025 Therapeutic exercises;CPT 29212 Electrical stimulation ATTENDED;CPT 53723 Electrical stimulation UNATTENDED;CPT 59065 Hot/Cold Packs therapy                General Information - 03/18/24 1435        Session Details    Document Type initial evaluation     Mode of Treatment individual therapy        General Information    Referring Physician Анна YBARRA     History of present illness/functional impairment Pt is a 74 y/o female who underwent 10/12/22 T10-S1 Posterior Thorocolumbosacral Decompression and Interbody Fusion, Instrumentation, Pelvic Fixation, Allograft, Autograft, BMP, Revision of Existing Instrumentation at [facility] by Dr Lee. She presents with chronic LBP with decreased mobility. She also was dx with bilateral shoulder OA/frozen shoulder with signficant limitations in shoulder mobility and function.     Patient/Family/Caregiver Comments/Observations Pt presents to OP PT to address B shoulder OA and chronic LBP s/p spinal surgery.     Existing Precautions/Restrictions fall                Pain/Vitals - 03/18/24 1435        Pain Assessment    Currently in pain Yes     Preferred Pain Scale number (Numeric Rating Pain Scale)     Pain: Body location Back     Pain Rating (0-10): Pre Activity 3     Pain Rating (0-10): Activity 5     Pain Description sore;aching        Pre Activity Vital Signs    /74     BP Location Left upper arm     BP Method Manual     Patient Position Sitting        Pain  Intervention    Intervention  IE     Post Intervention Comments monitored                Falls/Food Screening - 03/18/24 1435        Initial Falls Assessment    One or more falls in the last year Yes     How many times 1     Was the patient injured in any fall Yes     Fall prevention interventions recommended Schedule individual therapy sessions as appropriate        Food Insecurity    Within the past 12 months, you worried that your food would run out before you got the money to buy more. Never true     Within the past 12 months, the food you bought just didn't last and you didn't have money to get more. Never true                PT - 03/18/24 1435        Physical Therapy    Physical Therapy Specialty Ortho and Sports PT        PT Plan    Frequency of treatment 3 times/week     PT Duration 3 months     PT Custom Frequency and Duration 2x/week, progressing to 3x/week if pt able to tolerate     PT Cert From 03/18/24     PT Cert To 06/16/24     Date PT POC was sent to provider 03/18/24     Signed PT Plan of Care received?  No                   Living Environment    Living Environment - 03/18/24 1435        Living Environment    People in Home spouse     Living Arrangements house     Living Environment Comment 2STH w/ spouse; 1+1 YAS; 1/2 bathroom on 1st FL; FF to second floor bedroom; tub shower w/ shower chair + grab bars; standard toilets               PLOF:     ROM    Range of Motion - 03/18/24 1600        RIGHT: Upper Extremity PROM Assessment    Shoulder Flexion  138 degrees   8/10    Shoulder Abduction  103 degrees   6/10    Shoulder Internal Rotation  36 degrees   @90 ABD    Shoulder External Rotation  50 degrees   @90 ABD       LEFT: Upper Extremity PROM Assessment    Shoulder Flexion   122 degrees   5/10    Shoulder Abduction   93 degrees   8/10    Shoulder Internal Rotation   50 degrees   @45 ABD    Shoulder External Rotation   48 degrees   @45 ABD       LEFT: Upper Extremity AROM Assessment    Shoulder  Flexion   108 degrees   4/10    Shoulder Extension   52 degrees     Shoulder Abduction   62 degrees   4/10    Shoulder Internal Rotation   --   lateral sacrum 4/10    Shoulder External Rotation   22 degrees   4/10, C7       RIGHT: Upper Extremity AROM Assessment    Shoulder Flexion   106 degrees   4/10    Shoulder Extension   58 degrees     Shoulder Abduction   72 degrees   5/10    Shoulder Internal Rotation   --   Lateral sacrum    Shoulder External Rotation   32 degrees   T1       Cervical AROM    Cervical Flexion 42     Cervical Extension 42     Cervical Left SB 16     Cervical Right SB 35     Cervical Left Rotation 52     Cervical Right Rotation 62        Lumbar AROM    Lumbar Flexion 100   5/10    Lumbar Extension 23   2-3/10    Lumbar Left SB 8   4/10    Lumbar Right SB 22   3/10    Lumbar Left Rotation 38   2/10    Lumbar Right Rotation 52               MMT    Manual Muscle Tests - 03/18/24 1435        RIGHT: Upper Extremity Manual Muscle Test Assessment    Shoulder Flexion gross movement (4/5) good     Shoulder Extension gross movement (4+/5) good plus     Shoulder Abduction gross movement (4/5) good     Shoulder Internal Rotation gross movement (4+/5) good plus     Shoulder External Rotation gross movement (4/5) good     Elbow Flex gross movement (5/5) normal     Elbow Extension gross movement (5/5) normal        LEFT: Upper Extremity Manual Muscle Test Assessment    Shoulder Flexion gross movement (4/5) good     Shoulder Extension gross movement (4+/5) good plus     Shoulder Abduction gross movement (4/5) good     Shoulder Internal Rotation gross movement (4+/5) good plus     Shoulder External Rotation gross movement (4+/5) good plus     Elbow Flexion gross movement (5/5) normal     Elbow Extension gross movement (5/5) normal        RIGHT: Lower Extremity Manual Muscle Test Assessment    Hip Flexion gross movement (4-/5) good minus     Hip Extension gross movement (4-/5) good minus     Knee Flexion  strength (5/5) normal     Knee Extension strength (5/5) normal     Ankle Dorsiflexion gross movement (5/5) normal        LEFT: Lower Extremity Manual Muscle Test Assessment    Hip Flexion gross movement (4-/5) good minus     Hip Extension gross movement (4-/5) good minus     Knee Flexion strength (5/5) normal     Knee Extension strength (5/5) normal     Ankle Dorsiflexion gross movement (5/5) normal               Palpation    Palpation - 03/18/24 1435        Palpation    UE Palpation  (+) pain/hypomobility with shoulder inferior and posterior glides     Back Palpation  (+) TTP L1-L5               Ortho Special Tests    Orthopedic Special Tests - 03/18/24 1600        Shoulder    Drop Arm Right - Negative;Left - Negative               Transfers     Gait and Mobility    Gait and Mobility - 03/18/24 1435        Gait Training    Power, Gait independent     Variable surfaces Flat surface     Assistive Device none     Deviations/Abnormal Patterns antalgic;gait speed decreased;step length decreased;stride length decreased;scissoring     Comment (Gait/Stairs) B hip drop, B knee valgus, (+) mild scissoring               Neuromuscular/Motor     Balance/Posture    Balance and Posture - 03/18/24 1435        Postural Deviations    Postural Deviations head and neck;shoulder;upper back;thorax;low back;pelvis     Head and Neck forward head     Shoulder right shoulder forward;left shoulder forward;left shoulder elevation     Upper Back kyphosis     Low Back lordosis        Posture    Posture postural deviations               Outcome Measures    PT Outcome Measures - 03/18/24 1435        Subjective Outcome Measures    Oswestry (LOLY) 24/50=48% impaired     Fabian Shoulder Score 40     RMDI Low Back 13/24=54% impaired                  Goals     • [facility] PT Lumbar Goals      Short/Long Term Goals Time Frame Result Comment/Progress   Pt will increase core, LE, UE and Hip MMT >/= full grade 8 weeks weeks     Pt will increase lumbar ROM  >/= 10 degrees into flexion, extension, lateral flexion and rotation 4 weeks     Pt will increase Shoulder ROM >/= 10 degrees into flexion, extension, lateral flexion and rotation 4 weeks     Pt will improve RMQ >/= 10% functional improvement 4 weeks  IE 13/24=54% impaired   Pt will demonstrate improvements in Oswestry >/=  10 % functional improvement 4 weeks  IE: 24/50=48% impaired   Pt will demonstrate improvements in Oswestry >/=  20 % functional improvement 8 weeks     Improve Fabian shoulder scale >/= 12 points 4 weeks  IE 40/100   Improve Fabian shoulder scale >/= 24 points 8 weeks     Pt will be able to lift 10 lbs without back or shoulder pain 12 weeks     Increase AROM shoulder flexion, ABD >/= 120 deg 12 weeks     Pt will be independent in HEP for self management of symptoms 8 weeks     Pt will be able to don socks without back pain 12 weeks                       TREATMENT PLAN:    ORTHO PT FLOWSHEET    Exercises Current Session Time Completed today   MODALITIES- HEAT/ICE  CPT 86345 TOTAL TIME FOR SESSION Not performed    Heat     Ice/Vasocompression     MODALITIES - E-STIM  CPT 66345   TOTAL TIME FOR SESSION Not performed    E-stim/H-Wave     THER ACT  CPT 94873 TOTAL TIME FOR SESSION Not performed         Assessment     Body Mechanics/Work Training Safe lifting education    THER EX  CPT 91979 TOTAL TIME FOR SESSION 8-22 Minutes    HEP 3/18: wand shoulder flexion, TAC with breathing yes   CARDIOVASCULAR      TM     Nustep     UBE     TAC      TAC with march     TAC with reverse march          Wand:  -flexion  -Abduction  -ER  -IR  -EXT     shoulder IR with strap          Wall slides     TB ER/IR     AROM shoulder flexion  ABD     sidelying shoulder ER                    Hip 3 way     Side stepping     Prone hip ext     clamshells     S/l hip ABD     Bridge     Modified liliana stretch     Hip ER          TB rows/EXT     Step up     NEURO RE-ED  CPT 26736 TOTAL TIME FOR SESSION Not performed    COORDINATION      POSTURAL RE-ED        PRE-GAIT ACTIVITIES      BALANCE TRAINING                GAIT TRAINING  CPT 08496 TOTAL TIME FOR SESSION Not performed    Ambulation      Dynamic gait      MANUAL  CPT 90151 TOTAL TIME FOR SESSION Not performed    Stretching Shoulder PROM    Mobilization  Shoulder GH mobilizations inferior and posterior  STM    Massage     Taping         ABDOMINAL BRACING - TA ACTIVATION    While lying on your back, press your low back into the floor as you tighten your stomach muscles moving your navel down towards the floor. Place your thumbs 2 inches inward from your pelvic bone so that you can feel the muscle tanner. Repeat 10 Times   Hold 10 Seconds   Complete 1 Set   Perform 1 Times a Day          SHOULDER FLEXION AAROM - SUPINE - CANE    Lying on your back and holding a wand or cane, slowly raise the wand towards overhead. Use your unaffected arm to assist with the movement. Repeat 20 Times   Hold 5 Seconds   Complete 1 Set   Perform 1 Times a Day               ASSESSMENT:    This 73 y.o. year old female presents to PT with above stated diagnosis. Physical Therapy evaluation reveals decreased flexibility, decreased endurance, pain, decreased strength, impaired postural control, decreased ROM, impaired balance resulting in self-care, home management, community/leisure limitations. Reyna aCstillo will benefit from skilled PT services to address limitation, work towards rehab and patient goals and maximize PLOF of chosen ADLs.     Planned Services: The patient's treatment will include CPT 72858 Aquatic therapy/exercises, CPT 30775 Gait training, CPT 63913 Manual therapy, CPT 83064 Neuromuscular Reeducation, CPT 73335 Therapeutic activities, CPT 44009 Therapeutic exercises, CPT 80814 Electrical stimulation ATTENDED, CPT 36299 Electrical stimulation UNATTENDED, CPT 92656 Hot/Cold Packs therapy, .     Evette Barkley, PT

## 2024-03-22 ENCOUNTER — HOSPITAL ENCOUNTER (OUTPATIENT)
Dept: PHYSICAL THERAPY | Facility: REHABILITATION | Age: 74
Setting detail: THERAPIES SERIES
Discharge: HOME | End: 2024-03-22
Attending: NURSE PRACTITIONER
Payer: COMMERCIAL

## 2024-03-22 DIAGNOSIS — M19.011 LOCALIZED OSTEOARTHRITIS OF BOTH SHOULDERS: ICD-10-CM

## 2024-03-22 DIAGNOSIS — M19.012 LOCALIZED OSTEOARTHRITIS OF BOTH SHOULDERS: ICD-10-CM

## 2024-03-22 DIAGNOSIS — M25.60 DECREASED RANGE OF MOTION: ICD-10-CM

## 2024-03-22 DIAGNOSIS — Z98.1 S/P LUMBAR SPINAL FUSION: ICD-10-CM

## 2024-03-22 DIAGNOSIS — R53.1 DECREASED STRENGTH: ICD-10-CM

## 2024-03-22 DIAGNOSIS — M54.51 VERTEBROGENIC LOW BACK PAIN: Primary | ICD-10-CM

## 2024-03-22 PROCEDURE — 97010 HOT OR COLD PACKS THERAPY: CPT | Mod: GP

## 2024-03-22 PROCEDURE — 97110 THERAPEUTIC EXERCISES: CPT | Mod: GP

## 2024-03-22 NOTE — OP PT TREATMENT LOG
ORTHO PT FLOWSHEET    Exercises Current Session Time Completed today   MODALITIES- HEAT/ICE  CPT 88365 TOTAL TIME FOR SESSION 1 unit    Heat L shoulder end of session yes   Ice/Vasocompression     MODALITIES - E-STIM  CPT 59373   TOTAL TIME FOR SESSION Not performed    E-stim/H-Wave     THER ACT  CPT 78277 TOTAL TIME FOR SESSION Not performed         Assessment     Body Mechanics/Work Training Safe lifting education    THER EX  CPT 21308 TOTAL TIME FOR SESSION 53-67 Minutes    HEP 3/18: wand shoulder flexion, TAC with breathing  3/22: wand ABD, wand IR yes   CARDIOVASCULAR      TM     Nustep L1 x 10 min seat 7 UE/LE yes   UBE     TAC  10 sec x 10 yes   TAC with march     TAC with reverse march     SLR 1x10 B yes   Wand:  -flexion  -Abduction  -ER  -IR  -EXT   1x10  1x10 B  1x10 B  1x10 B  1x10 B   Yes  Yes  Yes  Yes  yes   shoulder IR with strap          Wall slides Flexion 1x10 B yes   TB ER/IR     AROM shoulder flexion  ABD     sidelying shoulder ER     No monies OTB 2x10 yes             Hip 3 way     Side stepping     Prone hip ext     clamshells     S/l hip ABD     Bridge 2x10 with ball squeeze yes   Modified liliana stretch     Hip ER          TB rows/EXT PTB 1x10 yes   Step up     NEURO RE-ED  CPT 16643 TOTAL TIME FOR SESSION Not performed    COORDINATION     POSTURAL RE-ED        PRE-GAIT ACTIVITIES      BALANCE TRAINING                GAIT TRAINING  CPT 51714 TOTAL TIME FOR SESSION Not performed    Ambulation      Dynamic gait      MANUAL  CPT 42432 TOTAL TIME FOR SESSION Not performed    Stretching Shoulder PROM    Mobilization  Shoulder GH mobilizations inferior and posterior  STM    Massage     Taping         ABDOMINAL BRACING - TA ACTIVATION    While lying on your back, press your low back into the floor as you tighten your stomach muscles moving your navel down towards the floor. Place your thumbs 2 inches inward from your pelvic bone so that you can feel the muscle tanner. Repeat 10 Times   Hold 10  Seconds   Complete 1 Set   Perform 1 Times a Day          SHOULDER FLEXION AAROM - SUPINE - CANE    Lying on your back and holding a wand or cane, slowly raise the wand towards overhead. Use your unaffected arm to assist with the movement. Repeat 20 Times   Hold 5 Seconds   Complete 1 Set   Perform 1 Times a Day            SHOULDER INTERNAL ROTATION STRETCH - BEHIND BACK - WAND / CANE    While holding a wand/cane behind your back as shown, slowly pull the wand up your back for a stretch to your shoulders. Lower back down and repeat.    Video # [id number] Repeat 10 Times   Hold 3 Seconds   Complete 1 Set   Perform 2 Times a Day          SHOULDER ABDUCTION - WAND / CANE    While holding a wand/cane palm face up on the injured side and palm face down on the uninjured side, slowly raise up your injured arm to the side. Lower back down and repeat.    Video # [id number] Repeat 10 Times   Hold 3 Seconds   Complete 1 Set   Perform 2 Times a Day

## 2024-03-22 NOTE — PROGRESS NOTES
Physical Therapy Visit    PT DAILY NOTE FOR OUTPATIENT THERAPY    Patient: Reyna Castillo MRN: [id number]  : 1950 73 y.o.  Referring Physician: Анна Terrazas CRNP  Date of Visit: 3/22/2024    Certification Dates: 24 through 24    Diagnosis:   1. Vertebrogenic low back pain    2. S/P lumbar spinal fusion    3. Localized osteoarthritis of both shoulders    4. Decreased range of motion    5. Decreased strength        Chief Complaints:  s/p lumbar surgery, pain lumbar spine R side, weakness    Precautions:   Existing Precautions/Restrictions: fall      TODAY'S VISIT    Time In Session:  Start Time: 1100  Stop Time: 1201  Time Calculation (min): 61 min   History/Vitals/Pain/Encounter Info - 24 1057        Injury History/Precautions/Daily Required Info    Document Type daily treatment     Primary Therapist Evette Barkley, PT, DPT, OCS     Chief Complaint/Reason for Visit  s/p lumbar surgery, pain lumbar spine R side, weakness     Referring Physician Анна YBARRA     Existing Precautions/Restrictions fall     History of present illness/functional impairment Pt is a 72 y/o female who underwent 10/12/22 T10-S1 Posterior Thorocolumbosacral Decompression and Interbody Fusion, Instrumentation, Pelvic Fixation, Allograft, Autograft, BMP, Revision of Existing Instrumentation at [facility] by Dr Lee. She presents with chronic LBP with decreased mobility. She also was dx with bilateral shoulder OA/frozen shoulder with signficant limitations in shoulder mobility and function.     Patient/Family/Caregiver Comments/Observations Pt reports having soreness after last session but she notes 3/10 discomfort in shoulders and back today     Patient reported fall since last visit No        Pain Assessment    Currently in pain Yes     Preferred Pain Scale number (Numeric Rating Pain Scale)     Pain: Body location Back     Pain Rating (0-10): Pre Activity 3                Daily Treatment Assessment  and Plan - 03/22/24 1057        Daily Treatment Assessment and Plan    Progress toward goals Progressing     Daily Outcome Summary Revewed HEP and updated with additional wand stretching. Pt demonstrated and verbalized understanding. SHe was educated to complete gentle stretching avoiding pushing into painful ranges. She tolerated exercises well with mild soreness noted at end of session.     Plan and Recommendations Progress core and shoudler strength, gentle ROM as tolerated.                     OBJECTIVE DATA TAKEN TODAY:    None taken    Today's Treatment:    ORTHO PT FLOWSHEET    Exercises Current Session Time Completed today   MODALITIES- HEAT/ICE  CPT 50245 TOTAL TIME FOR SESSION 1 unit    Heat L shoulder end of session yes   Ice/Vasocompression     MODALITIES - E-STIM  CPT 24207   TOTAL TIME FOR SESSION Not performed    E-stim/H-Wave     THER ACT  CPT 39247 TOTAL TIME FOR SESSION Not performed         Assessment     Body Mechanics/Work Training Safe lifting education    THER EX  CPT 37404 TOTAL TIME FOR SESSION 53-67 Minutes    HEP 3/18: wand shoulder flexion, TAC with breathing  3/22: wand ABD, wand IR yes   CARDIOVASCULAR      TM     Nustep L1 x 10 min seat 7 UE/LE yes   UBE     TAC  10 sec x 10 yes   TAC with march     TAC with reverse march     SLR 1x10 B yes   Wand:  -flexion  -Abduction  -ER  -IR  -EXT   1x10  1x10 B  1x10 B  1x10 B  1x10 B   Yes  Yes  Yes  Yes  yes   shoulder IR with strap          Wall slides Flexion 1x10 B yes   TB ER/IR     AROM shoulder flexion  ABD     sidelying shoulder ER     No monies OTB 2x10 yes             Hip 3 way     Side stepping     Prone hip ext     clamshells     S/l hip ABD     Bridge 2x10 with ball squeeze yes   Modified liliana stretch     Hip ER          TB rows/EXT PTB 1x10 yes   Step up     NEURO RE-ED  CPT 91752 TOTAL TIME FOR SESSION Not performed    COORDINATION     POSTURAL RE-ED        PRE-GAIT ACTIVITIES      BALANCE TRAINING                GAIT  TRAINING  CPT 07498 TOTAL TIME FOR SESSION Not performed    Ambulation      Dynamic gait      MANUAL  CPT 03573 TOTAL TIME FOR SESSION Not performed    Stretching Shoulder PROM    Mobilization  Shoulder GH mobilizations inferior and posterior  STM    Massage     Taping         ABDOMINAL BRACING - TA ACTIVATION    While lying on your back, press your low back into the floor as you tighten your stomach muscles moving your navel down towards the floor. Place your thumbs 2 inches inward from your pelvic bone so that you can feel the muscle tanner. Repeat 10 Times   Hold 10 Seconds   Complete 1 Set   Perform 1 Times a Day          SHOULDER FLEXION AAROM - SUPINE - CANE    Lying on your back and holding a wand or cane, slowly raise the wand towards overhead. Use your unaffected arm to assist with the movement. Repeat 20 Times   Hold 5 Seconds   Complete 1 Set   Perform 1 Times a Day            SHOULDER INTERNAL ROTATION STRETCH - BEHIND BACK - WAND / CANE    While holding a wand/cane behind your back as shown, slowly pull the wand up your back for a stretch to your shoulders. Lower back down and repeat.    Video # [id number] Repeat 10 Times   Hold 3 Seconds   Complete 1 Set   Perform 2 Times a Day          SHOULDER ABDUCTION - WAND / CANE    While holding a wand/cane palm face up on the injured side and palm face down on the uninjured side, slowly raise up your injured arm to the side. Lower back down and repeat.    Video # [id number] Repeat 10 Times   Hold 3 Seconds   Complete 1 Set   Perform 2 Times a Day

## 2024-04-01 ENCOUNTER — TELEPHONE (OUTPATIENT)
Dept: REGISTRATION | Facility: REHABILITATION | Age: 74
End: 2024-04-01
Payer: COMMERCIAL

## 2024-04-01 NOTE — TELEPHONE ENCOUNTER
Left voicemail for patient to offer an appointments for 4/4/24 at 1pm with Philomena Barkley. Left [phone] number.

## 2024-04-03 ENCOUNTER — TELEPHONE (OUTPATIENT)
Dept: REGISTRATION | Facility: REHABILITATION | Age: 74
End: 2024-04-03
Payer: COMMERCIAL

## 2024-04-09 ENCOUNTER — HOSPITAL ENCOUNTER (OUTPATIENT)
Dept: PHYSICAL THERAPY | Facility: REHABILITATION | Age: 74
Setting detail: THERAPIES SERIES
Discharge: HOME | End: 2024-04-09
Attending: NURSE PRACTITIONER
Payer: COMMERCIAL

## 2024-04-09 DIAGNOSIS — M19.012 LOCALIZED OSTEOARTHRITIS OF BOTH SHOULDERS: ICD-10-CM

## 2024-04-09 DIAGNOSIS — Z98.1 S/P LUMBAR SPINAL FUSION: ICD-10-CM

## 2024-04-09 DIAGNOSIS — R53.1 DECREASED STRENGTH: ICD-10-CM

## 2024-04-09 DIAGNOSIS — M25.60 DECREASED RANGE OF MOTION: ICD-10-CM

## 2024-04-09 DIAGNOSIS — M54.51 VERTEBROGENIC LOW BACK PAIN: Primary | ICD-10-CM

## 2024-04-09 DIAGNOSIS — M19.011 LOCALIZED OSTEOARTHRITIS OF BOTH SHOULDERS: ICD-10-CM

## 2024-04-09 PROCEDURE — 97113 AQUATIC THERAPY/EXERCISES: CPT | Mod: GP

## 2024-04-09 NOTE — OP PT TREATMENT LOG
"Precautions:       Individual aquatics 17355  38-52 min Yes   Group aquatics CPT 52174   no   Aquatics exercises flow sheet   Completed today         Walking forwards 5 laps  yes   Side stepping 5 laps  yes   kayak      Walking backwards      marching 5 laps yes        LE exercises          Hip abduction 2x10 with bar yes   Hip flexion 2x10 with bar yes   Hip abduction 2x10 with bar yes   Circles  cw/ccw     Single leg press with noodle          UE  exercises          Shoulder flexion 2x10 yes   Shoulder abduction 2x10 yes   Biceps/triceps     Horizontal abd/add 2x10 yes   Kickboard press          Step up forwards 1x10 B on 10\" step yes   Step ups side     W's with kick boards     squats STS 2x10 yes   Heel raises 2x10 B at wall yes   Knee curls 2x10 B at wall yes   Sitting on blue disc          Floating with noodles     Scissors, bicycling, skiers          stretching     Calf stretch 30 sec x 4 at wall yes   Low back stretch                                       "

## 2024-04-09 NOTE — PROGRESS NOTES
Physical Therapy Visit    PT DAILY NOTE FOR OUTPATIENT THERAPY    Patient: Reyna Castillo MRN: [id number]  : 1950 73 y.o.  Referring Physician: Анна Terrazas CRNP  Date of Visit: 2024    Certification Dates: 24 through 24    Diagnosis:   1. Vertebrogenic low back pain    2. S/P lumbar spinal fusion    3. Localized osteoarthritis of both shoulders    4. Decreased range of motion    5. Decreased strength        Chief Complaints:  s/p lumbar surgery, pain lumbar spine R side, weakness    Precautions:   Existing Precautions/Restrictions: fall      TODAY'S VISIT    Time In Session:  Start Time: 1002  Stop Time: 1053  Time Calculation (min): 51 min   History/Vitals/Pain/Encounter Info - 24 1006          Injury History/Precautions/Daily Required Info    Document Type daily treatment     Primary Therapist Evette Barkley, PT, DPT, OCS     Chief Complaint/Reason for Visit  s/p lumbar surgery, pain lumbar spine R side, weakness     Referring Physician Анна YBARRA     Existing Precautions/Restrictions fall     History of present illness/functional impairment Pt is a 74 y/o female who underwent 10/12/22 T10-S1 Posterior Thorocolumbosacral Decompression and Interbody Fusion, Instrumentation, Pelvic Fixation, Allograft, Autograft, BMP, Revision of Existing Instrumentation at [facility] by Dr Lee. She presents with chronic LBP with decreased mobility. She also was dx with bilateral shoulder OA/frozen shoulder with signficant limitations in shoulder mobility and function.     Patient/Family/Caregiver Comments/Observations Pt reports that her back is achy today /10. She reports that she wants to start beingmre active     Patient reported fall since last visit No        Pain Assessment    Currently in pain Yes     Preferred Pain Scale number (Numeric Rating Pain Scale)     Pain: Body location Back     Pain Rating (0-10): Pre Activity 5                    Daily Treatment Assessment  "and Plan - 04/09/24 1006          Daily Treatment Assessment and Plan    Progress toward goals Progressing     Daily Outcome Summary Pt was able to tolerate gentle mobilty and strengthening today without increase im pain. SHe was cued for core activation and to decreased flexed posture with standing activities. Pain decreased to 4/10 in back at end of session.     Plan and Recommendations Progress core and shoudler strength, gentle ROM as tolerated.                         OBJECTIVE DATA TAKEN TODAY:    None taken    Today's Treatment:    Precautions:       Individual aquatics 25372  38-52 min Yes   Group aquatics CPT 35627   no   Aquatics exercises flow sheet   Completed today         Walking forwards 5 laps  yes   Side stepping 5 laps  yes   kayak      Walking backwards      marching 5 laps yes        LE exercises          Hip abduction 2x10 with bar yes   Hip flexion 2x10 with bar yes   Hip abduction 2x10 with bar yes   Circles  cw/ccw     Single leg press with noodle          UE  exercises          Shoulder flexion 2x10 yes   Shoulder abduction 2x10 yes   Biceps/triceps     Horizontal abd/add 2x10 yes   Kickboard press          Step up forwards 1x10 B on 10\" step yes   Step ups side     W's with kick boards     squats STS 2x10 yes   Heel raises 2x10 B at wall yes   Knee curls 2x10 B at wall yes   Sitting on blue disc          Floating with noodles     Scissors, bicycling, skiers          stretching     Calf stretch 30 sec x 4 at wall yes   Low back stretch                                                              "

## 2024-04-11 ENCOUNTER — HOSPITAL ENCOUNTER (OUTPATIENT)
Dept: PHYSICAL THERAPY | Facility: REHABILITATION | Age: 74
Setting detail: THERAPIES SERIES
Discharge: HOME | End: 2024-04-11
Attending: NURSE PRACTITIONER
Payer: COMMERCIAL

## 2024-04-11 DIAGNOSIS — M19.011 LOCALIZED OSTEOARTHRITIS OF BOTH SHOULDERS: ICD-10-CM

## 2024-04-11 DIAGNOSIS — M19.012 LOCALIZED OSTEOARTHRITIS OF BOTH SHOULDERS: ICD-10-CM

## 2024-04-11 DIAGNOSIS — M54.51 VERTEBROGENIC LOW BACK PAIN: Primary | ICD-10-CM

## 2024-04-11 PROCEDURE — 97140 MANUAL THERAPY 1/> REGIONS: CPT | Mod: GP

## 2024-04-11 PROCEDURE — 97110 THERAPEUTIC EXERCISES: CPT | Mod: GP

## 2024-04-11 NOTE — PROGRESS NOTES
Physical Therapy Visit    PT DAILY NOTE FOR OUTPATIENT THERAPY    Patient: Reyna Castillo MRN: [id number]  : 1950 73 y.o.  Referring Physician: Анна Terrazas CRNP  Date of Visit: 2024    Certification Dates: 24 through 24    Diagnosis:   1. Vertebrogenic low back pain    2. Localized osteoarthritis of both shoulders        Chief Complaints:  s/p lumbar surgery, pain lumbar spine R side, weakness    Precautions:   Existing Precautions/Restrictions: fall      TODAY'S VISIT    Time In Session:  Start Time: 1311  Stop Time: 1356  Time Calculation (min): 45 min   History/Vitals/Pain/Encounter Info - 24 1311          Injury History/Precautions/Daily Required Info    Document Type daily treatment     Primary Therapist Evette Barkley, PT, DPT, OCS     Chief Complaint/Reason for Visit  s/p lumbar surgery, pain lumbar spine R side, weakness     Referring Physician Анна YBARRA     Existing Precautions/Restrictions fall     History of present illness/functional impairment Pt is a 74 y/o female who underwent 10/12/22 T10-S1 Posterior Thorocolumbosacral Decompression and Interbody Fusion, Instrumentation, Pelvic Fixation, Allograft, Autograft, BMP, Revision of Existing Instrumentation at [facility] by Dr Lee. She presents with chronic LBP with decreased mobility. She also was dx with bilateral shoulder OA/frozen shoulder with signficant limitations in shoulder mobility and function.     Patient/Family/Caregiver Comments/Observations Patient reports with elevated pain symptoms today, 6/10 intensity     Patient reported fall since last visit No        Pain Assessment    Currently in pain Yes     Preferred Pain Scale number (Numeric Rating Pain Scale)     Pain: Body location Back;Shoulder     Pain Rating (0-10): Pre Activity 6     Pain Rating (0-10): Post Activity 6        Pain Intervention    Intervention  Monitored, Andrew, manual     Post Intervention Comments No worsening end of  session                    Daily Treatment Assessment and Plan - 04/11/24 1311          Daily Treatment Assessment and Plan    Progress toward goals Progressing     Daily Outcome Summary Patient tolerates Nustep without issue. Benefits from manual PROM for improved OH mobility and decreased pain. Tolerates periscapular strengthening well without worsening symptoms. Demos good understanding of HEP.     Plan and Recommendations Progress core and shoudler strength, gentle ROM as tolerated.                         OBJECTIVE DATA TAKEN TODAY:    None taken    Today's Treatment:    ORTHO PT FLOWSHEET    Exercises Current Session Time Completed today   MODALITIES- HEAT/ICE  CPT 47901 TOTAL TIME FOR SESSION 1 unit    Heat L shoulder end of session yes   Ice/Vasocompression     MODALITIES - E-STIM  CPT 92022   TOTAL TIME FOR SESSION Not performed    E-stim/H-Wave     THER ACT  CPT 11375 TOTAL TIME FOR SESSION Not performed         Assessment     Body Mechanics/Work Training Safe lifting education    THER EX  CPT 93233 TOTAL TIME FOR SESSION 23-37 Minutes    HEP 3/18: wand shoulder flexion, TAC with breathing  3/22: wand ABD, wand IR yes   CARDIOVASCULAR      TM     Nustep L1 x 10 min seat 7 UE/LE yes   UBE     TAC  10 sec x 10 yes   TAC with march     TAC with reverse march     SLR 1x10 B yes   Wand:  -flexion  -Abduction  -ER  -IR  -EXT   1x10  1x10 B  1x10 B  1x10 B  1x10 B      shoulder IR with strap          Wall slides Flexion 1x10 B yes   TB ER/IR     AROM shoulder flexion  ABD     sidelying shoulder ER     No monies OTB 2x10 yes             Hip 3 way     Side stepping     Prone hip ext     clamshells     S/l hip ABD     Bridge 2x10 with ball squeeze yes   Modified liliana stretch     Hip ER          TB rows/EXT OTB 1x10 yes   Step up     NEURO RE-ED  CPT 66719 TOTAL TIME FOR SESSION Not performed    COORDINATION     POSTURAL RE-ED        PRE-GAIT ACTIVITIES      BALANCE TRAINING                GAIT TRAINING  CPT 53061  TOTAL TIME FOR SESSION Not performed    Ambulation      Dynamic gait      MANUAL  CPT 40202 TOTAL TIME FOR SESSION 8-22 Minutes    Stretching Shoulder PROM Yes   Mobilization  Shoulder GH mobilizations inferior and posterior  STM Yes     Massage     Taping         ABDOMINAL BRACING - TA ACTIVATION    While lying on your back, press your low back into the floor as you tighten your stomach muscles moving your navel down towards the floor. Place your thumbs 2 inches inward from your pelvic bone so that you can feel the muscle tanner. Repeat 10 Times   Hold 10 Seconds   Complete 1 Set   Perform 1 Times a Day          SHOULDER FLEXION AAROM - SUPINE - CANE    Lying on your back and holding a wand or cane, slowly raise the wand towards overhead. Use your unaffected arm to assist with the movement. Repeat 20 Times   Hold 5 Seconds   Complete 1 Set   Perform 1 Times a Day            SHOULDER INTERNAL ROTATION STRETCH - BEHIND BACK - WAND / CANE    While holding a wand/cane behind your back as shown, slowly pull the wand up your back for a stretch to your shoulders. Lower back down and repeat.    Video # [id number] Repeat 10 Times   Hold 3 Seconds   Complete 1 Set   Perform 2 Times a Day          SHOULDER ABDUCTION - WAND / CANE    While holding a wand/cane palm face up on the injured side and palm face down on the uninjured side, slowly raise up your injured arm to the side. Lower back down and repeat.    Video # [id number] Repeat 10 Times   Hold 3 Seconds   Complete 1 Set   Perform 2 Times a Day

## 2024-04-11 NOTE — OP PT TREATMENT LOG
ORTHO PT FLOWSHEET    Exercises Current Session Time Completed today   MODALITIES- HEAT/ICE  CPT 64094 TOTAL TIME FOR SESSION 1 unit    Heat L shoulder end of session yes   Ice/Vasocompression     MODALITIES - E-STIM  CPT 94356   TOTAL TIME FOR SESSION Not performed    E-stim/H-Wave     THER ACT  CPT 13566 TOTAL TIME FOR SESSION Not performed         Assessment     Body Mechanics/Work Training Safe lifting education    THER EX  CPT 57475 TOTAL TIME FOR SESSION 23-37 Minutes    HEP 3/18: wand shoulder flexion, TAC with breathing  3/22: wand ABD, wand IR yes   CARDIOVASCULAR      TM     Nustep L1 x 10 min seat 7 UE/LE yes   UBE     TAC  10 sec x 10 yes   TAC with march     TAC with reverse march     SLR 1x10 B yes   Wand:  -flexion  -Abduction  -ER  -IR  -EXT   1x10  1x10 B  1x10 B  1x10 B  1x10 B      shoulder IR with strap          Wall slides Flexion 1x10 B yes   TB ER/IR     AROM shoulder flexion  ABD     sidelying shoulder ER     No monies OTB 2x10 yes             Hip 3 way     Side stepping     Prone hip ext     clamshells     S/l hip ABD     Bridge 2x10 with ball squeeze yes   Modified liliana stretch     Hip ER          TB rows/EXT OTB 1x10 yes   Step up     NEURO RE-ED  CPT 19655 TOTAL TIME FOR SESSION Not performed    COORDINATION     POSTURAL RE-ED        PRE-GAIT ACTIVITIES      BALANCE TRAINING                GAIT TRAINING  CPT 49998 TOTAL TIME FOR SESSION Not performed    Ambulation      Dynamic gait      MANUAL  CPT 52154 TOTAL TIME FOR SESSION 8-22 Minutes    Stretching Shoulder PROM Yes   Mobilization  Shoulder GH mobilizations inferior and posterior  STM Yes     Massage     Taping         ABDOMINAL BRACING - TA ACTIVATION    While lying on your back, press your low back into the floor as you tighten your stomach muscles moving your navel down towards the floor. Place your thumbs 2 inches inward from your pelvic bone so that you can feel the muscle tanner. Repeat 10 Times   Hold 10 Seconds    Complete 1 Set   Perform 1 Times a Day          SHOULDER FLEXION AAROM - SUPINE - CANE    Lying on your back and holding a wand or cane, slowly raise the wand towards overhead. Use your unaffected arm to assist with the movement. Repeat 20 Times   Hold 5 Seconds   Complete 1 Set   Perform 1 Times a Day            SHOULDER INTERNAL ROTATION STRETCH - BEHIND BACK - WAND / CANE    While holding a wand/cane behind your back as shown, slowly pull the wand up your back for a stretch to your shoulders. Lower back down and repeat.    Video # [id number] Repeat 10 Times   Hold 3 Seconds   Complete 1 Set   Perform 2 Times a Day          SHOULDER ABDUCTION - WAND / CANE    While holding a wand/cane palm face up on the injured side and palm face down on the uninjured side, slowly raise up your injured arm to the side. Lower back down and repeat.    Video # [email or web address] Repeat 10 Times   Hold 3 Seconds   Complete 1 Set   Perform 2 Times a Day

## 2024-04-18 ENCOUNTER — HOSPITAL ENCOUNTER (OUTPATIENT)
Dept: PHYSICAL THERAPY | Facility: REHABILITATION | Age: 74
Setting detail: THERAPIES SERIES
Discharge: HOME | End: 2024-04-18
Attending: NURSE PRACTITIONER
Payer: COMMERCIAL

## 2024-04-18 DIAGNOSIS — M19.011 LOCALIZED OSTEOARTHRITIS OF BOTH SHOULDERS: ICD-10-CM

## 2024-04-18 DIAGNOSIS — M54.51 VERTEBROGENIC LOW BACK PAIN: Primary | ICD-10-CM

## 2024-04-18 DIAGNOSIS — M19.012 LOCALIZED OSTEOARTHRITIS OF BOTH SHOULDERS: ICD-10-CM

## 2024-04-18 PROCEDURE — 97140 MANUAL THERAPY 1/> REGIONS: CPT | Mod: GP

## 2024-04-18 PROCEDURE — 97110 THERAPEUTIC EXERCISES: CPT | Mod: GP

## 2024-04-18 NOTE — OP PT TREATMENT LOG
"ORTHO PT FLOWSHEET    Exercises Current Session Time Completed today   MODALITIES- HEAT/ICE  CPT 43366 TOTAL TIME FOR SESSION Not performed    Heat L shoulder end of session    Ice/Vasocompression     MODALITIES - E-STIM  CPT 37140   TOTAL TIME FOR SESSION Not performed    E-stim/H-Wave     THER ACT  CPT 84908 TOTAL TIME FOR SESSION Not performed         Assessment     Body Mechanics/Work Training Safe lifting education    THER EX  CPT 52216 TOTAL TIME FOR SESSION 38-52 Minutes    HEP 3/18: wand shoulder flexion, TAC with breathing  3/22: wand ABD, wand IR yes   CARDIOVASCULAR      TM     Nustep L1 x 10 min seat 7 UE/LE yes   UBE     TAC  10 sec x 10 yes   TAC with march     TAC with reverse march     SLR 1x10 B yes   Wand:  -flexion  -Abduction  -ER  -IR  -EXT   1x10  1x10 B  1x10 B  1x10 B  1x10 B      shoulder IR with strap          Pulleys Bilateral, flexion x3 min Yes   Wall slides Flexion 1x10 B yes   TB ER/IR 1x10 OTB ea  Yes   AROM shoulder flexion  ABD     sidelying shoulder ER     No monies OTB 2x10 yes             Hip 3 way     Side stepping     Prone hip ext     clamshells     S/l hip ABD     Pball rollout GPB 10x5\" Yes   Bridge 2x10 with ball squeeze yes   Modified liliana stretch     Hip ER          TB rows/EXT OTB 1x10 ea yes   Step up     NEURO RE-ED  CPT 21707 TOTAL TIME FOR SESSION Not performed    COORDINATION     POSTURAL RE-ED        PRE-GAIT ACTIVITIES      BALANCE TRAINING                GAIT TRAINING  CPT 78427 TOTAL TIME FOR SESSION Not performed    Ambulation      Dynamic gait      MANUAL  CPT 14905 TOTAL TIME FOR SESSION 8-22 Minutes    Stretching Shoulder PROM Yes   Mobilization  Shoulder GH mobilizations inferior and posterior  STM Yes     Rhythmic stabilization At 90 deg shoulder elevation Yes   Massage     Taping         ABDOMINAL BRACING - TA ACTIVATION    While lying on your back, press your low back into the floor as you tighten your stomach muscles moving your navel down towards the " floor. Place your thumbs 2 inches inward from your pelvic bone so that you can feel the muscle tanner. Repeat 10 Times   Hold 10 Seconds   Complete 1 Set   Perform 1 Times a Day          SHOULDER FLEXION AAROM - SUPINE - CANE    Lying on your back and holding a wand or cane, slowly raise the wand towards overhead. Use your unaffected arm to assist with the movement. Repeat 20 Times   Hold 5 Seconds   Complete 1 Set   Perform 1 Times a Day            SHOULDER INTERNAL ROTATION STRETCH - BEHIND BACK - WAND / CANE    While holding a wand/cane behind your back as shown, slowly pull the wand up your back for a stretch to your shoulders. Lower back down and repeat.    Video # [id number] Repeat 10 Times   Hold 3 Seconds   Complete 1 Set   Perform 2 Times a Day          SHOULDER ABDUCTION - WAND / CANE    While holding a wand/cane palm face up on the injured side and palm face down on the uninjured side, slowly raise up your injured arm to the side. Lower back down and repeat.    Video # [email or web address] Repeat 10 Times   Hold 3 Seconds   Complete 1 Set   Perform 2 Times a Day

## 2024-04-18 NOTE — PROGRESS NOTES
Physical Therapy Visit    PT DAILY NOTE FOR OUTPATIENT THERAPY    Patient: Reyna Castillo MRN: [id number]  : 1950 73 y.o.  Referring Physician: Анна Terrazas CRNP  Date of Visit: 2024    Certification Dates: 24 through 24    Diagnosis:   1. Vertebrogenic low back pain    2. Localized osteoarthritis of both shoulders        Chief Complaints:  s/p lumbar surgery, pain lumbar spine R side, weakness    Precautions:   Existing Precautions/Restrictions: fall      TODAY'S VISIT    Time In Session:  Start Time: 1255  Stop Time: 1355  Time Calculation (min): 60 min   History/Vitals/Pain/Encounter Info - 24 1252          Injury History/Precautions/Daily Required Info    Document Type daily treatment     Primary Therapist Evette Barkley, PT, DPT, OCS     Chief Complaint/Reason for Visit  s/p lumbar surgery, pain lumbar spine R side, weakness     Referring Physician Анна YBARRA     Existing Precautions/Restrictions fall     History of present illness/functional impairment Pt is a 72 y/o female who underwent 10/12/22 T10-S1 Posterior Thorocolumbosacral Decompression and Interbody Fusion, Instrumentation, Pelvic Fixation, Allograft, Autograft, BMP, Revision of Existing Instrumentation at [facility] by Dr Lee. She presents with chronic LBP with decreased mobility. She also was dx with bilateral shoulder OA/frozen shoulder with signficant limitations in shoulder mobility and function.     Patient/Family/Caregiver Comments/Observations Patient reports with with 5/10 L shoulder pain, no significant changes since last visit.     Patient reported fall since last visit No        Pain Assessment    Currently in pain Yes     Preferred Pain Scale number (Numeric Rating Pain Scale)     Pain Side/Orientation left     Pain: Body location Shoulder     Pain Rating (0-10): Pre Activity 5     Pain Rating (0-10): Post Activity 5        Pain Intervention    Intervention  Manual, monitored, Andrew      "Post Intervention Comments No worsening end of session                    Daily Treatment Assessment and Plan - 04/18/24 1252          Daily Treatment Assessment and Plan    Progress toward goals Progressing     Daily Outcome Summary Patient demos improved tolerance for manual intervention, OH mobility improved with performance of pulleys. Tolerates pball rollouts well for lumbar spine and shoulder mobility. Periscapular strengthening interventions are tolerated better than previous session. Strengthening progressed with TB IR/ER, tolerated well. Symptoms controlled throughout session.     Plan and Recommendations Progress core and shoudler strength, gentle ROM as tolerated.                         OBJECTIVE DATA TAKEN TODAY:    None taken    Today's Treatment:    ORTHO PT FLOWSHEET    Exercises Current Session Time Completed today   MODALITIES- HEAT/ICE  CPT 68584 TOTAL TIME FOR SESSION Not performed    Heat L shoulder end of session    Ice/Vasocompression     MODALITIES - E-STIM  CPT 84753   TOTAL TIME FOR SESSION Not performed    E-stim/H-Wave     THER ACT  CPT 58940 TOTAL TIME FOR SESSION Not performed         Assessment     Body Mechanics/Work Training Safe lifting education    THER EX  CPT 54260 TOTAL TIME FOR SESSION 38-52 Minutes    HEP 3/18: wand shoulder flexion, TAC with breathing  3/22: wand ABD, wand IR yes   CARDIOVASCULAR      TM     Nustep L1 x 10 min seat 7 UE/LE yes   UBE     TAC  10 sec x 10 yes   TAC with march     TAC with reverse march     SLR 1x10 B yes   Wand:  -flexion  -Abduction  -ER  -IR  -EXT   1x10  1x10 B  1x10 B  1x10 B  1x10 B      shoulder IR with strap          Pulleys Bilateral, flexion x3 min Yes   Wall slides Flexion 1x10 B yes   TB ER/IR 1x10 OTB ea  Yes   AROM shoulder flexion  ABD     sidelying shoulder ER     No monies OTB 2x10 yes             Hip 3 way     Side stepping     Prone hip ext     clamshells     S/l hip ABD     Pball rollout GPB 10x5\" Yes   Bridge 2x10 with ball " squeeze yes   Modified liliana stretch     Hip ER          TB rows/EXT OTB 1x10 ea yes   Step up     NEURO RE-ED  CPT 49102 TOTAL TIME FOR SESSION Not performed    COORDINATION     POSTURAL RE-ED        PRE-GAIT ACTIVITIES      BALANCE TRAINING                GAIT TRAINING  CPT 65458 TOTAL TIME FOR SESSION Not performed    Ambulation      Dynamic gait      MANUAL  CPT 47198 TOTAL TIME FOR SESSION 8-22 Minutes    Stretching Shoulder PROM Yes   Mobilization  Shoulder GH mobilizations inferior and posterior  STM Yes     Rhythmic stabilization At 90 deg shoulder elevation Yes   Massage     Taping         ABDOMINAL BRACING - TA ACTIVATION    While lying on your back, press your low back into the floor as you tighten your stomach muscles moving your navel down towards the floor. Place your thumbs 2 inches inward from your pelvic bone so that you can feel the muscle tanner. Repeat 10 Times   Hold 10 Seconds   Complete 1 Set   Perform 1 Times a Day          SHOULDER FLEXION AAROM - SUPINE - CANE    Lying on your back and holding a wand or cane, slowly raise the wand towards overhead. Use your unaffected arm to assist with the movement. Repeat 20 Times   Hold 5 Seconds   Complete 1 Set   Perform 1 Times a Day            SHOULDER INTERNAL ROTATION STRETCH - BEHIND BACK - WAND / CANE    While holding a wand/cane behind your back as shown, slowly pull the wand up your back for a stretch to your shoulders. Lower back down and repeat.    Video # [id number] Repeat 10 Times   Hold 3 Seconds   Complete 1 Set   Perform 2 Times a Day          SHOULDER ABDUCTION - WAND / CANE    While holding a wand/cane palm face up on the injured side and palm face down on the uninjured side, slowly raise up your injured arm to the side. Lower back down and repeat.    Video # [id number] Repeat 10 Times   Hold 3 Seconds   Complete 1 Set   Perform 2 Times a Day

## 2024-04-23 ENCOUNTER — HOSPITAL ENCOUNTER (OUTPATIENT)
Dept: PHYSICAL THERAPY | Facility: REHABILITATION | Age: 74
Setting detail: THERAPIES SERIES
Discharge: HOME | End: 2024-04-23
Attending: NURSE PRACTITIONER
Payer: COMMERCIAL

## 2024-04-23 DIAGNOSIS — M54.51 VERTEBROGENIC LOW BACK PAIN: Primary | ICD-10-CM

## 2024-04-23 DIAGNOSIS — R53.1 DECREASED STRENGTH: ICD-10-CM

## 2024-04-23 DIAGNOSIS — Z98.1 S/P LUMBAR SPINAL FUSION: ICD-10-CM

## 2024-04-23 DIAGNOSIS — M19.011 LOCALIZED OSTEOARTHRITIS OF BOTH SHOULDERS: ICD-10-CM

## 2024-04-23 DIAGNOSIS — M19.012 LOCALIZED OSTEOARTHRITIS OF BOTH SHOULDERS: ICD-10-CM

## 2024-04-23 DIAGNOSIS — M25.60 DECREASED RANGE OF MOTION: ICD-10-CM

## 2024-04-23 PROCEDURE — 97113 AQUATIC THERAPY/EXERCISES: CPT | Mod: GP

## 2024-04-23 NOTE — PROGRESS NOTES
Physical Therapy Visit    PT DAILY NOTE FOR OUTPATIENT THERAPY    Patient: Reyna Castillo MRN: [id number]  : 1950 73 y.o.  Referring Physician: Анна Terrazas CRNP  Date of Visit: 2024    Certification Dates: 24 through 24    Diagnosis:   1. Vertebrogenic low back pain    2. Localized osteoarthritis of both shoulders    3. S/P lumbar spinal fusion    4. Decreased range of motion    5. Decreased strength        Chief Complaints:  s/p lumbar surgery, pain lumbar spine R side, weakness    Precautions:   Existing Precautions/Restrictions: fall      TODAY'S VISIT    Time In Session:  Start Time: 1401  Stop Time: 1501  Time Calculation (min): 60 min   History/Vitals/Pain/Encounter Info - 24 1403          Injury History/Precautions/Daily Required Info    Document Type daily treatment     Primary Therapist Evette Barkley, PT, DPT, OCS     Chief Complaint/Reason for Visit  s/p lumbar surgery, pain lumbar spine R side, weakness     Referring Physician Анна YBARRA     Existing Precautions/Restrictions fall     History of present illness/functional impairment Pt is a 74 y/o female who underwent 10/12/22 T10-S1 Posterior Thorocolumbosacral Decompression and Interbody Fusion, Instrumentation, Pelvic Fixation, Allograft, Autograft, BMP, Revision of Existing Instrumentation at [facility] by Dr Lee. She presents with chronic LBP with decreased mobility. She also was dx with bilateral shoulder OA/frozen shoulder with signficant limitations in shoulder mobility and function.     Patient/Family/Caregiver Comments/Observations Pt reports her pain is what it nornally is but was sore after her land appt.     Patient reported fall since last visit No        Pain Assessment    Currently in pain Yes     Preferred Pain Scale number (Numeric Rating Pain Scale)     Pain Side/Orientation bilateral     Pain: Body location Back     Pain Rating (0-10): Pre Activity 5     Pain Rating (0-10): Post  "Activity 5        Pain Intervention    Intervention  Aquatics     Post Intervention Comments Less muscle stiffness only                    Daily Treatment Assessment and Plan - 04/23/24 1403          Daily Treatment Assessment and Plan    Progress toward goals Progressing     Daily Outcome Summary Pt presents with consistently moderate back pain that was unchanged after session but overall muscle stiffness was reduced. Pt was able to perform all aquatic therex with added UE exercises and use of paddles for increased resistance. However, requires cueing for form and sequencing and frequent redirecting. Pt will benefit from continued aquatics, as pt is able to tolerate better than land exercise.     Plan and Recommendations Progress core and shoudler strength, gentle ROM as tolerated.                         OBJECTIVE DATA TAKEN TODAY:    None taken    Today's Treatment:    Today's Treatment:     Precautions:          Individual aquatics 83568   60 min Yes   Group aquatics CPT 92264     no   Aquatics exercises flow sheet     Completed today             Walking forwards 5 laps   yes   Side stepping 5 laps   yes   kayak         Walking backwards         marching 5 laps yes           LE exercises               Hip abduction 2x10 with bar yes   Hip flexion 2x10 with bar yes   Hip abduction 2x10 with bar yes   Circles  cw/ccw       Single leg press with noodle               UE  exercises               Shoulder flexion 2x10 yes   Shoulder abduction 2x10 yes   Biceps/triceps 2x10 yes   Horizontal abd/add 2x10 yes   Kickboard press               Step up forwards 1x10 B on 10\" step yes   Step ups side       W's with kick boards       squats STS 2x10 yes   Heel raises 2x10 B at wall yes   Knee curls 2x10 B at wall yes   Sitting on blue disc               Floating with noodles       Scissors, bicycling, skiers               stretching       Calf stretch 30 sec x 4 at wall yes   Low back stretch                                    "

## 2024-04-23 NOTE — OP PT TREATMENT LOG
"Today's Treatment:     Precautions:          Individual aquatics 74761   60 min Yes   Group aquatics CPT 10032     no   Aquatics exercises flow sheet     Completed today             Walking forwards 5 laps   yes   Side stepping 5 laps   yes   kayak         Walking backwards         marching 5 laps yes           LE exercises               Hip abduction 2x10 with bar yes   Hip flexion 2x10 with bar yes   Hip abduction 2x10 with bar yes   Circles  cw/ccw       Single leg press with noodle               UE  exercises               Shoulder flexion 2x10 yes   Shoulder abduction 2x10 yes   Biceps/triceps 2x10 yes   Horizontal abd/add 2x10 yes   Kickboard press               Step up forwards 1x10 B on 10\" step yes   Step ups side       W's with kick boards       squats STS 2x10 yes   Heel raises 2x10 B at wall yes   Knee curls 2x10 B at wall yes   Sitting on blue disc               Floating with noodles       Scissors, bicycling, skiers               stretching       Calf stretch 30 sec x 4 at wall yes   Low back stretch                                                             "

## 2024-04-25 ENCOUNTER — HOSPITAL ENCOUNTER (OUTPATIENT)
Dept: PHYSICAL THERAPY | Facility: REHABILITATION | Age: 74
Setting detail: THERAPIES SERIES
Discharge: HOME | End: 2024-04-25
Attending: NURSE PRACTITIONER
Payer: COMMERCIAL

## 2024-04-25 DIAGNOSIS — M19.012 LOCALIZED OSTEOARTHRITIS OF BOTH SHOULDERS: ICD-10-CM

## 2024-04-25 DIAGNOSIS — M54.51 VERTEBROGENIC LOW BACK PAIN: Primary | ICD-10-CM

## 2024-04-25 DIAGNOSIS — M19.011 LOCALIZED OSTEOARTHRITIS OF BOTH SHOULDERS: ICD-10-CM

## 2024-04-25 PROCEDURE — 97140 MANUAL THERAPY 1/> REGIONS: CPT | Mod: GP

## 2024-04-25 PROCEDURE — 97110 THERAPEUTIC EXERCISES: CPT | Mod: GP

## 2024-04-25 PROCEDURE — 97010 HOT OR COLD PACKS THERAPY: CPT | Mod: GP

## 2024-04-25 NOTE — OP PT TREATMENT LOG
"ORTHO PT FLOWSHEET    Exercises Current Session Time Completed today   MODALITIES- HEAT/ICE  CPT 06213 TOTAL TIME FOR SESSION 1 unit    Heat L shoulder x8 min beginning of session    Ice/Vasocompression L shoulder x5 min end of session    MODALITIES - E-STIM  CPT 21783   TOTAL TIME FOR SESSION Not performed    E-stim/H-Wave     THER ACT  CPT 53775 TOTAL TIME FOR SESSION Not performed         Assessment     Body Mechanics/Work Training Safe lifting education    THER EX  CPT 22614 TOTAL TIME FOR SESSION 23-37 Minutes    HEP 3/18: wand shoulder flexion, TAC with breathing  3/22: wand ABD, wand IR    CARDIOVASCULAR      TM     Nustep L1 x 10 min seat 7 UE/LE yes   UBE     TAC  10 sec x 10    TAC with march     TAC with reverse march     SLR 1x10 B    Wand:  -flexion  -Abduction  -ER  -IR  -EXT   1x10  1x10 B  1x10 B  1x10 B  1x10 B      shoulder IR with strap          Pendulums LUE only, x20 cw/ccw  Yes   Pulleys Bilateral, flexion x3 min Yes   Wall slides Flexion 1x10 B    TB ER/IR 1x10 OTB ea     AROM shoulder flexion  ABD     sidelying shoulder ER     No monies OTB 2x10              Hip 3 way     Side stepping     Prone hip ext     clamshells     S/l hip ABD     Pball rollout GPB 10x5\"    Bridge 2x10 with ball squeeze    Modified liliana stretch     Hip ER          TB rows/EXT OTB 1x10 ea yes   Step up     NEURO RE-ED  CPT 50045 TOTAL TIME FOR SESSION Not performed    COORDINATION     POSTURAL RE-ED        PRE-GAIT ACTIVITIES      BALANCE TRAINING                GAIT TRAINING  CPT 98926 TOTAL TIME FOR SESSION Not performed    Ambulation      Dynamic gait      MANUAL  CPT 91952 TOTAL TIME FOR SESSION 8-22 Minutes    Stretching Shoulder PROM, B Yes   Mobilization  Shoulder GH mobilizations inferior and posterior  STM, B Yes     Rhythmic stabilization At 90 deg shoulder elevation N   Massage     Taping         ABDOMINAL BRACING - TA ACTIVATION    While lying on your back, press your low back into the floor as you tighten " your stomach muscles moving your navel down towards the floor. Place your thumbs 2 inches inward from your pelvic bone so that you can feel the muscle tanner. Repeat 10 Times   Hold 10 Seconds   Complete 1 Set   Perform 1 Times a Day          SHOULDER FLEXION AAROM - SUPINE - CANE    Lying on your back and holding a wand or cane, slowly raise the wand towards overhead. Use your unaffected arm to assist with the movement. Repeat 20 Times   Hold 5 Seconds   Complete 1 Set   Perform 1 Times a Day            SHOULDER INTERNAL ROTATION STRETCH - BEHIND BACK - WAND / CANE    While holding a wand/cane behind your back as shown, slowly pull the wand up your back for a stretch to your shoulders. Lower back down and repeat.    Video # [id number] Repeat 10 Times   Hold 3 Seconds   Complete 1 Set   Perform 2 Times a Day          SHOULDER ABDUCTION - WAND / CANE    While holding a wand/cane palm face up on the injured side and palm face down on the uninjured side, slowly raise up your injured arm to the side. Lower back down and repeat.    Video # [id number] Repeat 10 Times   Hold 3 Seconds   Complete 1 Set   Perform 2 Times a Day

## 2024-04-25 NOTE — PROGRESS NOTES
Physical Therapy Visit    PT DAILY NOTE FOR OUTPATIENT THERAPY    Patient: Reyna Castillo MRN: [id number]  : 1950 73 y.o.  Referring Physician: Анна Terrazas CRNP  Date of Visit: 2024    Certification Dates: 24 through 24    Diagnosis:   1. Vertebrogenic low back pain    2. Localized osteoarthritis of both shoulders        Chief Complaints:  s/p lumbar surgery, pain lumbar spine R side, weakness    Precautions:   Existing Precautions/Restrictions: fall      TODAY'S VISIT    Time In Session:  Start Time: 1301  Stop Time: 1356  Time Calculation (min): 55 min   History/Vitals/Pain/Encounter Info - 24 1303          Injury History/Precautions/Daily Required Info    Document Type daily treatment     Primary Therapist Evette Barkley, PT, DPT, OCS     Chief Complaint/Reason for Visit  s/p lumbar surgery, pain lumbar spine R side, weakness     Referring Physician Анна YBARRA     Existing Precautions/Restrictions fall     History of present illness/functional impairment Pt is a 74 y/o female who underwent 10/12/22 T10-S1 Posterior Thorocolumbosacral Decompression and Interbody Fusion, Instrumentation, Pelvic Fixation, Allograft, Autograft, BMP, Revision of Existing Instrumentation at [facility] by Dr Lee. She presents with chronic LBP with decreased mobility. She also was dx with bilateral shoulder OA/frozen shoulder with signficant limitations in shoulder mobility and function.     Patient/Family/Caregiver Comments/Observations Patient notes increased symptom response following last land session, increased L shoulder symptoms x2 days.     Patient reported fall since last visit No        Pain Assessment    Currently in pain Yes     Preferred Pain Scale number (Numeric Rating Pain Scale)     Pain: Body location Shoulder     Pain Rating (0-10): Pre Activity 6     Pain Rating (0-10): Post Activity 3        Pain Intervention    Intervention  Manual, Andrew     Post Intervention  "Comments 3pt improvement end of session                    Daily Treatment Assessment and Plan - 04/25/24 1303          Daily Treatment Assessment and Plan    Progress toward goals Progressing     Daily Outcome Summary Patient carlos good response to manual and self-guided mobility with much-improved pain free ROM following. Tolerates gentle periscapular strengthening and NuStep use without worsening discomfort. Benefits from modality use at beginning and end of session for pain management.     Plan and Recommendations Progress core and shoudler strength, gentle ROM as tolerated.                         OBJECTIVE DATA TAKEN TODAY:    None taken    Today's Treatment:    ORTHO PT FLOWSHEET    Exercises Current Session Time Completed today   MODALITIES- HEAT/ICE  CPT 46088 TOTAL TIME FOR SESSION 1 unit    Heat L shoulder x8 min beginning of session    Ice/Vasocompression L shoulder x5 min end of session    MODALITIES - E-STIM  CPT 56616   TOTAL TIME FOR SESSION Not performed    E-stim/H-Wave     THER ACT  CPT 85088 TOTAL TIME FOR SESSION Not performed         Assessment     Body Mechanics/Work Training Safe lifting education    THER EX  CPT 41988 TOTAL TIME FOR SESSION 23-37 Minutes    HEP 3/18: wand shoulder flexion, TAC with breathing  3/22: wand ABD, wand IR    CARDIOVASCULAR      TM     Nustep L1 x 10 min seat 7 UE/LE yes   UBE     TAC  10 sec x 10    TAC with march     TAC with reverse march     SLR 1x10 B    Wand:  -flexion  -Abduction  -ER  -IR  -EXT   1x10  1x10 B  1x10 B  1x10 B  1x10 B      shoulder IR with strap          Pendulums LUE only, x20 cw/ccw  Yes   Pulleys Bilateral, flexion x3 min Yes   Wall slides Flexion 1x10 B    TB ER/IR 1x10 OTB ea     AROM shoulder flexion  ABD     sidelying shoulder ER     No monies OTB 2x10              Hip 3 way     Side stepping     Prone hip ext     clamshells     S/l hip ABD     Pball rollout GPB 10x5\"    Bridge 2x10 with ball squeeze    Modified liliana stretch     Hip " ER          TB rows/EXT OTB 1x10 ea yes   Step up     NEURO RE-ED  CPT 28851 TOTAL TIME FOR SESSION Not performed    COORDINATION     POSTURAL RE-ED        PRE-GAIT ACTIVITIES      BALANCE TRAINING                GAIT TRAINING  CPT 49538 TOTAL TIME FOR SESSION Not performed    Ambulation      Dynamic gait      MANUAL  CPT 79439 TOTAL TIME FOR SESSION 8-22 Minutes    Stretching Shoulder PROM, B Yes   Mobilization  Shoulder GH mobilizations inferior and posterior  STM, B Yes     Rhythmic stabilization At 90 deg shoulder elevation N   Massage     Taping         ABDOMINAL BRACING - TA ACTIVATION    While lying on your back, press your low back into the floor as you tighten your stomach muscles moving your navel down towards the floor. Place your thumbs 2 inches inward from your pelvic bone so that you can feel the muscle tanner. Repeat 10 Times   Hold 10 Seconds   Complete 1 Set   Perform 1 Times a Day          SHOULDER FLEXION AAROM - SUPINE - CANE    Lying on your back and holding a wand or cane, slowly raise the wand towards overhead. Use your unaffected arm to assist with the movement. Repeat 20 Times   Hold 5 Seconds   Complete 1 Set   Perform 1 Times a Day            SHOULDER INTERNAL ROTATION STRETCH - BEHIND BACK - WAND / CANE    While holding a wand/cane behind your back as shown, slowly pull the wand up your back for a stretch to your shoulders. Lower back down and repeat.    Video # [id number] Repeat 10 Times   Hold 3 Seconds   Complete 1 Set   Perform 2 Times a Day          SHOULDER ABDUCTION - WAND / CANE    While holding a wand/cane palm face up on the injured side and palm face down on the uninjured side, slowly raise up your injured arm to the side. Lower back down and repeat.    Video # [id number] Repeat 10 Times   Hold 3 Seconds   Complete 1 Set   Perform 2 Times a Day

## 2024-04-30 ENCOUNTER — HOSPITAL ENCOUNTER (OUTPATIENT)
Dept: PHYSICAL THERAPY | Facility: REHABILITATION | Age: 74
Setting detail: THERAPIES SERIES
Discharge: HOME | End: 2024-04-30
Attending: NURSE PRACTITIONER
Payer: COMMERCIAL

## 2024-04-30 DIAGNOSIS — M19.012 LOCALIZED OSTEOARTHRITIS OF BOTH SHOULDERS: ICD-10-CM

## 2024-04-30 DIAGNOSIS — M19.011 LOCALIZED OSTEOARTHRITIS OF BOTH SHOULDERS: ICD-10-CM

## 2024-04-30 DIAGNOSIS — Z98.1 S/P LUMBAR SPINAL FUSION: ICD-10-CM

## 2024-04-30 DIAGNOSIS — M54.51 VERTEBROGENIC LOW BACK PAIN: Primary | ICD-10-CM

## 2024-04-30 PROCEDURE — 97113 AQUATIC THERAPY/EXERCISES: CPT | Mod: GP

## 2024-04-30 NOTE — PROGRESS NOTES
Physical Therapy Visit    PT DAILY NOTE FOR OUTPATIENT THERAPY    Patient: Reyna Castillo MRN: [id number]  : 1950 74 y.o.  Referring Physician: Анна Terrazas CRNP  Date of Visit: 2024    Certification Dates: 24 through 24    Diagnosis:   1. Vertebrogenic low back pain    2. Localized osteoarthritis of both shoulders    3. S/P lumbar spinal fusion        Chief Complaints:  s/p lumbar surgery, pain lumbar spine R side, weakness    Precautions:   Existing Precautions/Restrictions: fall      TODAY'S VISIT    Time In Session:  Start Time: 1102  Stop Time: 1156  Time Calculation (min): 54 min   History/Vitals/Pain/Encounter Info - 24 1110          Injury History/Precautions/Daily Required Info    Document Type daily treatment     Primary Therapist Evette Barkley, PT, DPT, OCS     Chief Complaint/Reason for Visit  s/p lumbar surgery, pain lumbar spine R side, weakness     Referring Physician Анна YBARRA     Existing Precautions/Restrictions fall     History of present illness/functional impairment Pt is a 74 y/o female who underwent 10/12/22 T10-S1 Posterior Thorocolumbosacral Decompression and Interbody Fusion, Instrumentation, Pelvic Fixation, Allograft, Autograft, BMP, Revision of Existing Instrumentation at [facility] by Dr Lee. She presents with chronic LBP with decreased mobility. She also was dx with bilateral shoulder OA/frozen shoulder with signficant limitations in shoulder mobility and function.     Patient/Family/Caregiver Comments/Observations Pt reports having a busy weekend and she is a bit sore today. She reports taking a tramadol this morning     Patient reported fall since last visit No        Pain Assessment    Currently in pain Yes     Preferred Pain Scale number (Numeric Rating Pain Scale)     Pain Side/Orientation bilateral     Pain: Body location Back;Shoulder     Pain Rating (0-10): Pre Activity 5                    Daily Treatment Assessment and  "Plan - 04/30/24 1110          Daily Treatment Assessment and Plan    Progress toward goals Progressing     Daily Outcome Summary Pt was able to complete all exercises without pain onset. She did require cues for core and glute activation. She reported a reduction in pain with activities and she denied having pain after exiting the pool. SHe notes her shoulder mobility is better with water activities with decreased crepitus.     Plan and Recommendations Progress note next visit when in gym.                         OBJECTIVE DATA TAKEN TODAY:    None taken    Today's Treatment:    Today's Treatment:     Precautions:          Individual aquatics 97603   53-67 min Yes   Group aquatics CPT 06428     no   Aquatics exercises flow sheet     Completed today             Walking forwards 5 laps   yes   Side stepping 5 laps   yes   kayak         Walking backwards  5 laps   yes    marching 5 laps yes           LE exercises               Hip abduction 2x10 with bar yes   Hip flexion 2x10 with bar yes   Hip abduction 2x10 with bar yes   Circles  cw/ccw       Single leg press with noodle               UE  exercises               Shoulder flexion 2x10 with paddles yes   Shoulder abduction 2x10 with paddles yes   Biceps/triceps 2x10 with paddles yes   Horizontal abd/add 2x10 with paddles yes   Kickboard press               Step up forwards 1x10 B on 10\" step    Step ups side       W's with kick boards       squats STS 2x10 yes   Heel raises 2x10 B at wall yes   Knee curls 2x10 B at wall yes   Sitting on blue disc               Floating with noodles       Scissors, bicycling, skiers               stretching       Calf stretch 30 sec x 4 at wall yes   Low back stretch                                                                                    "

## 2024-04-30 NOTE — OP PT TREATMENT LOG
"Today's Treatment:     Precautions:          Individual aquatics [id number]   53-67 min Yes   Group aquatics CPT 22610     no   Aquatics exercises flow sheet     Completed today             Walking forwards 5 laps   yes   Side stepping 5 laps   yes   kayak         Walking backwards  5 laps   yes    marching 5 laps yes           LE exercises               Hip abduction 2x10 with bar yes   Hip flexion 2x10 with bar yes   Hip abduction 2x10 with bar yes   Circles  cw/ccw       Single leg press with noodle               UE  exercises               Shoulder flexion 2x10 with paddles yes   Shoulder abduction 2x10 with paddles yes   Biceps/triceps 2x10 with paddles yes   Horizontal abd/add 2x10 with paddles yes   Kickboard press               Step up forwards 1x10 B on 10\" step    Step ups side       W's with kick boards       squats STS 2x10 yes   Heel raises 2x10 B at wall yes   Knee curls 2x10 B at wall yes   Sitting on blue disc               Floating with noodles       Scissors, bicycling, skiers               stretching       Calf stretch 30 sec x 4 at wall yes   Low back stretch                                                             " Electrodesiccation And Curettage Text: The wound bed was treated with electrodesiccation and curettage after the biopsy was performed.

## 2024-05-02 ENCOUNTER — HOSPITAL ENCOUNTER (OUTPATIENT)
Dept: PHYSICAL THERAPY | Facility: REHABILITATION | Age: 74
Setting detail: THERAPIES SERIES
Discharge: HOME | End: 2024-05-02
Attending: NURSE PRACTITIONER
Payer: COMMERCIAL

## 2024-05-02 DIAGNOSIS — Z98.1 S/P LUMBAR SPINAL FUSION: ICD-10-CM

## 2024-05-02 DIAGNOSIS — M19.011 LOCALIZED OSTEOARTHRITIS OF BOTH SHOULDERS: ICD-10-CM

## 2024-05-02 DIAGNOSIS — M54.51 VERTEBROGENIC LOW BACK PAIN: Primary | ICD-10-CM

## 2024-05-02 DIAGNOSIS — M19.012 LOCALIZED OSTEOARTHRITIS OF BOTH SHOULDERS: ICD-10-CM

## 2024-05-02 PROCEDURE — 97530 THERAPEUTIC ACTIVITIES: CPT | Mod: GP

## 2024-05-02 PROCEDURE — 97110 THERAPEUTIC EXERCISES: CPT | Mod: GP

## 2024-05-02 PROCEDURE — 97140 MANUAL THERAPY 1/> REGIONS: CPT | Mod: GP

## 2024-05-02 NOTE — PROGRESS NOTES
Physical Therapy Progress Note    PT PROGRESS NOTE FOR OUTPATIENT THERAPY    Patient: Reyna Castillo   MRN: [id number]  : 1950 74 y.o.    Referring Physician: Анна Terrazas CRNP  Date of Visit: 2024    Certification Dates: 24 through 24    Recommended Frequency & Duration:  3 times/week for up to 3 months   2x/week, progressing to 3x/week if pt able to tolerate    Diagnosis:   1. Vertebrogenic low back pain    2. Localized osteoarthritis of both shoulders    3. S/P lumbar spinal fusion        Chief Complaints:  Chief Complaint   Patient presents with    Dec Strength    Dec ROM    Difficulty Walking    Balance Deficits    Pain    Abnormality Of Gait       Precautions:   Existing Precautions/Restrictions: fall    TODAY'S VISIT:    Time In Session:  Start Time: 1258  Stop Time: 1359  Time Calculation (min): 61 min   General Information - 24 1255          Session Details    Document Type progress note     Mode of Treatment individual therapy        General Information    Referring Physician Анна YBARRA     History of present illness/functional impairment Pt is a 74 y/o female who underwent 10/12/22 T10-S1 Posterior Thorocolumbosacral Decompression and Interbody Fusion, Instrumentation, Pelvic Fixation, Allograft, Autograft, BMP, Revision of Existing Instrumentation at [facility] by Dr Lee. She presents with chronic LBP with decreased mobility. She also was dx with bilateral shoulder OA/frozen shoulder with signficant limitations in shoulder mobility and function.     Patient/Family/Caregiver Comments/Observations Pt reports that she feels likes she is making a lot of improvements in her shoulder ROM and stength and she feels that her legs are getting stronger. She reports that she bent forward yesterday to reach down and she felt a pop in her back. She notes that her back pain has been more sore since. SHe feels that her back was improving prior to re-injury  yesterday.     Existing Precautions/Restrictions fall                    Daily Falls Screen - 05/02/24 1255          Daily Falls Assessment    Patient reported fall since last visit No                    Pain/Vitals - 05/02/24 1255          Pain Assessment    Currently in pain Yes     Preferred Pain Scale number (Numeric Rating Pain Scale)     Pain: Body location Back     Pain Rating (0-10): Pre Activity 7        Pain Intervention    Intervention  TE     Post Intervention Comments decreased slightly                    PT - 05/02/24 1255          Physical Therapy    Physical Therapy Specialty Ortho and Sports PT        PT Plan    Frequency of treatment 3 times/week     PT Duration 3 months     PT Custom Frequency and Duration 2x/week, progressing to 3x/week if pt able to tolerate     PT Cert From 03/18/24     PT Cert To 06/16/24     Date PT POC was sent to provider 03/18/24     Signed PT Plan of Care received?  Yes                    Assessment and Plan - 05/02/24 1255          Assessment    Plan of Care reviewed and patient/family in agreement Yes     Rehab Potential/Prognosis good, to achieve stated therapy goals     Problem List decreased flexibility;decreased endurance;pain;decreased strength;impaired postural control;decreased ROM;impaired balance     Clinical Assessment Pt is a 72 y/o female with h/o lumbosacral anterior/posterior decompresison and fusion 10/12/22 presenting to OP PT with chronic LBP and hypomobility and B Shoulder OA and decreased mobility with functional limitations. She presents with hip and core weakness, decreased standing and walking tolerance, limitations in lifting, reaching, carrying, dressing and ADLs. SHe reports limited improvement on reasssessment today likely due to re-injury of back yesterday.  She currently reports decline from 48% to 50% impairment on LOLY and from 54% to 88%  impairment on RMQ. She reports 40% function on Fabian Shoulder Scale. Overall her activity tolerance is  improving but progress limited at this time as a result of recent irritation. Pt was educated on pain de-escalation techniques.  She will benefit from skilled PT to address impairments and progress towards goals.     Plan and Recommendations Core strengthening, gentle lumbar extensions, shoulder mobility.                     OBJECTIVE MEASUREMENTS/DATA:    ROM    Range of Motion - 05/02/24 1300          RIGHT: Upper Extremity PROM Assessment    Shoulder Flexion  148 degrees     Shoulder Abduction  110 degrees     Shoulder Internal Rotation  38 degrees     Shoulder External Rotation  58 degrees        LEFT: Upper Extremity PROM Assessment    Shoulder Flexion   118 degrees     Shoulder Abduction   100 degrees     Shoulder Internal Rotation   58 degrees     Shoulder External Rotation   30 degrees        LEFT: Upper Extremity AROM Assessment    Shoulder Flexion   100 degrees   3/10    Shoulder Extension   40 degrees   3/10    Shoulder Abduction   70 degrees   4/10    Shoulder Internal Rotation   --   lat sacrum    Shoulder External Rotation   26 degrees   C7       RIGHT: Upper Extremity AROM Assessment    Shoulder Flexion   110 degrees     Shoulder Extension   60 degrees     Shoulder Abduction   102 degrees   3/10    Shoulder Internal Rotation   --   lat sacrum    Shoulder External Rotation   40 degrees   C7       Lumbar AROM    Lumbar Flexion 110   7/10    Lumbar Extension 16   5/10    Lumbar Left SB 8   8/10    Lumbar Right SB 15   6/10    Lumbar Left Rotation 50   8/10    Lumbar Right Rotation 38   4/10                  MMT   Outcome Measures    PT Outcome Measures - 05/02/24 1255          Subjective Outcome Measures    Oswestry (LOLY) 25/50=50% impaired     [place] Shoulder Score 36     RMDI Low Back 21/24=87.5% impaired                     ROM and MMT          3/18/2024 5/2/2024    13:00   PT UE ROM Measurements   PROM: Right Shoulder Flexion  138 degrees       8/10 148 degrees   AROM: Right Shoulder Flexion 106  degrees       4/10 110 degrees   PROM: Left Shoulder Flexion 122 degrees       5/10 118 degrees   AROM: Left Shoulder Flexion 108 degrees       4/10 100 degrees       3/10   AROM: Right Shoulder Extension 58 degrees 60 degrees   AROM: Left Shoulder Extension 52 degrees 40 degrees       3/10   PROM: Right Shoulder  degrees       6/10 110 degrees   AROM: Right Shoulder ABD 72 degrees       5/10 102 degrees       3/10   PROM: Left Shoulder ABD 93 degrees       8/10 100 degrees   AROM: Left Shoulder ABD 62 degrees       4/10 70 degrees       4/10   PROM: Right Shoulder IR 36 degrees       @90 ABD 38 degrees   AROM: Right Shoulder IR --        Lateral sacrum --        lat sacrum   PROM: Left Shoulder IR 50 degrees       @45 ABD 58 degrees   AROM: Left Shoulder IR --        lateral sacrum 4/10 --        lat sacrum   PROM: Right Shoulder ER 50 degrees       @90 ABD 58 degrees   AROM: Right Shoulder ER 32 degrees       T1 40 degrees       C7   PROM: Left Shoulder ER 48 degrees       @45 ABD 30 degrees   AROM: Left Shoulder ER 22 degrees       4/10, C7 26 degrees       C7   PT Cervical/Lumbar/Other ROM Measurements   AROM: Cervical Flexion 42    AROM: Cervical Extension 42    AROM: Left Cervical SB 16    AROM: Right Cervical SB 35    AROM: Left Cervical Rotation 52    AROM: Right Cervical Rotation 62    AROM: Lumbar Flexion 100       5/10 110       7/10   AROM: Lumar Extension 23       2-3/10 16       5/10   AROM: Left Lumbar SB 8       4/10 8       8/10   AROM: Right Lumbar SB 22       3/10 15       6/10   AROM: Left Lumbar Rotation 38       2/10 50       8/10   AROM: Right Lumbar Rotation 52 38       4/10   PT UE MMT Measurements   Right Shoulder Flexion (4/5) good    Left Shoulder Flexion (4/5) good    Right Shoulder Extension (4+/5) good plus    Left Shoulder Extension (4+/5) good plus    Right Shoulder ABD (4/5) good    Left Shoulder ABD (4/5) good    Right Shoulder IR (4+/5) good plus    Left Shoulder IR (4+/5)  good plus    Right Shoulder ER (4/5) good    Left Shoulder ER (4+/5) good plus    Right Elbow Flexion (5/5) normal    Left Elbow Flexion (5/5) normal    Right Elbow Extension (5/5) normal    Left Elbow Extension (5/5) normal    PT LE MMT   Right Hip Flexion (4-/5) good minus    Left Hip Flexion (4-/5) good minus    Right Hip Extension (4-/5) good minus    Left Hip Extension (4-/5) good minus    Right Knee Flexion (5/5) normal    Left Knee Flexion (5/5) normal    Right Knee Extension (5/5) normal    Left Knee Extension (5/5) normal    Right Ankle DF (5/5) normal    Left Ankle DF (5/5) normal      Outcome Measures          3/18/2024    14:35 5/2/2024    12:55   PT SUBJECTIVE Outcome Measures   Oswestry 24/50=48% impaired 25/50=50% impaired   [place] Shoulder Score (PSS) 40 36       Today's Treatment::    Education provided:  Yes: See treatment log for details of education provided    ORTHO PT FLOWSHEET    Exercises Current Session Time Completed today   MODALITIES- HEAT/ICE  CPT 77003 TOTAL TIME FOR SESSION  unit    Heat L shoulder x8 min beginning of session    Ice/Vasocompression L shoulder x5 min end of session    MODALITIES - E-STIM  CPT 03858   TOTAL TIME FOR SESSION Not performed    E-stim/H-Wave     THER ACT  CPT 81197 TOTAL TIME FOR SESSION 8-22 Minutes         Assessment ROM, MMT, [place], LOLY, RMQ yes   Body Mechanics/Work Training Safe lifting education    THER EX  CPT 86056 TOTAL TIME FOR SESSION 23-37 Minutes    HEP 3/18: wand shoulder flexion, TAC with breathing  3/22: wand ABD, wand IR    CARDIOVASCULAR      TM     Nustep L1 x 10 min seat 7 UE/LE    UBE     TAC  10 sec x 10 yes   TAC with march     TAC with reverse march     SLR 1x10 B    Wand:  -flexion  -Abduction  -ER  -IR  -EXT   1x10  1x10 B  1x10 B  1x10 B  1x10 B   yes   shoulder IR with strap          Pendulums LUE only, x20 cw/ccw  Yes   Pulleys Bilateral, flexion x3 min    Wall slides Flexion 1x10 B    TB ER/IR 1x10 OTB ea     AROM shoulder  "flexion  ABD     sidelying shoulder ER     No monies OTB 2x10    Standing lumbar ext 1x10 yes   LTR 1x10 B yes   Hip 3 way     Side stepping     Prone hip ext     clamshells     S/l hip ABD     Pball rollout GPB 10x5\"    Bridge 2x10 with ball squeeze yes   Modified liliana stretch     Hip ER          TB rows/EXT OTB 1x10 ea yes   Step up     NEURO RE-ED  CPT 75739 TOTAL TIME FOR SESSION Not performed    COORDINATION     POSTURAL RE-ED        PRE-GAIT ACTIVITIES      BALANCE TRAINING                GAIT TRAINING  CPT 09020 TOTAL TIME FOR SESSION Not performed    Ambulation      Dynamic gait      MANUAL  CPT 74660 TOTAL TIME FOR SESSION 8-22 Minutes    Stretching Shoulder PROM, B Yes   Mobilization  Shoulder GH mobilizations inferior and posterior  STM, B Yes     Rhythmic stabilization At 90 deg shoulder elevation N   Massage     Taping         ABDOMINAL BRACING - TA ACTIVATION    While lying on your back, press your low back into the floor as you tighten your stomach muscles moving your navel down towards the floor. Place your thumbs 2 inches inward from your pelvic bone so that you can feel the muscle tanner. Repeat 10 Times   Hold 10 Seconds   Complete 1 Set   Perform 1 Times a Day          SHOULDER FLEXION AAROM - SUPINE - CANE    Lying on your back and holding a wand or cane, slowly raise the wand towards overhead. Use your unaffected arm to assist with the movement. Repeat 20 Times   Hold 5 Seconds   Complete 1 Set   Perform 1 Times a Day            SHOULDER INTERNAL ROTATION STRETCH - BEHIND BACK - WAND / CANE    While holding a wand/cane behind your back as shown, slowly pull the wand up your back for a stretch to your shoulders. Lower back down and repeat.    Video # [id number] Repeat 10 Times   Hold 3 Seconds   Complete 1 Set   Perform 2 Times a Day          SHOULDER ABDUCTION - WAND / CANE    While holding a wand/cane palm face up on the injured side and palm face down on the uninjured side, slowly raise " up your injured arm to the side. Lower back down and repeat.    Video # [id number] Repeat 10 Times   Hold 3 Seconds   Complete 1 Set   Perform 2 Times a Day                Goals Addressed                   This Visit's Progress     [facility] PT Lumbar Goals        Short/Long Term Goals Time Frame Result Comment/Progress   Pt will increase core, LE, UE and Hip MMT >/= full grade 8 weeks weeks On going    Pt will increase lumbar ROM >/= 10 degrees into flexion, extension, lateral flexion and rotation 4 weeks     Pt will increase Shoulder ROM >/= 10 degrees into flexion, extension, lateral flexion and rotation 4 weeks     Pt will improve RMQ >/= 10% functional improvement 4 weeks  IE 13/24=54% impaired  5/2: 21/24   Pt will demonstrate improvements in Oswestry >/=  10 % functional improvement 4 weeks  IE: 24/50=48% impaired  5/2: 25/50   Pt will demonstrate improvements in Oswestry >/=  20 % functional improvement 8 weeks     Improve Fabian shoulder scale >/= 12 points 4 weeks  IE 40/100  5/2: 36/100   Improve Fabian shoulder scale >/= 24 points 8 weeks     Pt will be able to lift 10 lbs without back or shoulder pain 12 weeks     Increase AROM shoulder flexion, ABD >/= 120 deg 12 weeks     Pt will be independent in [facility] for self management of symptoms 8 weeks     Pt will be able to don socks without back pain 12 weeks                       Evette Barkley, PT

## 2024-05-02 NOTE — OP PT TREATMENT LOG
"ORTHO PT FLOWSHEET    Exercises Current Session Time Completed today   MODALITIES- HEAT/ICE  CPT 17171 TOTAL TIME FOR SESSION  unit    Heat L shoulder x8 min beginning of session    Ice/Vasocompression L shoulder x5 min end of session    MODALITIES - E-STIM  CPT 39055   TOTAL TIME FOR SESSION Not performed    E-stim/H-Wave     THER ACT  CPT 21370 TOTAL TIME FOR SESSION 8-22 Minutes         Assessment ROM, MMT, Fabian, LOLY, RMQ yes   Body Mechanics/Work Training Safe lifting education    THER EX  CPT 92439 TOTAL TIME FOR SESSION 23-37 Minutes    HEP 3/18: wand shoulder flexion, TAC with breathing  3/22: wand ABD, wand IR    CARDIOVASCULAR      TM     Nustep L1 x 10 min seat 7 UE/LE    UBE     TAC  10 sec x 10 yes   TAC with march     TAC with reverse march     SLR 1x10 B    Wand:  -flexion  -Abduction  -ER  -IR  -EXT   1x10  1x10 B  1x10 B  1x10 B  1x10 B   yes   shoulder IR with strap          Pendulums LUE only, x20 cw/ccw  Yes   Pulleys Bilateral, flexion x3 min    Wall slides Flexion 1x10 B    TB ER/IR 1x10 OTB ea     AROM shoulder flexion  ABD     sidelying shoulder ER     No monies OTB 2x10    Standing lumbar ext 1x10 yes   LTR 1x10 B yes   Hip 3 way     Side stepping     Prone hip ext     clamshells     S/l hip ABD     Pball rollout GPB 10x5\"    Bridge 2x10 with ball squeeze yes   Modified liliana stretch     Hip ER          TB rows/EXT OTB 1x10 ea yes   Step up     NEURO RE-ED  CPT 28889 TOTAL TIME FOR SESSION Not performed    COORDINATION     POSTURAL RE-ED        PRE-GAIT ACTIVITIES      BALANCE TRAINING                GAIT TRAINING  CPT 18141 TOTAL TIME FOR SESSION Not performed    Ambulation      Dynamic gait      MANUAL  CPT 00713 TOTAL TIME FOR SESSION 8-22 Minutes    Stretching Shoulder PROM, B Yes   Mobilization  Shoulder GH mobilizations inferior and posterior  STM, B Yes     Rhythmic stabilization At 90 deg shoulder elevation N   Massage     Taping         ABDOMINAL BRACING - TA ACTIVATION    While " lying on your back, press your low back into the floor as you tighten your stomach muscles moving your navel down towards the floor. Place your thumbs 2 inches inward from your pelvic bone so that you can feel the muscle tanner. Repeat 10 Times   Hold 10 Seconds   Complete 1 Set   Perform 1 Times a Day          SHOULDER FLEXION AAROM - SUPINE - CANE    Lying on your back and holding a wand or cane, slowly raise the wand towards overhead. Use your unaffected arm to assist with the movement. Repeat 20 Times   Hold 5 Seconds   Complete 1 Set   Perform 1 Times a Day            SHOULDER INTERNAL ROTATION STRETCH - BEHIND BACK - WAND / CANE    While holding a wand/cane behind your back as shown, slowly pull the wand up your back for a stretch to your shoulders. Lower back down and repeat.    Video # [id number] Repeat 10 Times   Hold 3 Seconds   Complete 1 Set   Perform 2 Times a Day          SHOULDER ABDUCTION - WAND / CANE    While holding a wand/cane palm face up on the injured side and palm face down on the uninjured side, slowly raise up your injured arm to the side. Lower back down and repeat.    Video # [id number] Repeat 10 Times   Hold 3 Seconds   Complete 1 Set   Perform 2 Times a Day

## 2024-05-07 ENCOUNTER — HOSPITAL ENCOUNTER (OUTPATIENT)
Dept: PHYSICAL THERAPY | Facility: REHABILITATION | Age: 74
Setting detail: THERAPIES SERIES
Discharge: HOME | End: 2024-05-07
Attending: NURSE PRACTITIONER
Payer: COMMERCIAL

## 2024-05-07 DIAGNOSIS — M25.60 DECREASED RANGE OF MOTION: ICD-10-CM

## 2024-05-07 DIAGNOSIS — M19.012 LOCALIZED OSTEOARTHRITIS OF BOTH SHOULDERS: ICD-10-CM

## 2024-05-07 DIAGNOSIS — Z98.1 S/P LUMBAR SPINAL FUSION: ICD-10-CM

## 2024-05-07 DIAGNOSIS — M19.011 LOCALIZED OSTEOARTHRITIS OF BOTH SHOULDERS: ICD-10-CM

## 2024-05-07 DIAGNOSIS — M54.51 VERTEBROGENIC LOW BACK PAIN: Primary | ICD-10-CM

## 2024-05-07 DIAGNOSIS — R53.1 DECREASED STRENGTH: ICD-10-CM

## 2024-05-07 PROCEDURE — 97113 AQUATIC THERAPY/EXERCISES: CPT | Mod: GP

## 2024-05-07 NOTE — PROGRESS NOTES
Physical Therapy Visit    PT DAILY NOTE FOR OUTPATIENT THERAPY    Patient: Reyna Castillo MRN: [id number]  : 1950 74 y.o.  Referring Physician: Анна Terrazas CRNP  Date of Visit: 2024    Certification Dates: 24 through 24    Diagnosis:   1. Vertebrogenic low back pain    2. Localized osteoarthritis of both shoulders    3. S/P lumbar spinal fusion    4. Decreased range of motion    5. Decreased strength        Chief Complaints:  s/p lumbar surgery, pain lumbar spine R side, weakness    Precautions:   Existing Precautions/Restrictions: fall      TODAY'S VISIT    Time In Session:  Start Time: 1059  Stop Time: 1150  Time Calculation (min): 51 min   History/Vitals/Pain/Encounter Info - 24 1105          Injury History/Precautions/Daily Required Info    Document Type daily treatment     Primary Therapist Evette Barkley, PT, DPT, OCS     Chief Complaint/Reason for Visit  s/p lumbar surgery, pain lumbar spine R side, weakness     Referring Physician Анна YBARRA     Existing Precautions/Restrictions fall     History of present illness/functional impairment Pt is a 74 y/o female who underwent 10/12/22 T10-S1 Posterior Thorocolumbosacral Decompression and Interbody Fusion, Instrumentation, Pelvic Fixation, Allograft, Autograft, BMP, Revision of Existing Instrumentation at [facility] by Dr Lee. She presents with chronic LBP with decreased mobility. She also was dx with bilateral shoulder OA/frozen shoulder with signficant limitations in shoulder mobility and function.     Patient/Family/Caregiver Comments/Observations Pt rpeorts that she is having a lot of joint aches which she feels is related to arthritis and she thinks her steroid injections are wearing off. She reports 6/10 pain in shoulder today and 4/10 back pain.     Patient reported fall since last visit No        Pain Assessment    Currently in pain Yes     Preferred Pain Scale number (Numeric Rating Pain Scale)      "Pain Side/Orientation bilateral     Pain: Body location Shoulder     Pain Rating (0-10): Pre Activity 6        Pain Intervention    Intervention  aquatics     Post Intervention Comments decreased pain \"i feel good\"                    Daily Treatment Assessment and Plan - 05/07/24 1105          Daily Treatment Assessment and Plan    Progress toward goals Progressing     Daily Outcome Summary Pt noted with improved shoulder mobility and activity tolerance with exercises assisted by water. Cues for core activation t/o session with some mild LOB noted in water with dynamic activities. No increase in back or shoulder pain.     Plan and Recommendations Core strengthening, gentle lumbar extensions, shoulder mobility. scapular strengthening.                         OBJECTIVE DATA TAKEN TODAY:    None taken    Today's Treatment:    Today's Treatment:     Precautions:          Individual aquatics 68722   53-67 min Yes   Group aquatics CPT 22541     no   Aquatics exercises flow sheet     Completed today             Walking forwards 5 laps   yes   Side stepping 5 laps   yes   kayak         Walking backwards  5 laps   yes    marching 5 laps yes           LE exercises               Hip abduction 2x10 with bar yes   Hip flexion 2x10 with bar yes   Hip abduction 2x10 with bar yes   Circles  cw/ccw       Single leg press with noodle               UE  exercises               Shoulder flexion 2x10 with paddles yes   Shoulder abduction 2x10 with paddles yes   Biceps/triceps 2x10 with paddles yes   Horizontal abd/add 2x10 with paddles yes   Kickboard press  2x10  yes           Step up forwards 1x10 B on 10\" step yes   Step ups side       W's with kick boards       squats 2x10 yes   Heel raises 2x10 B at wall yes   Knee curls 2x10 B at wall yes   Sitting on blue disc               Floating with noodles       Scissors, bicycling, skiers               stretching       Calf stretch 30 sec x 4 at wall yes   Low back stretch  lumbar ext 1x10  " yes

## 2024-05-07 NOTE — OP PT TREATMENT LOG
"Today's Treatment:     Precautions:          Individual aquatics [id number]   53-67 min Yes   Group aquatics CPT 90752     no   Aquatics exercises flow sheet     Completed today             Walking forwards 5 laps   yes   Side stepping 5 laps   yes   kayak         Walking backwards  5 laps   yes    marching 5 laps yes           LE exercises               Hip abduction 2x10 with bar yes   Hip flexion 2x10 with bar yes   Hip abduction 2x10 with bar yes   Circles  cw/ccw       Single leg press with noodle               UE  exercises               Shoulder flexion 2x10 with paddles yes   Shoulder abduction 2x10 with paddles yes   Biceps/triceps 2x10 with paddles yes   Horizontal abd/add 2x10 with paddles yes   Kickboard press  2x10  yes           Step up forwards 1x10 B on 10\" step yes   Step ups side       W's with kick boards       squats 2x10 yes   Heel raises 2x10 B at wall yes   Knee curls 2x10 B at wall yes   Sitting on blue disc               Floating with noodles       Scissors, bicycling, skiers               stretching       Calf stretch 30 sec x 4 at wall yes   Low back stretch  lumbar ext 1x10  yes                                                         "

## 2024-05-16 ENCOUNTER — HOSPITAL ENCOUNTER (OUTPATIENT)
Dept: PHYSICAL THERAPY | Facility: REHABILITATION | Age: 74
Setting detail: THERAPIES SERIES
Discharge: HOME | End: 2024-05-16
Attending: NURSE PRACTITIONER
Payer: COMMERCIAL

## 2024-05-16 DIAGNOSIS — M54.51 VERTEBROGENIC LOW BACK PAIN: Primary | ICD-10-CM

## 2024-05-16 DIAGNOSIS — M19.011 LOCALIZED OSTEOARTHRITIS OF BOTH SHOULDERS: ICD-10-CM

## 2024-05-16 DIAGNOSIS — M19.012 LOCALIZED OSTEOARTHRITIS OF BOTH SHOULDERS: ICD-10-CM

## 2024-05-16 PROCEDURE — 97110 THERAPEUTIC EXERCISES: CPT | Mod: GP

## 2024-05-16 PROCEDURE — 97010 HOT OR COLD PACKS THERAPY: CPT | Mod: GP

## 2024-05-16 NOTE — PROGRESS NOTES
Physical Therapy Visit    PT DAILY NOTE FOR OUTPATIENT THERAPY    Patient: Reyna Castillo MRN: [id number]  : 1950 74 y.o.  Referring Physician: Анна Terrazas CRNP  Date of Visit: 2024    Certification Dates: 24 through 24    Diagnosis:   1. Vertebrogenic low back pain    2. Localized osteoarthritis of both shoulders        Chief Complaints:  s/p lumbar surgery, pain lumbar spine R side, weakness    Precautions:   Existing Precautions/Restrictions: fall      TODAY'S VISIT    Time In Session:  Start Time: 1259  Stop Time: 1358  Time Calculation (min): 59 min   History/Vitals/Pain/Encounter Info - 24 1253          Injury History/Precautions/Daily Required Info    Document Type daily treatment     Primary Therapist Evette Barkley, PT, DPT, OCS     Chief Complaint/Reason for Visit  s/p lumbar surgery, pain lumbar spine R side, weakness     Referring Physician Анна YBARRA     Existing Precautions/Restrictions fall     History of present illness/functional impairment Pt is a 74 y/o female who underwent 10/12/22 T10-S1 Posterior Thorocolumbosacral Decompression and Interbody Fusion, Instrumentation, Pelvic Fixation, Allograft, Autograft, BMP, Revision of Existing Instrumentation at [facility] by Dr Lee. She presents with chronic LBP with decreased mobility. She also was dx with bilateral shoulder OA/frozen shoulder with signficant limitations in shoulder mobility and function.     Patient/Family/Caregiver Comments/Observations Pt reports that she continues to have a lot of back and shoulder soreness since re-injury 2 weeks ago. She reports that she wants to get additional injections in her shoulders. She is having pain across her mid back. She reports 4/10 B shoudlers and back 6/10.     Patient reported fall since last visit No        Pain Assessment    Currently in pain Yes     Preferred Pain Scale number (Numeric Rating Pain Scale)     Pain: Body location Back     Pain  Rating (0-10): Pre Activity 6                    Daily Treatment Assessment and Plan - 05/16/24 1253          Daily Treatment Assessment and Plan    Progress toward goals Progressing     Daily Outcome Summary Session was kept light today due to back and shoulder discomfort.  Added hip stretching and gentle shoulder stretching and strengthening. She reported mild increased in back pain after exercises. 6/10 in back, no change in shoulders.     Plan and Recommendations Core strengthening, gentle lumbar extensions, shoulder mobility. scapular strengthening.                         OBJECTIVE DATA TAKEN TODAY:    None taken    Today's Treatment:    ORTHO PT FLOWSHEET    Exercises Current Session Time Completed today   MODALITIES- HEAT/ICE  CPT 36467 TOTAL TIME FOR SESSION 1 unit    Heat L shoulder x8 min beginning of session yes   Ice/Vasocompression L shoulder x5 min end of session    MODALITIES - E-STIM  CPT 44344   TOTAL TIME FOR SESSION Not performed    E-stim/H-Wave     THER ACT  CPT 19532 TOTAL TIME FOR SESSION Not performed         Assessment ROM, MMT, Fabian, LOLY, RMQ    Body Mechanics/Work Training Safe lifting education    THER EX  CPT 30324 TOTAL TIME FOR SESSION 38-52 Minutes    HEP 3/18: wand shoulder flexion, TAC with breathing  3/22: wand ABD, wand IR    CARDIOVASCULAR      TM     Nustep L1 x 10 min seat 7 UE/LE yes   UBE     TAC  10 sec x 10 with ball squeeze yes   TAC with march     TAC with reverse march     SLR 1x10 B    Wand:  -flexion  -Abduction  -ER  -IR  -EXT   1x10  1x10 B  1x10 B  1x10 B  1x10 B   Yes    yes   shoulder IR with strap          Pendulums LUE only, x20 cw/ccw  Yes   Pulleys Bilateral, flexion x3 min    Wall slides Flexion 1x10 B    TB ER/IR 1x10 OTB ea     AROM shoulder flexion  ABD     sidelying shoulder ER     No monies  Horizontal ABD OTB 2x10  OTB 1x10   yes   Standing lumbar ext 1x10 yes   LTR 1x10 B yes   Hip 3 way     Side stepping     Prone hip ext     clamshells     S/l hip  "ABD     Pball rollout GPB 10x5\"    Bridge 2x10 with ball squeeze yes   Modified liliana stretch 30 sec x 4 B yes   Hip ER 30 sec x 4 B yes        TB rows/EXT OTB 1x10 ea    Step up     NEURO RE-ED  CPT 81223 TOTAL TIME FOR SESSION Not performed    COORDINATION     POSTURAL RE-ED        PRE-GAIT ACTIVITIES      BALANCE TRAINING                GAIT TRAINING  CPT 64197 TOTAL TIME FOR SESSION Not performed    Ambulation      Dynamic gait      MANUAL  CPT 00547 TOTAL TIME FOR SESSION 8-22 Minutes    Stretching Shoulder PROM, B Yes   Mobilization  Shoulder GH mobilizations inferior and posterior  STM, B Yes     Rhythmic stabilization At 90 deg shoulder elevation N   Massage     Taping         ABDOMINAL BRACING - TA ACTIVATION    While lying on your back, press your low back into the floor as you tighten your stomach muscles moving your navel down towards the floor. Place your thumbs 2 inches inward from your pelvic bone so that you can feel the muscle tanner. Repeat 10 Times   Hold 10 Seconds   Complete 1 Set   Perform 1 Times a Day          SHOULDER FLEXION AAROM - SUPINE - CANE    Lying on your back and holding a wand or cane, slowly raise the wand towards overhead. Use your unaffected arm to assist with the movement. Repeat 20 Times   Hold 5 Seconds   Complete 1 Set   Perform 1 Times a Day            SHOULDER INTERNAL ROTATION STRETCH - BEHIND BACK - WAND / CANE    While holding a wand/cane behind your back as shown, slowly pull the wand up your back for a stretch to your shoulders. Lower back down and repeat.    Video # [id number] Repeat 10 Times   Hold 3 Seconds   Complete 1 Set   Perform 2 Times a Day          SHOULDER ABDUCTION - WAND / CANE    While holding a wand/cane palm face up on the injured side and palm face down on the uninjured side, slowly raise up your injured arm to the side. Lower back down and repeat.    Video # [id number] Repeat 10 Times   Hold 3 Seconds   Complete 1 Set   Perform 2 Times a Day    "

## 2024-05-16 NOTE — OP PT TREATMENT LOG
"ORTHO PT FLOWSHEET    Exercises Current Session Time Completed today   MODALITIES- HEAT/ICE  CPT 98355 TOTAL TIME FOR SESSION 1 unit    Heat L shoulder x8 min beginning of session yes   Ice/Vasocompression L shoulder x5 min end of session    MODALITIES - E-STIM  CPT 55842   TOTAL TIME FOR SESSION Not performed    E-stim/H-Wave     THER ACT  CPT 69643 TOTAL TIME FOR SESSION Not performed         Assessment ROM, MMT, Bulger, LOLY, RMQ    Body Mechanics/Work Training Safe lifting education    THER EX  CPT 78780 TOTAL TIME FOR SESSION 38-52 Minutes    HEP 3/18: wand shoulder flexion, TAC with breathing  3/22: wand ABD, wand IR    CARDIOVASCULAR      TM     Nustep L1 x 10 min seat 7 UE/LE yes   UBE     TAC  10 sec x 10 with ball squeeze yes   TAC with march     TAC with reverse march     SLR 1x10 B    Wand:  -flexion  -Abduction  -ER  -IR  -EXT   1x10  1x10 B  1x10 B  1x10 B  1x10 B   Yes    yes   shoulder IR with strap          Pendulums LUE only, x20 cw/ccw  Yes   Pulleys Bilateral, flexion x3 min    Wall slides Flexion 1x10 B    TB ER/IR 1x10 OTB ea     AROM shoulder flexion  ABD     sidelying shoulder ER     No monies  Horizontal ABD OTB 2x10  OTB 1x10   yes   Standing lumbar ext 1x10 yes   LTR 1x10 B yes   Hip 3 way     Side stepping     Prone hip ext     clamshells     S/l hip ABD     Pball rollout GPB 10x5\"    Bridge 2x10 with ball squeeze yes   Modified liliana stretch 30 sec x 4 B yes   Hip ER 30 sec x 4 B yes        TB rows/EXT OTB 1x10 ea    Step up     NEURO RE-ED  CPT 53964 TOTAL TIME FOR SESSION Not performed    COORDINATION     POSTURAL RE-ED        PRE-GAIT ACTIVITIES      BALANCE TRAINING                GAIT TRAINING  CPT 66862 TOTAL TIME FOR SESSION Not performed    Ambulation      Dynamic gait      MANUAL  CPT 34432 TOTAL TIME FOR SESSION 8-22 Minutes    Stretching Shoulder PROM, B Yes   Mobilization  Shoulder GH mobilizations inferior and posterior  STM, B Yes     Rhythmic stabilization At 90 deg shoulder " elevation N   Massage     Taping         ABDOMINAL BRACING - TA ACTIVATION    While lying on your back, press your low back into the floor as you tighten your stomach muscles moving your navel down towards the floor. Place your thumbs 2 inches inward from your pelvic bone so that you can feel the muscle tanner. Repeat 10 Times   Hold 10 Seconds   Complete 1 Set   Perform 1 Times a Day          SHOULDER FLEXION AAROM - SUPINE - CANE    Lying on your back and holding a wand or cane, slowly raise the wand towards overhead. Use your unaffected arm to assist with the movement. Repeat 20 Times   Hold 5 Seconds   Complete 1 Set   Perform 1 Times a Day            SHOULDER INTERNAL ROTATION STRETCH - BEHIND BACK - WAND / CANE    While holding a wand/cane behind your back as shown, slowly pull the wand up your back for a stretch to your shoulders. Lower back down and repeat.    Video # [id number] Repeat 10 Times   Hold 3 Seconds   Complete 1 Set   Perform 2 Times a Day          SHOULDER ABDUCTION - WAND / CANE    While holding a wand/cane palm face up on the injured side and palm face down on the uninjured side, slowly raise up your injured arm to the side. Lower back down and repeat.    Video # [id number] Repeat 10 Times   Hold 3 Seconds   Complete 1 Set   Perform 2 Times a Day

## 2024-05-21 ENCOUNTER — HOSPITAL ENCOUNTER (OUTPATIENT)
Dept: PHYSICAL THERAPY | Facility: REHABILITATION | Age: 74
Setting detail: THERAPIES SERIES
Discharge: HOME | End: 2024-05-21
Attending: NURSE PRACTITIONER
Payer: COMMERCIAL

## 2024-05-21 DIAGNOSIS — M25.60 DECREASED RANGE OF MOTION: ICD-10-CM

## 2024-05-21 DIAGNOSIS — M19.011 LOCALIZED OSTEOARTHRITIS OF BOTH SHOULDERS: ICD-10-CM

## 2024-05-21 DIAGNOSIS — R53.1 DECREASED STRENGTH: ICD-10-CM

## 2024-05-21 DIAGNOSIS — M19.012 LOCALIZED OSTEOARTHRITIS OF BOTH SHOULDERS: ICD-10-CM

## 2024-05-21 DIAGNOSIS — M54.51 VERTEBROGENIC LOW BACK PAIN: Primary | ICD-10-CM

## 2024-05-21 DIAGNOSIS — Z98.1 S/P LUMBAR SPINAL FUSION: ICD-10-CM

## 2024-05-21 PROCEDURE — 97113 AQUATIC THERAPY/EXERCISES: CPT | Mod: GP

## 2024-05-21 NOTE — OP PT TREATMENT LOG
"Today's Treatment:     Precautions:          Individual aquatics 18762   38-52 min Yes   Group aquatics CPT 18326     no   Aquatics exercises flow sheet     Completed today             Walking forwards 5 laps   yes   Side stepping 5 laps      kayak         Walking backwards  5 laps   yes   marching 5 laps            LE exercises               Hip abduction 2x10 with bar    Hip flexion 2x10 with bar    Hip abduction 2x10 with bar    Circles  cw/ccw       Single leg press with noodle        L hip flexion iso   5 sec x10  yes   UE  exercises               Shoulder flexion 2x10 with paddles yes   Shoulder abduction 2x10 with paddles yes   Biceps/triceps 2x10 with paddles yes   Horizontal abd/add 2x10 with paddles yes   Kickboard press  2x10             Step up forwards 1x10 B on 10\" step yes   Step ups side       W's with kick boards       squats 2x10 yes   Heel raises 2x10 B at wall yes   Knee curls 2x10 B at wall yes   Sitting on blue disc               Floating with noodles  3 min  Knee to chest 1x10  yes  yes   Scissors, bicycling, skiers               stretching       Calf stretch 30 sec x 4 at wall yes   Low back stretch  lumbar ext 1x10  yes                                                         "

## 2024-05-22 NOTE — PROGRESS NOTES
Physical Therapy Visit    PT DAILY NOTE FOR OUTPATIENT THERAPY    Patient: Reyna Castillo MRN: [id number]  : 1950 74 y.o.  Referring Physician: Анна Terrazas CRNP  Date of Visit: 2024    Certification Dates: 24 through 24    Diagnosis:   1. Vertebrogenic low back pain    2. Localized osteoarthritis of both shoulders    3. S/P lumbar spinal fusion    4. Decreased range of motion    5. Decreased strength        Chief Complaints:  s/p lumbar surgery, pain lumbar spine R side, weakness    Precautions:   Existing Precautions/Restrictions: fall      TODAY'S VISIT    Time In Session:  Start Time: 1402  Stop Time: 1448  Time Calculation (min): 46 min   History/Vitals/Pain/Encounter Info - 24 1411          Injury History/Precautions/Daily Required Info    Document Type daily treatment     Primary Therapist Evette Barkley, PT, DPT, OCS     Chief Complaint/Reason for Visit  s/p lumbar surgery, pain lumbar spine R side, weakness     Referring Physician Анна YBARRA     Existing Precautions/Restrictions fall     History of present illness/functional impairment Pt is a 74 y/o female who underwent 10/12/22 T10-S1 Posterior Thorocolumbosacral Decompression and Interbody Fusion, Instrumentation, Pelvic Fixation, Allograft, Autograft, BMP, Revision of Existing Instrumentation at [facility] by Dr Lee. She presents with chronic LBP with decreased mobility. She also was dx with bilateral shoulder OA/frozen shoulder with signficant limitations in shoulder mobility and function.     Patient/Family/Caregiver Comments/Observations Pt reports that she is scheduled for shoulder injections. She reports that her back pain is very bad today 7/10.     Patient reported fall since last visit No        Pain Assessment    Currently in pain Yes     Preferred Pain Scale number (Numeric Rating Pain Scale)     Pain: Body location Back     Pain Rating (0-10): Pre Activity 7                    Daily  "Treatment Assessment and Plan - 05/21/24 4179          Daily Treatment Assessment and Plan    Progress toward goals Progressing     Daily Outcome Summary Decreased intensity of exercises today to try to decrease symptom irritability. Pt noted with some back soreness with floating limiting tolerance to exercises today. No change in pain with seated hip flexion isometric, however pt did appear to have R sided QL spasms. No change in pain wiht exercises today.     Plan and Recommendations Core strengthening, gentle lumbar extensions, shoulder mobility. scapular strengthening. Assess pelvis next vist.                         OBJECTIVE DATA TAKEN TODAY:    None taken    Today's Treatment:    Today's Treatment:     Precautions:          Individual aquatics 99199   38-52 min Yes   Group aquatics CPT 08695     no   Aquatics exercises flow sheet     Completed today             Walking forwards 5 laps   yes   Side stepping 5 laps      kayak         Walking backwards  5 laps   yes   marching 5 laps            LE exercises               Hip abduction 2x10 with bar    Hip flexion 2x10 with bar    Hip abduction 2x10 with bar    Circles  cw/ccw       Single leg press with noodle        L hip flexion iso   5 sec x10  yes   UE  exercises               Shoulder flexion 2x10 with paddles yes   Shoulder abduction 2x10 with paddles yes   Biceps/triceps 2x10 with paddles yes   Horizontal abd/add 2x10 with paddles yes   Kickboard press  2x10             Step up forwards 1x10 B on 10\" step yes   Step ups side       W's with kick boards       squats 2x10 yes   Heel raises 2x10 B at wall yes   Knee curls 2x10 B at wall yes   Sitting on blue disc               Floating with noodles  3 min  Knee to chest 1x10  yes  yes   Scissors, bicycling, skiers               stretching       Calf stretch 30 sec x 4 at wall yes   Low back stretch  lumbar ext 1x10  yes                                                                                "

## 2024-06-04 ENCOUNTER — HOSPITAL ENCOUNTER (OUTPATIENT)
Dept: PHYSICAL THERAPY | Facility: REHABILITATION | Age: 74
Setting detail: THERAPIES SERIES
Discharge: HOME | End: 2024-06-04
Attending: NURSE PRACTITIONER
Payer: COMMERCIAL

## 2024-06-04 DIAGNOSIS — M19.011 LOCALIZED OSTEOARTHRITIS OF BOTH SHOULDERS: ICD-10-CM

## 2024-06-04 DIAGNOSIS — Z98.1 S/P LUMBAR SPINAL FUSION: ICD-10-CM

## 2024-06-04 DIAGNOSIS — M54.51 VERTEBROGENIC LOW BACK PAIN: Primary | ICD-10-CM

## 2024-06-04 DIAGNOSIS — R53.1 DECREASED STRENGTH: ICD-10-CM

## 2024-06-04 DIAGNOSIS — M25.60 DECREASED RANGE OF MOTION: ICD-10-CM

## 2024-06-04 DIAGNOSIS — M19.012 LOCALIZED OSTEOARTHRITIS OF BOTH SHOULDERS: ICD-10-CM

## 2024-06-04 PROCEDURE — 97113 AQUATIC THERAPY/EXERCISES: CPT | Mod: GP

## 2024-06-04 NOTE — OP PT TREATMENT LOG
"Today's Treatment:     Precautions:          Individual aquatics 21249   53-67 min Yes   Group aquatics CPT 91899     no   Aquatics exercises flow sheet     Completed today             Walking forwards 5 laps   yes   Side stepping 5 laps   yes   kayak         Walking backwards  5 laps   yes   marching 5 laps            LE exercises               Hip abduction 2x10 with bar yes   Hip flexion 2x10 with bar yes   Hip abduction 2x10 with bar yes   Circles  cw/ccw       Single leg press with noodle        L hip flexion iso   5 sec x10  nt   UE  exercises               Shoulder flexion 2x10 with paddles yes   Shoulder abduction 2x10 with paddles yes   Biceps/triceps 2x10 with paddles yes   Horizontal abd/add 2x10 with paddles yes   Kickboard press  2x10             Step up forwards 1x10 B on 10\" step yes   Step ups side 1x10 B on 10\" step yes   W's with kick boards       squats 2x10 yes   Heel raises 2x10 B at wall yes   Knee curls 2x10 B at wall yes   Sitting on blue disc               Floating with noodles  3 min  Knee to chest 1x10  yes  yes   Scissors, bicycling, skiers               stretching       Calf stretch 30 sec x 4 at wall Nt, res   Low back stretch  lumbar ext 1x10  nt, res                                                         "

## 2024-06-05 NOTE — PROGRESS NOTES
Physical Therapy Visit    PT DAILY NOTE FOR OUTPATIENT THERAPY    Patient: Reyna Castillo MRN: [id number]  : 1950 74 y.o.  Referring Physician: Анна Terrazas CRNP  Date of Visit: 2024    Certification Dates: 24 through 24    Diagnosis:   1. Vertebrogenic low back pain    2. Localized osteoarthritis of both shoulders    3. S/P lumbar spinal fusion    4. Decreased range of motion    5. Decreased strength        Chief Complaints:  s/p lumbar surgery, pain lumbar spine R side, weakness    Precautions:   Existing Precautions/Restrictions: fall      TODAY'S VISIT    Time In Session:  Start Time: 1402  Stop Time: 1455  Time Calculation (min): 53 min   History/Vitals/Pain/Encounter Info - 24 1538          Injury History/Precautions/Daily Required Info    Document Type daily treatment     Primary Therapist Evette Barkley, PT, DPT, OCS     Chief Complaint/Reason for Visit  s/p lumbar surgery, pain lumbar spine R side, weakness     Referring Physician Анна YBARRA     Existing Precautions/Restrictions fall     History of present illness/functional impairment Pt is a 72 y/o female who underwent 10/12/22 T10-S1 Posterior Thorocolumbosacral Decompression and Interbody Fusion, Instrumentation, Pelvic Fixation, Allograft, Autograft, BMP, Revision of Existing Instrumentation at [facility] by Dr Lee. She presents with chronic LBP with decreased mobility. She also was dx with bilateral shoulder OA/frozen shoulder with signficant limitations in shoulder mobility and function.     Patient/Family/Caregiver Comments/Observations Patient arrives reporting she got bilateral shoulder injections last week but feels like they only helped a very little bit.     Patient reported fall since last visit No        Pain Assessment    Currently in pain Yes     Preferred Pain Scale number (Numeric Rating Pain Scale)     Pain Side/Orientation bilateral     Pain: Body location Back     Pain Rating (0-10):  "Pre Activity 5     Pain Rating (0-10): Activity 4     Pain Rating (0-10): Post Activity 0        Pain Intervention    Intervention  aquatic TE     Post Intervention Comments no pain post session                    Daily Treatment Assessment and Plan - 06/05/24 1124          Daily Treatment Assessment and Plan    Progress toward goals Progressing     Daily Outcome Summary Patient arrives reporting she had injections in bilateral shoulders last week and had minimal improvement in pain of shoulders so far. Initiated lateral step ups this date which patient completes with no increase in pain. Patient reports positive response to aquatic TE this date, reporting 0/10 pain post session. Follow up NV on response to today's session and continue to progress as able.     Plan and Recommendations Progress note NV?? Core strengthening, gentle lumbar extensions, shoulder mobility. scapular strengthening. Assess pelvis next vist.                         OBJECTIVE DATA TAKEN TODAY:    None taken    Today's Treatment:    Today's Treatment:     Precautions:          Individual aquatics 91401   53-67 min Yes   Group aquatics CPT 55081     no   Aquatics exercises flow sheet     Completed today             Walking forwards 5 laps   yes   Side stepping 5 laps   yes   kayak         Walking backwards  5 laps   yes   marching 5 laps            LE exercises               Hip abduction 2x10 with bar yes   Hip flexion 2x10 with bar yes   Hip abduction 2x10 with bar yes   Circles  cw/ccw       Single leg press with noodle        L hip flexion iso   5 sec x10  nt   UE  exercises               Shoulder flexion 2x10 with paddles yes   Shoulder abduction 2x10 with paddles yes   Biceps/triceps 2x10 with paddles yes   Horizontal abd/add 2x10 with paddles yes   Kickboard press  2x10             Step up forwards 1x10 B on 10\" step yes   Step ups side 1x10 B on 10\" step yes   W's with kick boards       squats 2x10 yes   Heel raises 2x10 B at wall yes "   Knee curls 2x10 B at wall yes   Sitting on blue disc               Floating with noodles  3 min  Knee to chest 1x10  yes  yes   Scissors, bicycling, skiers               stretching       Calf stretch 30 sec x 4 at wall Nt, res   Low back stretch  lumbar ext 1x10  nt, res

## 2024-06-06 ENCOUNTER — HOSPITAL ENCOUNTER (OUTPATIENT)
Dept: PHYSICAL THERAPY | Facility: REHABILITATION | Age: 74
Setting detail: THERAPIES SERIES
Discharge: HOME | End: 2024-06-06
Attending: NURSE PRACTITIONER
Payer: COMMERCIAL

## 2024-06-06 DIAGNOSIS — M19.011 LOCALIZED OSTEOARTHRITIS OF BOTH SHOULDERS: ICD-10-CM

## 2024-06-06 DIAGNOSIS — M19.012 LOCALIZED OSTEOARTHRITIS OF BOTH SHOULDERS: ICD-10-CM

## 2024-06-06 DIAGNOSIS — M54.51 VERTEBROGENIC LOW BACK PAIN: Primary | ICD-10-CM

## 2024-06-06 DIAGNOSIS — R53.1 DECREASED STRENGTH: ICD-10-CM

## 2024-06-06 DIAGNOSIS — M25.60 DECREASED RANGE OF MOTION: ICD-10-CM

## 2024-06-06 DIAGNOSIS — Z98.1 S/P LUMBAR SPINAL FUSION: ICD-10-CM

## 2024-06-06 PROCEDURE — 97110 THERAPEUTIC EXERCISES: CPT | Mod: GP

## 2024-06-06 PROCEDURE — 97530 THERAPEUTIC ACTIVITIES: CPT | Mod: GP

## 2024-06-06 NOTE — OP PT TREATMENT LOG
"ORTHO PT FLOWSHEET    Exercises Current Session Time Completed today   MODALITIES- HEAT/ICE  CPT 68464 TOTAL TIME FOR SESSION Not performed    Heat L shoulder x8 min beginning of session yes   Ice/Vasocompression L shoulder x5 min end of session    MODALITIES - E-STIM  CPT 03365   TOTAL TIME FOR SESSION Not performed    E-stim/H-Wave     THER ACT  CPT 42726 TOTAL TIME FOR SESSION 38-52 Minutes    Re-eval 6/6/24 All subjective and objective measurements completed y   Assessment ROM, MMT, Fabian, LOLY, RMQ    Body Mechanics/Work Training Safe lifting education    THER EX  CPT 26694 TOTAL TIME FOR SESSION 8-22 Minutes    HEP 3/18: wand shoulder flexion, TAC with breathing  3/22: wand ABD, wand IR    CARDIOVASCULAR      TM     Nustep L1 x 10 min seat 7 UE/LE yes   UBE     TAC  10 sec x 10 with ball squeeze nt   TAC with march     TAC with reverse march     SLR 1x10 B    Wand:  -flexion  -Abduction  -ER  -IR  -EXT   1x10  1x10 B  1x10 B  1x10 B  1x10 B   nt    nt   shoulder IR with strap          Pendulums LUE only, x20 cw/ccw  nt   Pulleys Bilateral, flexion x3 min    Wall slides Flexion 1x10 B    TB ER/IR 1x10 OTB ea     AROM shoulder flexion  ABD     sidelying shoulder ER     No monies  Horizontal ABD OTB 2x10  OTB 1x10   nt   Standing lumbar ext 1x10 nt   LTR 1x10 B nt   Hip 3 way     Side stepping     Prone hip ext     clamshells     S/l hip ABD     Pball rollout GPB 10x5\"    Bridge 2x10 with ball squeeze nt   Modified liliana stretch 30 sec x 4 B nt   Hip ER 30 sec x 4 B nt        TB rows/EXT OTB 1x10 ea    Step up     NEURO RE-ED  CPT 89170 TOTAL TIME FOR SESSION Not performed    COORDINATION     POSTURAL RE-ED        PRE-GAIT ACTIVITIES      BALANCE TRAINING                GAIT TRAINING  CPT 09915 TOTAL TIME FOR SESSION Not performed    Ambulation      Dynamic gait      MANUAL  CPT 99241 TOTAL TIME FOR SESSION Not performed    Stretching Shoulder PROM, B Yes   Mobilization  Shoulder GH mobilizations inferior and " posterior  STM, B Yes     Rhythmic stabilization At 90 deg shoulder elevation N   Massage     Taping         ABDOMINAL BRACING - TA ACTIVATION    While lying on your back, press your low back into the floor as you tighten your stomach muscles moving your navel down towards the floor. Place your thumbs 2 inches inward from your pelvic bone so that you can feel the muscle tanner. Repeat 10 Times   Hold 10 Seconds   Complete 1 Set   Perform 1 Times a Day          SHOULDER FLEXION AAROM - SUPINE - CANE    Lying on your back and holding a wand or cane, slowly raise the wand towards overhead. Use your unaffected arm to assist with the movement. Repeat 20 Times   Hold 5 Seconds   Complete 1 Set   Perform 1 Times a Day            SHOULDER INTERNAL ROTATION STRETCH - BEHIND BACK - WAND / CANE    While holding a wand/cane behind your back as shown, slowly pull the wand up your back for a stretch to your shoulders. Lower back down and repeat.    Video # [id number] Repeat 10 Times   Hold 3 Seconds   Complete 1 Set   Perform 2 Times a Day          SHOULDER ABDUCTION - WAND / CANE    While holding a wand/cane palm face up on the injured side and palm face down on the uninjured side, slowly raise up your injured arm to the side. Lower back down and repeat.    Video # [id number] Repeat 10 Times   Hold 3 Seconds   Complete 1 Set   Perform 2 Times a Day

## 2024-06-06 NOTE — LETTER
Dear DR. Terrazas,    Thank you for this referral. Please review the attached notes and plan of care for your approval.  Please contact our department with any questions.     Sincerely,     Anni Honeycutt, PT  [address]  [place] [address]  Phone [phone]  Fax  [phone]    By co-signing this Plan of Care (POC) you agree to the following:  I have reviewed the the Plan of Care established by the therapist within this document and certify that the services are skilled and medically necessary. I have reviewed the plan and recommend that these services continue to meet the goals stated in this document.    PHYSICIAN SIGNATURE: __________________________________     DATE: ___________________  TIME: _____________           Physical Therapy Plan of Care 24   Effective from: 2024  Effective to: 2024    Plan ID: [id number]            Participants as of Finalize on 2024    Name Type Comments Contact Info    TATE Lopes Referring Provider  [phone]    Anni Honeycutt, PT Physical Therapist         Last Progress Notes Note     Author: Anni Honeycutt, PT Status: Signed Last edited: 2024  1:00 PM       Physical Therapy Progress Note    [facility] Fax: [phone]    PT RE-EVALUATION FOR OUTPATIENT THERAPY    Patient: Reyna Castillo     MRN: [id number]  : 1950 74 y.o.    Referring Physician: Анна Terrazas CRNP  Date of Visit: 2024    New Certification Dates: 24 through 24    Recommended Frequency & Duration:  3 times/week for up to 8 weeks   2x/week, progressing to 3x/week if pt able to tolerate    Diagnosis:   1. Vertebrogenic low back pain    2. S/P lumbar spinal fusion    3. Decreased range of motion    4. Localized osteoarthritis of both shoulders    5. Decreased strength        Chief Complaints:  Chief Complaint   Patient presents with   • Dec ROM   • Dec Strength   • Pain   • Decreased Endurance   • Decreased recreational/play activity   • Decreased  Mobility   •  Difficulty Performing Work/school Tasks       Precautions:   Existing Precautions/Restrictions: fall    TODAY'S VISIT:    Time In Session:  Start Time: 1302  Stop Time: 1358  Time Calculation (min): 56 min   General Information - 06/06/24 1250          Session Details    Document Type re-evaluation     Mode of Treatment individual therapy        General Information    Referring Physician Анна YBARRA     History of present illness/functional impairment Pt is a 74 y/o female who underwent 10/12/22 T10-S1 Posterior Thorocolumbosacral Decompression and Interbody Fusion, Instrumentation, Pelvic Fixation, Allograft, Autograft, BMP, Revision of Existing Instrumentation at [facility] by Dr Lee. She presents with chronic LBP with decreased mobility. She also was dx with bilateral shoulder OA/frozen shoulder with signficant limitations in shoulder mobility and function.     Patient/Family/Caregiver Comments/Observations Patient arrives reporting she feels like at this time she has made 40% progress with both her L shoulder and low back. She reports pain at worst in the L shoulder in the past week 5/10 and pain at worst in low back 7/10.     Existing Precautions/Restrictions fall                      Pain/Vitals - 06/06/24 1303          Pain Assessment    Currently in pain Yes     Preferred Pain Scale number (Numeric Rating Pain Scale)     Pain Side/Orientation bilateral;lower     Pain: Body location Back     Pain Rating (0-10): Pre Activity 5     Pain Rating (0-10): Activity 5     Pain Rating (0-10): Post Activity 5        Pain Intervention    Intervention  TE, MHP     Post Intervention Comments monitored                    PT - 06/06/24 1250          Physical Therapy    Physical Therapy [facility] PT        PT Plan    Frequency of treatment 3 times/week     PT Duration 8 weeks     PT Custom Frequency and Duration 2x/week, progressing to 3x/week if pt able to tolerate     PT Cert  From 06/06/24     PT Cert To 08/02/24     Date PT POC was sent to provider 06/06/24     Signed PT Plan of Care received?  No                    Assessment and Plan - 06/07/24 1349          Assessment    Plan of Care reviewed and patient/family in agreement Yes     System Pathology/Pathophysiology Noted musculoskeletal;cardiovascular     Functional Limitations in Following Categories (PT Eval) self-care;home management;community/leisure     Rehab Potential/Prognosis good, to achieve stated therapy goals     Problem List decreased flexibility;decreased endurance;pain;decreased strength;impaired postural control;decreased ROM;impaired balance     Clinical Assessment Patient is a 75 yo female who presents today for re-examination. Re-evaluation findings reveal improved BLE and BUE strength, improved lumbar and BUE range of motion, improved LOLY and [place] Shoulder outcome measure scoring, and progression toward all short and long term goals. Patient will continue to benefit from skilled PT in order to address remaining deficits, continue progression toward short/long term goals, and return to highest level of pain free function.     Plan and Recommendations Core strengthening, gentle lumbar extensions, shoulder mobility. scapular strengthening. Assess pelvis next vist.     Planned Services CPT 53226 Aquatic therapy/exercises;CPT 37504 Gait training;CPT 82641 Manual therapy;CPT 07135 Neuromuscular Reeducation;CPT 93715 Therapeutic activities;CPT 45075 Therapeutic exercises;CPT 33518 Electrical stimulation ATTENDED;CPT 04145 Electrical stimulation UNATTENDED;CPT 08620 Hot/Cold Packs therapy                     OBJECTIVE MEASUREMENTS/DATA:       ROM    Range of Motion - 06/06/24 1300          LEFT: Upper Extremity AROM Assessment    Shoulder Flexion   100 degrees   5/10    Shoulder Extension   53 degrees   4/10    Shoulder Abduction   80 degrees   6/10    Shoulder Internal Rotation   --   lateral sacrum    Shoulder External  Rotation   --   C7, 5/10       RIGHT: Upper Extremity AROM Assessment    Shoulder Flexion   110 degrees   5/10    Shoulder Extension   62 degrees   4/10    Shoulder Abduction   103 degrees     Shoulder Internal Rotation   --   lateral sacrum    Shoulder External Rotation   --   C7       Lumbar AROM    Lumbar Flexion 110   5/10    Lumbar Extension 20   5/10    Lumbar Left SB 12   6/10    Lumbar Right SB 18   6/10    Lumbar Left Rotation 50   6/10    Lumbar Right Rotation 55   6/10                  MMT    Manual Muscle Tests - 06/06/24 1303          RIGHT: Upper Extremity Manual Muscle Test Assessment    Shoulder Flexion gross movement (4+/5) good plus     Shoulder Extension gross movement (4+/5) good plus     Shoulder Adduction gross movement (4+/5) good plus     Shoulder Abduction gross movement (4+/5) good plus     Shoulder Internal Rotation gross movement (4+/5) good plus     Shoulder External Rotation gross movement (4+/5) good plus     Elbow Flex gross movement (5/5) normal     Elbow Extension gross movement (5/5) normal        LEFT: Upper Extremity Manual Muscle Test Assessment    Shoulder Flexion gross movement (4+/5) good plus     Shoulder Extension gross movement (4+/5) good plus     Shoulder Adduction gross movement (4+/5) good plus     Shoulder Abduction gross movement (4/5) good     Shoulder Internal Rotation gross movement (4+/5) good plus     Shoulder External Rotation gross movement (4+/5) good plus     Elbow Flexion gross movement (5/5) normal     Elbow Extension gross movement (5/5) normal        RIGHT: Lower Extremity Manual Muscle Test Assessment    Hip Flexion gross movement (4/5) good     Hip Extension gross movement (4/5) good     Hip Abduction gross movement (4+/5) good plus     Hip Adduction gross movement (4+/5) good plus     Hip Internal Rotation gross movement (4/5) good     Hip External Rotation gross movement (4/5) good     Knee Flexion strength (5/5) normal     Knee Extension strength  (5/5) normal     Ankle Dorsiflexion gross movement (5/5) normal        LEFT: Lower Extremity Manual Muscle Test Assessment    Hip Flexion gross movement (4/5) good     Hip Extension gross movement (4/5) good     Hip Abduction gross movement (4+/5) good plus     Hip Adduction gross movement (4+/5) good plus     Hip Internal Rotation gross movement (4/5) good     Hip External Rotation gross movement (4/5) good     Knee Flexion strength (5/5) normal     Knee Extension strength (5/5) normal     Ankle Dorsiflexion gross movement (5/5) normal                     Outcome Measures    PT Outcome Measures - 06/06/24 1303          Subjective Outcome Measures    Oswestry (LOLY) 22/50 (previously 25/50)     [place]resa Shoulder Score 38                     ROM and MMT          3/18/2024 5/2/2024    13:00 6/6/2024   PT UE ROM Measurements   PROM: Right Shoulder Flexion  138 degrees       8/10 148 degrees    AROM: Right Shoulder Flexion 106 degrees       4/10 110 degrees 110 degrees       5/10   PROM: Left Shoulder Flexion 122 degrees       5/10 118 degrees    AROM: Left Shoulder Flexion 108 degrees       4/10 100 degrees       3/10 100 degrees       5/10   AROM: Right Shoulder Extension 58 degrees 60 degrees 62 degrees       4/10   AROM: Left Shoulder Extension 52 degrees 40 degrees       3/10 53 degrees       4/10   PROM: Right Shoulder  degrees       6/10 110 degrees    AROM: Right Shoulder ABD 72 degrees       5/10 102 degrees       3/10 103 degrees   PROM: Left Shoulder ABD 93 degrees       8/10 100 degrees    AROM: Left Shoulder ABD 62 degrees       4/10 70 degrees       4/10 80 degrees       6/10   PROM: Right Shoulder IR 36 degrees       @90 ABD 38 degrees    AROM: Right Shoulder IR --        Lateral sacrum --        lat sacrum --        lateral sacrum   PROM: Left Shoulder IR 50 degrees       @45 ABD 58 degrees    AROM: Left Shoulder IR --        lateral sacrum 4/10 --        lat sacrum --        lateral sacrum   PROM:  Right Shoulder ER 50 degrees       @90 ABD 58 degrees    AROM: Right Shoulder ER 32 degrees       T1 40 degrees       C7 --        C7   PROM: Left Shoulder ER 48 degrees       @45 ABD 30 degrees    AROM: Left Shoulder ER 22 degrees       4/10, C7 26 degrees       C7 --        C7, 5/10   PT Cervical/Lumbar/Other ROM Measurements   AROM: Cervical Flexion 42     AROM: Cervical Extension 42     AROM: Left Cervical SB 16     AROM: Right Cervical SB 35     AROM: Left Cervical Rotation 52     AROM: Right Cervical Rotation 62     AROM: Lumbar Flexion 100       5/10 110       7/10 110       5/10   AROM: [place]umar Extension 23       2-3/10 16       5/10 20       5/10   AROM: Left Lumbar SB 8       4/10 8       8/10 12       6/10   AROM: Right Lumbar SB 22       3/10 15       6/10 18       6/10   AROM: Left Lumbar Rotation 38       2/10 50       8/10 50       6/10   AROM: Right Lumbar Rotation 52 38       4/10 55       6/10   PT UE MMT Measurements   Right Shoulder Flexion (4/5) good  (4+/5) good plus   Left Shoulder Flexion (4/5) good  (4+/5) good plus   Right Shoulder Extension (4+/5) good plus  (4+/5) good plus   Left Shoulder Extension (4+/5) good plus  (4+/5) good plus   Right Shoulder ADD   (4+/5) good plus   Left Shoulder ADD   (4+/5) good plus   Right Shoulder ABD (4/5) good  (4+/5) good plus   Left Shoulder ABD (4/5) good  (4/5) good   Right Shoulder IR (4+/5) good plus  (4+/5) good plus   Left Shoulder IR (4+/5) good plus  (4+/5) good plus   Right Shoulder ER (4/5) good  (4+/5) good plus   Left Shoulder ER (4+/5) good plus  (4+/5) good plus   Right Elbow Flexion (5/5) normal  (5/5) normal   Left Elbow Flexion (5/5) normal  (5/5) normal   Right Elbow Extension (5/5) normal  (5/5) normal   Left Elbow Extension (5/5) normal  (5/5) normal   PT LE MMT   Right Hip Flexion (4-/5) good minus  (4/5) good   Left Hip Flexion (4-/5) good minus  (4/5) good   Right Hip Extension (4-/5) good minus  (4/5) good   Left Hip Extension  (4-/5) good minus  (4/5) good   Right Hip ABD   (4+/5) good plus   Left Hip ABD   (4+/5) good plus   Right Hip ADD   (4+/5) good plus   Left Hip ADD   (4+/5) good plus   Right Hip IR   (4/5) good   Left Hip IR   (4/5) good   Right Hip ER   (4/5) good   Left Hip ER   (4/5) good   Right Knee Flexion (5/5) normal  (5/5) normal   Left Knee Flexion (5/5) normal  (5/5) normal   Right Knee Extension (5/5) normal  (5/5) normal   Left Knee Extension (5/5) normal  (5/5) normal   Right Ankle DF (5/5) normal  (5/5) normal   Left Ankle DF (5/5) normal  (5/5) normal     Outcome Measures          3/18/2024    14:35 5/2/2024    12:55 6/6/2024    13:03   PT SUBJECTIVE Outcome Measures   Oswestry 24/50=48% impaired 25/50=50% impaired 22/50 (previously 25/50)   [place] Shoulder Score (PSS) 40 36 38       Today's Treatment::    ORTHO PT FLOWSHEET    Exercises Current Session Time Completed today   MODALITIES- HEAT/ICE  CPT 96233 TOTAL TIME FOR SESSION Not performed    Heat L shoulder x8 min beginning of session yes   Ice/Vasocompression L shoulder x5 min end of session    MODALITIES - E-STIM  CPT 13552   TOTAL TIME FOR SESSION Not performed    E-stim/H-Wave     THER ACT  CPT 98751 TOTAL TIME FOR SESSION 38-52 Minutes    Re-eval 6/6/24 All subjective and objective measurements completed y   Assessment ROM, MMT, [place], LOLY, RMQ    Body Mechanics/Work Training Safe lifting education    THER EX  CPT 16080 TOTAL TIME FOR SESSION 8-22 Minutes    HEP 3/18: wand shoulder flexion, TAC with breathing  3/22: wand ABD, wand IR    CARDIOVASCULAR      TM     Nustep L1 x 10 min seat 7 UE/LE yes   UBE     TAC  10 sec x 10 with ball squeeze nt   TAC with march     TAC with reverse march     SLR 1x10 B    Wand:  -flexion  -Abduction  -ER  -IR  -EXT   1x10  1x10 B  1x10 B  1x10 B  1x10 B   nt    nt   shoulder IR with strap          Pendulums LUE only, x20 cw/ccw  nt   Pulleys Bilateral, flexion x3 min    Wall slides Flexion 1x10 B    TB ER/IR 1x10 OTB ea    "  AROM shoulder flexion  ABD     sidelying shoulder ER     No monies  Horizontal ABD OTB 2x10  OTB 1x10   nt   Standing lumbar ext 1x10 nt   LTR 1x10 B nt   Hip 3 way     Side stepping     Prone hip ext     clamshells     S/l hip ABD     Pball rollout GPB 10x5\"    Bridge 2x10 with ball squeeze nt   Modified liliana stretch 30 sec x 4 B nt   Hip ER 30 sec x 4 B nt        TB rows/EXT OTB 1x10 ea    Step up     NEURO RE-ED  CPT 39704 TOTAL TIME FOR SESSION Not performed    COORDINATION     POSTURAL RE-ED        PRE-GAIT ACTIVITIES      BALANCE TRAINING                GAIT TRAINING  CPT 54706 TOTAL TIME FOR SESSION Not performed    Ambulation      Dynamic gait      MANUAL  CPT 44871 TOTAL TIME FOR SESSION Not performed    Stretching Shoulder PROM, B Yes   Mobilization  Shoulder GH mobilizations inferior and posterior  STM, B Yes     Rhythmic stabilization At 90 deg shoulder elevation N   Massage     Taping         ABDOMINAL BRACING - TA ACTIVATION    While lying on your back, press your low back into the floor as you tighten your stomach muscles moving your navel down towards the floor. Place your thumbs 2 inches inward from your pelvic bone so that you can feel the muscle tanner. Repeat 10 Times   Hold 10 Seconds   Complete 1 Set   Perform 1 Times a Day          SHOULDER FLEXION AAROM - SUPINE - CANE    Lying on your back and holding a wand or cane, slowly raise the wand towards overhead. Use your unaffected arm to assist with the movement. Repeat 20 Times   Hold 5 Seconds   Complete 1 Set   Perform 1 Times a Day            SHOULDER INTERNAL ROTATION STRETCH - BEHIND BACK - WAND / CANE    While holding a wand/cane behind your back as shown, slowly pull the wand up your back for a stretch to your shoulders. Lower back down and repeat.    Video # [id number] Repeat 10 Times   Hold 3 Seconds   Complete 1 Set   Perform 2 Times a Day          SHOULDER ABDUCTION - WAND / CANE    While holding a wand/cane palm face up on " the injured side and palm face down on the uninjured side, slowly raise up your injured arm to the side. Lower back down and repeat.    Video # [id number] Repeat 10 Times   Hold 3 Seconds   Complete 1 Set   Perform 2 Times a Day               Goals:   Goals       •  <enter goal here> (pt-stated)     •  [facility] PT Lumbar Goals       Short/Long Term Goals Time Frame Result Comment/Progress   Pt will increase core, LE, UE and Hip MMT >/= full grade 8 weeks weeks On going    Pt will increase lumbar ROM >/= 10 degrees into flexion, extension, lateral flexion and rotation 4 weeks Improved but not fully met    Pt will increase Shoulder ROM >/= 10 degrees into flexion, extension, lateral flexion and rotation 4 weeks Improved but not fully met    Pt will improve RMQ >/= 10% functional improvement 4 weeks Not assessed IE 13/24=54% impaired  5/2: 21/24   Pt will demonstrate improvements in Oswestry >/=  10 % functional improvement 4 weeks Improved IE: 24/50=48% impaired  5/2: 25/50 6/6: 22/50   Pt will demonstrate improvements in Oswestry >/=  20 % functional improvement 8 weeks Ongoing    Improve [place] shoulder scale >/= 12 points 4 weeks Improved IE 40/100  5/2: 36/100  6/6: 38/100   Improve [place] shoulder scale >/= 24 points 8 weeks Ongoing    Pt will be able to lift 10 lbs without back or shoulder pain 12 weeks Ongoing    Increase AROM shoulder flexion, ABD >/= 120 deg 12 weeks Ongoing    Pt will be independent in HEP for self management of symptoms 8 weeks Ongoing    Pt will be able to don socks without back pain 12 weeks Ongoing              •  Mutually agreed upon pain goal       Mutually agreed upon pain goal: - lower pain to 2/10 or lower with ADLs, walking, house work- met on dc.    6/6/24 PN: pt reports progressing toward but not met                This 74 y.o. year old female presents to PT with above stated diagnosis. Physical Therapy evaluation reveals decreased flexibility, decreased endurance, pain, decreased  strength, impaired postural control, decreased ROM, impaired balance resulting in self-care, home management, community/leisure limitations. Reyna Castillo will benefit from skilled PT services to address limitation, work towards rehab and patient goals and maximize PLOF of chosen ADLs.     Planned Services: The patient's treatment will include CPT 15960 Aquatic therapy/exercises, CPT 60742 Gait training, CPT 46339 Manual therapy, CPT 08606 Neuromuscular Reeducation, CPT 77010 Therapeutic activities, CPT 23593 Therapeutic exercises, CPT 71821 Electrical stimulation ATTENDED, CPT 38638 Electrical stimulation UNATTENDED, CPT 99712 Hot/Cold Packs therapy, .     Anni Honeycutt, PT                         Current Participants as of 6/7/2024    Name Type Comments Contact Info    TATE Lopes Referring Provider  [phone]    Signature pending    Anni Honeycutt, PT Physical Therapist      Signature pending

## 2024-06-07 NOTE — PROGRESS NOTES
Physical Therapy Progress Note    [facility] Fax: [phone]    PT RE-EVALUATION FOR OUTPATIENT THERAPY    Patient: Reyna Castillo     MRN: [id number]  : 1950 74 y.o.    Referring Physician: Анна Terrazas CRNP  Date of Visit: 2024    New Certification Dates: 24 through 24    Recommended Frequency & Duration:  3 times/week for up to 8 weeks   2x/week, progressing to 3x/week if pt able to tolerate    Diagnosis:   1. Vertebrogenic low back pain    2. S/P lumbar spinal fusion    3. Decreased range of motion    4. Localized osteoarthritis of both shoulders    5. Decreased strength        Chief Complaints:  Chief Complaint   Patient presents with    Dec ROM    Dec Strength    Pain    Decreased Endurance    Decreased recreational/play activity    Decreased Mobility     Difficulty Performing Work/school Tasks       Precautions:   Existing Precautions/Restrictions: fall    TODAY'S VISIT:    Time In Session:  Start Time: 1302  Stop Time: 1358  Time Calculation (min): 56 min   General Information - 24 1250          Session Details    Document Type re-evaluation     Mode of Treatment individual therapy        General Information    Referring Physician Анна YBARRA     History of present illness/functional impairment Pt is a 72 y/o female who underwent 10/12/22 T10-S1 Posterior Thorocolumbosacral Decompression and Interbody Fusion, Instrumentation, Pelvic Fixation, Allograft, Autograft, BMP, Revision of Existing Instrumentation at [facility] by Dr Lee. She presents with chronic LBP with decreased mobility. She also was dx with bilateral shoulder OA/frozen shoulder with signficant limitations in shoulder mobility and function.     Patient/Family/Caregiver Comments/Observations Patient arrives reporting she feels like at this time she has made 40% progress with both her L shoulder and low back. She reports pain at worst in the L shoulder in the past week 5/10 and pain at  worst in low back 7/10.     Existing Precautions/Restrictions fall                      Pain/Vitals - 06/06/24 1303          Pain Assessment    Currently in pain Yes     Preferred Pain Scale number (Numeric Rating Pain Scale)     Pain Side/Orientation bilateral;lower     Pain: Body location Back     Pain Rating (0-10): Pre Activity 5     Pain Rating (0-10): Activity 5     Pain Rating (0-10): Post Activity 5        Pain Intervention    Intervention  TE, MHP     Post Intervention Comments monitored                    PT - 06/06/24 1250          Physical Therapy    Physical Therapy Specialty Ortho and Sports PT        PT Plan    Frequency of treatment 3 times/week     PT Duration 8 weeks     PT Custom Frequency and Duration 2x/week, progressing to 3x/week if pt able to tolerate     PT Cert From 06/06/24     PT Cert To 08/02/24     Date PT POC was sent to provider 06/06/24     Signed PT Plan of Care received?  No                    Assessment and Plan - 06/07/24 1349          Assessment    Plan of Care reviewed and patient/family in agreement Yes     System Pathology/Pathophysiology Noted musculoskeletal;cardiovascular     Functional Limitations in Following Categories (PT Eval) self-care;home management;community/leisure     Rehab Potential/Prognosis good, to achieve stated therapy goals     Problem List decreased flexibility;decreased endurance;pain;decreased strength;impaired postural control;decreased ROM;impaired balance     Clinical Assessment Patient is a 75 yo female who presents today for re-examination. Re-evaluation findings reveal improved BLE and BUE strength, improved lumbar and BUE range of motion, improved LOLY and Fabian Shoulder outcome measure scoring, and progression toward all short and long term goals. Patient will continue to benefit from skilled PT in order to address remaining deficits, continue progression toward short/long term goals, and return to highest level of pain free function.     Plan  and Recommendations Core strengthening, gentle lumbar extensions, shoulder mobility. scapular strengthening. Assess pelvis next vist.     Planned Services CPT 80749 Aquatic therapy/exercises;CPT 85567 Gait training;CPT 77193 Manual therapy;CPT 12180 Neuromuscular Reeducation;CPT 62665 Therapeutic activities;CPT 28811 Therapeutic exercises;CPT 92885 Electrical stimulation ATTENDED;CPT 14976 Electrical stimulation UNATTENDED;CPT 87677 Hot/Cold Packs therapy                     OBJECTIVE MEASUREMENTS/DATA:       ROM    Range of Motion - 06/06/24 1300          LEFT: Upper Extremity AROM Assessment    Shoulder Flexion   100 degrees   5/10    Shoulder Extension   53 degrees   4/10    Shoulder Abduction   80 degrees   6/10    Shoulder Internal Rotation   --   lateral sacrum    Shoulder External Rotation   --   C7, 5/10       RIGHT: Upper Extremity AROM Assessment    Shoulder Flexion   110 degrees   5/10    Shoulder Extension   62 degrees   4/10    Shoulder Abduction   103 degrees     Shoulder Internal Rotation   --   lateral sacrum    Shoulder External Rotation   --   C7       Lumbar AROM    Lumbar Flexion 110   5/10    Lumbar Extension 20   5/10    Lumbar Left SB 12   6/10    Lumbar Right SB 18   6/10    Lumbar Left Rotation 50   6/10    Lumbar Right Rotation 55   6/10                  MMT    Manual Muscle Tests - 06/06/24 1303          RIGHT: Upper Extremity Manual Muscle Test Assessment    Shoulder Flexion gross movement (4+/5) good plus     Shoulder Extension gross movement (4+/5) good plus     Shoulder Adduction gross movement (4+/5) good plus     Shoulder Abduction gross movement (4+/5) good plus     Shoulder Internal Rotation gross movement (4+/5) good plus     Shoulder External Rotation gross movement (4+/5) good plus     Elbow Flex gross movement (5/5) normal     Elbow Extension gross movement (5/5) normal        LEFT: Upper Extremity Manual Muscle Test Assessment    Shoulder Flexion gross movement (4+/5) good  plus     Shoulder Extension gross movement (4+/5) good plus     Shoulder Adduction gross movement (4+/5) good plus     Shoulder Abduction gross movement (4/5) good     Shoulder Internal Rotation gross movement (4+/5) good plus     Shoulder External Rotation gross movement (4+/5) good plus     Elbow Flexion gross movement (5/5) normal     Elbow Extension gross movement (5/5) normal        RIGHT: Lower Extremity Manual Muscle Test Assessment    Hip Flexion gross movement (4/5) good     Hip Extension gross movement (4/5) good     Hip Abduction gross movement (4+/5) good plus     Hip Adduction gross movement (4+/5) good plus     Hip Internal Rotation gross movement (4/5) good     Hip External Rotation gross movement (4/5) good     Knee Flexion strength (5/5) normal     Knee Extension strength (5/5) normal     Ankle Dorsiflexion gross movement (5/5) normal        LEFT: Lower Extremity Manual Muscle Test Assessment    Hip Flexion gross movement (4/5) good     Hip Extension gross movement (4/5) good     Hip Abduction gross movement (4+/5) good plus     Hip Adduction gross movement (4+/5) good plus     Hip Internal Rotation gross movement (4/5) good     Hip External Rotation gross movement (4/5) good     Knee Flexion strength (5/5) normal     Knee Extension strength (5/5) normal     Ankle Dorsiflexion gross movement (5/5) normal                     Outcome Measures    PT Outcome Measures - 06/06/24 1303          Subjective Outcome Measures    Oswestry (LOLY) 22/50 (previously 25/50)     Fabian Shoulder Score 38                     ROM and MMT          3/18/2024 5/2/2024    13:00 6/6/2024   PT UE ROM Measurements   PROM: Right Shoulder Flexion  138 degrees       8/10 148 degrees    AROM: Right Shoulder Flexion 106 degrees       4/10 110 degrees 110 degrees       5/10   PROM: Left Shoulder Flexion 122 degrees       5/10 118 degrees    AROM: Left Shoulder Flexion 108 degrees       4/10 100 degrees       3/10 100 degrees        5/10   AROM: Right Shoulder Extension 58 degrees 60 degrees 62 degrees       4/10   AROM: Left Shoulder Extension 52 degrees 40 degrees       3/10 53 degrees       4/10   PROM: Right Shoulder  degrees       6/10 110 degrees    AROM: Right Shoulder ABD 72 degrees       5/10 102 degrees       3/10 103 degrees   PROM: Left Shoulder ABD 93 degrees       8/10 100 degrees    AROM: Left Shoulder ABD 62 degrees       4/10 70 degrees       4/10 80 degrees       6/10   PROM: Right Shoulder IR 36 degrees       @90 ABD 38 degrees    AROM: Right Shoulder IR --        Lateral sacrum --        lat sacrum --        lateral sacrum   PROM: Left Shoulder IR 50 degrees       @45 ABD 58 degrees    AROM: Left Shoulder IR --        lateral sacrum 4/10 --        lat sacrum --        lateral sacrum   PROM: Right Shoulder ER 50 degrees       @90 ABD 58 degrees    AROM: Right Shoulder ER 32 degrees       T1 40 degrees       C7 --        C7   PROM: Left Shoulder ER 48 degrees       @45 ABD 30 degrees    AROM: Left Shoulder ER 22 degrees       4/10, C7 26 degrees       C7 --        C7, 5/10   PT Cervical/Lumbar/Other ROM Measurements   AROM: Cervical Flexion 42     AROM: Cervical Extension 42     AROM: Left Cervical SB 16     AROM: Right Cervical SB 35     AROM: Left Cervical Rotation 52     AROM: Right Cervical Rotation 62     AROM: Lumbar Flexion 100       5/10 110       7/10 110       5/10   AROM: Lumar Extension 23       2-3/10 16       5/10 20       5/10   AROM: Left Lumbar SB 8       4/10 8       8/10 12       6/10   AROM: Right Lumbar SB 22       3/10 15       6/10 18       6/10   AROM: Left Lumbar Rotation 38       2/10 50       8/10 50       6/10   AROM: Right Lumbar Rotation 52 38       4/10 55       6/10   PT UE MMT Measurements   Right Shoulder Flexion (4/5) good  (4+/5) good plus   Left Shoulder Flexion (4/5) good  (4+/5) good plus   Right Shoulder Extension (4+/5) good plus  (4+/5) good plus   Left Shoulder Extension (4+/5)  good plus  (4+/5) good plus   Right Shoulder ADD   (4+/5) good plus   Left Shoulder ADD   (4+/5) good plus   Right Shoulder ABD (4/5) good  (4+/5) good plus   Left Shoulder ABD (4/5) good  (4/5) good   Right Shoulder IR (4+/5) good plus  (4+/5) good plus   Left Shoulder IR (4+/5) good plus  (4+/5) good plus   Right Shoulder ER (4/5) good  (4+/5) good plus   Left Shoulder ER (4+/5) good plus  (4+/5) good plus   Right Elbow Flexion (5/5) normal  (5/5) normal   Left Elbow Flexion (5/5) normal  (5/5) normal   Right Elbow Extension (5/5) normal  (5/5) normal   Left Elbow Extension (5/5) normal  (5/5) normal   PT LE MMT   Right Hip Flexion (4-/5) good minus  (4/5) good   Left Hip Flexion (4-/5) good minus  (4/5) good   Right Hip Extension (4-/5) good minus  (4/5) good   Left Hip Extension (4-/5) good minus  (4/5) good   Right Hip ABD   (4+/5) good plus   Left Hip ABD   (4+/5) good plus   Right Hip ADD   (4+/5) good plus   Left Hip ADD   (4+/5) good plus   Right Hip IR   (4/5) good   Left Hip IR   (4/5) good   Right Hip ER   (4/5) good   Left Hip ER   (4/5) good   Right Knee Flexion (5/5) normal  (5/5) normal   Left Knee Flexion (5/5) normal  (5/5) normal   Right Knee Extension (5/5) normal  (5/5) normal   Left Knee Extension (5/5) normal  (5/5) normal   Right Ankle DF (5/5) normal  (5/5) normal   Left Ankle DF (5/5) normal  (5/5) normal     Outcome Measures          3/18/2024    14:35 5/2/2024    12:55 6/6/2024    13:03   PT SUBJECTIVE Outcome Measures   Oswestry 24/50=48% impaired 25/50=50% impaired 22/50 (previously 25/50)   Fabian Shoulder Score (PSS) 40 36 38       Today's Treatment::    ORTHO PT FLOWSHEET    Exercises Current Session Time Completed today   MODALITIES- HEAT/ICE  CPT 81800 TOTAL TIME FOR SESSION Not performed    Heat L shoulder x8 min beginning of session yes   Ice/Vasocompression L shoulder x5 min end of session    MODALITIES - E-STIM  CPT 28525   TOTAL TIME FOR SESSION Not performed    E-stim/H-Wave    "  THER ACT  CPT 86054 TOTAL TIME FOR SESSION 38-52 Minutes    Re-eval 6/6/24 All subjective and objective measurements completed y   Assessment ROM, MMT, Fabina, LOLY, RMQ    Body Mechanics/Work Training Safe lifting education    THER EX  CPT 37676 TOTAL TIME FOR SESSION 8-22 Minutes    HEP 3/18: wand shoulder flexion, TAC with breathing  3/22: wand ABD, wand IR    CARDIOVASCULAR      TM     Nustep L1 x 10 min seat 7 UE/LE yes   UBE     TAC  10 sec x 10 with ball squeeze nt   TAC with march     TAC with reverse march     SLR 1x10 B    Wand:  -flexion  -Abduction  -ER  -IR  -EXT   1x10  1x10 B  1x10 B  1x10 B  1x10 B   nt    nt   shoulder IR with strap          Pendulums LUE only, x20 cw/ccw  nt   Pulleys Bilateral, flexion x3 min    Wall slides Flexion 1x10 B    TB ER/IR 1x10 OTB ea     AROM shoulder flexion  ABD     sidelying shoulder ER     No monies  Horizontal ABD OTB 2x10  OTB 1x10   nt   Standing lumbar ext 1x10 nt   LTR 1x10 B nt   Hip 3 way     Side stepping     Prone hip ext     clamshells     S/l hip ABD     Pball rollout GPB 10x5\"    Bridge 2x10 with ball squeeze nt   Modified liliana stretch 30 sec x 4 B nt   Hip ER 30 sec x 4 B nt        TB rows/EXT OTB 1x10 ea    Step up     NEURO RE-ED  CPT 31568 TOTAL TIME FOR SESSION Not performed    COORDINATION     POSTURAL RE-ED        PRE-GAIT ACTIVITIES      BALANCE TRAINING                GAIT TRAINING  CPT 99616 TOTAL TIME FOR SESSION Not performed    Ambulation      Dynamic gait      MANUAL  CPT 42459 TOTAL TIME FOR SESSION Not performed    Stretching Shoulder PROM, B Yes   Mobilization  Shoulder GH mobilizations inferior and posterior  STM, B Yes     Rhythmic stabilization At 90 deg shoulder elevation N   Massage     Taping         ABDOMINAL BRACING - TA ACTIVATION    While lying on your back, press your low back into the floor as you tighten your stomach muscles moving your navel down towards the floor. Place your thumbs 2 inches inward from your pelvic bone " so that you can feel the muscle tanner. Repeat 10 Times   Hold 10 Seconds   Complete 1 Set   Perform 1 Times a Day          SHOULDER FLEXION AAROM - SUPINE - CANE    Lying on your back and holding a wand or cane, slowly raise the wand towards overhead. Use your unaffected arm to assist with the movement. Repeat 20 Times   Hold 5 Seconds   Complete 1 Set   Perform 1 Times a Day            SHOULDER INTERNAL ROTATION STRETCH - BEHIND BACK - WAND / CANE    While holding a wand/cane behind your back as shown, slowly pull the wand up your back for a stretch to your shoulders. Lower back down and repeat.    Video # [id number] Repeat 10 Times   Hold 3 Seconds   Complete 1 Set   Perform 2 Times a Day          SHOULDER ABDUCTION - WAND / CANE    While holding a wand/cane palm face up on the injured side and palm face down on the uninjured side, slowly raise up your injured arm to the side. Lower back down and repeat.    Video # [id number] Repeat 10 Times   Hold 3 Seconds   Complete 1 Set   Perform 2 Times a Day               Goals:   Goals         <enter goal here> (pt-stated)       [facility] PT Lumbar Goals       Short/Long Term Goals Time Frame Result Comment/Progress   Pt will increase core, LE, UE and Hip MMT >/= full grade 8 weeks weeks On going    Pt will increase lumbar ROM >/= 10 degrees into flexion, extension, lateral flexion and rotation 4 weeks Improved but not fully met    Pt will increase Shoulder ROM >/= 10 degrees into flexion, extension, lateral flexion and rotation 4 weeks Improved but not fully met    Pt will improve RMQ >/= 10% functional improvement 4 weeks Not assessed IE 13/24=54% impaired  5/2: 21/24   Pt will demonstrate improvements in Oswestry >/=  10 % functional improvement 4 weeks Improved IE: 24/50=48% impaired  5/2: 25/50 6/6: 22/50   Pt will demonstrate improvements in Oswestry >/=  20 % functional improvement 8 weeks Ongoing    Improve Fabian shoulder scale >/= 12 points 4 weeks Improved IE  40/100  5/2: 36/100  6/6: 38/100   Improve Fabian shoulder scale >/= 24 points 8 weeks Ongoing    Pt will be able to lift 10 lbs without back or shoulder pain 12 weeks Ongoing    Increase AROM shoulder flexion, ABD >/= 120 deg 12 weeks Ongoing    Pt will be independent in HEP for self management of symptoms 8 weeks Ongoing    Pt will be able to don socks without back pain 12 weeks Ongoing                Mutually agreed upon pain goal       Mutually agreed upon pain goal: - lower pain to 2/10 or lower with ADLs, walking, house work- met on dc.    6/6/24 PN: pt reports progressing toward but not met                This 74 y.o. year old female presents to PT with above stated diagnosis. Physical Therapy evaluation reveals decreased flexibility, decreased endurance, pain, decreased strength, impaired postural control, decreased ROM, impaired balance resulting in self-care, home management, community/leisure limitations. Reyna Castillo will benefit from skilled PT services to address limitation, work towards rehab and patient goals and maximize PLOF of chosen ADLs.     Planned Services: The patient's treatment will include CPT 04488 Aquatic therapy/exercises, CPT 12801 Gait training, CPT 91102 Manual therapy, CPT 92672 Neuromuscular Reeducation, CPT 90101 Therapeutic activities, CPT 92509 Therapeutic exercises, CPT 51728 Electrical stimulation ATTENDED, CPT 87953 Electrical stimulation UNATTENDED, CPT 36325 Hot/Cold Packs therapy, .     Anni Honeycutt, PT

## 2024-06-11 ENCOUNTER — HOSPITAL ENCOUNTER (OUTPATIENT)
Dept: PHYSICAL THERAPY | Facility: REHABILITATION | Age: 74
Setting detail: THERAPIES SERIES
Discharge: HOME | End: 2024-06-11
Attending: NURSE PRACTITIONER
Payer: COMMERCIAL

## 2024-06-11 DIAGNOSIS — M54.51 VERTEBROGENIC LOW BACK PAIN: Primary | ICD-10-CM

## 2024-06-11 DIAGNOSIS — M25.60 DECREASED RANGE OF MOTION: ICD-10-CM

## 2024-06-11 DIAGNOSIS — Z98.1 S/P LUMBAR SPINAL FUSION: ICD-10-CM

## 2024-06-11 PROCEDURE — 97113 AQUATIC THERAPY/EXERCISES: CPT | Mod: GP

## 2024-06-11 NOTE — OP PT TREATMENT LOG
"Today's Treatment:     Precautions:          Individual aquatics 51988   53-67 min Yes   Group aquatics CPT 34528     no   Aquatics exercises flow sheet     Completed today             Walking forwards 5 laps   yes   Side stepping 5 laps   yes   kayak         Walking backwards  5 laps   yes   marching 5 laps yes           LE exercises               Hip abduction 2x10 with bar yes   Hip flexion 2x10 with bar yes   Hip abduction 2x10 with bar yes   Circles  cw/ccw       Single leg press with noodle        L hip flexion iso   5 sec x10  nt   UE  exercises               Shoulder flexion 2x10 with paddles yes   Shoulder abduction 2x10 with paddles yes   Biceps/triceps 2x10 with paddles yes   Horizontal abd/add 2x10 with paddles yes   Kickboard press  2x10             Step up forwards 1x10 B on 10\" step yes   Step ups side 1x10 B on 10\" step yes   W's with kick boards       squats 2x10 yes   Heel raises 2x10 B at wall yes   Knee curls 2x10 B at wall yes   Sitting on blue disc               Floating with noodles  3 min  Knee to chest 1x10  yes  yes   Scissors, bicycling, skiers               stretching       Calf stretch 30 sec x 4 at wall yes   Low back stretch  lumbar ext 1x10  nt, res                                                             "

## 2024-06-11 NOTE — PROGRESS NOTES
Physical Therapy Visit    PT DAILY NOTE FOR OUTPATIENT THERAPY    Patient: Reyna Castillo MRN: [id number]  : 1950 74 y.o.  Referring Physician: Анна Terrazas CRNP  Date of Visit: 2024    Certification Dates: 24 through 24    Diagnosis:   1. Vertebrogenic low back pain    2. S/P lumbar spinal fusion    3. Decreased range of motion        Chief Complaints:  s/p lumbar surgery, pain lumbar spine R side, weakness    Precautions:   Existing Precautions/Restrictions: fall      TODAY'S VISIT    Time In Session:  Start Time: 1400  Stop Time: 1455  Time Calculation (min): 55 min   History/Vitals/Pain/Encounter Info - 24 1406          Injury History/Precautions/Daily Required Info    Document Type daily treatment     Primary Therapist Evetet Barkley, PT, DPT, OCS     Chief Complaint/Reason for Visit  s/p lumbar surgery, pain lumbar spine R side, weakness     Referring Physician Анна YBARRA     Existing Precautions/Restrictions fall     History of present illness/functional impairment Pt is a 72 y/o female who underwent 10/12/22 T10-S1 Posterior Thorocolumbosacral Decompression and Interbody Fusion, Instrumentation, Pelvic Fixation, Allograft, Autograft, BMP, Revision of Existing Instrumentation at [facility] by Dr Lee. She presents with chronic LBP with decreased mobility. She also was dx with bilateral shoulder OA/frozen shoulder with signficant limitations in shoulder mobility and function.     Patient/Family/Caregiver Comments/Observations Pt reports that her pain is approx a 4-5/10 in her low back     Patient reported fall since last visit No        Pain Assessment    Currently in pain Yes     Preferred Pain Scale number (Numeric Rating Pain Scale)     Pain Side/Orientation bilateral     Pain: Body location Back     Pain Rating (0-10): Pre Activity 5                    Daily Treatment Assessment and Plan - 24 1406          Daily Treatment Assessment and Plan     "Progress toward goals Progressing     Daily Outcome Summary Pt arrived to aquatic therapy with typical 5/10 pain in her back. Pt able to tolerate pool session well but need VC to ensure core engagement with LE strengthening therex.     Plan and Recommendations Core strengthening, gentle lumbar extensions, shoulder mobility. scapular strengthening. Continue to follow POC per primary PT                         OBJECTIVE DATA TAKEN TODAY:    None taken    Today's Treatment:    Today's Treatment:     Precautions:          Individual aquatics 58230   53-67 min Yes   Group aquatics CPT 80680     no   Aquatics exercises flow sheet     Completed today             Walking forwards 5 laps   yes   Side stepping 5 laps   yes   kayak         Walking backwards  5 laps   yes   marching 5 laps yes           LE exercises               Hip abduction 2x10 with bar yes   Hip flexion 2x10 with bar yes   Hip abduction 2x10 with bar yes   Circles  cw/ccw       Single leg press with noodle        L hip flexion iso   5 sec x10  nt   UE  exercises               Shoulder flexion 2x10 with paddles yes   Shoulder abduction 2x10 with paddles yes   Biceps/triceps 2x10 with paddles yes   Horizontal abd/add 2x10 with paddles yes   Kickboard press  2x10             Step up forwards 1x10 B on 10\" step yes   Step ups side 1x10 B on 10\" step yes   W's with kick boards       squats 2x10 yes   Heel raises 2x10 B at wall yes   Knee curls 2x10 B at wall yes   Sitting on blue disc               Floating with noodles  3 min  Knee to chest 1x10  yes  yes   Scissors, bicycling, skiers               stretching       Calf stretch 30 sec x 4 at wall yes   Low back stretch  lumbar ext 1x10  nt, res                                                                                    "

## 2024-06-12 ENCOUNTER — TELEPHONE (OUTPATIENT)
Dept: REGISTRATION | Facility: REHABILITATION | Age: 74
End: 2024-06-12
Payer: COMMERCIAL

## 2024-06-14 NOTE — TELEPHONE ENCOUNTER
Left voicemail for patient to offer an appointment on 6/18/24 a 3pm with Shahla Monroe for the pool. Left [phone] number.

## 2024-07-02 ENCOUNTER — HOSPITAL ENCOUNTER (OUTPATIENT)
Dept: PHYSICAL THERAPY | Facility: REHABILITATION | Age: 74
Setting detail: THERAPIES SERIES
Discharge: HOME | End: 2024-07-02
Attending: NURSE PRACTITIONER
Payer: COMMERCIAL

## 2024-07-02 DIAGNOSIS — R53.1 DECREASED STRENGTH: ICD-10-CM

## 2024-07-02 DIAGNOSIS — Z98.1 S/P LUMBAR SPINAL FUSION: ICD-10-CM

## 2024-07-02 DIAGNOSIS — M19.011 LOCALIZED OSTEOARTHRITIS OF BOTH SHOULDERS: ICD-10-CM

## 2024-07-02 DIAGNOSIS — M54.51 VERTEBROGENIC LOW BACK PAIN: Primary | ICD-10-CM

## 2024-07-02 DIAGNOSIS — M19.012 LOCALIZED OSTEOARTHRITIS OF BOTH SHOULDERS: ICD-10-CM

## 2024-07-02 DIAGNOSIS — M25.60 DECREASED RANGE OF MOTION: ICD-10-CM

## 2024-07-02 PROCEDURE — 97113 AQUATIC THERAPY/EXERCISES: CPT | Mod: GP

## 2024-07-02 NOTE — PROGRESS NOTES
Physical Therapy Visit    PT DAILY NOTE FOR OUTPATIENT THERAPY    Patient: Reyna Castillo MRN: [id number]  : 1950 74 y.o.  Referring Physician: Анна Terrazas CRNP  Date of Visit: 2024    Certification Dates: 24 through 24    Diagnosis:   1. Vertebrogenic low back pain    2. S/P lumbar spinal fusion    3. Decreased range of motion    4. Localized osteoarthritis of both shoulders    5. Decreased strength        Chief Complaints:  s/p lumbar surgery, pain lumbar spine R side, weakness    Precautions:   Existing Precautions/Restrictions: fall      TODAY'S VISIT    Time In Session:  Start Time: 1304  Stop Time: 1352  Time Calculation (min): 48 min   History/Vitals/Pain/Encounter Info - 24 1310          Injury History/Precautions/Daily Required Info    Document Type daily treatment     Primary Therapist Evette Barkley, PT, DPT, OCS     Chief Complaint/Reason for Visit  s/p lumbar surgery, pain lumbar spine R side, weakness     Referring Physician Анна YBARRA     Existing Precautions/Restrictions fall     History of present illness/functional impairment Pt is a 72 y/o female who underwent 10/12/22 T10-S1 Posterior Thorocolumbosacral Decompression and Interbody Fusion, Instrumentation, Pelvic Fixation, Allograft, Autograft, BMP, Revision of Existing Instrumentation at [facility] by Dr Lee. She presents with chronic LBP with decreased mobility. She also was dx with bilateral shoulder OA/frozen shoulder with signficant limitations in shoulder mobility and function.     Patient/Family/Caregiver Comments/Observations Patient arrives reporting her shoulders are really bothering her today.     Patient reported fall since last visit No        Pain Assessment    Currently in pain Yes     Preferred Pain Scale number (Numeric Rating Pain Scale)     Pain Side/Orientation bilateral     Pain: Body location Shoulder     Pain Rating (0-10): Pre Activity 5     Pain Rating (0-10): Activity  "3     Pain Rating (0-10): Post Activity 3        Pain Intervention    Intervention  aquatic TE     Post Intervention Comments pain monitored                    Daily Treatment Assessment and Plan - 07/02/24 1310          Daily Treatment Assessment and Plan    Progress toward goals Progressing     Daily Outcome Summary Patient arrives to aquatic therapy with chief complaint of B shoulder pain. She completes today's full plan of care with report of decreased B shoulder pain at end of session. Re-initiated kick board press which patient reports feeling most challenged by due to pain in shoulders but able to tolerate. Follow up NV on response to today's session and progress as able.     Plan and Recommendations Core strengthening, gentle lumbar extensions, shoulder mobility. scapular strengthening. Continue to follow POC per primary PT                         OBJECTIVE DATA TAKEN TODAY:    None taken    Today's Treatment:    Today's Treatment:     Precautions:          Individual aquatics 85253   53-67 min Yes   Group aquatics CPT 52641     no   Aquatics exercises flow sheet     Completed today             Walking forwards 5 laps   yes   Side stepping 5 laps   yes   kayak         Walking backwards  5 laps   yes   marching 5 laps yes           LE exercises               Hip abduction 2x10 with bar yes   Hip flexion 2x10 with bar yes   Hip abduction 2x10 with bar yes   Circles  cw/ccw       Single leg press with noodle        L hip flexion iso   5 sec x10  nt   UE  exercises               Shoulder flexion 2x10 with paddles yes   Shoulder abduction 2x10 with paddles yes   Biceps/triceps 2x10 with paddles yes   Horizontal abd/add 2x10 with paddles yes   Kickboard press  2x10  yes           Step up forwards 1x10 B on 10\" step yes   Step ups side 1x10 B on 10\" step yes   W's with kick boards       squats 2x10 yes   Heel raises 2x10 B at wall yes   Knee curls 2x10 B at wall yes   Sitting on blue disc               Floating " with noodles  3 min  Knee to chest 1x10  nt, resume  Nt, resume   Scissors, bicycling, skiers               stretching       Calf stretch 30 sec x 4 at wall yes   Low back stretch  lumbar ext 1x10  nt, res

## 2024-07-02 NOTE — OP PT TREATMENT LOG
"Today's Treatment:     Precautions:          Individual aquatics 29367   53-67 min Yes   Group aquatics CPT 79280     no   Aquatics exercises flow sheet     Completed today             Walking forwards 5 laps   yes   Side stepping 5 laps   yes   kayak         Walking backwards  5 laps   yes   marching 5 laps yes           LE exercises               Hip abduction 2x10 with bar yes   Hip flexion 2x10 with bar yes   Hip abduction 2x10 with bar yes   Circles  cw/ccw       Single leg press with noodle        L hip flexion iso   5 sec x10  nt   UE  exercises               Shoulder flexion 2x10 with paddles yes   Shoulder abduction 2x10 with paddles yes   Biceps/triceps 2x10 with paddles yes   Horizontal abd/add 2x10 with paddles yes   Kickboard press  2x10  yes           Step up forwards 1x10 B on 10\" step yes   Step ups side 1x10 B on 10\" step yes   W's with kick boards       squats 2x10 yes   Heel raises 2x10 B at wall yes   Knee curls 2x10 B at wall yes   Sitting on blue disc               Floating with noodles  3 min  Knee to chest 1x10  nt, resume  Nt, resume   Scissors, bicycling, skiers               stretching       Calf stretch 30 sec x 4 at wall yes   Low back stretch  lumbar ext 1x10  nt, res                                                             "

## 2024-07-18 ENCOUNTER — HOSPITAL ENCOUNTER (OUTPATIENT)
Dept: PHYSICAL THERAPY | Facility: REHABILITATION | Age: 74
Setting detail: THERAPIES SERIES
Discharge: HOME | End: 2024-07-18
Attending: NURSE PRACTITIONER
Payer: COMMERCIAL

## 2024-07-18 DIAGNOSIS — M19.012 LOCALIZED OSTEOARTHRITIS OF BOTH SHOULDERS: ICD-10-CM

## 2024-07-18 DIAGNOSIS — M54.51 VERTEBROGENIC LOW BACK PAIN: Primary | ICD-10-CM

## 2024-07-18 DIAGNOSIS — M19.011 LOCALIZED OSTEOARTHRITIS OF BOTH SHOULDERS: ICD-10-CM

## 2024-07-18 DIAGNOSIS — R53.1 DECREASED STRENGTH: ICD-10-CM

## 2024-07-18 DIAGNOSIS — Z98.1 S/P LUMBAR SPINAL FUSION: ICD-10-CM

## 2024-07-18 DIAGNOSIS — M25.60 DECREASED RANGE OF MOTION: ICD-10-CM

## 2024-07-18 PROCEDURE — 97140 MANUAL THERAPY 1/> REGIONS: CPT | Mod: GP

## 2024-07-18 PROCEDURE — 97530 THERAPEUTIC ACTIVITIES: CPT | Mod: GP

## 2024-07-18 PROCEDURE — 97110 THERAPEUTIC EXERCISES: CPT | Mod: GP

## 2024-07-18 NOTE — OP PT TREATMENT LOG
"ORTHO PT FLOWSHEET     Exercises Current Session Time Completed today   MODALITIES- HEAT/ICE  CPT 80423 TOTAL TIME FOR SESSION Not performed     Heat L shoulder x8 min beginning of session yes   Ice/Vasocompression L shoulder x5 min end of session     MODALITIES - E-STIM  CPT 68468    TOTAL TIME FOR SESSION Not performed     E-stim/H-Wave       THER ACT  CPT 05839 TOTAL TIME FOR SESSION 23-37 Minutes     Re-eval 6/6/24 All subjective and objective measurements completed y   Assessment ROM, MMT, [place], LOLY, RMQ  yes   Body Mechanics/Work Training Safe lifting education     THER EX  CPT 57126 TOTAL TIME FOR SESSION 8-22 Minutes     HEP 3/18: wand shoulder flexion, TAC with breathing  3/22: wand ABD, wand IR     CARDIOVASCULAR        TM       Nustep L1 x 10 min seat 7 UE/LE yes   UBE       TAC  10 sec x 10 with ball squeeze yes   TAC with march       TAC with reverse march       SLR 1x10 B     Wand:  -flexion  -Abduction  -ER  -IR  -EXT    1x10  1x10 B  1x10 B  1x10 B  1x10 B    nt     nt   shoulder IR with strap               Pendulums LUE only, x20 cw/ccw  nt   Pulleys Bilateral, flexion x3 min     Wall slides Flexion 1x10 B     TB ER/IR 1x10 OTB ea      AROM shoulder flexion  ABD       sidelying shoulder ER       No monies  Horizontal ABD OTB 2x10  OTB 1x10    nt   Standing lumbar ext 1x10 nt   LTR 1x10 B yes   Hip 3 way       Side stepping       Prone hip ext       clamshells       S/l hip ABD       Pball rollout GPB 10x5\"     Bridge 2x10 with ball squeeze nt   Modified liliana stretch 30 sec x 4 B nt   Hip ER 30 sec x 4 B nt           TB rows/EXT OTB 1x10 ea     Step up       NEURO RE-ED  CPT 73592 TOTAL TIME FOR SESSION Not performed     COORDINATION       POSTURAL RE-ED           PRE-GAIT ACTIVITIES        BALANCE TRAINING                        GAIT TRAINING  CPT 39438 TOTAL TIME FOR SESSION Not performed     Ambulation        Dynamic gait        MANUAL  CPT 53545  8-22 min     Stretching Shoulder PROM, B Yes "   Mobilization  Shoulder GH mobilizations inferior and posterior  STM, B Yes      Rhythmic stabilization At 90 deg shoulder elevation N   Massage       Taping

## 2024-07-18 NOTE — PROGRESS NOTES
Physical Therapy Progress Note    PT PROGRESS NOTE FOR OUTPATIENT THERAPY    Patient: Reyna Castillo   MRN: [id number]  : 1950 74 y.o.    Referring Physician: Анна Terrazas CRNP  Date of Visit: 2024    Certification Dates: 24 through 24    Recommended Frequency & Duration:  3 times/week for up to 8 weeks   2x/week, progressing to 3x/week if pt able to tolerate    Diagnosis:   1. Vertebrogenic low back pain    2. S/P lumbar spinal fusion    3. Decreased range of motion    4. Localized osteoarthritis of both shoulders    5. Decreased strength        Chief Complaints:  Chief Complaint   Patient presents with    Dec Strength    Decreased recreational/play activity    Joint Pain    Abnormality Of Gait    Dec ROM     Difficulty Performing Work/school Tasks    Pain       Precautions:   Existing Precautions/Restrictions: fall    TODAY'S VISIT:    Time In Session:  Start Time: 1000  Stop Time: 1102  Time Calculation (min): 62 min   General Information - 24 1012          Session Details    Document Type progress note     Mode of Treatment individual therapy        General Information    Referring Physician Анна YBARRA     History of present illness/functional impairment Pt is a 72 y/o female who underwent 10/12/22 T10-S1 Posterior Thorocolumbosacral Decompression and Interbody Fusion, Instrumentation, Pelvic Fixation, Allograft, Autograft, BMP, Revision of Existing Instrumentation at [facility] by Dr Lee. She presents with chronic LBP with decreased mobility. She also was dx with bilateral shoulder OA/frozen shoulder with signficant limitations in shoulder mobility and function.     Patient/Family/Caregiver Comments/Observations Pt reports that she feels limited progress with her shoulders due to severe arthritis. She reports that her back is doing better and she is able to do more. She reports that pain can increase to 4/10 with activity.                    Daily Falls  Screen - 07/18/24 1012          Daily Falls Assessment    Patient reported fall since last visit No                    Pain/Vitals - 07/18/24 1012          Pain Assessment    Currently in pain Yes     Preferred Pain Scale number (Numeric Rating Pain Scale)     Pain: Body location Back;Shoulder     Pain Rating (0-10): Pre Activity 4        Pain Intervention    Intervention  TE     Post Intervention Comments no change                    PT - 07/18/24 1012          Physical Therapy    Physical Therapy Specialty Ortho and Sports PT        PT Plan    Frequency of treatment 3 times/week     PT Duration 8 weeks     PT Custom Frequency and Duration 2x/week, progressing to 3x/week if pt able to tolerate     PT Cert From 06/06/24     PT Cert To 08/02/24     Date PT POC was sent to provider 06/06/24     Signed PT Plan of Care received?  No                    Assessment and Plan - 07/18/24 1012          Assessment    Plan of Care reviewed and patient/family in agreement Yes     Functional Limitations in Following Categories (PT Eval) self-care;home management;community/leisure     Rehab Potential/Prognosis adequate, monitor progress closely     Clinical Assessment Patient is a 73 yo female who presenting to PT with back pain and B shoulder OA. She conitnues to demonstrate improved BLE and BUE strength, improved lumbar ROM, improved LOLY and [place] Shoulder outcome measure scoring, and progression toward all short and long term goals. Her shoulder progress has been limited by pain and severe crepitus, limiting tolerance to stretching and strengthening, but she demonstrates good progress with lumbar pain and strength.  Patient will continue to benefit from skilled PT in order to address remaining deficits, continue progression toward short/long term goals, and return to highest level of pain free function.     Plan and Recommendations Progress core and gentle shoulde mobility. Scapular strengthening.                     OBJECTIVE  MEASUREMENTS/DATA:    ROM    Range of Motion - 07/18/24 1100          RIGHT: Upper Extremity PROM Assessment    Shoulder Flexion  135 degrees     Shoulder Abduction  100 degrees     Shoulder Internal Rotation  35 degrees   @90    Shoulder External Rotation  52 degrees   @90       LEFT: Upper Extremity PROM Assessment    Shoulder Flexion   105 degrees     Shoulder Abduction   72 degrees     Shoulder Internal Rotation   70 degrees   @45    Shoulder External Rotation   38 degrees   @45       LEFT: Upper Extremity AROM Assessment    Shoulder Flexion   96 degrees   5/10    Shoulder Extension   52 degrees   4/10    Shoulder Abduction   60 degrees   5/10 pain    Shoulder Internal Rotation   --   lat sacrum    Shoulder External Rotation   23 degrees        RIGHT: Upper Extremity AROM Assessment    Shoulder Flexion   102 degrees   5/10    Shoulder Extension   56 degrees   4/10    Shoulder Abduction   82 degrees   5/10    Shoulder Internal Rotation   --   lat sacrum    Shoulder External Rotation   45 degrees        Lumbar AROM    Lumbar Flexion 100     Lumbar Extension 25   4/10    Lumbar Left SB 20   4/10    Lumbar Right SB 22   4/10    Lumbar Left Rotation 43   4/10    Lumbar Right Rotation 45   4/10                  MMT   Outcome Measures    PT Outcome Measures - 07/18/24 1012          Subjective Outcome Measures    Oswestry (LOLY) 21/50=42% impaired     Fabian Shoulder Score 53                     ROM and MMT          3/18/2024 5/2/2024    13:00 6/6/2024 7/18/2024    11:00   PT UE ROM Measurements   PROM: Right Shoulder Flexion  138 degrees       8/10 148 degrees  135 degrees   AROM: Right Shoulder Flexion 106 degrees       4/10 110 degrees 110 degrees       5/10 102 degrees       5/10   PROM: Left Shoulder Flexion 122 degrees       5/10 118 degrees  105 degrees   AROM: Left Shoulder Flexion 108 degrees       4/10 100 degrees       3/10 100 degrees       5/10 96 degrees       5/10   AROM: Right Shoulder Extension 58 degrees  60 degrees 62 degrees       4/10 56 degrees       4/10   AROM: Left Shoulder Extension 52 degrees 40 degrees       3/10 53 degrees       4/10 52 degrees       4/10   PROM: Right Shoulder  degrees       6/10 110 degrees  100 degrees   AROM: Right Shoulder ABD 72 degrees       5/10 102 degrees       3/10 103 degrees 82 degrees       5/10   PROM: Left Shoulder ABD 93 degrees       8/10 100 degrees  72 degrees   AROM: Left Shoulder ABD 62 degrees       4/10 70 degrees       4/10 80 degrees       6/10 60 degrees       5/10 pain   PROM: Right Shoulder IR 36 degrees       @90 ABD 38 degrees  35 degrees       @90   AROM: Right Shoulder IR --        Lateral sacrum --        lat sacrum --        lateral sacrum --        lat sacrum   PROM: Left Shoulder IR 50 degrees       @45 ABD 58 degrees  70 degrees       @45   AROM: Left Shoulder IR --        lateral sacrum 4/10 --        lat sacrum --        lateral sacrum --        lat sacrum   PROM: Right Shoulder ER 50 degrees       @90 ABD 58 degrees  52 degrees       @90   AROM: Right Shoulder ER 32 degrees       T1 40 degrees       C7 --        C7 45 degrees   PROM: Left Shoulder ER 48 degrees       @45 ABD 30 degrees  38 degrees       @45   AROM: Left Shoulder ER 22 degrees       4/10, C7 26 degrees       C7 --        C7, 5/10 23 degrees   PT Cervical/Lumbar/Other ROM Measurements   AROM: Cervical Flexion 42      AROM: Cervical Extension 42      AROM: Left Cervical SB 16      AROM: Right Cervical SB 35      AROM: Left Cervical Rotation 52      AROM: Right Cervical Rotation 62      AROM: Lumbar Flexion 100       5/10 110       7/10 110       5/10 100   AROM: [place] Extension 23       2-3/10 16       5/10 20       5/10 25       4/10   AROM: Left Lumbar SB 8       4/10 8       8/10 12       6/10 20       4/10   AROM: Right Lumbar SB 22       3/10 15       6/10 18       6/10 22       4/10   AROM: Left Lumbar Rotation 38       2/10 50       8/10 50       6/10 43       4/10    AROM: Right Lumbar Rotation 52 38       4/10 55       6/10 45       4/10   PT UE MMT Measurements   Right Shoulder Flexion (4/5) good  (4+/5) good plus    Left Shoulder Flexion (4/5) good  (4+/5) good plus    Right Shoulder Extension (4+/5) good plus  (4+/5) good plus    Left Shoulder Extension (4+/5) good plus  (4+/5) good plus    Right Shoulder ADD   (4+/5) good plus    Left Shoulder ADD   (4+/5) good plus    Right Shoulder ABD (4/5) good  (4+/5) good plus    Left Shoulder ABD (4/5) good  (4/5) good    Right Shoulder IR (4+/5) good plus  (4+/5) good plus    Left Shoulder IR (4+/5) good plus  (4+/5) good plus    Right Shoulder ER (4/5) good  (4+/5) good plus    Left Shoulder ER (4+/5) good plus  (4+/5) good plus    Right Elbow Flexion (5/5) normal  (5/5) normal    Left Elbow Flexion (5/5) normal  (5/5) normal    Right Elbow Extension (5/5) normal  (5/5) normal    Left Elbow Extension (5/5) normal  (5/5) normal    PT LE MMT   Right Hip Flexion (4-/5) good minus  (4/5) good    Left Hip Flexion (4-/5) good minus  (4/5) good    Right Hip Extension (4-/5) good minus  (4/5) good    Left Hip Extension (4-/5) good minus  (4/5) good    Right Hip ABD   (4+/5) good plus    Left Hip ABD   (4+/5) good plus    Right Hip ADD   (4+/5) good plus    Left Hip ADD   (4+/5) good plus    Right Hip IR   (4/5) good    Left Hip IR   (4/5) good    Right Hip ER   (4/5) good    Left Hip ER   (4/5) good    Right Knee Flexion (5/5) normal  (5/5) normal    Left Knee Flexion (5/5) normal  (5/5) normal    Right Knee Extension (5/5) normal  (5/5) normal    Left Knee Extension (5/5) normal  (5/5) normal    Right Ankle DF (5/5) normal  (5/5) normal    Left Ankle DF (5/5) normal  (5/5) normal      Outcome Measures          3/18/2024    14:35 5/2/2024    12:55 6/6/2024    13:03 7/18/2024    10:12   PT SUBJECTIVE Outcome Measures   Oswestry 24/50=48% impaired 25/50=50% impaired 22/50 (previously 25/50) 21/50=42% impaired   [place] Shoulder Score (PSS) 40  "[phone] 53       Today's Treatment::    Education provided:  Yes: See treatment log for details of education provided    ORTHO PT FLOWSHEET     Exercises Current Session Time Completed today   MODALITIES- HEAT/ICE  CPT 78393 TOTAL TIME FOR SESSION Not performed     Heat L shoulder x8 min beginning of session yes   Ice/Vasocompression L shoulder x5 min end of session     MODALITIES - E-STIM  CPT 34353    TOTAL TIME FOR SESSION Not performed     E-stim/H-Wave       THER ACT  CPT 95035 TOTAL TIME FOR SESSION 23-37 Minutes     Re-eval 6/6/24 All subjective and objective measurements completed y   Assessment ROM, MMT, Fabian, LOYL, RMQ  yes   Body Mechanics/Work Training Safe lifting education     THER EX  CPT 79972 TOTAL TIME FOR SESSION 8-22 Minutes     HEP 3/18: wand shoulder flexion, TAC with breathing  3/22: wand ABD, wand IR     CARDIOVASCULAR        TM       Nustep L1 x 10 min seat 7 UE/LE yes   UBE       TAC  10 sec x 10 with ball squeeze yes   TAC with march       TAC with reverse march       SLR 1x10 B     Wand:  -flexion  -Abduction  -ER  -IR  -EXT    1x10  1x10 B  1x10 B  1x10 B  1x10 B    nt     nt   shoulder IR with strap               Pendulums LUE only, x20 cw/ccw  nt   Pulleys Bilateral, flexion x3 min     Wall slides Flexion 1x10 B     TB ER/IR 1x10 OTB ea      AROM shoulder flexion  ABD       sidelying shoulder ER       No monies  Horizontal ABD OTB 2x10  OTB 1x10    nt   Standing lumbar ext 1x10 nt   LTR 1x10 B yes   Hip 3 way       Side stepping       Prone hip ext       clamshells       S/l hip ABD       Pball rollout GPB 10x5\"     Bridge 2x10 with ball squeeze nt   Modified liliana stretch 30 sec x 4 B nt   Hip ER 30 sec x 4 B nt           TB rows/EXT OTB 1x10 ea     Step up       NEURO RE-ED  CPT 48665 TOTAL TIME FOR SESSION Not performed     COORDINATION       POSTURAL RE-ED           PRE-GAIT ACTIVITIES        BALANCE TRAINING                        GAIT TRAINING  CPT 62639 TOTAL TIME FOR SESSION Not " performed     Ambulation        Dynamic gait        MANUAL  CPT 74377  8-22 min     Stretching Shoulder PROM, B Yes   Mobilization  Shoulder GH mobilizations inferior and posterior  STM, B Yes      Rhythmic stabilization At 90 deg shoulder elevation N   Massage       Taping              Goals Addressed                   This Visit's Progress     [facility] PT Lumbar Goals        Short/Long Term Goals Time Frame Result Comment/Progress   Pt will increase core, LE, UE and Hip MMT >/= full grade 8 weeks weeks On going    Pt will increase lumbar ROM >/= 10 degrees into flexion, extension, lateral flexion and rotation 4 weeks Improved but not fully met    Pt will increase Shoulder ROM >/= 10 degrees into flexion, extension, lateral flexion and rotation 4 weeks Improved but not fully met    Pt will improve RMQ >/= 10% functional improvement 4 weeks Not assessed IE 13/24=54% impaired  5/2: 21/24   Pt will demonstrate improvements in Oswestry >/=  10 % functional improvement 4 weeks Improved IE: 24/50=48% impaired  5/2: 25/50  6/6: 22/50 7/18: 21/50   Pt will demonstrate improvements in Oswestry >/=  20 % functional improvement 8 weeks Ongoing    Improve Fabian shoulder scale >/= 12 points 4 weeks met IE 40/100  5/2: 36/100  6/6: 38/100 7/18: 53/100   Improve [place] shoulder scale >/= 24 points 8 weeks Ongoing    Pt will be able to lift 10 lbs without back or shoulder pain 12 weeks Ongoing    Increase AROM shoulder flexion, ABD >/= 120 deg 12 weeks Ongoing    Pt will be independent in [facility] for self management of symptoms 8 weeks met    Pt will be able to don socks without back pain 12 weeks Ongoing                      Evette Barkley, PT

## 2024-07-23 ENCOUNTER — HOSPITAL ENCOUNTER (OUTPATIENT)
Dept: PHYSICAL THERAPY | Facility: REHABILITATION | Age: 74
Setting detail: THERAPIES SERIES
Discharge: HOME | End: 2024-07-23
Attending: NURSE PRACTITIONER
Payer: COMMERCIAL

## 2024-07-23 DIAGNOSIS — M19.011 LOCALIZED OSTEOARTHRITIS OF BOTH SHOULDERS: ICD-10-CM

## 2024-07-23 DIAGNOSIS — M19.012 LOCALIZED OSTEOARTHRITIS OF BOTH SHOULDERS: ICD-10-CM

## 2024-07-23 DIAGNOSIS — M25.60 DECREASED RANGE OF MOTION: ICD-10-CM

## 2024-07-23 DIAGNOSIS — M54.51 VERTEBROGENIC LOW BACK PAIN: Primary | ICD-10-CM

## 2024-07-23 DIAGNOSIS — R53.1 DECREASED STRENGTH: ICD-10-CM

## 2024-07-23 DIAGNOSIS — Z98.1 S/P LUMBAR SPINAL FUSION: ICD-10-CM

## 2024-07-23 PROCEDURE — 97113 AQUATIC THERAPY/EXERCISES: CPT | Mod: GP,KX

## 2024-07-23 NOTE — PROGRESS NOTES
Physical Therapy Visit    PT DAILY NOTE FOR OUTPATIENT THERAPY    Patient: Reyna Castillo MRN: [id number]  : 1950 74 y.o.  Referring Physician: Анна Terrazas CRNP  Date of Visit: 2024    Certification Dates: 24 through 24    Diagnosis:   1. Vertebrogenic low back pain    2. S/P lumbar spinal fusion    3. Decreased range of motion    4. Localized osteoarthritis of both shoulders    5. Decreased strength        Chief Complaints:  s/p lumbar surgery, pain lumbar spine R side, weakness    Precautions:   Existing Precautions/Restrictions: fall      TODAY'S VISIT    Time In Session:  Start Time: 0101  Stop Time: 0155  Time Calculation (min): 54 min   History/Vitals/Pain/Encounter Info - 24 1309          Injury History/Precautions/Daily Required Info    Document Type daily treatment     Primary Therapist Evette Barkley, PT, DPT, OCS     Chief Complaint/Reason for Visit  s/p lumbar surgery, pain lumbar spine R side, weakness     Referring Physician Анна YBARRA     Existing Precautions/Restrictions fall     History of present illness/functional impairment Pt is a 74 y/o female who underwent 10/12/22 T10-S1 Posterior Thorocolumbosacral Decompression and Interbody Fusion, Instrumentation, Pelvic Fixation, Allograft, Autograft, BMP, Revision of Existing Instrumentation at [facility] by Dr Lee. She presents with chronic LBP with decreased mobility. She also was dx with bilateral shoulder OA/frozen shoulder with signficant limitations in shoulder mobility and function.     Patient/Family/Caregiver Comments/Observations Patient arrives reporting following her progress note she felt sore. She states by  the soreness had subsided.     Patient reported fall since last visit No        Pain Assessment    Currently in pain Yes     Preferred Pain Scale number (Numeric Rating Pain Scale)     Pain Side/Orientation bilateral     Pain: Body location Shoulder     Pain Rating (0-10):  "Pre Activity 2     Pain Rating (0-10): Activity 2     Pain Rating (0-10): Post Activity 1        Pain Intervention    Intervention  aquatic PT     Post Intervention Comments slight improvement in soreness post session                    Daily Treatment Assessment and Plan - 07/23/24 1309          Daily Treatment Assessment and Plan    Progress toward goals Progressing     Daily Outcome Summary Patient completes today's aquatic session with slight improvement in shoulder pain post session. She reports (+) fatigue post session. Will benefit from continued progression of POC nv to return to highest level of function.     Plan and Recommendations Progress core and gentle shoulde mobility. Scapular strengthening. trial seated LAQ and hamstring stretch nv                         OBJECTIVE DATA TAKEN TODAY:    None taken    Today's Treatment:    Today's Treatment:     Precautions:          Individual aquatics 47257   53-67 min Yes   Group aquatics CPT 19863     no   Aquatics exercises flow sheet     Completed today             Walking forwards 5 laps   yes   Side stepping 5 laps   yes   kayak         Walking backwards  5 laps   yes   marching 5 laps yes           LE exercises               Hip abduction 2x10 with bar yes   Hip flexion 2x10 with bar yes   Hip abduction 2x10 with bar yes   Circles  cw/ccw   nv   Single leg press with noodle        L hip flexion iso   5 sec x10  nt   UE  exercises               Shoulder flexion 2x10 with paddles yes   Shoulder abduction 2x10 with paddles yes   Biceps/triceps 2x10 with paddles yes   Horizontal abd/add 2x10 with paddles yes   Kickboard press 2x10 yes           Step up forwards 1x10 B on 10\" step yes   Step ups side 1x10 B on 10\" step yes   W's with kick boards       squats 2x10 yes   Heel raises 2x10 B at wall yes   Knee curls 2x10 B at wall yes   Sitting on blue disc               Floating with noodles  3 min  Knee to chest 1x10 yes  yes   Scissors, bicycling, skiers           "     stretching       Calf stretch 30 sec x 4 at wall yes   Low back stretch  lumbar ext 1x10  nt, res

## 2024-07-23 NOTE — OP PT TREATMENT LOG
"Today's Treatment:     Precautions:          Individual aquatics 48713   53-67 min Yes   Group aquatics CPT 78478     no   Aquatics exercises flow sheet     Completed today             Walking forwards 5 laps   yes   Side stepping 5 laps   yes   kayak         Walking backwards  5 laps   yes   marching 5 laps yes           LE exercises               Hip abduction 2x10 with bar yes   Hip flexion 2x10 with bar yes   Hip abduction 2x10 with bar yes   Circles  cw/ccw   nv   Single leg press with noodle        L hip flexion iso   5 sec x10  nt   UE  exercises               Shoulder flexion 2x10 with paddles yes   Shoulder abduction 2x10 with paddles yes   Biceps/triceps 2x10 with paddles yes   Horizontal abd/add 2x10 with paddles yes   Kickboard press 2x10 yes           Step up forwards 1x10 B on 10\" step yes   Step ups side 1x10 B on 10\" step yes   W's with kick boards       squats 2x10 yes   Heel raises 2x10 B at wall yes   Knee curls 2x10 B at wall yes   Sitting on blue disc               Floating with noodles  3 min  Knee to chest 1x10 yes  yes   Scissors, bicycling, skiers               stretching       Calf stretch 30 sec x 4 at wall yes   Low back stretch  lumbar ext 1x10  nt, res                                                             "

## 2024-07-25 ENCOUNTER — TRANSCRIBE ORDERS (OUTPATIENT)
Dept: SCHEDULING | Age: 74
End: 2024-07-25

## 2024-07-25 DIAGNOSIS — N28.1 CYST OF KIDNEY, ACQUIRED: Primary | ICD-10-CM

## 2024-07-30 ENCOUNTER — HOSPITAL ENCOUNTER (OUTPATIENT)
Dept: PHYSICAL THERAPY | Facility: REHABILITATION | Age: 74
Setting detail: THERAPIES SERIES
Discharge: HOME | End: 2024-07-30
Attending: NURSE PRACTITIONER
Payer: COMMERCIAL

## 2024-07-30 DIAGNOSIS — M25.60 DECREASED RANGE OF MOTION: ICD-10-CM

## 2024-07-30 DIAGNOSIS — Z98.1 S/P LUMBAR SPINAL FUSION: ICD-10-CM

## 2024-07-30 DIAGNOSIS — R53.1 DECREASED STRENGTH: ICD-10-CM

## 2024-07-30 DIAGNOSIS — M19.011 LOCALIZED OSTEOARTHRITIS OF BOTH SHOULDERS: ICD-10-CM

## 2024-07-30 DIAGNOSIS — M54.51 VERTEBROGENIC LOW BACK PAIN: Primary | ICD-10-CM

## 2024-07-30 DIAGNOSIS — M19.012 LOCALIZED OSTEOARTHRITIS OF BOTH SHOULDERS: ICD-10-CM

## 2024-07-30 PROCEDURE — 97113 AQUATIC THERAPY/EXERCISES: CPT | Mod: GP,KX

## 2024-07-30 NOTE — PROGRESS NOTES
Physical Therapy Visit    PT DAILY NOTE FOR OUTPATIENT THERAPY    Patient: Reyna Castillo MRN: [id number]  : 1950 74 y.o.  Referring Physician: Анна Terrazas CRNP  Date of Visit: 2024    Certification Dates: 24 through 24    Diagnosis:   1. Vertebrogenic low back pain    2. S/P lumbar spinal fusion    3. Decreased range of motion    4. Localized osteoarthritis of both shoulders    5. Decreased strength        Chief Complaints:  s/p lumbar surgery, pain lumbar spine R side, weakness    Precautions:   Existing Precautions/Restrictions: fall      TODAY'S VISIT    Time In Session:  Start Time: 1400  Stop Time: 1455  Time Calculation (min): 55 min   History/Vitals/Pain/Encounter Info - 24 1408          Injury History/Precautions/Daily Required Info    Document Type daily treatment     Primary Therapist Evette Barkley, PT, DPT, OCS     Chief Complaint/Reason for Visit  s/p lumbar surgery, pain lumbar spine R side, weakness     Referring Physician Анна YBARRA     Existing Precautions/Restrictions fall     History of present illness/functional impairment Pt is a 72 y/o female who underwent 10/12/22 T10-S1 Posterior Thorocolumbosacral Decompression and Interbody Fusion, Instrumentation, Pelvic Fixation, Allograft, Autograft, BMP, Revision of Existing Instrumentation at [facility] by Dr Lee. She presents with chronic LBP with decreased mobility. She also was dx with bilateral shoulder OA/frozen shoulder with signficant limitations in shoulder mobility and function.     Patient/Family/Caregiver Comments/Observations Pt reports that she is feeling pretty good today and notes having only 4/10 pain.     Patient reported fall since last visit No        Pain Assessment    Currently in pain Yes     Preferred Pain Scale number (Numeric Rating Pain Scale)     Pain: Body location Back;Shoulder     Pain Rating (0-10): Pre Activity 4                    Daily Treatment Assessment and  "Plan - 07/30/24 1408          Daily Treatment Assessment and Plan    Progress toward goals Progressing     Daily Outcome Summary Pt was able to complete all strengthening without pain onset. Good tolerance to strength and cardio retraining. She noted some fatigue but denied increased pain with activities. Continue to progress as tolerated.     Plan and Recommendations Progress core and gentle shoulde mobility. Scapular strengthening. trial seated LAQ and hamstring stretch nv                         OBJECTIVE DATA TAKEN TODAY:    None taken    Today's Treatment:    Today's Treatment:     Precautions:          Individual aquatics 58546   53-67 min Yes   Group aquatics CPT 21650     no   Aquatics exercises flow sheet     Completed today             Walking forwards 5 laps   yes   Side stepping 5 laps   yes   kayak         Walking backwards  5 laps   yes   marching 5 laps yes           LE exercises               Hip abduction 2x10 with bar yes   Hip flexion 2x10 with bar yes   Hip abduction 2x10 with bar yes   Circles  cw/ccw   nv   Single leg press with noodle        L hip flexion iso   5 sec x10  nt   UE  exercises               Shoulder flexion 2x10 with paddles yes   Shoulder abduction 2x10 with paddles yes   Biceps/triceps 2x10 with paddles yes   Horizontal abd/add 2x10 with paddles yes   Kickboard press 2x10     shoulder ER  2x10 yes   Step up forwards 1x10 B on 10\" step yes   Step ups side 1x10 B on 10\" step yes   W's with kick boards       squats 2x10 yes   Heel raises 2x10 B at wall yes   Knee curls 2x10 B at wall yes   Sitting on blue disc               Floating with noodles  3 min  Knee to chest 1x10    Scissors, bicycling, skiers               stretching       Calf stretch 30 sec x 4 at wall    Low back stretch  lumbar ext 1x10  nt, res                                                                                    "

## 2024-07-30 NOTE — OP PT TREATMENT LOG
"Today's Treatment:     Precautions:          Individual aquatics 62440   53-67 min Yes   Group aquatics CPT 01967     no   Aquatics exercises flow sheet     Completed today             Walking forwards 5 laps   yes   Side stepping 5 laps   yes   kayak         Walking backwards  5 laps   yes   marching 5 laps yes           LE exercises               Hip abduction 2x10 with bar yes   Hip flexion 2x10 with bar yes   Hip abduction 2x10 with bar yes   Circles  cw/ccw   nv   Single leg press with noodle        L hip flexion iso   5 sec x10  nt   UE  exercises               Shoulder flexion 2x10 with paddles yes   Shoulder abduction 2x10 with paddles yes   Biceps/triceps 2x10 with paddles yes   Horizontal abd/add 2x10 with paddles yes   Kickboard press 2x10     shoulder ER  2x10 yes   Step up forwards 1x10 B on 10\" step yes   Step ups side 1x10 B on 10\" step yes   W's with kick boards       squats 2x10 yes   Heel raises 2x10 B at wall yes   Knee curls 2x10 B at wall yes   Sitting on blue disc               Floating with noodles  3 min  Knee to chest 1x10    Scissors, bicycling, skiers               stretching       Calf stretch 30 sec x 4 at wall    Low back stretch  lumbar ext 1x10  nt, res                                                             "

## 2024-07-31 NOTE — ADDENDUM NOTE
Encounter addended by: Evette Barkley, PT on: 7/31/2024 8:53 AM   Actions taken: Flowsheet accepted, Clinical Note Signed

## 2024-08-01 ENCOUNTER — HOSPITAL ENCOUNTER (OUTPATIENT)
Dept: PHYSICAL THERAPY | Facility: REHABILITATION | Age: 74
Setting detail: THERAPIES SERIES
Discharge: HOME | End: 2024-08-01
Attending: NURSE PRACTITIONER
Payer: COMMERCIAL

## 2024-08-01 DIAGNOSIS — M54.51 VERTEBROGENIC LOW BACK PAIN: Primary | ICD-10-CM

## 2024-08-01 DIAGNOSIS — Z98.1 S/P LUMBAR SPINAL FUSION: ICD-10-CM

## 2024-08-01 DIAGNOSIS — M25.60 DECREASED RANGE OF MOTION: ICD-10-CM

## 2024-08-01 PROCEDURE — 97530 THERAPEUTIC ACTIVITIES: CPT | Mod: GP

## 2024-08-01 PROCEDURE — 97110 THERAPEUTIC EXERCISES: CPT | Mod: GP

## 2024-08-01 NOTE — LETTER
Dear DR. Terrazas,    Thank you for this referral. Please review the attached notes and plan of care for your approval.  Please contact our department with any questions.     Sincerely,     Evette Barkley, PT  [address]  [place] [address]  Phone [phone]  Fax  [phone]    By co-signing this Plan of Care (POC) you agree to the following:  I have reviewed the the Plan of Care established by the therapist within this document and certify that the services are skilled and medically necessary. I have reviewed the plan and recommend that these services continue to meet the goals stated in this document.    PHYSICIAN SIGNATURE: __________________________________     DATE: ___________________  TIME: _____________           Physical Therapy Plan of Care 24   Effective from: 2024  Effective to: 2024    Plan ID: [id number]            Participants as of Finalize on 2024    Name Type Comments Contact Info    TATE Lopes Referring Provider  [phone]    Evette Barkley, PT Physical Therapist         Last Progress Notes Note     Author: Evette Barkley, PT Status: Signed Last edited: 2024  1:00 PM       Physical Therapy Progress Note    [facility] Fax: [phone]    PT RE-EVALUATION FOR OUTPATIENT THERAPY    Patient: Reyna Castillo     MRN: [id number]  : 1950 74 y.o.    Referring Physician: Анна Terrazas CRNP  Date of Visit: 2024    New Certification Dates: 24 through 24    Recommended Frequency & Duration:  2 times/week for up to 8 weeks        Diagnosis:   1. Vertebrogenic low back pain    2. S/P lumbar spinal fusion    3. Decreased range of motion        Chief Complaints:  Chief Complaint   Patient presents with   • Difficulty Walking   • Balance Deficits   • Dec ROM   • Dec Strength   • Pain   • Decreased Endurance   • Abnormality Of Gait   • Joint Pain   •  Imbalance   • Decreased recreational/play activity   • Decreased Mobility       Precautions:  "  Existing Precautions/Restrictions: fall    TODAY'S VISIT:    Time In Session:  Start Time: 1302  Stop Time: 1358  Time Calculation (min): 56 min   General Information - 08/01/24 1307          Session Details    Document Type re-evaluation     Mode of Treatment individual therapy        General Information    Referring Physician Анна YBARRA     History of present illness/functional impairment Pt is a 74 y/o female who underwent 10/12/22 T10-S1 Posterior Thorocolumbosacral Decompression and Interbody Fusion, Instrumentation, Pelvic Fixation, Allograft, Autograft, BMP, Revision of Existing Instrumentation at [facility] by Dr Lee. She presents with chronic LBP with decreased mobility. She also was dx with bilateral shoulder OA/frozen shoulder with signficant limitations in shoulder mobility and function.     Patient/Family/Caregiver Comments/Observations Pt reports that she wants to continue to work on her strength and edurance to improve her walking tolerance. She reports 4-5/10 back pain today. She reports that the pool helps with her pain and she is able to do more activity.     Existing Precautions/Restrictions fall                    Daily Falls Screen - 08/01/24 1307          Daily Falls Assessment    Patient reported fall since last visit No                    Pain/Vitals - 08/01/24 1307          Pain Assessment    Currently in pain Yes     Preferred Pain Scale number (Numeric Rating Pain Scale)     Pain: Body location Back     Pain Rating (0-10): Pre Activity 3        Pain Intervention    Intervention  TE     Post Intervention Comments \"I feel good\"                    PT - 08/01/24 1307          Physical Therapy    Physical Therapy [facility] PT        PT Plan    Frequency of treatment 2 times/week     PT Duration 8 weeks     PT Cert From 08/01/24     PT Cert To 09/26/24     Date PT POC was sent to provider 08/01/24     Signed PT Plan of Care received?  No                    " Assessment and Plan - 08/01/24 1618          Assessment    Plan of Care reviewed and patient/family in agreement Yes     System Pathology/Pathophysiology Noted musculoskeletal;cardiovascular     Functional Limitations in Following Categories (PT Eval) self-care;home management;community/leisure     Rehab Potential/Prognosis good, to achieve stated therapy goals     Problem List decreased flexibility;decreased endurance;pain;decreased strength;impaired postural control;decreased ROM;impaired balance     Clinical Assessment Patient is a 73 yo female who presenting to PT with back pain and B shoulder OA. She continues to demonstrate improved BLE and BUE strength, improved lumbar ROM, improved LOLY and Fabian Shoulder outcome measure scoring, and progression toward all short and long term goals. Her shoulder progress has been limited by pain and severe crepitus, limiting tolerance to stretching and strengthening, but she demonstrates good progress with lumbar pain and strength. She continues to be limited by decreased endurance, core and hip strength. She will benefit from continued aquatic program to addres limitations and progress towards goals. Patient will continue to benefit from skilled PT in order to address remaining deficits, continue progression toward short/long term goals, and return to highest level of pain free function.     Plan and Recommendations Continue aquatics 2x /week x 4 weeks to increased strength and endurance.     Planned Services CPT 81149 Aquatic therapy/exercises;CPT 63316 Gait training;CPT 38324 Manual therapy;CPT 96925 Neuromuscular Reeducation;CPT 50019 Therapeutic activities;CPT 41703 Therapeutic exercises;CPT 32575 Electrical stimulation ATTENDED;CPT 37030 Electrical stimulation UNATTENDED;CPT 34104 Hot/Cold Packs therapy                     OBJECTIVE MEASUREMENTS/DATA:     ROM   MMT   Outcome Measures     ROM and MMT          3/18/2024 5/2/2024    13:00 6/6/2024 7/18/2024    11:00   PT SANJUANA  ROM Measurements   PROM: Right Shoulder Flexion  138 degrees       8/10 148 degrees  135 degrees   AROM: Right Shoulder Flexion 106 degrees       4/10 110 degrees 110 degrees       5/10 102 degrees       5/10   PROM: Left Shoulder Flexion 122 degrees       5/10 118 degrees  105 degrees   AROM: Left Shoulder Flexion 108 degrees       4/10 100 degrees       3/10 100 degrees       5/10 96 degrees       5/10   AROM: Right Shoulder Extension 58 degrees 60 degrees 62 degrees       4/10 56 degrees       4/10   AROM: Left Shoulder Extension 52 degrees 40 degrees       3/10 53 degrees       4/10 52 degrees       4/10   PROM: Right Shoulder  degrees       6/10 110 degrees  100 degrees   AROM: Right Shoulder ABD 72 degrees       5/10 102 degrees       3/10 103 degrees 82 degrees       5/10   PROM: Left Shoulder ABD 93 degrees       8/10 100 degrees  72 degrees   AROM: Left Shoulder ABD 62 degrees       4/10 70 degrees       4/10 80 degrees       6/10 60 degrees       5/10 pain   PROM: Right Shoulder IR 36 degrees       @90 ABD 38 degrees  35 degrees       @90   AROM: Right Shoulder IR --        Lateral sacrum --        lat sacrum --        lateral sacrum --        lat sacrum   PROM: Left Shoulder IR 50 degrees       @45 ABD 58 degrees  70 degrees       @45   AROM: Left Shoulder IR --        lateral sacrum 4/10 --        lat sacrum --        lateral sacrum --        lat sacrum   PROM: Right Shoulder ER 50 degrees       @90 ABD 58 degrees  52 degrees       @90   AROM: Right Shoulder ER 32 degrees       T1 40 degrees       C7 --        C7 45 degrees   PROM: Left Shoulder ER 48 degrees       @45 ABD 30 degrees  38 degrees       @45   AROM: Left Shoulder ER 22 degrees       4/10, C7 26 degrees       C7 --        C7, 5/10 23 degrees   PT Cervical/Lumbar/Other ROM Measurements   AROM: Cervical Flexion 42      AROM: Cervical Extension 42      AROM: Left Cervical SB 16      AROM: Right Cervical SB 35      AROM: Left Cervical  Rotation 52      AROM: Right Cervical Rotation 62      AROM: Lumbar Flexion 100       5/10 110       7/10 110       5/10 100   AROM: Lumar Extension 23       2-3/10 16       5/10 20       5/10 25       4/10   AROM: Left Lumbar SB 8       4/10 8       8/10 12       6/10 20       4/10   AROM: Right Lumbar SB 22       3/10 15       6/10 18       6/10 22       4/10   AROM: Left Lumbar Rotation 38       2/10 50       8/10 50       6/10 43       4/10   AROM: Right Lumbar Rotation 52 38       4/10 55       6/10 45       4/10   PT UE MMT Measurements   Right Shoulder Flexion (4/5) good  (4+/5) good plus    Left Shoulder Flexion (4/5) good  (4+/5) good plus    Right Shoulder Extension (4+/5) good plus  (4+/5) good plus    Left Shoulder Extension (4+/5) good plus  (4+/5) good plus    Right Shoulder ADD   (4+/5) good plus    Left Shoulder ADD   (4+/5) good plus    Right Shoulder ABD (4/5) good  (4+/5) good plus    Left Shoulder ABD (4/5) good  (4/5) good    Right Shoulder IR (4+/5) good plus  (4+/5) good plus    Left Shoulder IR (4+/5) good plus  (4+/5) good plus    Right Shoulder ER (4/5) good  (4+/5) good plus    Left Shoulder ER (4+/5) good plus  (4+/5) good plus    Right Elbow Flexion (5/5) normal  (5/5) normal    Left Elbow Flexion (5/5) normal  (5/5) normal    Right Elbow Extension (5/5) normal  (5/5) normal    Left Elbow Extension (5/5) normal  (5/5) normal    PT LE MMT   Right Hip Flexion (4-/5) good minus  (4/5) good    Left Hip Flexion (4-/5) good minus  (4/5) good    Right Hip Extension (4-/5) good minus  (4/5) good    Left Hip Extension (4-/5) good minus  (4/5) good    Right Hip ABD   (4+/5) good plus    Left Hip ABD   (4+/5) good plus    Right Hip ADD   (4+/5) good plus    Left Hip ADD   (4+/5) good plus    Right Hip IR   (4/5) good    Left Hip IR   (4/5) good    Right Hip ER   (4/5) good    Left Hip ER   (4/5) good    Right Knee Flexion (5/5) normal  (5/5) normal    Left Knee Flexion (5/5) normal  (5/5) normal     Right Knee Extension (5/5) normal  (5/5) normal    Left Knee Extension (5/5) normal  (5/5) normal    Right Ankle DF (5/5) normal  (5/5) normal    Left Ankle DF (5/5) normal  (5/5) normal      Outcome Measures          3/18/2024    14:35 5/2/2024    12:55 6/6/2024    13:03 7/18/2024    10:12   PT SUBJECTIVE Outcome Measures   [place] 24/50=48% impaired 25/50=50% impaired 22/50 (previously 25/50) 21/50=42% impaired   Fabian Shoulder Score (PSS) 40 36 38 53       Today's Treatment::    ORTHO PT FLOWSHEET     Exercises Current Session Time Completed today   MODALITIES- HEAT/ICE  CPT 48805 TOTAL TIME FOR SESSION Not performed     Heat L shoulder x8 min beginning of session yes   Ice/Vasocompression L shoulder x5 min end of session     MODALITIES - E-STIM  CPT 75907    TOTAL TIME FOR SESSION Not performed     E-stim/H-Wave       THER ACT  CPT 29088 TOTAL TIME FOR SESSION 8-22 Minutes     Re-eval 6/6/24 All subjective and objective measurements completed y   Assessment ROM, MMT, Fabian, LOLY, RMQ  yes   Body Mechanics/Work Training Safe lifting education     THER EX  CPT 76216 TOTAL TIME FOR SESSION 38-52 Minutes     HEP 3/18: wand shoulder flexion, TAC with breathing  3/22: wand ABD, wand IR     CARDIOVASCULAR        TM       Nustep L1 x 10 min seat 7 UE/LE yes   UBE       TAC  10 sec x 10 with ball squeeze yes   TAC with march    yes   TAC with reverse march       Wand:  -flexion  -Abduction  -ER  -IR  -EXT    1x10  1x10 B  1x10 B  1x10 B  1x10 B    nt     nt   shoulder IR with strap               Pendulums LUE only, x20 cw/ccw  nt   Pulleys Bilateral, flexion x3 min     Wall slides Flexion 1x10 B     TB ER/IR 1x10 OTB ea      AROM shoulder flexion  ABD       sidelying shoulder ER       No monies  Horizontal ABD OTB 2x10  OTB 1x10    nt   Standing lumbar ext 1x10 nt   LTR 1x10 B yes   Hip 3 way  1x10 B  yes   Side stepping       HR 2x10 yes   Prone hip ext       clamshells       SLR 2x10 B yes   S/l hip ABD  2x10 B  yes  "  Pball rollout GPB 10x5\"     Bridge 2x10 with ball squeeze yes   Modified liliana stretch 30 sec x 4 B nt   Hip ER 30 sec x 4 B nt    STS  1x10 from low mat  yes   TB rows/EXT OTB 1x10 ea     Step up       NEURO RE-ED  CPT 83536 TOTAL TIME FOR SESSION Not performed     COORDINATION       POSTURAL RE-ED           PRE-GAIT ACTIVITIES        BALANCE TRAINING                        GAIT TRAINING  CPT 55133 TOTAL TIME FOR SESSION Not performed     Ambulation        Dynamic gait        MANUAL  CPT 82697  8-22 min     Stretching Shoulder PROM, B Yes   Mobilization  Shoulder GH mobilizations inferior and posterior  STM, B Yes      Rhythmic stabilization At 90 deg shoulder elevation N   Massage       Taping             Goals:   Goals       • [facility] PT Lumbar Goals      Short/Long Term Goals Time Frame Result Comment/Progress   Pt will increase core, LE, UE and Hip MMT >/= full grade 8 weeks weeks On going    Pt will increase lumbar ROM >/= 10 degrees into flexion, extension, lateral flexion and rotation 4 weeks ongoing    Pt will increase Shoulder ROM >/= 10 degrees into flexion, extension, lateral flexion and rotation 4 weeks ongoing    Pt will improve RMQ >/= 10% functional improvement 4 weeks ongoing IE 13/24=54% impaired  5/2: 21/24   Pt will demonstrate improvements in Oswestry >/=  10 % functional improvement 4 weeks Improved IE: 24/50=48% impaired  5/2: 25/50  6/6: 22/50  7/18: 21/50   Pt will demonstrate improvements in Oswestry >/=  20 % functional improvement 8 weeks Ongoing    Improve [place] shoulder scale >/= 12 points 4 weeks met IE 40/100  5/2: 36/100  6/6: 38/100  7/18: 53/100   Improve [place] shoulder scale >/= 24 points 8 weeks Ongoing    Pt will be able to lift 10 lbs without back or shoulder pain 12 weeks Ongoing    Pt will be able to walk for 30 min without back pain 4 weeks new    Increase AROM shoulder flexion, ABD >/= 120 deg 12 weeks Ongoing    Pt will be independent in [facility] for self management of symptoms 8 " weeks met    Pt will be able to don socks without back pain 12 weeks Ongoing                        This 74 y.o. year old female presents to PT with above stated diagnosis. Physical Therapy evaluation reveals decreased flexibility, decreased endurance, pain, decreased strength, impaired postural control, decreased ROM, impaired balance resulting in self-care, home management, community/leisure limitations. Reyna Castillo will benefit from skilled PT services to address limitation, work towards rehab and patient goals and maximize PLOF of chosen ADLs.     Planned Services: The patient's treatment will include CPT 29177 Aquatic therapy/exercises, CPT 26138 Gait training, CPT 40743 Manual therapy, CPT 64350 Neuromuscular Reeducation, CPT 20834 Therapeutic activities, CPT 84220 Therapeutic exercises, CPT 09737 Electrical stimulation ATTENDED, CPT 62227 Electrical stimulation UNATTENDED, CPT 83855 Hot/Cold Packs therapy, .     Evette Barkley PT                         Current Participants as of 8/1/2024    Name Type Comments Contact Info    TATE Lopes Referring Provider  [phone]    Signature pending    Evette Barkley PT Physical Therapist      Electronically signed by Evette Barkley PT at 8/1/2024 7890 EDT

## 2024-08-01 NOTE — OP PT TREATMENT LOG
"ORTHO PT FLOWSHEET     Exercises Current Session Time Completed today   MODALITIES- HEAT/ICE  CPT 69019 TOTAL TIME FOR SESSION Not performed     Heat L shoulder x8 min beginning of session yes   Ice/Vasocompression L shoulder x5 min end of session     MODALITIES - E-STIM  CPT 12520    TOTAL TIME FOR SESSION Not performed     E-stim/H-Wave       THER ACT  CPT 33179 TOTAL TIME FOR SESSION 8-22 Minutes     Re-eval 6/6/24 All subjective and objective measurements completed y   Assessment ROM, MMT, Fabian, LOLY, RMQ  yes   Body Mechanics/Work Training Safe lifting education     THER EX  CPT 85561 TOTAL TIME FOR SESSION 38-52 Minutes     HEP 3/18: wand shoulder flexion, TAC with breathing  3/22: wand ABD, wand IR     CARDIOVASCULAR        TM       Nustep L1 x 10 min seat 7 UE/LE yes   UBE       TAC  10 sec x 10 with ball squeeze yes   TAC with march    yes   TAC with reverse march       Wand:  -flexion  -Abduction  -ER  -IR  -EXT    1x10  1x10 B  1x10 B  1x10 B  1x10 B    nt     nt   shoulder IR with strap               Pendulums LUE only, x20 cw/ccw  nt   Pulleys Bilateral, flexion x3 min     Wall slides Flexion 1x10 B     TB ER/IR 1x10 OTB ea      AROM shoulder flexion  ABD       sidelying shoulder ER       No monies  Horizontal ABD OTB 2x10  OTB 1x10    nt   Standing lumbar ext 1x10 nt   LTR 1x10 B yes   Hip 3 way  1x10 B  yes   Side stepping       HR 2x10 yes   Prone hip ext       clamshells       SLR 2x10 B yes   S/l hip ABD  2x10 B  yes   Pball rollout GPB 10x5\"     Bridge 2x10 with ball squeeze yes   Modified liliana stretch 30 sec x 4 B nt   Hip ER 30 sec x 4 B nt    STS  1x10 from low mat  yes   TB rows/EXT OTB 1x10 ea     Step up       NEURO RE-ED  CPT 62083 TOTAL TIME FOR SESSION Not performed     COORDINATION       POSTURAL RE-ED           PRE-GAIT ACTIVITIES        BALANCE TRAINING                        GAIT TRAINING  CPT 98320 TOTAL TIME FOR SESSION Not performed     Ambulation        Dynamic gait      "   MANUAL  CPT 22611  8-22 min     Stretching Shoulder PROM, B Yes   Mobilization  Shoulder GH mobilizations inferior and posterior  STM, B Yes      Rhythmic stabilization At 90 deg shoulder elevation N   Massage       Taping

## 2024-08-01 NOTE — PROGRESS NOTES
Physical Therapy Progress Note    [facility] Fax: [phone]    PT RE-EVALUATION FOR OUTPATIENT THERAPY    Patient: Reyna Castillo     MRN: [id number]  : 1950 74 y.o.    Referring Physician: Анна Terrazas CRNP  Date of Visit: 2024    New Certification Dates: 24 through 24    Recommended Frequency & Duration:  2 times/week for up to 8 weeks        Diagnosis:   1. Vertebrogenic low back pain    2. S/P lumbar spinal fusion    3. Decreased range of motion        Chief Complaints:  Chief Complaint   Patient presents with    Difficulty Walking    Balance Deficits    Dec ROM    Dec Strength    Pain    Decreased Endurance    Abnormality Of Gait    Joint Pain     Imbalance    Decreased recreational/play activity    Decreased Mobility       Precautions:   Existing Precautions/Restrictions: fall    TODAY'S VISIT:    Time In Session:  Start Time: 1302  Stop Time: 1358  Time Calculation (min): 56 min   General Information - 24 1307          Session Details    Document Type re-evaluation     Mode of Treatment individual therapy        General Information    Referring Physician Анна YBARRA     History of present illness/functional impairment Pt is a 74 y/o female who underwent 10/12/22 T10-S1 Posterior Thorocolumbosacral Decompression and Interbody Fusion, Instrumentation, Pelvic Fixation, Allograft, Autograft, BMP, Revision of Existing Instrumentation at [facility] by Dr Lee. She presents with chronic LBP with decreased mobility. She also was dx with bilateral shoulder OA/frozen shoulder with signficant limitations in shoulder mobility and function.     Patient/Family/Caregiver Comments/Observations Pt reports that she wants to continue to work on her strength and edurance to improve her walking tolerance. She reports 4-5/10 back pain today. She reports that the pool helps with her pain and she is able to do more activity.     Existing Precautions/Restrictions fall        "             Daily Falls Screen - 08/01/24 1307          Daily Falls Assessment    Patient reported fall since last visit No                    Pain/Vitals - 08/01/24 1307          Pain Assessment    Currently in pain Yes     Preferred Pain Scale number (Numeric Rating Pain Scale)     Pain: Body location Back     Pain Rating (0-10): Pre Activity 3        Pain Intervention    Intervention  TE     Post Intervention Comments \"I feel good\"                    PT - 08/01/24 1307          Physical Therapy    Physical Therapy Special[facility] Sports PT        PT Plan    Frequency of treatment 2 times/week     PT Duration 8 weeks     PT Cert From 08/01/24     PT Cert To 09/26/24     Date PT POC was sent to provider 08/01/24     Signed PT Plan of Care received?  No                    Assessment and Plan - 08/01/24 1618          Assessment    Plan of Care reviewed and patient/family in agreement Yes     System Pathology/Pathophysiology Noted musculoskeletal;cardiovascular     Functional Limitations in Following Categories (PT Eval) self-care;home management;community/leisure     Rehab Potential/Prognosis good, to achieve stated therapy goals     Problem List decreased flexibility;decreased endurance;pain;decreased strength;impaired postural control;decreased ROM;impaired balance     Clinical Assessment Patient is a 73 yo female who presenting to PT with back pain and B shoulder OA. She continues to demonstrate improved BLE and BUE strength, improved lumbar ROM, improved LOLY and Marydel Shoulder outcome measure scoring, and progression toward all short and long term goals. Her shoulder progress has been limited by pain and severe crepitus, limiting tolerance to stretching and strengthening, but she demonstrates good progress with lumbar pain and strength. She continues to be limited by decreased endurance, core and hip strength. She will benefit from continued aquatic program to addres limitations and progress towards goals. " Patient will continue to benefit from skilled PT in order to address remaining deficits, continue progression toward short/long term goals, and return to highest level of pain free function.     Plan and Recommendations Continue aquatics 2x /week x 4 weeks to increased strength and endurance.     Planned Services CPT 47792 Aquatic therapy/exercises;CPT 22675 Gait training;CPT 35048 Manual therapy;CPT 33314 Neuromuscular Reeducation;CPT 68255 Therapeutic activities;CPT 83477 Therapeutic exercises;CPT 82141 Electrical stimulation ATTENDED;CPT 61273 Electrical stimulation UNATTENDED;CPT 67391 Hot/Cold Packs therapy                     OBJECTIVE MEASUREMENTS/DATA:     ROM   MMT   Outcome Measures     ROM and MMT          3/18/2024 5/2/2024    13:00 6/6/2024 7/18/2024    11:00   PT UE ROM Measurements   PROM: Right Shoulder Flexion  138 degrees       8/10 148 degrees  135 degrees   AROM: Right Shoulder Flexion 106 degrees       4/10 110 degrees 110 degrees       5/10 102 degrees       5/10   PROM: Left Shoulder Flexion 122 degrees       5/10 118 degrees  105 degrees   AROM: Left Shoulder Flexion 108 degrees       4/10 100 degrees       3/10 100 degrees       5/10 96 degrees       5/10   AROM: Right Shoulder Extension 58 degrees 60 degrees 62 degrees       4/10 56 degrees       4/10   AROM: Left Shoulder Extension 52 degrees 40 degrees       3/10 53 degrees       4/10 52 degrees       4/10   PROM: Right Shoulder  degrees       6/10 110 degrees  100 degrees   AROM: Right Shoulder ABD 72 degrees       5/10 102 degrees       3/10 103 degrees 82 degrees       5/10   PROM: Left Shoulder ABD 93 degrees       8/10 100 degrees  72 degrees   AROM: Left Shoulder ABD 62 degrees       4/10 70 degrees       4/10 80 degrees       6/10 60 degrees       5/10 pain   PROM: Right Shoulder IR 36 degrees       @90 ABD 38 degrees  35 degrees       @90   AROM: Right Shoulder IR --        Lateral sacrum --        lat sacrum --         lateral sacrum --        lat sacrum   PROM: Left Shoulder IR 50 degrees       @45 ABD 58 degrees  70 degrees       @45   AROM: Left Shoulder IR --        lateral sacrum 4/10 --        lat sacrum --        lateral sacrum --        lat sacrum   PROM: Right Shoulder ER 50 degrees       @90 ABD 58 degrees  52 degrees       @90   AROM: Right Shoulder ER 32 degrees       T1 40 degrees       C7 --        C7 45 degrees   PROM: Left Shoulder ER 48 degrees       @45 ABD 30 degrees  38 degrees       @45   AROM: Left Shoulder ER 22 degrees       4/10, C7 26 degrees       C7 --        C7, 5/10 23 degrees   PT Cervical/Lumbar/Other ROM Measurements   AROM: Cervical Flexion 42      AROM: Cervical Extension 42      AROM: Left Cervical SB 16      AROM: Right Cervical SB 35      AROM: Left Cervical Rotation 52      AROM: Right Cervical Rotation 62      AROM: Lumbar Flexion 100       5/10 110       7/10 110       5/10 100   AROM: Lumar Extension 23       2-3/10 16       5/10 20       5/10 25       4/10   AROM: Left Lumbar SB 8       4/10 8       8/10 12       6/10 20       4/10   AROM: Right Lumbar SB 22       3/10 15       6/10 18       6/10 22       4/10   AROM: Left Lumbar Rotation 38       2/10 50       8/10 50       6/10 43       4/10   AROM: Right Lumbar Rotation 52 38       4/10 55       6/10 45       4/10   PT UE MMT Measurements   Right Shoulder Flexion (4/5) good  (4+/5) good plus    Left Shoulder Flexion (4/5) good  (4+/5) good plus    Right Shoulder Extension (4+/5) good plus  (4+/5) good plus    Left Shoulder Extension (4+/5) good plus  (4+/5) good plus    Right Shoulder ADD   (4+/5) good plus    Left Shoulder ADD   (4+/5) good plus    Right Shoulder ABD (4/5) good  (4+/5) good plus    Left Shoulder ABD (4/5) good  (4/5) good    Right Shoulder IR (4+/5) good plus  (4+/5) good plus    Left Shoulder IR (4+/5) good plus  (4+/5) good plus    Right Shoulder ER (4/5) good  (4+/5) good plus    Left Shoulder ER (4+/5) good plus   (4+/5) good plus    Right Elbow Flexion (5/5) normal  (5/5) normal    Left Elbow Flexion (5/5) normal  (5/5) normal    Right Elbow Extension (5/5) normal  (5/5) normal    Left Elbow Extension (5/5) normal  (5/5) normal    PT LE MMT   Right Hip Flexion (4-/5) good minus  (4/5) good    Left Hip Flexion (4-/5) good minus  (4/5) good    Right Hip Extension (4-/5) good minus  (4/5) good    Left Hip Extension (4-/5) good minus  (4/5) good    Right Hip ABD   (4+/5) good plus    Left Hip ABD   (4+/5) good plus    Right Hip ADD   (4+/5) good plus    Left Hip ADD   (4+/5) good plus    Right Hip IR   (4/5) good    Left Hip IR   (4/5) good    Right Hip ER   (4/5) good    Left Hip ER   (4/5) good    Right Knee Flexion (5/5) normal  (5/5) normal    Left Knee Flexion (5/5) normal  (5/5) normal    Right Knee Extension (5/5) normal  (5/5) normal    Left Knee Extension (5/5) normal  (5/5) normal    Right Ankle DF (5/5) normal  (5/5) normal    Left Ankle DF (5/5) normal  (5/5) normal      Outcome Measures          3/18/2024    14:35 5/2/2024    12:55 6/6/2024    13:03 7/18/2024    10:12   PT SUBJECTIVE Outcome Measures   Oswestry 24/50=48% impaired 25/50=50% impaired 22/50 (previously 25/50) 21/50=42% impaired   Fabian Shoulder Score (PSS) 40 36 38 53       Today's Treatment::    ORTHO PT FLOWSHEET     Exercises Current Session Time Completed today   MODALITIES- HEAT/ICE  CPT 67082 TOTAL TIME FOR SESSION Not performed     Heat L shoulder x8 min beginning of session yes   Ice/Vasocompression L shoulder x5 min end of session     MODALITIES - E-STIM  CPT 27672    TOTAL TIME FOR SESSION Not performed     E-stim/H-Wave       THER ACT  CPT 94687 TOTAL TIME FOR SESSION 8-22 Minutes     Re-eval 6/6/24 All subjective and objective measurements completed y   Assessment ROM, MMT, Orosi, LOLY, RMQ  yes   Body Mechanics/Work Training Safe lifting education     THER EX  CPT 46481 TOTAL TIME FOR SESSION 38-52 Minutes     HEP 3/18: lashell shoulder flexion,  "TAC with breathing  3/22: wand ABD, wand IR     CARDIOVASCULAR        TM       Nustep L1 x 10 min seat 7 UE/LE yes   UBE       TAC  10 sec x 10 with ball squeeze yes   TAC with march    yes   TAC with reverse march       Wand:  -flexion  -Abduction  -ER  -IR  -EXT    1x10  1x10 B  1x10 B  1x10 B  1x10 B    nt     nt   shoulder IR with strap               Pendulums LUE only, x20 cw/ccw  nt   Pulleys Bilateral, flexion x3 min     Wall slides Flexion 1x10 B     TB ER/IR 1x10 OTB ea      AROM shoulder flexion  ABD       sidelying shoulder ER       No monies  Horizontal ABD OTB 2x10  OTB 1x10    nt   Standing lumbar ext 1x10 nt   LTR 1x10 B yes   Hip 3 way  1x10 B  yes   Side stepping       HR 2x10 yes   Prone hip ext       clamshells       SLR 2x10 B yes   S/l hip ABD  2x10 B  yes   Pball rollout GPB 10x5\"     Bridge 2x10 with ball squeeze yes   Modified liliana stretch 30 sec x 4 B nt   Hip ER 30 sec x 4 B nt    STS  1x10 from low mat  yes   TB rows/EXT OTB 1x10 ea     Step up       NEURO RE-ED  CPT 56104 TOTAL TIME FOR SESSION Not performed     COORDINATION       POSTURAL RE-ED           PRE-GAIT ACTIVITIES        BALANCE TRAINING                        GAIT TRAINING  CPT 59884 TOTAL TIME FOR SESSION Not performed     Ambulation        Dynamic gait        MANUAL  CPT 70863  8-22 min     Stretching Shoulder PROM, B Yes   Mobilization  Shoulder GH mobilizations inferior and posterior  STM, B Yes      Rhythmic stabilization At 90 deg shoulder elevation N   Massage       Taping             Goals:   Goals        [facility] PT Lumbar Goals      Short/Long Term Goals Time Frame Result Comment/Progress   Pt will increase core, LE, UE and Hip MMT >/= full grade 8 weeks weeks On going    Pt will increase lumbar ROM >/= 10 degrees into flexion, extension, lateral flexion and rotation 4 weeks ongoing    Pt will increase Shoulder ROM >/= 10 degrees into flexion, extension, lateral flexion and rotation 4 weeks ongoing    Pt will improve " RMQ >/= 10% functional improvement 4 weeks ongoing IE 13/24=54% impaired  5/2: 21/24   Pt will demonstrate improvements in Oswestry >/=  10 % functional improvement 4 weeks Improved IE: 24/50=48% impaired  5/2: 25/50  6/6: 22/50 7/18: 21/50   Pt will demonstrate improvements in Oswestry >/=  20 % functional improvement 8 weeks Ongoing    Improve Fabian shoulder scale >/= 12 points 4 weeks met IE 40/100  5/2: 36/100  6/6: 38/100  7/18: 53/100   Improve [place] shoulder scale >/= 24 points 8 weeks Ongoing    Pt will be able to lift 10 lbs without back or shoulder pain 12 weeks Ongoing    Pt will be able to walk for 30 min without back pain 4 weeks new    Increase AROM shoulder flexion, ABD >/= 120 deg 12 weeks Ongoing    Pt will be independent in HEP for self management of symptoms 8 weeks met    Pt will be able to don socks without back pain 12 weeks Ongoing                        This 74 y.o. year old female presents to PT with above stated diagnosis. Physical Therapy evaluation reveals decreased flexibility, decreased endurance, pain, decreased strength, impaired postural control, decreased ROM, impaired balance resulting in self-care, home management, community/leisure limitations. Reyna Castillo will benefit from skilled PT services to address limitation, work towards rehab and patient goals and maximize PLOF of chosen ADLs.     Planned Services: The patient's treatment will include CPT 08343 Aquatic therapy/exercises, CPT 40193 Gait training, CPT 77052 Manual therapy, CPT 85555 Neuromuscular Reeducation, CPT 38128 Therapeutic activities, CPT 99218 Therapeutic exercises, CPT 29663 Electrical stimulation ATTENDED, CPT 77881 Electrical stimulation UNATTENDED, CPT 28772 Hot/Cold Packs therapy, .     Evette Barkley, PT

## 2024-08-06 ENCOUNTER — HOSPITAL ENCOUNTER (OUTPATIENT)
Dept: PHYSICAL THERAPY | Facility: REHABILITATION | Age: 74
Setting detail: THERAPIES SERIES
Discharge: HOME | End: 2024-08-06
Attending: NURSE PRACTITIONER
Payer: COMMERCIAL

## 2024-08-06 DIAGNOSIS — M19.012 LOCALIZED OSTEOARTHRITIS OF BOTH SHOULDERS: ICD-10-CM

## 2024-08-06 DIAGNOSIS — M25.60 DECREASED RANGE OF MOTION: ICD-10-CM

## 2024-08-06 DIAGNOSIS — Z98.1 S/P LUMBAR SPINAL FUSION: ICD-10-CM

## 2024-08-06 DIAGNOSIS — M54.51 VERTEBROGENIC LOW BACK PAIN: Primary | ICD-10-CM

## 2024-08-06 DIAGNOSIS — M19.011 LOCALIZED OSTEOARTHRITIS OF BOTH SHOULDERS: ICD-10-CM

## 2024-08-06 DIAGNOSIS — R53.1 DECREASED STRENGTH: ICD-10-CM

## 2024-08-06 PROCEDURE — 97113 AQUATIC THERAPY/EXERCISES: CPT | Mod: GP

## 2024-08-06 NOTE — OP PT TREATMENT LOG
"Today's Treatment:     Precautions:          Individual aquatics 71802   53-67 min Yes   Group aquatics CPT 76407     no   Aquatics exercises flow sheet     Completed today             Walking forwards 5 laps   yes   Side stepping 5 laps   yes   kayak         Walking backwards  5 laps   yes   marching 5 laps yes           LE exercises               Hip abduction 2x10 with bar yes   Hip flexion 2x10 with bar yes   Hip abduction 2x10 with bar yes   Circles  cw/ccw   nv   Single leg press with noodle        L hip flexion iso   5 sec x10  nt   UE  exercises               Shoulder flexion 2x10 with paddles yes   Shoulder abduction 2x10 with paddles yes   Biceps/triceps 2x10 with paddles yes   Horizontal abd/add 2x10 with paddles yes   Kickboard press 2x10     shoulder ER  2x10 yes   Step up forwards 1x10 B on 10\" step yes   Step ups side 1x10 B on 10\" step yes   W's with kick boards       squats 2x10 yes   Heel raises 2x10 B at wall yes   Knee curls 2x10 B at wall yes   Sitting on blue disc               Floating with noodles  3 min  Knee to chest 1x10    Scissors, bicycling, skiers               stretching       Calf stretch 30 sec x 4 at wall    Low back stretch  lumbar ext 1x10  nt, res                                                             "

## 2024-08-06 NOTE — PROGRESS NOTES
Physical Therapy Visit    PT DAILY NOTE FOR OUTPATIENT THERAPY    Patient: Reyna Castillo MRN: [id number]  : 1950 74 y.o.  Referring Physician: Анна Terrazas CRNP  Date of Visit: 2024    Certification Dates: 24 through 24    Diagnosis:   1. Vertebrogenic low back pain    2. S/P lumbar spinal fusion    3. Decreased range of motion    4. Localized osteoarthritis of both shoulders    5. Decreased strength        Chief Complaints:  s/p lumbar surgery, pain lumbar spine R side, weakness    Precautions:   Existing Precautions/Restrictions: fall      TODAY'S VISIT    Time In Session:  Start Time: 1401  Stop Time: 1456  Time Calculation (min): 55 min   History/Vitals/Pain/Encounter Info - 24 1346          Injury History/Precautions/Daily Required Info    Document Type daily treatment     Primary Therapist Evette Barkley, PT, DPT, OCS     Chief Complaint/Reason for Visit  s/p lumbar surgery, pain lumbar spine R side, weakness     Referring Physician Анна YBARRA     Existing Precautions/Restrictions fall     History of present illness/functional impairment Pt is a 74 y/o female who underwent 10/12/22 T10-S1 Posterior Thorocolumbosacral Decompression and Interbody Fusion, Instrumentation, Pelvic Fixation, Allograft, Autograft, BMP, Revision of Existing Instrumentation at [facility] by Dr Lee. She presents with chronic LBP with decreased mobility. She also was dx with bilateral shoulder OA/frozen shoulder with signficant limitations in shoulder mobility and function.     Patient/Family/Caregiver Comments/Observations Pt reports that she has shoulder and back pain today 5/10.     Patient reported fall since last visit No        Pain Assessment    Currently in pain Yes     Preferred Pain Scale number (Numeric Rating Pain Scale)     Pain Side/Orientation bilateral     Pain: Body location Back                    Daily Treatment Assessment and Plan - 24 1346          Daily  "Treatment Assessment and Plan    Progress toward goals Progressing     Daily Outcome Summary Pt demonstrated good tolerance to pool program. required cues for TVA recruitment and maintaining good form with therex. Improved sxs post session.     Plan and Recommendations continue aquatic program                         OBJECTIVE DATA TAKEN TODAY:    None taken    Today's Treatment:    Today's Treatment:     Precautions:          Individual aquatics 62235   53-67 min Yes   Group aquatics CPT 45830     no   Aquatics exercises flow sheet     Completed today             Walking forwards 5 laps   yes   Side stepping 5 laps   yes   kayak         Walking backwards  5 laps   yes   marching 5 laps yes           LE exercises               Hip abduction 2x10 with bar yes   Hip flexion 2x10 with bar yes   Hip abduction 2x10 with bar yes   Circles  cw/ccw   nv   Single leg press with noodle        L hip flexion iso   5 sec x10  nt   UE  exercises               Shoulder flexion 2x10 with paddles yes   Shoulder abduction 2x10 with paddles yes   Biceps/triceps 2x10 with paddles yes   Horizontal abd/add 2x10 with paddles yes   Kickboard press 2x10     shoulder ER  2x10 yes   Step up forwards 1x10 B on 10\" step yes   Step ups side 1x10 B on 10\" step yes   W's with kick boards       squats 2x10 yes   Heel raises 2x10 B at wall yes   Knee curls 2x10 B at wall yes   Sitting on blue disc               Floating with noodles  3 min  Knee to chest 1x10    Scissors, bicycling, skiers               stretching       Calf stretch 30 sec x 4 at wall    Low back stretch  lumbar ext 1x10  nt, res                                                                                    "

## 2024-08-08 ENCOUNTER — HOSPITAL ENCOUNTER (OUTPATIENT)
Dept: PHYSICAL THERAPY | Facility: REHABILITATION | Age: 74
Setting detail: THERAPIES SERIES
Discharge: HOME | End: 2024-08-08
Attending: NURSE PRACTITIONER
Payer: COMMERCIAL

## 2024-08-08 DIAGNOSIS — Z98.1 S/P LUMBAR SPINAL FUSION: ICD-10-CM

## 2024-08-08 DIAGNOSIS — M19.011 LOCALIZED OSTEOARTHRITIS OF BOTH SHOULDERS: ICD-10-CM

## 2024-08-08 DIAGNOSIS — R53.1 DECREASED STRENGTH: ICD-10-CM

## 2024-08-08 DIAGNOSIS — M54.51 VERTEBROGENIC LOW BACK PAIN: Primary | ICD-10-CM

## 2024-08-08 DIAGNOSIS — M25.60 DECREASED RANGE OF MOTION: ICD-10-CM

## 2024-08-08 DIAGNOSIS — M19.012 LOCALIZED OSTEOARTHRITIS OF BOTH SHOULDERS: ICD-10-CM

## 2024-08-08 PROCEDURE — 97113 AQUATIC THERAPY/EXERCISES: CPT | Mod: GP

## 2024-08-08 NOTE — PROGRESS NOTES
Physical Therapy Visit    PT DAILY NOTE FOR OUTPATIENT THERAPY    Patient: Reyna Castillo MRN: [id number]  : 1950 74 y.o.  Referring Physician: Анна Terrazas CRNP  Date of Visit: 2024    Certification Dates: 24 through 24    Diagnosis:   1. Vertebrogenic low back pain    2. S/P lumbar spinal fusion    3. Decreased range of motion    4. Localized osteoarthritis of both shoulders    5. Decreased strength        Chief Complaints:  s/p lumbar surgery, pain lumbar spine R side, weakness    Precautions:   Existing Precautions/Restrictions: fall      TODAY'S VISIT    Time In Session:  Start Time: 1300  Stop Time: 1355  Time Calculation (min): 55 min   History/Vitals/Pain/Encounter Info - 24 1302          Injury History/Precautions/Daily Required Info    Document Type daily treatment     Primary Therapist Evette Barkley, PT, DPT, OCS     Chief Complaint/Reason for Visit  s/p lumbar surgery, pain lumbar spine R side, weakness     Referring Physician Анна YBARRA     Existing Precautions/Restrictions fall     History of present illness/functional impairment Pt is a 74 y/o female who underwent 10/12/22 T10-S1 Posterior Thorocolumbosacral Decompression and Interbody Fusion, Instrumentation, Pelvic Fixation, Allograft, Autograft, BMP, Revision of Existing Instrumentation at [facility] by Dr Lee. She presents with chronic LBP with decreased mobility. She also was dx with bilateral shoulder OA/frozen shoulder with signficant limitations in shoulder mobility and function.     Patient/Family/Caregiver Comments/Observations Pt reports increased pain in her back and shoulders due to the weather.     Patient reported fall since last visit No        Pain Assessment    Currently in pain Yes     Preferred Pain Scale number (Numeric Rating Pain Scale)     Pain Side/Orientation bilateral     Pain: Body location Back;Shoulder     Pain Rating (0-10): Pre Activity 5        Pain Intervention     "Intervention  Pool therapy     Post Intervention Comments monitored                    Daily Treatment Assessment and Plan - 08/08/24 1302          Daily Treatment Assessment and Plan    Progress toward goals Progressing     Daily Outcome Summary Pt demo good tolerance to pool program noting decrease in sxs post session. Pt performed UE in deeper water today to increase resistance with paddles.     Plan and Recommendations continue aquatic program                         OBJECTIVE DATA TAKEN TODAY:    None taken    Today's Treatment:    Today's Treatment:     Precautions:          Individual aquatics 61966   53-67 min Yes   Group aquatics CPT 98379     no   Aquatics exercises flow sheet     Completed today             Walking forwards 5 laps   yes   Side stepping 5 laps   yes   kayak         Walking backwards  5 laps   yes   marching 5 laps yes           LE exercises               Hip abduction 2x10 with bar yes   Hip flexion 2x10 with bar yes   Hip abduction 2x10 with bar yes   Circles  cw/ccw   nv   Single leg press with noodle        L hip flexion iso   5 sec x10  nt   UE  exercises               Shoulder flexion 2x10 with paddles yes   Shoulder abduction 2x10 with paddles yes   Biceps/triceps 2x10 with paddles yes   Horizontal abd/add 2x10 with paddles yes   Kickboard press 2x10     shoulder ER  2x10 yes   Step up forwards 1x10 B on 10\" step yes   Step ups side 1x10 B on 10\" step yes   W's with kick boards       squats 2x10 yes   Heel raises 2x10 B at wall yes   Knee curls 2x10 B at wall yes   Sitting on blue disc               Floating with noodles  3 min  Knee to chest 1x10    Scissors, bicycling, skiers               stretching       Calf stretch 30 sec x 4 at wall    Low back stretch  lumbar ext 1x10  nt, res                                                                                    "

## 2024-08-08 NOTE — OP PT TREATMENT LOG
"Today's Treatment:     Precautions:          Individual aquatics 86610   53-67 min Yes   Group aquatics CPT 67697     no   Aquatics exercises flow sheet     Completed today             Walking forwards 5 laps   yes   Side stepping 5 laps   yes   kayak         Walking backwards  5 laps   yes   marching 5 laps yes           LE exercises               Hip abduction 2x10 with bar yes   Hip flexion 2x10 with bar yes   Hip abduction 2x10 with bar yes   Circles  cw/ccw   nv   Single leg press with noodle        L hip flexion iso   5 sec x10  nt   UE  exercises               Shoulder flexion 2x10 with paddles yes   Shoulder abduction 2x10 with paddles yes   Biceps/triceps 2x10 with paddles yes   Horizontal abd/add 2x10 with paddles yes   Kickboard press 2x10     shoulder ER  2x10 yes   Step up forwards 1x10 B on 10\" step yes   Step ups side 1x10 B on 10\" step yes   W's with kick boards       squats 2x10 yes   Heel raises 2x10 B at wall yes   Knee curls 2x10 B at wall yes   Sitting on blue disc               Floating with noodles  3 min  Knee to chest 1x10    Scissors, bicycling, skiers               stretching       Calf stretch 30 sec x 4 at wall    Low back stretch  lumbar ext 1x10  nt, res                                                             "

## 2024-08-13 ENCOUNTER — HOSPITAL ENCOUNTER (OUTPATIENT)
Dept: PHYSICAL THERAPY | Facility: REHABILITATION | Age: 74
Setting detail: THERAPIES SERIES
Discharge: HOME | End: 2024-08-13
Attending: NURSE PRACTITIONER
Payer: COMMERCIAL

## 2024-08-13 DIAGNOSIS — Z98.1 S/P LUMBAR SPINAL FUSION: ICD-10-CM

## 2024-08-13 DIAGNOSIS — M54.51 VERTEBROGENIC LOW BACK PAIN: Primary | ICD-10-CM

## 2024-08-13 DIAGNOSIS — M25.60 DECREASED RANGE OF MOTION: ICD-10-CM

## 2024-08-13 PROCEDURE — 97113 AQUATIC THERAPY/EXERCISES: CPT | Mod: GP

## 2024-08-13 NOTE — OP PT TREATMENT LOG
"Today's Treatment:     Precautions:          Individual aquatics 59750   53-67 min Yes   Group aquatics CPT 08317     no   Aquatics exercises flow sheet     Completed today             Walking forwards 5 laps   yes   Side stepping 5 laps   yes   kayak         Walking backwards  5 laps   yes   marching 5 laps yes           LE exercises               Hip abduction 2x10 with bar yes   Hip flexion 2x10 with bar yes   Hip abduction 2x10 with bar yes   Circles  cw/ccw   nv   Single leg press with noodle        L hip flexion iso   5 sec x10  nt   UE  exercises               Shoulder flexion 2x10 with paddles yes   Shoulder abduction 2x10 with paddles yes   Biceps/triceps 2x10 with paddles yes   Horizontal abd/add 2x10 with paddles yes   Kickboard press 2x10 yes    shoulder ER  2x10 yes   Step up forwards 2x10 B on 10\" step yes   Step ups side 2x10 B on 10\" step yes   W's with kick boards       squats 2x10 yes   Heel raises 2x10 B at wall yes   Knee curls 2x10 B at wall yes   Sitting on blue disc               Floating with noodles  3 min  Knee to chest 1x10 yes   Scissors, bicycling, skiers               stretching       Calf stretch 30 sec x 4 at wall    Low back stretch  lumbar ext 1x10  nt, res                                                             "

## 2024-08-14 NOTE — PROGRESS NOTES
Physical Therapy Visit    PT DAILY NOTE FOR OUTPATIENT THERAPY    Patient: Reyna Castillo MRN: [id number]  : 1950 74 y.o.  Referring Physician: Анна Terrazas CRNP  Date of Visit: 2024    Certification Dates: 24 through 24    Diagnosis:   1. Vertebrogenic low back pain    2. S/P lumbar spinal fusion    3. Decreased range of motion        Chief Complaints:  s/p lumbar surgery, pain lumbar spine R side, weakness    Precautions:   Existing Precautions/Restrictions: fall      TODAY'S VISIT    Time In Session:  Start Time: 1400  Stop Time: 1455  Time Calculation (min): 55 min   History/Vitals/Pain/Encounter Info - 24 1127          Injury History/Precautions/Daily Required Info    Document Type daily treatment     Primary Therapist Evette Barkley, PT, DPT, OCS     Chief Complaint/Reason for Visit  s/p lumbar surgery, pain lumbar spine R side, weakness     Referring Physician Анна YBARRA     Existing Precautions/Restrictions fall     History of present illness/functional impairment Pt is a 74 y/o female who underwent 10/12/22 T10-S1 Posterior Thorocolumbosacral Decompression and Interbody Fusion, Instrumentation, Pelvic Fixation, Allograft, Autograft, BMP, Revision of Existing Instrumentation at [facility] by Dr Lee. She presents with chronic LBP with decreased mobility. She also was dx with bilateral shoulder OA/frozen shoulder with signficant limitations in shoulder mobility and function.     Patient/Family/Caregiver Comments/Observations Patient arrives to aquatic PT reporting she feels good today but her shoulders definitely still bother her.     Patient reported fall since last visit No        Pain Assessment    Currently in pain Yes     Preferred Pain Scale number (Numeric Rating Pain Scale)     Pain Side/Orientation bilateral     Pain: Body location Shoulder     Pain Rating (0-10): Pre Activity 4     Pain Rating (0-10): Activity 2     Pain Rating (0-10): Post  "Activity 2        Pain Intervention    Intervention  aquatic TE     Post Intervention Comments decreased post session                    Daily Treatment Assessment and Plan - 08/14/24 1127          Daily Treatment Assessment and Plan    Progress toward goals Progressing     Daily Outcome Summary Patient continues to demonstrate positive response to aquatic therapy program, reporting decreased pain post session. Verbal cues for form throughout Andrew. Patient reports (+) fatigue post session. Increased repetitions of FSU/LSU this date which patient was able to tolerate with no increased pain.     Plan and Recommendations continue aquatic program                         OBJECTIVE DATA TAKEN TODAY:    None taken    Today's Treatment:    Today's Treatment:     Precautions:          Individual aquatics 82242   53-67 min Yes   Group aquatics CPT 02736     no   Aquatics exercises flow sheet     Completed today             Walking forwards 5 laps   yes   Side stepping 5 laps   yes   kayak         Walking backwards  5 laps   yes   marching 5 laps yes           LE exercises               Hip abduction 2x10 with bar yes   Hip flexion 2x10 with bar yes   Hip abduction 2x10 with bar yes   Circles  cw/ccw   nv   Single leg press with noodle        L hip flexion iso   5 sec x10  nt   UE  exercises               Shoulder flexion 2x10 with paddles yes   Shoulder abduction 2x10 with paddles yes   Biceps/triceps 2x10 with paddles yes   Horizontal abd/add 2x10 with paddles yes   Kickboard press 2x10 yes    shoulder ER  2x10 yes   Step up forwards 2x10 B on 10\" step yes   Step ups side 2x10 B on 10\" step yes   W's with kick boards       squats 2x10 yes   Heel raises 2x10 B at wall yes   Knee curls 2x10 B at wall yes   Sitting on blue disc               Floating with noodles  3 min  Knee to chest 1x10 yes   Scissors, bicycling, skiers               stretching       Calf stretch 30 sec x 4 at wall    Low back stretch  lumbar ext 1x10  nt, " res

## 2024-08-19 ENCOUNTER — HOSPITAL ENCOUNTER (OUTPATIENT)
Dept: RADIOLOGY | Age: 74
Discharge: HOME | End: 2024-08-19
Attending: UROLOGY
Payer: COMMERCIAL

## 2024-08-19 DIAGNOSIS — N28.1 CYST OF KIDNEY, ACQUIRED: ICD-10-CM

## 2024-08-19 RX ORDER — GADOBUTROL 604.72 MG/ML
10.2 INJECTION INTRAVENOUS ONCE
Status: COMPLETED | OUTPATIENT
Start: 2024-08-19 | End: 2024-08-19

## 2024-08-19 RX ADMIN — GADOBUTROL 10 ML: 604.72 INJECTION INTRAVENOUS at 11:33

## 2024-08-19 NOTE — PROGRESS NOTES
"Reyna Castillo   [id number]    Your doctor has referred you for an MRI examination that requires the injection of a contrast material into your bloodstream. This contrast material, sometimes referred to as \"x-ray dye\" allows for better interpretation and results in a more accurate interpretation of the examination.     Without the use of contrast, the examination may be less informative and result in a suboptimal examination, and possibly a delay in diagnosis and, if needed, treatment.     The contrast material is given through a small needle or catheter placed into a vein, usually on the inside of the elbow, on the back of hand, or in a vein in the foot or lower leg.    The most common, though still rare, potential reaction to an intravenous contrast injection is an allergic-like reaction. Most allergic-like reactions are mild, though a small subset of people can have more severe reactions. Mild reactions include mild / scattered hives, itching, scratchy throat, sneezing and nasal congestion. More severe reactions include facial swelling, severe difficulty breathing, wheezing and anaphylactic shock. Those with a prior history allergic-like reaction to the same class of contrast media (such as CT contrast or MRI contrast) are at the highest risk for an allergic reaction.     If you believe you had an allergic reaction to contrast in the past, please let our staff know. We can determine if this increases your risk for a future reaction and provide steps to decrease the risk. This may delay your examination, but it decreases the risk of having a new and possibly more severe reaction to the contrast injection.    People with a history of prior allergic reactions to other substances (such as unrelated medications and food) and patients with a history of asthma have slightly increased risk for an allergic reaction from contrast material when compared with the general population, but do not require to be pretreated " prior to a contrast injection.    You should notify the physician, nurse or technologist if you have ever had any of these conditions or if you have any questions.

## 2024-08-27 ENCOUNTER — HOSPITAL ENCOUNTER (OUTPATIENT)
Dept: PHYSICAL THERAPY | Facility: REHABILITATION | Age: 74
Setting detail: THERAPIES SERIES
Discharge: HOME | End: 2024-08-27
Attending: NURSE PRACTITIONER
Payer: COMMERCIAL

## 2024-08-27 DIAGNOSIS — Z98.1 S/P LUMBAR SPINAL FUSION: ICD-10-CM

## 2024-08-27 DIAGNOSIS — M25.60 DECREASED RANGE OF MOTION: ICD-10-CM

## 2024-08-27 DIAGNOSIS — M54.51 VERTEBROGENIC LOW BACK PAIN: Primary | ICD-10-CM

## 2024-08-27 PROCEDURE — 97113 AQUATIC THERAPY/EXERCISES: CPT | Mod: GP

## 2024-08-27 NOTE — PROGRESS NOTES
Physical Therapy Visit    PT DAILY NOTE FOR OUTPATIENT THERAPY    Patient: Reyna Castillo MRN: [id number]  : 1950 74 y.o.  Referring Physician: Анна Terrazas CRNP  Date of Visit: 2024    Certification Dates:   through      Diagnosis:   1. Vertebrogenic low back pain    2. S/P lumbar spinal fusion    3. Decreased range of motion        Chief Complaints:  s/p lumbar surgery, pain lumbar spine R side, weakness    Precautions:   Existing Precautions/Restrictions: fall      TODAY'S VISIT    Time In Session:  Start Time: 1400  Stop Time: 1455  Time Calculation (min): 55 min   History/Vitals/Pain/Encounter Info - 24 1404          Injury History/Precautions/Daily Required Info    Document Type daily treatment     Primary Therapist Evette Barkley, PT, DPT, OCS     Chief Complaint/Reason for Visit  s/p lumbar surgery, pain lumbar spine R side, weakness     Referring Physician Анна YBARRA     Existing Precautions/Restrictions fall     History of present illness/functional impairment Pt is a 72 y/o female who underwent 10/12/22 T10-S1 Posterior Thorocolumbosacral Decompression and Interbody Fusion, Instrumentation, Pelvic Fixation, Allograft, Autograft, BMP, Revision of Existing Instrumentation at [facility] by Dr Lee. She presents with chronic LBP with decreased mobility. She also was dx with bilateral shoulder OA/frozen shoulder with signficant limitations in shoulder mobility and function.     Patient/Family/Caregiver Comments/Observations Pt reports that they found a lesion on her pancreas and she has to have endoscopy. Pt reports that she is going to have a catatrac     Patient reported fall since last visit No        Pain Assessment    Currently in pain Yes     Preferred Pain Scale number (Numeric Rating Pain Scale)     Pain Side/Orientation bilateral     Pain: Body location Shoulder     Pain Rating (0-10): Pre Activity 4        Pain Intervention    Intervention  aquatic TE     Post  "Intervention Comments monitored                    Daily Treatment Assessment and Plan - 08/27/24 1404          Daily Treatment Assessment and Plan    Progress toward goals Progressing     Daily Outcome Summary Pt continues to have a positive response to aquatic therapy with improved sxs post. Pt is scheduled for cataract sx tomorrow and only given restrictions for 48 hours. Discussed matainence program following d/c and patient agreeable as an option following current POC.     Plan and Recommendations continue aquatic program, discuss maintance                         OBJECTIVE DATA TAKEN TODAY:    None taken    Today's Treatment:    Today's Treatment:     Precautions:          Individual aquatics 68049   53-67 min Yes   Group aquatics CPT 52272     no   Aquatics exercises flow sheet     Completed today             Walking forwards 5 laps   yes   Side stepping 5 laps   yes   kayak         Walking backwards  5 laps   yes   marching 5 laps yes           LE exercises               Hip abduction 2x10 with bar yes   Hip flexion 2x10 with bar yes   Hip abduction 2x10 with bar yes   Circles  cw/ccw   nv   Single leg press with noodle        L hip flexion iso   5 sec x10  nt   UE  exercises               Shoulder flexion 2x10 with paddles yes   Shoulder abduction 2x10 with paddles yes   Biceps/triceps 2x10 with paddles yes   Horizontal abd/add 2x10 with paddles yes   Kickboard press 2x10 yes    shoulder ER  2x10 yes   Step up forwards 2x10 B on 10\" step yes   Step ups side 2x10 B on 10\" step yes   W's with kick boards       squats 2x10 yes   Heel raises 2x10 B at wall yes   Knee curls 2x10 B at wall yes   Sitting on blue disc               Floating with noodles  3 min  Knee to chest 1x10 y   Scissors, bicycling, skiers               stretching       Calf stretch 30 sec x 4 at wall    Low back stretch  lumbar ext 1x10  nt, res                                                                                    "

## 2024-08-27 NOTE — OP PT TREATMENT LOG
"Today's Treatment:     Precautions:          Individual aquatics 95681   53-67 min Yes   Group aquatics CPT 53494     no   Aquatics exercises flow sheet     Completed today             Walking forwards 5 laps   yes   Side stepping 5 laps   yes   kayak         Walking backwards  5 laps   yes   marching 5 laps yes           LE exercises               Hip abduction 2x10 with bar yes   Hip flexion 2x10 with bar yes   Hip abduction 2x10 with bar yes   Circles  cw/ccw   nv   Single leg press with noodle        L hip flexion iso   5 sec x10  nt   UE  exercises               Shoulder flexion 2x10 with paddles yes   Shoulder abduction 2x10 with paddles yes   Biceps/triceps 2x10 with paddles yes   Horizontal abd/add 2x10 with paddles yes   Kickboard press 2x10 yes    shoulder ER  2x10 yes   Step up forwards 2x10 B on 10\" step yes   Step ups side 2x10 B on 10\" step yes   W's with kick boards       squats 2x10 yes   Heel raises 2x10 B at wall yes   Knee curls 2x10 B at wall yes   Sitting on blue disc               Floating with noodles  3 min  Knee to chest 1x10 y   Scissors, bicycling, skiers               stretching       Calf stretch 30 sec x 4 at wall    Low back stretch  lumbar ext 1x10  nt, res                                                             "

## 2024-09-03 ENCOUNTER — HOSPITAL ENCOUNTER (OUTPATIENT)
Dept: PHYSICAL THERAPY | Facility: REHABILITATION | Age: 74
Setting detail: THERAPIES SERIES
Discharge: HOME | End: 2024-09-03
Attending: NURSE PRACTITIONER
Payer: COMMERCIAL

## 2024-09-03 DIAGNOSIS — M54.51 VERTEBROGENIC LOW BACK PAIN: Primary | ICD-10-CM

## 2024-09-03 DIAGNOSIS — M19.011 LOCALIZED OSTEOARTHRITIS OF BOTH SHOULDERS: ICD-10-CM

## 2024-09-03 DIAGNOSIS — M19.012 LOCALIZED OSTEOARTHRITIS OF BOTH SHOULDERS: ICD-10-CM

## 2024-09-03 DIAGNOSIS — M25.60 DECREASED RANGE OF MOTION: ICD-10-CM

## 2024-09-03 DIAGNOSIS — Z98.1 S/P LUMBAR SPINAL FUSION: ICD-10-CM

## 2024-09-03 PROCEDURE — 97113 AQUATIC THERAPY/EXERCISES: CPT | Mod: GP

## 2024-09-03 NOTE — OP PT TREATMENT LOG
"Today's Treatment:     Precautions:          Individual aquatics 52997   53-67 min Yes   Group aquatics CPT 47933     no   Aquatics exercises flow sheet     Completed today             Walking forwards 5 laps   yes   Side stepping 5 laps   yes   kayak         Walking backwards  5 laps   yes   marching 5 laps yes           LE exercises               Hip abduction 2x10 with bar yes   Hip flexion 2x10 with bar yes   Hip abduction 2x10 with bar yes   Circles  cw/ccw   nv   Single leg press with noodle        L hip flexion iso   5 sec x10  nt   UE  exercises               Shoulder flexion 2x10 with paddles yes   Shoulder abduction 2x10 with paddles yes   Biceps/triceps 2x10 with paddles yes   Horizontal abd/add 2x10 with paddles yes   Kickboard press 2x10 n    shoulder ER  2x10 yes   Step up forwards 2x10 B on 10\" step Re-add   Step ups side 2x10 B on 10\" step Re-add   W's with kick boards       squats 2x10 yes   Heel raises 2x10 B at wall yes   Knee curls 2x10 B at wall yes   Sitting on blue disc               Floating with noodles  3 min  Knee to chest 1x10 Re-add   Scissors, bicycling, skiers               stretching       Calf stretch 30 sec x 4 at wall    Low back stretch  lumbar ext 1x10  nt, res                                                             "

## 2024-09-03 NOTE — PROGRESS NOTES
"Physical Therapy Visit    PT DAILY NOTE FOR OUTPATIENT THERAPY    Patient: Reyna Castillo MRN: [id number]  : 1950 74 y.o.  Referring Physician: Анна Terrazas CRNP  Date of Visit: 9/3/2024    Certification Dates: 24 through 24    Diagnosis:   1. Vertebrogenic low back pain    2. S/P lumbar spinal fusion    3. Decreased range of motion    4. Localized osteoarthritis of both shoulders        Chief Complaints:  s/p lumbar surgery, pain lumbar spine R side, weakness    Precautions:   Existing Precautions/Restrictions: fall      TODAY'S VISIT    Time In Session:  Start Time: 1358  Stop Time: 1450  Time Calculation (min): 52 min   History/Vitals/Pain/Encounter Info - 24 1407          Injury History/Precautions/Daily Required Info    Document Type daily treatment     Primary Therapist Evette Barkley, PT, DPT, OCS     Chief Complaint/Reason for Visit  s/p lumbar surgery, pain lumbar spine R side, weakness     Referring Physician Анна YBARRA     Existing Precautions/Restrictions fall     History of present illness/functional impairment Pt is a 72 y/o female who underwent 10/12/22 T10-S1 Posterior Thorocolumbosacral Decompression and Interbody Fusion, Instrumentation, Pelvic Fixation, Allograft, Autograft, BMP, Revision of Existing Instrumentation at [facility] by Dr Lee. She presents with chronic LBP with decreased mobility. She also was dx with bilateral shoulder OA/frozen shoulder with signficant limitations in shoulder mobility and function.     Patient/Family/Caregiver Comments/Observations Pt denies any falls or med changes. Pt reports that she is going to have her second cataract surgery next week. She reports her legs/knees are feeling better. Her shoulders are \"status quo\"     Patient reported fall since last visit No        Pain Assessment    Currently in pain Yes     Preferred Pain Scale number (Numeric Rating Pain Scale)     Pain Side/Orientation bilateral     Pain: " "Body location Shoulder     Pain Rating (0-10): Pre Activity 4        Pain Intervention    Intervention  aquatic TE     Post Intervention Comments monitored                    Daily Treatment Assessment and Plan - 09/03/24 1407          Daily Treatment Assessment and Plan    Progress toward goals Progressing     Daily Outcome Summary Pt continues to find relief post aquatic therapy. Required cues to decrease speed and improve control with aquatic therex. Added paddle press for additional UE strengthening.     Plan and Recommendations continue aquatic program, discuss maintance, discharge nv                         OBJECTIVE DATA TAKEN TODAY:    None taken    Today's Treatment:    Today's Treatment:     Precautions:          Individual aquatics 04867   53-67 min Yes   Group aquatics CPT 79493     no   Aquatics exercises flow sheet     Completed today             Walking forwards 5 laps   yes   Side stepping 5 laps   yes   kayak         Walking backwards  5 laps   yes   marching 5 laps yes           LE exercises               Hip abduction 2x10 with bar yes   Hip flexion 2x10 with bar yes   Hip abduction 2x10 with bar yes   Circles  cw/ccw   nv   Single leg press with noodle        L hip flexion iso   5 sec x10  nt   UE  exercises               Shoulder flexion 2x10 with paddles yes   Shoulder abduction 2x10 with paddles yes   Biceps/triceps 2x10 with paddles yes   Horizontal abd/add 2x10 with paddles yes   Kickboard press 2x10 n    shoulder ER  2x10 yes   Step up forwards 2x10 B on 10\" step Re-add   Step ups side 2x10 B on 10\" step Re-add   W's with kick boards       squats 2x10 yes   Heel raises 2x10 B at wall yes   Knee curls 2x10 B at wall yes   Sitting on blue disc               Floating with noodles  3 min  Knee to chest 1x10 Re-add   Scissors, bicycling, skiers               stretching       Calf stretch 30 sec x 4 at wall    Low back stretch  lumbar ext 1x10  nt, res                                              "

## 2024-09-05 ENCOUNTER — HOSPITAL ENCOUNTER (OUTPATIENT)
Dept: PHYSICAL THERAPY | Facility: REHABILITATION | Age: 74
Setting detail: THERAPIES SERIES
Discharge: HOME | End: 2024-09-05
Attending: NURSE PRACTITIONER
Payer: COMMERCIAL

## 2024-09-05 DIAGNOSIS — Z98.1 S/P LUMBAR SPINAL FUSION: Primary | ICD-10-CM

## 2024-09-05 DIAGNOSIS — M25.60 DECREASED RANGE OF MOTION: ICD-10-CM

## 2024-09-05 PROCEDURE — 97113 AQUATIC THERAPY/EXERCISES: CPT | Mod: GP

## 2024-09-05 NOTE — OP PT TREATMENT LOG
"Today's Treatment:     Precautions:          Individual aquatics 20081   53-67 min Yes   Group aquatics CPT 70118     no   Aquatics exercises flow sheet     Completed today             Walking forwards 5 laps   yes   Side stepping 5 laps   yes   kayak         Walking backwards  5 laps   yes   marching 5 laps yes           LE exercises               Hip abduction 2x10 with bar yes   Hip flexion 2x10 with bar no   Hip extension 2x10 with bar yes   Circles  cw/ccw  2 x 10 at bar yes   Single leg press with noodle        L hip flexion iso   5 sec x10  nt   UE  exercises               Shoulder flexion 2x10 with paddles yes   Shoulder abduction 2x10 with paddles yes   Biceps/triceps 2x10 with paddles yes   Horizontal abd/add 2x10 with paddles yes   Kickboard press 2x10 no    shoulder ER  2x10 yes   Pendulum f/b s/s circles X 10 ea         Step up forwards 2x10 B on 10\" step Re-add   Step ups side 2x10 B on 10\" step Re-add   W's with kick boards       squats 2x10 no   Heel raises 2x10 B at wall no   Knee curls 2x10 B at wall yes   Sitting on blue disc               Floating with noodles  3 min  Knee to chest 1x10 Re-add   Scissors, bicycling, skiers               stretching       Calf stretch 30 sec x 4 at wall    Low back stretch  lumbar ext 1x10  nt, res                                                             "

## 2024-09-05 NOTE — PROGRESS NOTES
Physical Therapy Visit    PT DAILY NOTE FOR OUTPATIENT THERAPY    Patient: Reyna Castillo MRN: [id number]  : 1950 74 y.o.  Referring Physician: Анна Terrazas CRNP  Date of Visit: 2024    Certification Dates: 24 through 24    Diagnosis:   1. S/P lumbar spinal fusion    2. Decreased range of motion        Chief Complaints:  s/p lumbar surgery, pain lumbar spine R side, weakness    Precautions:   Existing Precautions/Restrictions: fall      TODAY'S VISIT    Time In Session:  Start Time: 1305  Stop Time: 1358  Time Calculation (min): 53 min   History/Vitals/Pain/Encounter Info - 24 1301          Injury History/Precautions/Daily Required Info    Document Type daily treatment     Primary Therapist Evette Barkley, PT, DPT, OCS     Chief Complaint/Reason for Visit  s/p lumbar surgery, pain lumbar spine R side, weakness     Referring Physician Анна YBARRA     Existing Precautions/Restrictions fall     History of present illness/functional impairment Pt is a 74 y/o female who underwent 10/12/22 T10-S1 Posterior Thorocolumbosacral Decompression and Interbody Fusion, Instrumentation, Pelvic Fixation, Allograft, Autograft, BMP, Revision of Existing Instrumentation at [facility] by Dr Lee. She presents with chronic LBP with decreased mobility. She also was dx with bilateral shoulder OA/frozen shoulder with signficant limitations in shoulder mobility and function.     Patient/Family/Caregiver Comments/Observations Pt denies falls or med changes but did have her cataract surgery last week and will have the other one done next week.  States her biggest pains are with her shoulders.     Patient reported fall since last visit No        Pain Assessment    Currently in pain Yes     Preferred Pain Scale number (Numeric Rating Pain Scale)     Pain Side/Orientation bilateral     Pain: Body location Shoulder     Pain Rating (0-10): Pre Activity 7     Pain Rating (0-10): Activity 2     Pain  "Rating (0-10): Post Activity 2        Pain Intervention    Intervention  aquatic TE     Post Intervention Comments no increase in pain noted                    Daily Treatment Assessment and Plan - 09/05/24 1301          Daily Treatment Assessment and Plan    Progress toward goals Progressing     Daily Outcome Summary Added pendulum in the water and gentle horiz abd/add (like open book) to facilitate gentle ranges.  Cues provided throughout session to work on biomechanics with scapular retraction/depression and decrease UT activation.     Plan and Recommendations continue aquatic program, discuss maintance, discharge plan?                         OBJECTIVE DATA TAKEN TODAY:    None taken    Today's Treatment:    Today's Treatment:     Precautions:          Individual aquatics 17027   53-67 min Yes   Group aquatics CPT 60633     no   Aquatics exercises flow sheet     Completed today             Walking forwards 5 laps   yes   Side stepping 5 laps   yes   kayak         Walking backwards  5 laps   yes   marching 5 laps yes           LE exercises               Hip abduction 2x10 with bar yes   Hip flexion 2x10 with bar no   Hip extension 2x10 with bar yes   Circles  cw/ccw  2 x 10 at bar yes   Single leg press with noodle        L hip flexion iso   5 sec x10  nt   UE  exercises               Shoulder flexion 2x10 with paddles yes   Shoulder abduction 2x10 with paddles yes   Biceps/triceps 2x10 with paddles yes   Horizontal abd/add 2x10 with paddles yes   Kickboard press 2x10 no    shoulder ER  2x10 yes   Pendulum f/b s/s circles X 10 ea         Step up forwards 2x10 B on 10\" step Re-add   Step ups side 2x10 B on 10\" step Re-add   W's with kick boards       squats 2x10 no   Heel raises 2x10 B at wall no   Knee curls 2x10 B at wall yes   Sitting on blue disc               Floating with noodles  3 min  Knee to chest 1x10 Re-add   Scissors, bicycling, skiers               stretching       Calf stretch 30 sec x 4 at wall  "   Low back stretch  lumbar ext 1x10  nt, res

## 2024-10-07 ENCOUNTER — HOSPITAL ENCOUNTER (OUTPATIENT)
Dept: RADIOLOGY | Age: 74
Discharge: HOME | End: 2024-10-07
Attending: GENERAL ACUTE CARE HOSPITAL
Payer: COMMERCIAL

## 2024-10-07 ENCOUNTER — TRANSCRIBE ORDERS (OUTPATIENT)
Dept: ADMINISTRATIVE | Age: 74
End: 2024-10-07

## 2024-10-07 ENCOUNTER — TRANSCRIBE ORDERS (OUTPATIENT)
Dept: RADIOLOGY | Age: 74
End: 2024-10-07

## 2024-10-07 DIAGNOSIS — Z12.31 ENCOUNTER FOR SCREENING MAMMOGRAM FOR MALIGNANT NEOPLASM OF BREAST: Primary | ICD-10-CM

## 2024-10-07 DIAGNOSIS — Z12.31 ENCOUNTER FOR SCREENING MAMMOGRAM FOR MALIGNANT NEOPLASM OF BREAST: ICD-10-CM

## 2024-10-07 PROCEDURE — 77063 BREAST TOMOSYNTHESIS BI: CPT

## 2025-02-06 ENCOUNTER — TRANSCRIBE ORDERS (OUTPATIENT)
Dept: SCHEDULING | Age: 75
End: 2025-02-06

## 2025-02-06 DIAGNOSIS — Z98.1 ARTHRODESIS STATUS: Primary | ICD-10-CM

## 2025-02-06 DIAGNOSIS — M96.0 PSEUDARTHROSIS AFTER FUSION OR ARTHRODESIS: ICD-10-CM

## 2025-02-10 ENCOUNTER — HOSPITAL ENCOUNTER (OUTPATIENT)
Dept: RADIOLOGY | Age: 75
Discharge: HOME | End: 2025-02-10
Attending: NURSE PRACTITIONER
Payer: COMMERCIAL

## 2025-02-10 DIAGNOSIS — M96.0 PSEUDARTHROSIS AFTER FUSION OR ARTHRODESIS: ICD-10-CM

## 2025-02-10 DIAGNOSIS — Z98.1 ARTHRODESIS STATUS: ICD-10-CM

## 2025-02-10 PROCEDURE — 72128 CT CHEST SPINE W/O DYE: CPT

## 2025-02-10 PROCEDURE — 72131 CT LUMBAR SPINE W/O DYE: CPT

## 2025-02-28 ENCOUNTER — TRANSCRIBE ORDERS (OUTPATIENT)
Dept: SCHEDULING | Age: 75
End: 2025-02-28

## 2025-02-28 DIAGNOSIS — M54.16 RADICULOPATHY, LUMBAR REGION: Primary | ICD-10-CM

## 2025-02-28 DIAGNOSIS — I82.402 ACUTE EMBOLISM AND THROMBOSIS OF UNSPECIFIED DEEP VEINS OF LEFT LOWER EXTREMITY (CMS/HCC): ICD-10-CM

## 2025-02-28 DIAGNOSIS — M54.14 RADICULOPATHY, THORACIC REGION: ICD-10-CM

## 2025-03-03 ENCOUNTER — HOSPITAL ENCOUNTER (OUTPATIENT)
Dept: RADIOLOGY | Facility: HOSPITAL | Age: 75
Discharge: HOME | End: 2025-03-03
Attending: ORTHOPAEDIC SURGERY
Payer: COMMERCIAL

## 2025-03-03 DIAGNOSIS — I82.402 ACUTE EMBOLISM AND THROMBOSIS OF UNSPECIFIED DEEP VEINS OF LEFT LOWER EXTREMITY (CMS/HCC): ICD-10-CM

## 2025-03-03 PROCEDURE — 93971 EXTREMITY STUDY: CPT | Mod: LT

## 2025-03-08 ENCOUNTER — HOSPITAL ENCOUNTER (OUTPATIENT)
Dept: RADIOLOGY | Facility: HOSPITAL | Age: 75
Discharge: HOME | End: 2025-03-08
Attending: ORTHOPAEDIC SURGERY
Payer: MEDICARE

## 2025-03-08 DIAGNOSIS — M54.16 RADICULOPATHY, LUMBAR REGION: ICD-10-CM

## 2025-03-08 DIAGNOSIS — M54.14 RADICULOPATHY, THORACIC REGION: ICD-10-CM

## 2025-03-21 ENCOUNTER — TELEPHONE (OUTPATIENT)
Dept: SCHEDULING | Facility: CLINIC | Age: 75
End: 2025-03-21
Payer: COMMERCIAL

## 2025-03-21 NOTE — TELEPHONE ENCOUNTER
New Patient Appointment Request    Name of caller: Reyna Castillo    Reason for Visit: Pt due for a whole work up    Insurance: [employer]    Insurance ID #: [id number]     Recent Cardiac Test/Procedures: none    Referred by: candy & Kourtney Nj    Primary Care Physician: Lubna Kerr MD    Previous Cardiologist name and phone number: Dr Jacinto Lopez contact number: [phone]    Additional notes/History: Pt has had multiple spine & knee replacements over the last few years & that's why she's been unable to come in before now

## 2025-05-01 ENCOUNTER — TRANSCRIBE ORDERS (OUTPATIENT)
Dept: REGISTRATION | Facility: REHABILITATION | Age: 75
End: 2025-05-01

## 2025-05-01 DIAGNOSIS — M54.50 LUMBAR PAIN: ICD-10-CM

## 2025-05-01 DIAGNOSIS — M54.51 VERTEBROGENIC LOW BACK PAIN: Primary | ICD-10-CM

## 2025-05-01 DIAGNOSIS — M53.3 SACROILIAC JOINT PAIN: ICD-10-CM

## 2025-05-01 DIAGNOSIS — M54.6 THORACIC SPINE PAIN: ICD-10-CM

## 2025-05-27 ASSESSMENT — ENCOUNTER SYMPTOMS
VERTIGO: 0
PALPITATIONS: 0
HEMATEMESIS: 0
DYSPNEA ON EXERTION: 0
LIGHT-HEADEDNESS: 0
WHEEZING: 0
WEAKNESS: 0
SPUTUM PRODUCTION: 0
DYSURIA: 0
HEMATOCHEZIA: 0
HEMATURIA: 0
COLOR CHANGE: 0
NAIL CHANGES: 0
MYALGIAS: 0
JAUNDICE: 0
SHORTNESS OF BREATH: 0
FEVER: 0
ABDOMINAL PAIN: 0
ALTERED MENTAL STATUS: 0
NERVOUS/ANXIOUS: 0
HOARSE VOICE: 0
ORTHOPNEA: 0
PND: 0
IRREGULAR HEARTBEAT: 0
SYNCOPE: 0
HEMOPTYSIS: 0
NEAR-SYNCOPE: 0
DIAPHORESIS: 0
INSOMNIA: 0
BLURRED VISION: 0

## 2025-05-27 NOTE — PROGRESS NOTES
"CARDIOLOGY OUTPATIENT PROGRESS NOTE  {(Determine Patient Interest  Today, [provider name] is using a new technology to assist with their documentation in the electronic medical record.  The technology is called JAVID, and it helps [provider name] capture all the details of your conversation using an audio recording of your visit.  The recording is then used to help [provider name] write their visit note, and it ultimately allows [provider name] to be more focused on you during the visit.  Are you ok if [provider name] uses the JAVID technology during today's appointment?):[id number]}    Patient Agreeable to JAVID?  {AMB [facility] JAVID YES/NO STAFF:32409}  {(IF YES-If at any point during the visit anyone present wants the recording stopped, please let your provider know and they will do so.  If NO- No problem.  We'll conduct today's visit without an audio recording.):[id number]}           Reyna Castillo is a 75 y.o. female  who presents for pre-operative cardiac exam and blood pressure evaluation.    Summary: CAD based on a coronary calcium score 101, early family hx of CAD, HTN, diverticulitis & various orthopedic problems.     Interval hx: she presented a few days ago for both routine & pre-operative cardiac exam at which time SBP markedly elevated in 180-190 range.  This has happened previously during office visits and she states home blood pressure are much better controlled.  She was asked to return today with home cuff and re-evaluation of BP.    Her blood pressure is much improved today on exam 141/72, she feels much less anxiety.  Her home cuff checked today in office with SBP about 12mmHg higher than office value.  Reviewed that she can certainly use this as \"ball park measurement.\"  Reviewed most recent home readings averaging 135/67, this is acceptable and may even be a bit lower.  It appears she has true white coat syndrome.  Would allow some extra time prior to actual surgical intervention for BP to come down.    Plans for " for right total knee arthroscopy with Dr. Cowan on 21.  See below for surgical clearance.     The following have been reviewed and updated as appropriate in this visit:  Tobacco  Allergies  Meds  Problems  Med Hx  Surg Hx  Fam Hx  Soc Hx      Past Medical History:   Diagnosis Date    Abnormal ECG 10/09/2018    Nonspecific T wave flattening inferiorly minimal ST depression laterally and anterolateral T wave inversion    Acid reflux     Agatston CAC score 100-199 10/09/2018    8/17 coronary calcium score 101 LAD 13, circumflex 68, RCA 20    Anxiety     Lopez's esophagus without dysplasia     Class 2 severe obesity due to excess calories with serious comorbidity and body mass index (BMI) of 36.0 to 36.9 in adult (CMS/[facility]) 10/09/2018    2017 weight 232 pounds    Depression     DJD (degenerative joint disease)     Family history of early CAD 10/09/2018    Father  of MI at age 59    Hearing loss     History of hepatitis A     Hypertension     Kidney lesion     Lumbar myelopathy (CMS/[facility])     Tran-Juan tear     Mixed hyperlipidemia 10/09/2018    2020 Lipid panel  LDL 91 HDL 47 Trigs 176    Spinal enthesopathy of lumbar region (CMS/[facility])        :   Past Surgical History   Procedure Laterality Date    Adenoidectomy      Cholecystectomy      COLECTOMY SIGMOID N/A 10/10/2018    Performed by Evans Trujillo MD at  OR    Colonoscopy      Foot surgery Right     Hand surgery Right     Inner ear surgery Right     Joint replacement Bilateral     bilateral 2021; knees    Knee arthroscopy Bilateral     L3-S1 Posterior Lumbar Decompression, Posterior Lumbar Interbody Fusion, Instrumentation, Cage, Allograft, Autograft, BMP N/A 11/3/2021    Performed by Melchor Lee MD at  OR    Lumbar laminectomy      Sigmoidectomy      T10-S1 Posterior Thorocolumbosacral Decompression and Interbody Fusion, Instrumentation, Pelvic Fization, Allograft, Autograft, BMP, Revision of Existing Instrumentation N/A  10/12/2022    Performed by Melchor Lee MD at  OR    Tonsillectomy      Transumbilical augmentation mammaplasty      Upper gastrointestinal endoscopy      [place] tooth extraction         ALLERGIES   Adhesive tape-silicones and Nsaids (non-steroidal anti-inflammatory drug)    Current Outpatient Medications   Medication Sig Dispense Refill    alendronate (FOSAMAX) 70 mg tablet Take 70 mg by mouth once a week. Take on an empty stomach and do not lie down for the next 30 min.      amLODIPine (NORVASC) 5 mg tablet Take 5 mg by mouth daily.      busPIRone (BUSPAR) 7.5 mg tablet Take 7.5 mg by mouth 3 (three) times a day.      DULoxetine (CYMBALTA) 20 mg capsule Take 40 mg by mouth daily.      fenofibrate (TRICOR) 48 mg tablet Take 48 mg by mouth daily.        gabapentin (NEURONTIN) 300 mg capsule Take 300 mg by mouth 3 (three) times a day.      hydrochlorothiazide 12.5 mg tablet Take 12.5 mg by mouth daily.      irbesartan (AVAPRO) 150 mg tablet Take 150 mg by mouth 2 (two) times a day.        multivitamin capsule Take 1 capsule by mouth daily.      polyethylene glycol (MIRALAX) 17 gram packet Take 17 g by mouth daily for 3 days. 3 packet 0    rosuvastatin (CRESTOR) 20 mg tablet Take 20 mg by mouth daily.      sennosides-docusate sodium (SENOKOT-S) 8.6-50 mg Take 1 tablet by mouth 2 (two) times a day. 60 tablet 0    sertraline 100 mg tablet Take 100 mg by mouth once daily.      vitamin B complex (B COMPLEX 1 ORAL) Take by mouth daily.       No current facility-administered medications for this visit.          Social History     Socioeconomic History    Marital status:    Tobacco Use    Smoking status: Former     Current packs/day: 0.00     Average packs/day: 0.5 packs/day for 30.0 years (15.0 ttl pk-yrs)     Types: Cigarettes     Start date:      Quit date:      Years since quittin.4    Smokeless tobacco: Never   Vaping Use    Vaping status: Never Used   Substance and Sexual Activity    Alcohol  use: Yes     Comment: Rare/holidays    Drug use: No    Sexual activity: Defer     Social Drivers of Health     Food Insecurity: No Food Insecurity (3/18/2024)    Hunger Vital Sign     Worried About Running Out of Food in the Last Year: Never true     Ran Out of Food in the Last Year: Never true          Family History   Problem Relation Name Age of Onset    Heart attack Biological Father      Coronary artery disease Biological Father      Stroke Biological Father      Heart disease Biological Father      Hyperlipidemia Biological Mother      Hypertension Biological Mother         Review of Systems   Constitutional: Negative for diaphoresis, fever and malaise/fatigue.   HENT:  Negative for hoarse voice and nosebleeds.    Eyes:  Negative for blurred vision and visual disturbance.   Cardiovascular:  Negative for chest pain, cyanosis, dyspnea on exertion, irregular heartbeat, leg swelling, near-syncope, orthopnea, palpitations, paroxysmal nocturnal dyspnea and syncope.   Respiratory:  Negative for hemoptysis, shortness of breath, sputum production and wheezing.    Endocrine: Negative for cold intolerance and heat intolerance.   Hematologic/Lymphatic: Negative for bleeding problem.   Skin:  Negative for color change and nail changes.   Musculoskeletal:  Negative for muscle weakness and myalgias.   Gastrointestinal:  Negative for abdominal pain, hematemesis, hematochezia and jaundice.   Genitourinary:  Negative for dysuria and hematuria.   Neurological:  Negative for light-headedness, vertigo and weakness.   Psychiatric/Behavioral:  Negative for altered mental status. The patient does not have insomnia and is not nervous/anxious.        Objective     There were no vitals taken for this visit.      Wt Readings from Last 3 Encounters:   03/08/25 99.8 kg (220 lb)   08/19/24 102 kg (225 lb)   02/22/24 98.9 kg (218 lb)       Physical Exam  Constitutional:       General: She is not in acute distress.     Appearance: She is  well-developed. She is not diaphoretic.   HENT:      Head: Normocephalic.   Cardiovascular:      Rate and Rhythm: Normal rate and regular rhythm.      Heart sounds: Normal heart sounds.   Pulmonary:      Effort: No respiratory distress.      Breath sounds: Normal breath sounds. No wheezing.   Chest:      Chest wall: No tenderness.   Abdominal:      General: There is no distension.      Palpations: Abdomen is soft.      Tenderness: There is no abdominal tenderness.   Musculoskeletal:         General: Normal range of motion.      Cervical back: Normal range of motion.   Skin:     General: Skin is warm and dry.   Neurological:      Mental Status: She is alert and oriented to person, place, and time.          LABS  Lab Results   Component Value Date    WBC 14.46 (H) 10/14/2022    HGB 10.6 (L) 10/14/2022    HCT 31.0 (L) 10/14/2022     10/14/2022    CHOL 127 2018    TRIG 119 2018    LDLCALC 66 2018    HDL 37 (L) 2018    ALT 20 10/05/2018    AST 24 10/05/2018     (L) 10/14/2022    K 4.1 10/14/2022     10/14/2022    CREATININE 0.7 10/14/2022    BUN 23 (H) 10/14/2022    CO2 22 10/14/2022    TSH 1.96 2018    INR 1.0 10/10/2022    GLUCOSE 131 (H) 10/14/2022    HGBA1C 5.4 2018       ECG 21      Problem List Items Addressed This Visit       Family history of early CAD - Primary    Overview   Father  of MI at age 59         Essential hypertension    Overview   True white coat HTN.  Home BP cuff checked today in office with SBP about 12mmHg higher than office value.  Average -140/60-70s.  If office SBP as high as 200 at times.              I, Alanis Reddy, am scribing for, and in the presence of Bruce Casey, DO.      This patient note has been dictated using speech recognition software. Inadvertent speech recognition errors should be disregarded. Please do not hesitate to call my office for clarifications.

## 2025-05-29 ASSESSMENT — ENCOUNTER SYMPTOMS
FEVER: 0
COLOR CHANGE: 0
LIGHT-HEADEDNESS: 0
SPUTUM PRODUCTION: 0
WHEEZING: 0
JAUNDICE: 0
PND: 0
WEAKNESS: 0
MYALGIAS: 0
PALPITATIONS: 0
BLURRED VISION: 0
HEMATEMESIS: 0
VERTIGO: 0
IRREGULAR HEARTBEAT: 0
DIAPHORESIS: 0
ORTHOPNEA: 0
INSOMNIA: 0
HOARSE VOICE: 0
HEMATURIA: 0
NAIL CHANGES: 0
NERVOUS/ANXIOUS: 0
NEAR-SYNCOPE: 0
DYSURIA: 0
ALTERED MENTAL STATUS: 0
SHORTNESS OF BREATH: 0
SYNCOPE: 0
ABDOMINAL PAIN: 0
HEMATOCHEZIA: 0
DYSPNEA ON EXERTION: 0
HEMOPTYSIS: 0

## 2025-05-29 NOTE — PROGRESS NOTES
CARDIOLOGY OUTPATIENT PROGRESS NOTE      Patient Agreeable to JAVID?  yes           Reyna Castillo is a 75 y.o. female  who presents for ongoing cardiovascular care.    Summary: CAD based on a coronary calcium score 101  early family hx of CAD  HTN  diverticulitis & various orthopedic problems.     History of Present Illness  The patient is a 75-year-old woman who presents for a routine office visit.    She reports no cardiac symptoms such as chest pain, pressure, tightness, burning, lightheadedness, or dizziness. Her physical activity is primarily limited by her back condition. She has never undergone a nuclear stress test.    She has recently initiated water therapy and has been engaging in walking exercises. Over the past five years, she has experienced a weight loss from 225 pounds to 192 pounds. Her diet includes fish, chicken, and beans, but she has abstained from beef for the past two years.    She has a history of two back surgeries with extensive lumbar spine fusion and two total knee arthroplasties since her last visit five years ago. She also requires a left shoulder replacement but has not scheduled the procedure yet.    Denies angina, exertional dyspnea, orthopnea, palpitation, syncope, near syncope, claudication, focal neurologic symptoms, or medication side effects.    PAST SURGICAL HISTORY:  Two back surgeries with extensive lumbar spine fusion  Two total knee arthroplasties  Left shoulder replacement (pending)       The following have been reviewed and updated as appropriate in this visit:  Tobacco  Allergies  Meds  Problems  Med Hx  Surg Hx  Fam Hx  Soc Hx      Past Medical History:   Diagnosis Date    Abnormal ECG 10/09/2018    Nonspecific T wave flattening inferiorly minimal ST depression laterally and anterolateral T wave inversion    Acid reflux     Agatston CAC score 100-199 10/09/2018    8/17 coronary calcium score 101 LAD 13, circumflex 68, RCA 20    Anxiety     Lopez's esophagus  without dysplasia     Class 2 severe obesity due to excess calories with serious comorbidity and body mass index (BMI) of 36.0 to 36.9 in adult (CMS/[facility]) 10/09/2018    2017 weight 232 pounds    Depression     DJD (degenerative joint disease)     Family history of early CAD 10/09/2018    Father  of MI at age 59    Hearing loss     History of hepatitis A     Hypertension     Kidney lesion     Lumbar myelopathy (CMS/[facility])     Tran-Juan tear     Mixed hyperlipidemia 10/09/2018    2020 Lipid panel  LDL 91 HDL 47 Trigs 176    Spinal enthesopathy of lumbar region (CMS/[facility])        :   Past Surgical History   Procedure Laterality Date    Adenoidectomy      Cholecystectomy      COLECTOMY SIGMOID N/A 10/10/2018    Performed by Evans Trujillo MD at  OR    Colonoscopy      Foot surgery Right     Hand surgery Right     Inner ear surgery Right     Joint replacement Bilateral     bilateral 2021; knees    Knee arthroscopy Bilateral     L3-S1 Posterior Lumbar Decompression, Posterior Lumbar Interbody Fusion, Instrumentation, Cage, Allograft, Autograft, BMP N/A 11/3/2021    Performed by Melchor Lee MD at  OR    Lumbar laminectomy      Sigmoidectomy      T10-S1 Posterior Thorocolumbosacral Decompression and Interbody Fusion, Instrumentation, Pelvic Fization, Allograft, Autograft, BMP, Revision of Existing Instrumentation N/A 10/12/2022    Performed by Melchor Lee MD at  OR    Tonsillectomy      Transumbilical augmentation mammaplasty      Upper gastrointestinal endoscopy      [place] tooth extraction         ALLERGIES   Adhesive tape-silicones and Nsaids (non-steroidal anti-inflammatory drug)    Current Outpatient Medications   Medication Sig Dispense Refill    amLODIPine (NORVASC) 5 mg tablet Take 5 mg by mouth daily.      busPIRone (BUSPAR) 7.5 mg tablet Take 7.5 mg by mouth 3 (three) times a day.      cholecalciferol, vitD3,/vit K2 (VITAMIN D3-VITAMIN K2 ORAL) Take 100 Units by mouth.      DULoxetine  (CYMBALTA) 20 mg capsule Take 40 mg by mouth daily.      fenofibrate (TRICOR) 48 mg tablet Take 48 mg by mouth daily.        gabapentin (NEURONTIN) 300 mg capsule Take 300 mg by mouth 3 (three) times a day.      hydrochlorothiazide 12.5 mg tablet Take 12.5 mg by mouth daily.      irbesartan (AVAPRO) 150 mg tablet Take 150 mg by mouth 2 (two) times a day.        multivitamin capsule Take 1 capsule by mouth daily.      rosuvastatin (CRESTOR) 20 mg tablet Take 20 mg by mouth daily.      vitamin B complex (B COMPLEX 1 ORAL) Take by mouth daily.       No current facility-administered medications for this visit.          Social History     Socioeconomic History    Marital status:      Spouse name: None    Number of children: None    Years of education: None    Highest education level: None   Tobacco Use    Smoking status: Former     Current packs/day: 0.00     Average packs/day: 0.5 packs/day for 30.0 years (15.0 ttl pk-yrs)     Types: Cigarettes     Start date:      Quit date:      Years since quittin.4    Smokeless tobacco: Never   Vaping Use    Vaping status: Never Used   Substance and Sexual Activity    Alcohol use: Yes     Comment: Rare/holidays    Drug use: No    Sexual activity: Defer     Social Drivers of Health     Food Insecurity: No Food Insecurity (2025)    Hunger Vital Sign     Worried About Running Out of Food in the Last Year: Never true     Ran Out of Food in the Last Year: Never true          Family History   Problem Relation Name Age of Onset    Heart attack Biological Father      Coronary artery disease Biological Father      Stroke Biological Father      Heart disease Biological Father      Hyperlipidemia Biological Mother      Hypertension Biological Mother         Review of Systems   Constitutional: Negative for diaphoresis, fever and malaise/fatigue.   HENT:  Negative for hoarse voice and nosebleeds.    Eyes:  Negative for blurred vision and visual disturbance.  "  Cardiovascular:  Negative for chest pain, cyanosis, dyspnea on exertion, irregular heartbeat, leg swelling, near-syncope, orthopnea, palpitations, paroxysmal nocturnal dyspnea and syncope.   Respiratory:  Negative for hemoptysis, shortness of breath, sputum production and wheezing.    Endocrine: Negative for cold intolerance and heat intolerance.   Hematologic/Lymphatic: Negative for bleeding problem.   Skin:  Negative for color change and nail changes.   Musculoskeletal:  Negative for muscle weakness and myalgias.   Gastrointestinal:  Negative for abdominal pain, hematemesis, hematochezia and jaundice.   Genitourinary:  Negative for dysuria and hematuria.   Neurological:  Negative for light-headedness, vertigo and weakness.   Psychiatric/Behavioral:  Negative for altered mental status. The patient does not have insomnia and is not nervous/anxious.        Objective     Visit Vitals  /78 (BP Location: Left upper arm, Patient Position: Sitting)   Pulse 80   Resp 16   Ht 1.575 m (5' 2\")   Wt 102 kg (224 lb)   SpO2 98%   BMI 40.97 kg/m²         Wt Readings from Last 3 Encounters:   06/02/25 102 kg (224 lb)   03/08/25 99.8 kg (220 lb)   08/19/24 102 kg (225 lb)       Physical Exam  Constitutional:       General: She is not in acute distress.     Appearance: She is well-developed. She is not diaphoretic.   HENT:      Head: Normocephalic.   Cardiovascular:      Rate and Rhythm: Normal rate and regular rhythm.      Heart sounds: Normal heart sounds.   Pulmonary:      Effort: No respiratory distress.      Breath sounds: Normal breath sounds. No wheezing.   Chest:      Chest wall: No tenderness.   Abdominal:      General: There is no distension.      Palpations: Abdomen is soft.      Tenderness: There is no abdominal tenderness.   Musculoskeletal:         General: Normal range of motion.      Cervical back: Normal range of motion.   Skin:     General: Skin is warm and dry.   Neurological:      Mental Status: She is " alert and oriented to person, place, and time.          LABS  Lab Results   Component Value Date    WBC 14.46 (H) 10/14/2022    HGB 10.6 (L) 10/14/2022    HCT 31.0 (L) 10/14/2022     10/14/2022    CHOL 127 07/27/2018    TRIG 119 07/27/2018    LDLCALC 66 07/27/2018    HDL 37 (L) 07/27/2018    ALT 20 10/05/2018    AST 24 10/05/2018     (L) 10/14/2022    K 4.1 10/14/2022     10/14/2022    CREATININE 0.7 10/14/2022    BUN 23 (H) 10/14/2022    CO2 22 10/14/2022    TSH 1.96 07/27/2018    INR 1.0 10/10/2022    GLUCOSE 131 (H) 10/14/2022    HGBA1C 5.4 07/27/2018       ECG   6/2/2025  SR with one PVC  Assessment & Plan  1. Coronary Artery Disease:  - Calcium score of 101 from 2017, recent imaging shows some plaque in the arteries and aorta.  - Currently on rosuvastatin and fenofibrate.  - Goal: Lower LDL cholesterol to <70, preferably <55.  - Nuclear stress test to be ordered.  - Plan to increase rosuvastatin and discontinue fenofibrate if blood test supports.  - May add another medication in place of fenofibrate if needed.  Also looked at her CT of her thoracic spine from February of this year and her coronary calcification, and aortic calcifications are noted and discussed.-    2. Thoracic Aortic Atherosclerosis:  - Imaging shows some plaque in the thoracic aorta.  Also demonstrated on February his thoracic spine CT scan and discussed  - Continue current medications and lifestyle modifications, including diet and exercise.    3. Hypertension:  - Blood pressure well-managed at 126/78 mmHg with amlodipine, hydrochlorothiazide, and valsartan.  - No changes to hypertension management necessary.    4. Hyperlipidemia:  - Currently on rosuvastatin and fenofibrate.  - Goal: Lower LDL cholesterol to <70, preferably <55.  - Blood test to assess current lipid levels.  - Plan to increase rosuvastatin and discontinue fenofibrate if res-her most recent LDL from February was 103 despite a few years ago was in the 60s.   She is having labs later this week and can reevaluate.    Reviewed that may need to increase rosuvastatin potentially in the future add ezetimibe but if the triglycerides low perhaps we can stop the fenofibrate to limit the medications especially given the data regarding fenofibrate may not be as helpful unless triglycerides are very elevated.  Reviewed that the main focus to treat triglycerides is a low carbohydrate diet.    5. Gait Disorder:  - Reports back pain limiting ability to walk.  - Started water therapy as prescribed by Dr. Fishman.  - Continue with water therapy and other exercises that do not exacerbate back pain.  -As result of the stress test will be a vasodilator nuclear stress test    6. Weight Management:  - Experienced weight loss from 225 pounds to 192 pounds over the past five years.  - Diet includes fish, chicken, and beans, abstained from beef for the past two years.  - Advised to continue with current dietary habits and consider small changes for further weight management.  Reviewed that daily changing, for example 100 earnest less carbs with each meal can have significant results without having to make drastic difficult to maintain changes.  Reviewed the need to do exercise in a safe manner for spine but that the exercises for fitness and not likely to have significant weight loss benefit as diet is usually the more important factor    Risks, benefits, and alternatives of treatment were discussed, including the importance of lifestyle modifications such as diet and exercise, the potential need for medication adjustments, and the role of preventive measures in managing coronary artery disease and hyperlipidemia. The patient was informed about the nuclear stress test procedure and its purpose in evaluating her condition. The importance of hydration and the potential causes of dehydration were also addressed.    Follow-up:  - Call on 06/05/2025 to check blood test results.  - Schedule nuclear stress  test at [facility].       Problem List Items Addressed This Visit          High    Agatston CAC score 100-199    Overview    coronary calcium score 101 LAD 13, circumflex 68, RCA 20    Continue Crestor and BP control, no obstructive disease so off ASA         Relevant Orders    [facility] MUSE ECG 12 lead (clinic performed) (Completed)    CV Nuclear PET/CT myocardial perfusion    Gait disorder - Primary       Medium    Mixed hyperlipidemia    Overview    Lipid panel   LDL 91  HDL 47  Trigs 176         Relevant Orders    [facility] MUSE ECG 12 lead (clinic performed) (Completed)    CV Nuclear PET/CT myocardial perfusion    Lipid panel    Comprehensive metabolic panel    Essential hypertension    Overview   True white coat HTN.  Home BP cuff checked today in office with SBP about 12mmHg higher than office value.  Average -140/60-70s.  If office SBP as high as 200 at times.           Relevant Orders    [facility] MUSE ECG 12 lead (clinic performed) (Completed)       Low    Family history of early CAD    Overview   Father  of MI at age 59         Relevant Orders    [facility] MUSE ECG 12 lead (clinic performed) (Completed)    Class 2 severe obesity due to excess calories with serious comorbidity and body mass index (BMI) of 36.0 to 36.9 in adult (CMS/[facility])    Overview   2017 weight 232 pounds          I attest that this visit supports the complexity inherent to evaluation and management associated with medical care services that serve as the continuing focal point for all needed health care services and/or medical care services that are part of ongoing care related to this patient's single, serious condition or a complex condition.     This patient note has been dictated using speech recognition software. Inadvertent speech recognition errors should be disregarded. Please do not hesitate to call my office for clarifications.    Bruce Casey DO, FACC

## 2025-05-30 ENCOUNTER — HOSPITAL ENCOUNTER (OUTPATIENT)
Dept: PHYSICAL THERAPY | Facility: REHABILITATION | Age: 75
Setting detail: THERAPIES SERIES
Discharge: HOME | End: 2025-05-30
Attending: NURSE PRACTITIONER
Payer: MEDICARE

## 2025-05-30 DIAGNOSIS — M19.011 LOCALIZED OSTEOARTHRITIS OF BOTH SHOULDERS: ICD-10-CM

## 2025-05-30 DIAGNOSIS — M53.3 SACROILIAC JOINT PAIN: ICD-10-CM

## 2025-05-30 DIAGNOSIS — M54.50 LUMBAR PAIN: Primary | Chronic | ICD-10-CM

## 2025-05-30 DIAGNOSIS — M54.51 VERTEBROGENIC LOW BACK PAIN: ICD-10-CM

## 2025-05-30 DIAGNOSIS — M25.60 DECREASED RANGE OF MOTION: ICD-10-CM

## 2025-05-30 DIAGNOSIS — Z98.1 S/P LUMBAR SPINAL FUSION: ICD-10-CM

## 2025-05-30 DIAGNOSIS — R53.1 DECREASED STRENGTH: ICD-10-CM

## 2025-05-30 DIAGNOSIS — M19.012 LOCALIZED OSTEOARTHRITIS OF BOTH SHOULDERS: ICD-10-CM

## 2025-05-30 DIAGNOSIS — M54.6 THORACIC SPINE PAIN: ICD-10-CM

## 2025-05-30 PROCEDURE — 97163 PT EVAL HIGH COMPLEX 45 MIN: CPT | Mod: GP

## 2025-06-02 ENCOUNTER — TELEPHONE (OUTPATIENT)
Dept: REGISTRATION | Facility: REHABILITATION | Age: 75
End: 2025-06-02
Payer: COMMERCIAL

## 2025-06-02 ENCOUNTER — OFFICE VISIT (OUTPATIENT)
Dept: CARDIOLOGY | Facility: CLINIC | Age: 75
End: 2025-06-02
Payer: MEDICARE

## 2025-06-02 VITALS
DIASTOLIC BLOOD PRESSURE: 78 MMHG | BODY MASS INDEX: 41.22 KG/M2 | OXYGEN SATURATION: 98 % | SYSTOLIC BLOOD PRESSURE: 126 MMHG | HEART RATE: 80 BPM | RESPIRATION RATE: 16 BRPM | WEIGHT: 224 LBS | HEIGHT: 62 IN

## 2025-06-02 DIAGNOSIS — E66.01 CLASS 2 SEVERE OBESITY DUE TO EXCESS CALORIES WITH SERIOUS COMORBIDITY AND BODY MASS INDEX (BMI) OF 36.0 TO 36.9 IN ADULT (CMS/HCC): ICD-10-CM

## 2025-06-02 DIAGNOSIS — R26.9 GAIT DISORDER: Primary | ICD-10-CM

## 2025-06-02 DIAGNOSIS — E78.2 MIXED HYPERLIPIDEMIA: ICD-10-CM

## 2025-06-02 DIAGNOSIS — R93.1 AGATSTON CAC SCORE 100-199: ICD-10-CM

## 2025-06-02 DIAGNOSIS — Z82.49 FAMILY HISTORY OF EARLY CAD: ICD-10-CM

## 2025-06-02 DIAGNOSIS — E66.812 CLASS 2 SEVERE OBESITY DUE TO EXCESS CALORIES WITH SERIOUS COMORBIDITY AND BODY MASS INDEX (BMI) OF 36.0 TO 36.9 IN ADULT (CMS/HCC): ICD-10-CM

## 2025-06-02 DIAGNOSIS — I10 ESSENTIAL HYPERTENSION: ICD-10-CM

## 2025-06-02 LAB
ATRIAL RATE: 71
P AXIS: 18
PR INTERVAL: 136
QRS DURATION: 84
QT INTERVAL: 378
QTC CALCULATION(BAZETT): 410
R AXIS: -29
T WAVE AXIS: 38
VENTRICULAR RATE: 71

## 2025-06-02 PROCEDURE — G8754 DIAS BP LESS 90: HCPCS | Performed by: INTERNAL MEDICINE

## 2025-06-02 PROCEDURE — 93000 ELECTROCARDIOGRAM COMPLETE: CPT | Performed by: INTERNAL MEDICINE

## 2025-06-02 PROCEDURE — G2211 COMPLEX E/M VISIT ADD ON: HCPCS | Performed by: INTERNAL MEDICINE

## 2025-06-02 PROCEDURE — 99214 OFFICE O/P EST MOD 30 MIN: CPT | Performed by: INTERNAL MEDICINE

## 2025-06-02 PROCEDURE — G8752 SYS BP LESS 140: HCPCS | Performed by: INTERNAL MEDICINE

## 2025-06-02 NOTE — TELEPHONE ENCOUNTER
6/2 - [facility] offering 2 Aquatics PT appts both with Ros Larsen for 6/9 @ 1:00pm and 6/16 @ 1:00pm. Left #[phone] to call back. - at

## 2025-06-03 ENCOUNTER — TELEPHONE (OUTPATIENT)
Dept: SCHEDULING | Facility: CLINIC | Age: 75
End: 2025-06-03
Payer: COMMERCIAL

## 2025-06-03 NOTE — TELEPHONE ENCOUNTER
Pt called, pt states on her lab slips the insurance filling order is incorrect. Pt states the [employer] is the primary insurance and the Medicare is secondary.     Pt can be reached at [phone]

## 2025-06-04 ENCOUNTER — TELEPHONE (OUTPATIENT)
Dept: CARDIOLOGY | Facility: CLINIC | Age: 75
End: 2025-06-04
Payer: COMMERCIAL

## 2025-06-04 DIAGNOSIS — E78.2 MIXED HYPERLIPIDEMIA: Primary | ICD-10-CM

## 2025-06-04 NOTE — TELEPHONE ENCOUNTER
Please see other telephone encounter. Insurance info was not correct. Labs cancelled from OV 6/2 and re-ordered here after insurance info was corrected. Pt notified.

## 2025-06-05 LAB
ALBUMIN SERPL-MCNC: 4.7 G/DL (ref 3.8–4.8)
ALP SERPL-CCNC: 65 IU/L (ref 44–121)
ALT SERPL-CCNC: 17 IU/L (ref 0–32)
AST SERPL-CCNC: 25 IU/L (ref 0–40)
BILIRUB SERPL-MCNC: 0.4 MG/DL (ref 0–1.2)
BUN SERPL-MCNC: 24 MG/DL (ref 8–27)
BUN/CREAT SERPL: 26 (ref 12–28)
CALCIUM SERPL-MCNC: 10.6 MG/DL (ref 8.7–10.3)
CHLORIDE SERPL-SCNC: 100 MMOL/L (ref 96–106)
CHOLEST SERPL-MCNC: 174 MG/DL (ref 100–199)
CO2 SERPL-SCNC: 20 MMOL/L (ref 20–29)
CREAT SERPL-MCNC: 0.92 MG/DL (ref 0.57–1)
EGFRCR SERPLBLD CKD-EPI 2021: 65 ML/MIN/1.73
GLOBULIN SER CALC-MCNC: 2.6 G/DL (ref 1.5–4.5)
GLUCOSE SERPL-MCNC: 88 MG/DL (ref 70–99)
HDLC SERPL-MCNC: 62 MG/DL
LDLC SERPL CALC-MCNC: 89 MG/DL (ref 0–99)
POTASSIUM SERPL-SCNC: 5 MMOL/L (ref 3.5–5.2)
PROT SERPL-MCNC: 7.3 G/DL (ref 6–8.5)
SODIUM SERPL-SCNC: 138 MMOL/L (ref 134–144)
TRIGL SERPL-MCNC: 129 MG/DL (ref 0–149)
VLDLC SERPL CALC-MCNC: 23 MG/DL (ref 5–40)

## 2025-06-06 ENCOUNTER — APPOINTMENT (OUTPATIENT)
Dept: LAB | Facility: HOSPITAL | Age: 75
End: 2025-06-06
Attending: INTERNAL MEDICINE
Payer: MEDICARE

## 2025-06-06 ENCOUNTER — TRANSCRIBE ORDERS (OUTPATIENT)
Dept: ADMINISTRATIVE | Age: 75
End: 2025-06-06

## 2025-06-06 DIAGNOSIS — E83.52 HYPERCALCEMIA: Primary | ICD-10-CM

## 2025-06-06 DIAGNOSIS — E83.52 HYPERCALCEMIA: ICD-10-CM

## 2025-06-06 LAB
CA-I BLD-SCNC: 1.33 MMOL/L (ref 1.15–1.27)
CALCIUM SERPL-MCNC: 10.9 MG/DL (ref 8.6–10.3)
PTH-INTACT SERPL-MCNC: 26.3 PG/ML (ref 12–88)

## 2025-06-06 PROCEDURE — 82330 ASSAY OF CALCIUM: CPT

## 2025-06-06 PROCEDURE — 83970 ASSAY OF PARATHORMONE: CPT

## 2025-06-06 PROCEDURE — 36415 COLL VENOUS BLD VENIPUNCTURE: CPT

## 2025-06-09 ENCOUNTER — HOSPITAL ENCOUNTER (OUTPATIENT)
Dept: PHYSICAL THERAPY | Facility: REHABILITATION | Age: 75
Setting detail: THERAPIES SERIES
Discharge: HOME | End: 2025-06-09
Attending: NURSE PRACTITIONER
Payer: MEDICARE

## 2025-06-09 DIAGNOSIS — M53.3 SACROILIAC JOINT PAIN: ICD-10-CM

## 2025-06-09 DIAGNOSIS — M54.6 THORACIC SPINE PAIN: ICD-10-CM

## 2025-06-09 DIAGNOSIS — M19.011 LOCALIZED OSTEOARTHRITIS OF BOTH SHOULDERS: ICD-10-CM

## 2025-06-09 DIAGNOSIS — M19.012 LOCALIZED OSTEOARTHRITIS OF BOTH SHOULDERS: ICD-10-CM

## 2025-06-09 DIAGNOSIS — M54.50 LUMBAR PAIN: Primary | ICD-10-CM

## 2025-06-09 DIAGNOSIS — Z98.1 S/P LUMBAR SPINAL FUSION: ICD-10-CM

## 2025-06-09 DIAGNOSIS — R53.1 DECREASED STRENGTH: ICD-10-CM

## 2025-06-09 PROCEDURE — 97113 AQUATIC THERAPY/EXERCISES: CPT | Mod: GP

## 2025-06-09 NOTE — PROGRESS NOTES
"Physical Therapy Visit    PT DAILY NOTE FOR OUTPATIENT THERAPY    Patient: Reyna Castillo MRN: [id number]  : 1950 75 y.o.  Referring Physician: Анна Terrazas CRNP  Date of Visit: 2025    Certification Dates: 25 through 25     Diagnosis:   1. Lumbar pain    2. Thoracic spine pain    3. Sacroiliac joint pain    4. S/P lumbar spinal fusion    5. Localized osteoarthritis of both shoulders    6. Decreased strength        Chief Complaints:  s/p lumbar surgery, pain lumbar spine R side, weakness    Precautions:   Existing Precautions/Restrictions: fall  Precautions comments: no flex or rotation from last episode of care - now with connecting lucia failure L5-S1      TODAY'S VISIT    Time In Session:  Start Time: 1400  Stop Time: 1345  Time Calculation (min): 1425 min   History/Vitals/Pain/Encounter Info - 25 1230          Injury History/Precautions/Daily Required Info    Document Type daily treatment     Primary Therapist Ros Larsen PT     Chief Complaint/Reason for Visit  s/p lumbar surgery, pain lumbar spine R side, weakness     Referring Physician Анна YBARRA     Existing Precautions/Restrictions fall     Precautions comments no flex or rotation from last episode of care - now with connecting lucia failure L5-S1     History of present illness/functional impairment Pt is a 72 y/o female who underwent 10/12/22 T10-S1 Posterior Thorocolumbosacral Decompression and Interbody Fusion, Instrumentation, Pelvic Fixation, Allograft, Autograft, BMP, Revision of Existing Instrumentation at [facility] by Dr Lee. In 2025 she was bending over to  something off the floor and experienced a \"pop\" with immediate low back pain.  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  She presents with chronic LBP with decreased mobility. She also has dx with bilateral shoulder OA with signficant limitations in shoulder mobility and function with pain.  L is worse than right. PMH: bilat " TKA, osteoporosis, HTN, DJD, R hand surgery, R foot surgery     Patient/Family/Caregiver Comments/Observations Pt reports she had a lot of pain after the assessment.  Found that the testing of HS/sciatic nerve tension was most painful.  Pain took several days to come down to normal levels for her (5/10)  Today she is better, but has taken Tramadol again.  Just found out her calcium levels are off and will be undergoing some more tests to determine path forwards.  Not to taken any extra calcium supplements for now.     Patient reported fall since last visit No        Pain Assessment    Currently in pain Yes     Preferred Pain Scale number (Numeric Rating Pain Scale)     Pain Side/Orientation bilateral     Pain: Body location Back;Shoulder     Pain Rating (0-10): Pre Activity 5     Pain Rating (0-10): Activity 5     Pain Rating (0-10): Post Activity 3     Pain Radiation to shoulder, left;leg, left     Pain Description constant;deep;spasm;pressure;aching     Pain Onset/Duration No pain increase during aquatics        Pain Intervention    Intervention  aquatic exercise     Post Intervention Comments decreased pain from 5/10 - 3/10        Pre Activity Vital Signs    Pulse 81     SpO2 98 %     /80     BP Location Left upper arm     BP Method Manual     Patient Position Sitting         Services    Do You Speak a Language Other Than English at Home? no               Daily Treatment Assessment and Plan - 06/09/25 1230          Daily Treatment Assessment and Plan    Progress toward goals Progressing     Daily Outcome Summary Initial aquatics session this episode of care.  Patient in/out of water via stairs with bilat HR, caught L foot briefly on top step but no injury.  Kept reps at 10 - 15 and slow and gentle.  Modified shoulder exercises for L to allow for AA and PROM under 90 degrees.  Seated cycling x 20 reps - assisted initially for balance and postural control.  Limited duration due to first visit and  following poor tolerance for assessment.  Recheck next visit for tolerance to increase reps.     Plan and Recommendations Assess response to initial PT aquatics visit.  Complete FGA when able, and PSS (requested patient complete at home and return - she had forgotten today)  Encourage use of SPC.                     OBJECTIVE DATA TAKEN TODAY:    None taken    Today's Treatment:    Aquatics exercises flow sheet Y/N Group aquatics CPT 76515  Individual aquatics [id number] Time  45 min   DX: T10-S1 fusion with hardware failure at L5S1 chronic pain, bilat shoulder severe OA, kevin L y In and out via stairs with bilat HR.  Rec SPC for patient due to + bilat Trendelenberg      Therapist in Pool?  Y Yes - at least for first visit    Reviewed aquatics guidelines handout   Yes          Walking forwards y 4 laps with barbell    Side stepping y 3 laps with barbell    kayak      Walking backwards nv               LE exercises          Hip abduction y X 10    Hip flexion - march y X 10   Hip extension no    HS curls y X 10   Heel raises y X 10   Squats Y  X 10 - working to increase shoulder flexion ROM with less pain             UE  exercises  No paddles and within tolerable range - let water assist - use diaphragmatic breathing     Perform unilaterally initially   Pendulum Y L UE  x 10 CW/ CCW   Scap Retraction Y Needs cues for performance x 10   Shoulder flexion Y X 10   Shoulder abduction/scaption nv modified   Biceps/triceps nv    Horizontal abd/add Y Gentle sweep below 90 degrees   x 10 on R, LUE had to stop due to pain   Shoulder extension Y  X 10        Step up forwards     Step ups side          Seated - 8 in block under feet     Add squeeze  Y X 10   Cycling Y X 10   Sit to stand nv         Floating with noodles ?    Scissors, bicycling, skiers          stretching     Calf stretch     Low back stretch

## 2025-06-09 NOTE — OP PT TREATMENT LOG
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  Rec SPC for patient due to + bilat Trendelenberg      Therapist in Pool?  Y Yes - at least for first visit    Reviewed aquatics guidelines handout   Yes          Walking forwards y 4 laps with barbell    Side stepping y 3 laps with barbell    kayak      Walking backwards nv               LE exercises          Hip abduction y X 10    Hip flexion - march y X 10   Hip extension no    HS curls y X 10   Heel raises y X 10   Squats Y  X 10 - working to increase shoulder flexion ROM with less pain             UE  exercises  No paddles and within tolerable range - let water assist - use diaphragmatic breathing     Perform unilaterally initially   Pendulum Y L UE  x 10 CW/ CCW   Scap Retraction Y Needs cues for performance x 10   Shoulder flexion Y X 10   Shoulder abduction/scaption nv modified   Biceps/triceps nv    Horizontal abd/add Y Gentle sweep below 90 degrees   x 10 on R, LUE had to stop due to pain   Shoulder extension Y  X 10        Step up forwards     Step ups side          Seated - 8 in block under feet     Add squeeze  Y X 10   Cycling Y X 10   Sit to stand nv         Floating with noodles ?    Scissors, bicycling, skiers          stretching     Calf stretch     Low back stretch

## 2025-06-11 ENCOUNTER — TELEPHONE (OUTPATIENT)
Dept: CARDIOLOGY | Facility: CLINIC | Age: 75
End: 2025-06-11
Payer: COMMERCIAL

## 2025-06-11 DIAGNOSIS — E78.2 MIXED HYPERLIPIDEMIA: Primary | ICD-10-CM

## 2025-06-11 DIAGNOSIS — E78.2 MIXED HYPERLIPIDEMIA: ICD-10-CM

## 2025-06-11 DIAGNOSIS — I10 ESSENTIAL HYPERTENSION: Primary | ICD-10-CM

## 2025-06-11 RX ORDER — ROSUVASTATIN CALCIUM 40 MG/1
40 TABLET, COATED ORAL DAILY
Qty: 90 TABLET | Refills: 1 | Status: SHIPPED | OUTPATIENT
Start: 2025-06-11 | End: 2025-12-08

## 2025-06-11 NOTE — PROGRESS NOTES
Lipid panel and CMP ordered for patient to get done in 2 months after increasing rosuvastatin to 40mg and d/c fenofibrate.

## 2025-06-12 ENCOUNTER — HOSPITAL ENCOUNTER (OUTPATIENT)
Dept: PHYSICAL THERAPY | Facility: REHABILITATION | Age: 75
Setting detail: THERAPIES SERIES
Discharge: HOME | End: 2025-06-12
Attending: NURSE PRACTITIONER
Payer: MEDICARE

## 2025-06-12 DIAGNOSIS — M54.50 LUMBAR PAIN: Primary | ICD-10-CM

## 2025-06-12 DIAGNOSIS — Z98.1 S/P LUMBAR SPINAL FUSION: ICD-10-CM

## 2025-06-12 DIAGNOSIS — M54.51 VERTEBROGENIC LOW BACK PAIN: ICD-10-CM

## 2025-06-12 DIAGNOSIS — M25.60 DECREASED RANGE OF MOTION: ICD-10-CM

## 2025-06-12 DIAGNOSIS — R53.1 DECREASED STRENGTH: ICD-10-CM

## 2025-06-12 DIAGNOSIS — M54.6 THORACIC SPINE PAIN: ICD-10-CM

## 2025-06-12 DIAGNOSIS — M53.3 SACROILIAC JOINT PAIN: ICD-10-CM

## 2025-06-12 PROCEDURE — 97113 AQUATIC THERAPY/EXERCISES: CPT | Mod: GP

## 2025-06-12 NOTE — PROGRESS NOTES
"Physical Therapy Visit    PT DAILY NOTE FOR OUTPATIENT THERAPY    Patient: Reyna Castillo MRN: [id number]  : 1950 75 y.o.  Referring Physician: Анна Terrazas CRNP  Date of Visit: 2025    Certification Dates: 25 through 25     Diagnosis:   1. Lumbar pain    2. Thoracic spine pain    3. Sacroiliac joint pain    4. S/P lumbar spinal fusion    5. Decreased strength    6. Decreased range of motion    7. Vertebrogenic low back pain        Chief Complaints:  s/p lumbar surgery, pain lumbar spine R side, weakness    Precautions:   Existing Precautions/Restrictions: fall  Precautions comments: no flex or rotation from last episode of care - now with connecting lucia failure L5-S1      TODAY'S VISIT    Time In Session:  Start Time: 1505  Stop Time: 1558  Time Calculation (min): 53 min   History/Vitals/Pain/Encounter Info - 25 1409          Injury History/Precautions/Daily Required Info    Document Type daily treatment     Primary Therapist Ros Larsen PT     Chief Complaint/Reason for Visit  s/p lumbar surgery, pain lumbar spine R side, weakness     Referring Physician Анна YBARRA     Existing Precautions/Restrictions fall     Precautions comments no flex or rotation from last episode of care - now with connecting lucia failure L5-S1     History of present illness/functional impairment Pt is a 74 y/o female who underwent 10/12/22 T10-S1 Posterior Thorocolumbosacral Decompression and Interbody Fusion, Instrumentation, Pelvic Fixation, Allograft, Autograft, BMP, Revision of Existing Instrumentation at [facility] by Dr Lee. In 2025 she was bending over to  something off the floor and experienced a \"pop\" with immediate low back pain.  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  She presents with chronic LBP with decreased mobility. She also has dx with bilateral shoulder OA with signficant limitations in shoulder mobility and function with pain.  L is worse than " "right. PMH: bilat TKA, osteoporosis, HTN, DJD, R hand surgery, R foot surgery     Patient/Family/Caregiver Comments/Observations Pt reports the aquatic session was well tolerated last visit.  Made her feel like she had more range of motion/mobility.  Today she said \"I was going up and down stairs x 4 - I don't pay attention to what I do enough since I always hurt.  I did bring my cane today and I did notice that my walking is better when I use it.\"     Patient reported fall since last visit No        Pain Assessment    Currently in pain Yes     Pain Rating (0-10): Pre Activity 5     Pain Rating (0-10): Activity 5     Pain Rating (0-10): Post Activity 3        Pain Intervention    Intervention  aquatic exercise     Post Intervention Comments decrease in pain         Services    Do You Speak a Language Other Than English at Home? no               Daily Treatment Assessment and Plan - 06/12/25 0512          Daily Treatment Assessment and Plan    Progress toward goals Progressing     Daily Outcome Summary Aquatics session well tolerated - had to modify the runner stretch LLE and the hip abd on the R to decrease pain during the exercise.  Added more UE exercise with control/ assistance thru buoyancy.  All exercise reps increased to 15-20.  Checked height of SPC and encouraged use in RUE to alleviate L shoulder stress.  Have not added cycle float due to shoulder pain, but might try in future.  Perhaps riding the noodle?     Plan and Recommendations Continue to progress exercises gradually.  FGA when able.                     OBJECTIVE DATA TAKEN TODAY:    None taken    Today's Treatment:    Aquatics exercises flow sheet Y/N Group aquatics CPT 29200  Individual aquatics [id number] Time   53min   DX: T10-S1 fusion with hardware failure at L5S1 chronic pain, bilat shoulder severe OA, kevin L y In and out via stairs with bilat HR.  Rec SPC for patient due to + bilat Trendelenberg      Therapist in Pool?  Y Yes - at least " for first visit    Reviewed aquatics guidelines handout   Yes          Walking forwards y 4 laps with barbell    Side stepping y 3 laps with barbell    kayak      Walking backwards nv               LE exercises          Hip abduction y 2 X 10  (switch to sets of 5 or alternate next visit)   Hip flexion - march y 2 X 10   Hip extension no    HS curls y 2 X 10   Heel raises y 2 X 10   Squats Y  X 10 - working to increase shoulder flexion ROM with less pain             UE  exercises  No paddles and within tolerable range - let water assist - use diaphragmatic breathing     Perform unilaterally initially   Pendulum Y L UE  x 10 CW/ CCW   Scap Retraction Y Needs cues for performance x 10   Shoulder flexion Y X 10  2 bouts   Shoulder abduction/scaption y Modified x 19   Biceps/triceps y 2 x 10   Horizontal abd/add n Gentle sweep below 90 degrees   x 10 on R, LUE had to stop due to pain   Shoulder extension Y  X 10   No money Y X 10             Step up forwards     Step ups side          Seated - 8 in block under feet     Add squeeze  Y X 10   Cycling Y 2 X 10   Sit to stand nv    Ankle pumps y 2 x 10 w knees extended             Floating with noodles ?    Scissors, bicycling, skiers          stretching     Calf stretch Y 2 x 20 sec   Low back stretch

## 2025-06-12 NOTE — OP PT TREATMENT LOG
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  Rec SPC for patient due to + bilat Trendelenberg      Therapist in Pool?  Y Yes - at least for first visit    Reviewed aquatics guidelines handout   Yes          Walking forwards y 4 laps with barbell    Side stepping y 3 laps with barbell    kayak      Walking backwards nv               LE exercises          Hip abduction y 2 X 10  (switch to sets of 5 or alternate next visit)   Hip flexion - march y 2 X 10   Hip extension no    HS curls y 2 X 10   Heel raises y 2 X 10   Squats Y  X 10 - working to increase shoulder flexion ROM with less pain             UE  exercises  No paddles and within tolerable range - let water assist - use diaphragmatic breathing     Perform unilaterally initially   Pendulum Y L UE  x 10 CW/ CCW   Scap Retraction Y Needs cues for performance x 10   Shoulder flexion Y X 10  2 bouts   Shoulder abduction/scaption y Modified x 19   Biceps/triceps y 2 x 10   Horizontal abd/add n Gentle sweep below 90 degrees   x 10 on R, LUE had to stop due to pain   Shoulder extension Y  X 10   No money Y X 10             Step up forwards     Step ups side          Seated - 8 in block under feet     Add squeeze  Y X 10   Cycling Y 2 X 10   Sit to stand nv    Ankle pumps y 2 x 10 w knees extended             Floating with noodles ?    Scissors, bicycling, skiers          stretching     Calf stretch Y 2 x 20 sec   Low back stretch

## 2025-06-20 ENCOUNTER — HOSPITAL ENCOUNTER (OUTPATIENT)
Dept: PHYSICAL THERAPY | Facility: REHABILITATION | Age: 75
Setting detail: THERAPIES SERIES
Discharge: HOME | End: 2025-06-20
Attending: NURSE PRACTITIONER
Payer: MEDICARE

## 2025-06-20 DIAGNOSIS — Z98.1 S/P LUMBAR SPINAL FUSION: ICD-10-CM

## 2025-06-20 DIAGNOSIS — M53.3 SACROILIAC JOINT PAIN: ICD-10-CM

## 2025-06-20 DIAGNOSIS — M25.60 DECREASED RANGE OF MOTION: ICD-10-CM

## 2025-06-20 DIAGNOSIS — M54.50 LUMBAR PAIN: Primary | ICD-10-CM

## 2025-06-20 DIAGNOSIS — M54.6 THORACIC SPINE PAIN: ICD-10-CM

## 2025-06-20 DIAGNOSIS — R53.1 DECREASED STRENGTH: ICD-10-CM

## 2025-06-20 PROCEDURE — 97113 AQUATIC THERAPY/EXERCISES: CPT | Mod: GP

## 2025-06-20 NOTE — PROGRESS NOTES
"Physical Therapy Visit    PT DAILY NOTE FOR OUTPATIENT THERAPY    Patient: Reyna Castillo MRN: [id number]  : 1950 75 y.o.  Referring Physician: Анна Terrazas CRNP  Date of Visit: 2025    Certification Dates: 25 through 25     Diagnosis:   1. Lumbar pain    2. Thoracic spine pain    3. Sacroiliac joint pain    4. S/P lumbar spinal fusion    5. Decreased strength    6. Decreased range of motion        Chief Complaints:  s/p lumbar surgery, pain lumbar spine R side, weakness    Precautions:   Existing Precautions/Restrictions: fall  Precautions comments: no flex or rotation from last episode of care - now with connecting lucia failure L5-S1      TODAY'S VISIT    Time In Session:  Start Time: 1005  Stop Time: 1100  Time Calculation (min): 55 min   History/Vitals/Pain/Encounter Info - 25 1005          Injury History/Precautions/Daily Required Info    Document Type daily treatment     Primary Therapist Ros Larsen PT     Chief Complaint/Reason for Visit  s/p lumbar surgery, pain lumbar spine R side, weakness     Referring Physician Анна YBARRA     Existing Precautions/Restrictions fall     Precautions comments no flex or rotation from last episode of care - now with connecting lucia failure L5-S1     History of present illness/functional impairment Pt is a 74 y/o female who underwent 10/12/22 T10-S1 Posterior Thorocolumbosacral Decompression and Interbody Fusion, Instrumentation, Pelvic Fixation, Allograft, Autograft, BMP, Revision of Existing Instrumentation at [facility] by Dr Lee. In 2025 she was bending over to  something off the floor and experienced a \"pop\" with immediate low back pain.  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  She presents with chronic LBP with decreased mobility. She also has dx with bilateral shoulder OA with signficant limitations in shoulder mobility and function with pain.  L is worse than right. PMH: bilat TKA, osteoporosis, " HTN, DJD, R hand surgery, R foot surgery     Patient/Family/Caregiver Comments/Observations Pt reports aqautics are well-tolerated except for hip abd and hip extension.  wants to do try hanging with noodle support in front today     Patient reported fall since last visit No        Pain Assessment    Currently in pain Yes     Preferred Pain Scale number (Numeric Rating Pain Scale)     Pain Side/Orientation bilateral     Pain: Body location Back;Shoulder     Pain Rating (0-10): Pre Activity 5     Pain Rating (0-10): Activity 4     Pain Rating (0-10): Post Activity 4        Pain Intervention    Intervention  aquatic     Post Intervention Comments decrease in pain while in pool         Services    Do You Speak a Language Other Than English at Home? no               Daily Treatment Assessment and Plan - 06/20/25 1005          Daily Treatment Assessment and Plan    Progress toward goals Progressing     Daily Outcome Summary Reyna finds that the pool helps her tolerate the back pain, but she is unable to tolerate hip abduction or extension on the R.  She was able to perform cycling with noodle support - unable to tolerate scissor exercise.  Able to perform the exercises with her L shoulder with assistance, avoiding concentric lifting of the arm as it aggravates.  Buoyancy helped the L sholder mobility a lot.     Plan and Recommendations Continue to progress exercises gradually.  FGA when able.                     OBJECTIVE DATA TAKEN TODAY:    None taken    Today's Treatment:    Aquatics exercises flow sheet Y/N Group aquatics CPT 13075  Individual aquatics [id number] Time   55min   DX: T10-S1 fusion with hardware failure at L5S1 chronic pain, bilat shoulder severe OA, kevin L y In and out via stairs with bilat HR.  Rec SPC for patient due to + bilat Trendelenberg      Therapist in Pool?  Y Yes - at least for first visit    Reviewed aquatics guidelines handout   Yes          Walking forwards y 4 laps with caty     Side stepping y 3 laps with barbell    kayak      Walking backwards y 3 laps with barbell              LE exercises          Hip abduction y 2 X 10  (switch to sets of 5 or alternate next visit) - did not perform with RLE   Hip flexion - march y 2 X 10   Hip extension no    HS curls y 2 X 10   Heel raises y 2 X 10   Squats nv  X 10 - working to increase shoulder flexion ROM with less pain   Weight Shifting Y Forward/back stride step x 10 ea LE leading        UE  exercises  No paddles and within tolerable range - let water assist - use diaphragmatic breathing     Perform unilaterally initially   Pendulum Y L UE  x 10 CW/ CCW   Scap Retraction Y Needs manual cues for performance x 10   Shoulder flexion Y X 10  2 bouts   Shoulder abduction/scaption y Modified x 10   Biceps/triceps y 2 x 10   Horizontal abd/add n Gentle sweep below 90 degrees   x 10 on R, LUE had to stop due to pain   Shoulder extension Y  X 10   No money Y X 10             Step up forwards     Step ups side          Seated - 8 in block under feet     Add squeeze  n X 10   Cycling m 2 X 10   Sit to stand nv    Ankle pumps n 2 x 10 w knees extended             Floating with noodles y Large noodle in fron   cycling y Does not tolerate scissors, cross countr        stretching     Calf stretch Y 4 x 20 sec ea   Low back stretch

## 2025-06-20 NOTE — OP PT TREATMENT LOG
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  Rec SPC for patient due to + bilat Trendelenberg      Therapist in Pool?  Y Yes - at least for first visit    Reviewed aquatics guidelines handout   Yes          Walking forwards y 4 laps with barbell    Side stepping y 3 laps with barbell    kayak      Walking backwards y 3 laps with barbell              LE exercises          Hip abduction y 2 X 10  (switch to sets of 5 or alternate next visit) - did not perform with RLE   Hip flexion - march y 2 X 10   Hip extension no    HS curls y 2 X 10   Heel raises y 2 X 10   Squats nv  X 10 - working to increase shoulder flexion ROM with less pain   Weight Shifting Y Forward/back stride step x 10 ea LE leading        UE  exercises  No paddles and within tolerable range - let water assist - use diaphragmatic breathing     Perform unilaterally initially   Pendulum Y L UE  x 10 CW/ CCW   Scap Retraction Y Needs manual cues for performance x 10   Shoulder flexion Y X 10  2 bouts   Shoulder abduction/scaption y Modified x 10   Biceps/triceps y 2 x 10   Horizontal abd/add n Gentle sweep below 90 degrees   x 10 on R, LUE had to stop due to pain   Shoulder extension Y  X 10   No money Y X 10             Step up forwards     Step ups side          Seated - 8 in block under feet     Add squeeze  n X 10   Cycling m 2 X 10   Sit to stand nv    Ankle pumps n 2 x 10 w knees extended             Floating with noodles y Large noodle in fron   cycling y Does not tolerate scissors, cross countr        stretching     Calf stretch Y 4 x 20 sec ea   Low back stretch

## 2025-06-25 ENCOUNTER — HOSPITAL ENCOUNTER (OUTPATIENT)
Dept: PHYSICAL THERAPY | Facility: REHABILITATION | Age: 75
Setting detail: THERAPIES SERIES
Discharge: HOME | End: 2025-06-25
Attending: NURSE PRACTITIONER
Payer: MEDICARE

## 2025-06-25 DIAGNOSIS — M53.3 SACROILIAC JOINT PAIN: ICD-10-CM

## 2025-06-25 DIAGNOSIS — R53.1 DECREASED STRENGTH: ICD-10-CM

## 2025-06-25 DIAGNOSIS — M54.6 THORACIC SPINE PAIN: ICD-10-CM

## 2025-06-25 DIAGNOSIS — M54.50 LUMBAR PAIN: Primary | ICD-10-CM

## 2025-06-25 DIAGNOSIS — Z98.1 S/P LUMBAR SPINAL FUSION: ICD-10-CM

## 2025-06-25 DIAGNOSIS — M25.60 DECREASED RANGE OF MOTION: ICD-10-CM

## 2025-06-25 PROCEDURE — 97113 AQUATIC THERAPY/EXERCISES: CPT | Mod: GP

## 2025-06-25 NOTE — PROGRESS NOTES
"Physical Therapy Discharge      PT DISCHARGE NOTE FOR OUTPATIENT THERAPY    Patient: Reyna Castillo MRN: [id number]  : 1950 75 y.o.  Referring Physician: Анна Terrazas CRNP  Date of Visit: 2025      Certification Dates: 25 through 25    Total Visit Count: 5    Diagnosis:   1. Lumbar pain    2. Thoracic spine pain    3. Sacroiliac joint pain    4. S/P lumbar spinal fusion    5. Decreased strength    6. Decreased range of motion        Chief Complaints:  Chief Complaint   Patient presents with    Difficulty Walking    Pain    Balance Deficits    Dec Strength    Dec ROM       Precautions:  Existing Precautions/Restrictions: fall  Precautions comments: no flex or rotation from last episode of care - now with connecting lucia failure L5-S1      TODAY'S VISIT:    Time In Session:  Start Time: 1305  Stop Time: 1400  Time Calculation (min): 55 min   General Information - 25 1256          Session Details    Document Type discharge evaluation     Mode of Treatment individual therapy        General Information    Referring Physician Анна YBARRA     History of present illness/functional impairment Pt is a 74 y/o female who underwent 10/12/22 T10-S1 Posterior Thorocolumbosacral Decompression and Interbody Fusion, Instrumentation, Pelvic Fixation, Allograft, Autograft, BMP, Revision of Existing Instrumentation at [facility] by Dr Lee. In 2025 she was bending over to  something off the floor and experienced a \"pop\" with immediate low back pain.  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  She presents with chronic LBP with decreased mobility. She also has dx with bilateral shoulder OA with signficant limitations in shoulder mobility and function with pain.  L is worse than right. PMH: bilat TKA, osteoporosis, HTN, DJD, R hand surgery, R foot surgery     Patient/Family/Caregiver Comments/Observations Pt reports she is unexpectedly going to have a reverse total " shoulder done on July 7.  States today is her last PT appt due to needing to get all her testing done.  Wants to return as soon as the shoulder is replaced.     Existing Precautions/Restrictions fall     Precautions comments no flex or rotation from last episode of care - now with connecting lucia failure L5-S1         Services    Do You Speak a Language Other Than English at Home? no                 Pain/Vitals - 06/25/25 1650          Pain Assessment    Currently in pain Yes     Preferred Pain Scale number (Numeric Rating Pain Scale)     Pain Side/Orientation bilateral     Pain: Body location Back;Shoulder     Pain Rating (0-10): Pre Activity 6     Pain Rating (0-10): Activity 6     Pain Rating (0-10): Post Activity 6        Pain Intervention    Intervention  aquatic     Post Intervention Comments some decrease in pain kevin with float activities               PT - 06/25/25 1256          Physical Therapy    Physical Therapy Specialty Spine PT        PT Plan    Frequency of treatment 2 times/week     PT Duration 3 months     PT Cert From 05/30/25     PT Cert To 08/28/25     Date PT POC was sent to provider 05/30/25               Assessment and Plan - 06/25/25 1256          Assessment    Clinical Assessment Patient is unexpectedly discharged today due to upcoming Reverse Total Shoulder surgery.  No re-testing was possible due to unplanned discharge after 5 visits.  Pt would like to return to PT after her surgery and was informed she would need to have new orders and formal discharge needed for today.     Plan and Recommendations Discharge PT at this time.                   OBJECTIVE MEASUREMENTS/DATA:        ROM and MMT          5/30/2025   PT UE ROM Measurements   PROM: Right Shoulder Flexion  115 degrees   PROM: Left Shoulder Flexion 85 degrees   PROM: Right Shoulder Extension 50 degrees   PROM: Left Shoulder Extension 30 degrees   PROM: Right Shoulder ABD 95 degrees   PROM: Left Shoulder ABD 50 degrees    PROM: Right Shoulder IR 35 degrees       at 90 degrees abd   PROM: Left Shoulder IR 40 degrees       in neutral   PROM: Right Shoulder ER 40 degrees       at 90 degrees abd   PROM: Left Shoulder ER 25 degrees       in neutral abd   PROM: Right Elbow Flex/Ext wfl   PROM: Left Elbow Flex/Ext wfl   PT LE ROM Measurements   AROM: Right LE Gross Movement WFL       but approx 10 degrees from normal limits   AROM: Left LE Gross Movement WFL       but approx 10 degrees from normal limits   AROM: Right Hip Extension 0 degrees       pain   AROM: Left Hip Extension 0 degrees       pain on extension   PT Cervical/Lumbar/Other ROM Measurements   AROM: Lumbar Flexion 60   AROM: Lumar Extension 0       painful   AROM: Left Lumbar SB 10   AROM: Right Lumbar SB 15   PT LE MMT   Right Hip Flexion (4-/5) good minus   Left Hip Flexion (3+/5) fair plus   Right Hip Extension (3-/5) fair minus       limited by pain   Left Hip Extension (3-/5) fair minus       limited by pain   Right Hip ABD (3-/5) fair minus       sub optimal testing due to shoulders   Left Hip ABD (3-/5) fair minus       sub optimal positioning due to shoulder pain   Right Hip IR (3+/5) fair plus   Left Hip IR (3+/5) fair plus   Right Hip ER (3+/5) fair plus   Left Hip ER (3+/5) fair plus   Right Knee Flexion (4/5) good   Left Knee Flexion (4/5) good   Right Knee Extension (4+/5) good plus   Left Knee Extension (4+/5) good plus   Right Ankle DF (4+/5) good plus   Left Ankle DF (4+/5) good plus   Right Ankle PF (3/5) fair   Left Ankle PF (3/5) fair   Right Ankle Inversion (4+/5) good plus   Left Ankle Inversion (4+/5) good plus   Right Ankle Eversion (4+/5) good plus   Left Ankle Eversion (4/5) good     Outcome Measures          5/30/2025    10:16   PT OBJECTIVE Outcome Measures   2 Minute Walk Test 329 ft   Five Times Sit to Stand 14.19   PT SUBJECTIVE Outcome Measures   Oswestry 42% impaired          Today's Treatment:    Education provided:  Yes: See treatment log  for details of education provided  Methods: Discussion and Handout, demonstration  Readiness: acceptance  Response: Demonstrated understanding    Aquatics exercises flow sheet Y/N Group aquatics CPT 82679  Individual aquatics 89152 Time   55min   DX: T10-S1 fusion with hardware failure at L5S1 chronic pain, bilat shoulder severe OA, kevin L y In and out via stairs with bilat HR.  Rec SPC for patient due to + bilat Trendelenberg      Therapist in Pool?  Y Yes - at least for first visit    Reviewed aquatics guidelines handout   Yes          Walking forwards y 4 laps with barbell    Side stepping y 3 laps with barbell    kayak      Walking backwards y 3 laps with barbell              LE exercises          Hip abduction y 2 X 10  (switch to sets of 5 or alternate next visit) - did not perform with RLE   Hip flexion - march y 2 X 10   Hip extension no    HS curls y 2 X 10   Heel raises y 2 X 10   Squats nv  X 10 - working to increase shoulder flexion ROM with less pain   Weight Shifting Y Forward/back stride step x 10 ea LE leading        UE  exercises  No paddles and within tolerable range - let water assist - use diaphragmatic breathing     Perform unilaterally initially   Pendulum Y L UE  x 10 CW/ CCW   Scap Retraction Y Needs manual cues for performance x 10   Shoulder flexion Y X 10  2 bouts   Shoulder abduction/scaption y Modified x 10   Biceps/triceps y 2 x 10   Horizontal abd/add n Gentle sweep below 90 degrees   x 10 on R, LUE had to stop due to pain   Shoulder extension Y  X 10   No money Y X 10             Step up forwards     Step ups side          Seated - 8 in block under feet     Add squeeze  n X 10   Cycling m 2 X 10   Sit to stand nv    Ankle pumps n 2 x 10 w knees extended             Floating with noodles y Large noodle in fron   cycling y Does not tolerate scissors, cross countr        stretching     Calf stretch Y 4 x 20 sec ea   Low back stretch                                          Goals Addressed                       This Visit's Progress       Patient Stated      COMPLETED: Patient Stated (pt-stated)         I want to walk further with better endurance, and be able to bend better.  Not met         Other      COMPLETED: PT Lumbar/Shoulder LTG         Long term goals   Long Term Goals Time Frame Result Comment/Progress   Pt will increase core, LE and Hip MMT >/= full grade 8-12 wks  Unplanned discharge   Pt will increase shoulder PROM >/= 15 degrees into flexion, extension, lIR/ER and abduction to allow improved ability to don shirts 8-12 wks     Pt will demonstrate improvements in Oswestry >/=  15 % functional improvement 12 wks     Improve [place] Shoulder Score >/= 15 % 12 wks     Pt will demonstrate an indep HEP for aquatics 12 wks     Patient will increase her 2MWT distance by 50 ft to reflect improved gait control and endurance 12 wks     Reyna will demonstrate a gait pattern that has a wider base of support and decrease in Trendelenberg with or without use of SPC 12 wks     Reyna will be able to walk 45-60 minutes 2-3 tmes per week 12 wks             COMPLETED: PT Lumbar/Shoulder STG         Short term goals   Short Term Goals Time Frame Result Comment/Progress   Pt will increase core, LE and Hip MMT >/= ½ grade 4 -6 wks Not met Unplanned discharge   Pt will increase bilat shoulder PROM 10 degrees all directions 4 wks Not met Unplanned discharge   Pt will demonstrate improvements in Oswestry >/=  12% functional improvement  4-6 wks Not assessed Unplanned discharge   Improve [place] Shoulder Score 5 points 4-6 wks Not assessed Unplanned discharge   Pt will be independent with HEP to increase carryover at home 4 wks  Partially met Unplanned discharge   Pt will demonstrate less scissoring with ambulation/ improve base of support during ambulation by 2 inches 4-6 wks Not met unless using SPC Unplanned discharge   Patient will demonstrate sufficient control to perform hip hinge squat to  items below waist  height 6 wks Not met Unplanned discharge   Pt will be able to stand >/= 15 min without onset of LBP 4 wks ongoing Unplanned discharge   Pt will be able to walk 30 minutes without shortness of breath 4 wks Sl improvement Unplanned discharge                 Discharge information for CARF:    Reason for D/C: Self discharge  Was care interrupted for medical reason?: Yes (upcoming surgery)  Care was interrupted due to hospitalization?: No (upcoming surgery)  Did the patient demonstrate increased independence: Yes  Demonstration of independence:: Chancellor in HEP  Has the patient returned to productive activity?: No  Skin integrity: Intact - No issues

## 2025-06-25 NOTE — OP PT TREATMENT LOG
Aquatics exercises flow sheet Y/N Group aquatics CPT 89063  Individual aquatics [id number] Time   55min   DX: T10-S1 fusion with hardware failure at L5S1 chronic pain, bilat shoulder severe OA, kevin L y In and out via stairs with bilat HR.  Rec SPC for patient due to + bilat Trendelenberg      Therapist in Pool?  Y Yes - at least for first visit    Reviewed aquatics guidelines handout   Yes          Walking forwards y 4 laps with barbell    Side stepping y 3 laps with barbell    kayak      Walking backwards y 3 laps with barbell              LE exercises          Hip abduction y 2 X 10  (switch to sets of 5 or alternate next visit) - did not perform with RLE   Hip flexion - march y 2 X 10   Hip extension no    HS curls y 2 X 10   Heel raises y 2 X 10   Squats nv  X 10 - working to increase shoulder flexion ROM with less pain   Weight Shifting Y Forward/back stride step x 10 ea LE leading        UE  exercises  No paddles and within tolerable range - let water assist - use diaphragmatic breathing     Perform unilaterally initially   Pendulum Y L UE  x 10 CW/ CCW   Scap Retraction Y Needs manual cues for performance x 10   Shoulder flexion Y X 10  2 bouts   Shoulder abduction/scaption y Modified x 10   Biceps/triceps y 2 x 10   Horizontal abd/add n Gentle sweep below 90 degrees   x 10 on R, LUE had to stop due to pain   Shoulder extension Y  X 10   No money Y X 10             Step up forwards     Step ups side          Seated - 8 in block under feet     Add squeeze  n X 10   Cycling m 2 X 10   Sit to stand nv    Ankle pumps n 2 x 10 w knees extended             Floating with noodles y Large noodle in fron   cycling y Does not tolerate scissors, cross countr        stretching     Calf stretch Y 4 x 20 sec ea   Low back stretch

## 2025-07-17 ENCOUNTER — HOSPITAL ENCOUNTER (EMERGENCY)
Facility: HOSPITAL | Age: 75
Discharge: HOME | End: 2025-07-18
Attending: STUDENT IN AN ORGANIZED HEALTH CARE EDUCATION/TRAINING PROGRAM
Payer: MEDICARE

## 2025-07-17 ENCOUNTER — APPOINTMENT (EMERGENCY)
Dept: RADIOLOGY | Facility: HOSPITAL | Age: 75
End: 2025-07-17
Payer: MEDICARE

## 2025-07-17 ENCOUNTER — APPOINTMENT (EMERGENCY)
Dept: RADIOLOGY | Facility: HOSPITAL | Age: 75
End: 2025-07-17
Attending: STUDENT IN AN ORGANIZED HEALTH CARE EDUCATION/TRAINING PROGRAM
Payer: MEDICARE
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
## 2025-07-17 DIAGNOSIS — M79.89 RIGHT LEG SWELLING: ICD-10-CM

## 2025-07-17 DIAGNOSIS — M25.512 ACUTE PAIN OF LEFT SHOULDER: ICD-10-CM

## 2025-07-17 PROCEDURE — 70450 CT HEAD/BRAIN W/O DYE: CPT

## 2025-07-17 PROCEDURE — 3E033NZ INTRODUCTION OF ANALGESICS, HYPNOTICS, SEDATIVES INTO PERIPHERAL VEIN, PERCUTANEOUS APPROACH: ICD-10-PCS | Performed by: STUDENT IN AN ORGANIZED HEALTH CARE EDUCATION/TRAINING PROGRAM

## 2025-07-17 PROCEDURE — 63600000 HC DRUGS/DETAIL CODE: Mod: JZ

## 2025-07-17 PROCEDURE — 73030 X-RAY EXAM OF SHOULDER: CPT | Mod: LT

## 2025-07-17 PROCEDURE — 93971 EXTREMITY STUDY: CPT | Mod: RT

## 2025-07-17 PROCEDURE — 73060 X-RAY EXAM OF HUMERUS: CPT | Mod: LT

## 2025-07-17 PROCEDURE — 72220 X-RAY EXAM SACRUM TAILBONE: CPT

## 2025-07-17 PROCEDURE — 96374 THER/PROPH/DIAG INJ IV PUSH: CPT

## 2025-07-17 PROCEDURE — 72125 CT NECK SPINE W/O DYE: CPT

## 2025-07-17 PROCEDURE — 99284 EMERGENCY DEPT VISIT MOD MDM: CPT | Mod: 25

## 2025-07-17 RX ORDER — HYDROMORPHONE HYDROCHLORIDE 1 MG/ML
0.5 INJECTION, SOLUTION INTRAMUSCULAR; INTRAVENOUS; SUBCUTANEOUS ONCE
Status: COMPLETED | OUTPATIENT
Start: 2025-07-17 | End: 2025-07-17

## 2025-07-17 RX ADMIN — HYDROMORPHONE HYDROCHLORIDE 0.5 MG: 1 INJECTION, SOLUTION INTRAMUSCULAR; INTRAVENOUS; SUBCUTANEOUS at 22:45

## 2025-07-18 ENCOUNTER — APPOINTMENT (EMERGENCY)
Dept: RADIOLOGY | Facility: HOSPITAL | Age: 75
End: 2025-07-18
Payer: MEDICARE

## 2025-07-18 VITALS
HEART RATE: 86 BPM | TEMPERATURE: 97.9 F | OXYGEN SATURATION: 98 % | RESPIRATION RATE: 16 BRPM | DIASTOLIC BLOOD PRESSURE: 78 MMHG | SYSTOLIC BLOOD PRESSURE: 145 MMHG

## 2025-07-18 PROCEDURE — 73020 X-RAY EXAM OF SHOULDER: CPT | Mod: RT

## 2025-07-18 NOTE — DISCHARGE INSTRUCTIONS
Return to the emergency department immediately if you develop intractable pain, new weakness or sensory deficits to your extremity, pale/cool extremity, or if you experience any other symptom that is concerning to you.    The ultrasound that you had of your right leg was negative for a clot today.  However, if symptoms persist, you may need a repeat ultrasound in approximately 5 to 7 days.    Take medications as prescribed.    Follow-up with your orthopedic surgeon for reevaluation.

## 2025-07-18 NOTE — CONSULTS
Orthopedic Surgery Consult Note    Consult for: L shoulder pain    Mechanism of Injury: mechanical fall    HPI:  75 y.o. female who is currently ~1.5 weeks status post L rTSA presenting with with left shoulder pain. Patient underwent L rTSA 25 with Dr. Castrejon. Since then she has been progressing well. Earlier today she was reaching over to grab a weed out of her yard when she fell over on to her left side. She endorses some headstrike, but denies any LOC. Taking ASA 81mg as DVT ppx. Denies any other injuries other than small superifical lacerations. Denies any numbness or tingling in RUE. Shoulder immobilizer in place.    Denies history of myocardial infarction  Denies history of stroke  Denies history of DVT/PE  Denies taking any antiplatelet or anticoagulation medication    Hand Dominance: RHD  Baseline Ambulatory Status: unassisted    PMH:  Past Medical History:   Diagnosis Date    Abnormal ECG 10/09/2018    Nonspecific T wave flattening inferiorly minimal ST depression laterally and anterolateral T wave inversion    Acid reflux     Agatston CAC score 100-199 10/09/2018    8/17 coronary calcium score 101 LAD 13, circumflex 68, RCA 20    Anxiety     Lopez's esophagus without dysplasia     Class 2 severe obesity due to excess calories with serious comorbidity and body mass index (BMI) of 36.0 to 36.9 in adult (CMS/[facility]) 10/09/2018    2017 weight 232 pounds    Depression     DJD (degenerative joint disease)     Family history of early CAD 10/09/2018    Father  of MI at age 59    Hearing loss     History of hepatitis A     Hypertension     Kidney lesion     Lumbar myelopathy (CMS/[facility])     Tran-Juan tear     Mixed hyperlipidemia 10/09/2018    2020 Lipid panel  LDL 91 HDL 47 Trigs 176    Spinal enthesopathy of lumbar region (CMS/[facility])        PSH:  Past Surgical History   Procedure Laterality Date    Adenoidectomy      Cholecystectomy      COLECTOMY SIGMOID N/A 10/10/2018    Performed by Evans Trujillo MD  "at  OR    Colonoscopy      Foot surgery Right     Hand surgery Right     Inner ear surgery Right     Joint replacement Bilateral     bilateral 2020, 2021; knees    Knee arthroscopy Bilateral     L3-S1 Posterior Lumbar Decompression, Posterior Lumbar Interbody Fusion, Instrumentation, Cage, Allograft, Autograft, BMP N/A 11/3/2021    Performed by Melchor Lee MD at  OR    Lumbar laminectomy      Sigmoidectomy      T10-S1 Posterior Thorocolumbosacral Decompression and Interbody Fusion, Instrumentation, Pelvic Fization, Allograft, Autograft, BMP, Revision of Existing Instrumentation N/A 10/12/2022    Performed by Melchor Lee MD at  OR    Tonsillectomy      Transumbilical augmentation mammaplasty      Upper gastrointestinal endoscopy      [place] tooth extraction         Medications:      Allergies:  Allergies   Allergen Reactions    Adhesive Tape-Silicones Rash    Nsaids (Non-Steroidal Anti-Inflammatory Drug) GI intolerance     Per RN during rounds, pt NSAID \"allergy\" is upset stomach.       SHx:  - Living situation: Lives at home with  in a house  - Occupation: retired  - Tobacco use: denies  - Alcohol use: denies  - Illicit substances: denies    Vital Signs:  Vitals:    07/17/25 2238   BP: (!) 146/68   Pulse: 95   Resp: 18   Temp:    SpO2: 97%       Labs:  CBC Results         10/14/22 10/13/22 10/10/22     1059 0337 1005    WBC 14.46 13.98 8.53    RBC 3.41 3.43 4.58    HGB 10.6 10.4 13.8    HCT 31.0 31.5 43.1    MCV 90.9 91.8 94.1    MCH 31.1 30.3 30.1    MCHC 34.2 33.0 32.0     323 365           Comment for HGB at 0337 on 10/13/22: ALL RESULTS HAVE BEEN CHECKED          BMP Results         06/06/25 06/04/25 10/14/22     1527 1235 1059    NA -- 138 134    K -- 5.0 4.1    Cl -- 100 102    CO2 -- 20 22    Glucose -- 88 131    BUN -- 24 23    Creatinine -- 0.92 0.7    Calcium 10.9 -- 9.5    Anion Gap -- -- 10    EGFR -- 65 >60.0            Physical Exam:  General:  Awake, following " commands  Symmetric chest rise bilaterally with normal effort    Musculoskeletal:  Right Upper Extremity  Atraumatic, no obvious deformity, skin intact  No tenderness to palpation throughout  Fires deltoid, biceps, triceps, wrist extensors, finger flexors, hand intrinsics  Sensation intact to light touch in axillary, median, radial, ulnar nerve distributions  Fingers warm    Left Upper Extremity   Atraumatic, no obvious deformity, skin intact. Sling in place. TTP over shoulder anteriorly, laterally, and posteriorly. No TTP at elbow, wrist, or hand  Fires deltoid, biceps, triceps, wrist extensors, finger flexors, hand intrinsics. Able to thumbs up, A ok, cross fingers, extend wrist, make a fist.  Sensation intact to light touch in axillary, median, radial, ulnar nerve distributions  Fingers warm. Palpable DP pulse    Right Lower Extremity  Atraumatic, no obvious deformity, skin intact  No tenderness to palpation throughout  Fires iliopsoas, quadriceps, tibialis anterior, extensor hallucis longus, gastrocnemius/soleus  Sensation intact to light touch in superficial peroneal, deep peroneal, tibial, saphenous, sural nerve distributions  Toes warm    Left Lower Extremity   Atraumatic, no obvious deformity, skin intact  No tenderness to palpation throughout  Fires iliopsoas, quadriceps, tibialis anterior, extensor hallucis longus, gastrocnemius/soleus  Sensation intact to light touch in superficial peroneal, deep peroneal, tibial, saphenous, sural nerve distributions  Toes warm    Imaging:  X-rays of left shoulder and humerus show no obvious fracture or dislocation      Assessment & Plan:  75 y.o. female who is 10 days status post L rTSA presenting with L shoulder pain status post mechanical fall.    - No obvious deformity, fracture, or dislocation appreciated on imaging. No acute orthopaedic intervention indicated. Recommended continued shoulder immobilizer application with scheduled follow up.  - Imaging: complete  -  Weight bearing status: NWB YOHAN  - Appreciate medical management, pre-operative risk stratification, and optimization for surgery  - Pre-operative labs to include   - DVT prophylaxis: ASA 81mg QD as prescribed  - Analgesia  - Please have patient follow up with Dr. Castrejon in the outpatient clinic      Pérez Aviles MD  Orthopedic Surgery

## 2025-07-18 NOTE — ED PROVIDER NOTES
Emergency Medicine Note  HPI   HISTORY OF PRESENT ILLNESS     Patient is a 75-year-old female s/p left total shoulder replacement on 2025 with Dr. Castrejon presenting to the emergency department for evaluation of left shoulder pain.  Patient states that she was looking at the [place] with her daughter when she noticed some weeds.  Patient states that she bent down to pick the weeds and fell onto her left shoulder.  Patient states that when she went to get herself up she did strike her head.  No loss of consciousness.  Patient is on 81 mg aspirin daily.  Patient states that prior to today she was not taking much for pain, but the pain since the fall has been severe prompting  further evaluation in the emergency department.  Patient denies any new weakness or sensory deficits in the extremity.      History provided by:  Patient        Patient History   PAST HISTORY     Reviewed from Nursing Triage:       Past Medical History:   Diagnosis Date    Abnormal ECG 10/09/2018    Nonspecific T wave flattening inferiorly minimal ST depression laterally and anterolateral T wave inversion    Acid reflux     Agatston CAC score 100-199 10/09/2018    8/17 coronary calcium score 101 LAD 13, circumflex 68, RCA 20    Anxiety     Lopez's esophagus without dysplasia     Class 2 severe obesity due to excess calories with serious comorbidity and body mass index (BMI) of 36.0 to 36.9 in adult (CMS/HCC) 10/09/2018    2017 weight 232 pounds    Depression     DJD (degenerative joint disease)     Family history of early CAD 10/09/2018    Father  of MI at age 59    Hearing loss     History of hepatitis A     Hypertension     Kidney lesion     Lumbar myelopathy (CMS/HCC)     Tran-Juan tear     Mixed hyperlipidemia 10/09/2018    2020 Lipid panel  LDL 91 HDL 47 Trigs 176    Spinal enthesopathy of lumbar region (CMS/HCC)        Past Surgical History   Procedure Laterality Date    Adenoidectomy      Cholecystectomy      COLECTOMY  SIGMOID N/A 10/10/2018    Performed by Evans Trujillo MD at  OR    Colonoscopy      Foot surgery Right     Hand surgery Right     Inner ear surgery Right     Joint replacement Bilateral     bilateral 2021; knees    Knee arthroscopy Bilateral     L3-S1 Posterior Lumbar Decompression, Posterior Lumbar Interbody Fusion, Instrumentation, Cage, Allograft, Autograft, BMP N/A 11/3/2021    Performed by Melchor Lee MD at  OR    Lumbar laminectomy      Sigmoidectomy      T10-S1 Posterior Thorocolumbosacral Decompression and Interbody Fusion, Instrumentation, Pelvic Fization, Allograft, Autograft, BMP, Revision of Existing Instrumentation N/A 10/12/2022    Performed by Melchor Lee MD at  OR    Tonsillectomy      Transumbilical augmentation mammaplasty      Upper gastrointestinal endoscopy      [place] tooth extraction         Family History   Problem Relation Name Age of Onset    Heart attack Biological Father      Coronary artery disease Biological Father      Stroke Biological Father      Heart disease Biological Father      Hyperlipidemia Biological Mother      Hypertension Biological Mother         Social History     Tobacco Use    Smoking status: Former     Current packs/day: 0.00     Average packs/day: 0.5 packs/day for 30.0 years (15.0 ttl pk-yrs)     Types: Cigarettes     Start date:      Quit date:      Years since quittin.5    Smokeless tobacco: Never   Vaping Use    Vaping status: Never Used   Substance Use Topics    Alcohol use: Yes     Comment: Rare/holidays    Drug use: No         Review of Systems   REVIEW OF SYSTEMS     Review of Systems   Musculoskeletal:  Positive for arthralgias and myalgias.   Skin:  Positive for wound.   All other systems reviewed and are negative.        VITALS     ED Vitals      Date/Time Temp Pulse Resp BP SpO2 [facility]   25 0140 -- 86 16 145/78 98 % [facility]   25 2238 -- 95 18 146/68 97 % [facility]   25 2117 36.6 °C (97.9 °F) 96 20 126/64 98 % [facility]           Pulse Ox %: 98 % (07/17/25 2140)  Pulse Ox Interpretation: Normal (07/17/25 2140)           Physical Exam   PHYSICAL EXAM     Physical Exam  Vitals and nursing note reviewed.   Constitutional:       Comments: Patient is uncomfortable appearing, but in no acute distress.  She answers questions appropriately and speaks in full sentences.  She makes good eye contact throughout examination.   HENT:      Head: Normocephalic.        Mouth/Throat:      Mouth: Mucous membranes are moist.      Pharynx: Oropharynx is clear.   Eyes:      Conjunctiva/sclera: Conjunctivae normal.      Pupils: Pupils are equal, round, and reactive to light.   Neck:      Comments: No midline cervical, thoracic or lumbar spine tenderness to palpation.  No bony deformity.  No step-off.  No crepitus.  Cardiovascular:      Rate and Rhythm: Normal rate and regular rhythm.   Pulmonary:      Effort: Pulmonary effort is normal.      Breath sounds: Normal breath sounds.   Abdominal:      General: Abdomen is flat.      Palpations: Abdomen is soft.      Tenderness: There is no abdominal tenderness.   Musculoskeletal:         General: Tenderness present.      Cervical back: Normal range of motion and neck supple.      Right lower leg: Edema present.      Comments: LUE with sling in place.  There is reproducible tenderness over the shoulder anterior, laterally and posteriorly.  There is no reproducible tenderness at the elbow, wrist or hand.  Neurovascularly intact.  Palpable pulses.  Soft compartments.    Skin:     General: Skin is warm and dry.      Capillary Refill: Capillary refill takes less than 2 seconds.   Neurological:      Mental Status: She is alert. Mental status is at baseline.           PROCEDURES     Procedures     DATA     Results       None            Imaging Results              X-RAY SHOULDER RIGHT 1 VIEW (Final result)  Result time 07/18/25 08:56:06   Procedure changed from X-RAY SHOULDER RIGHT 2+ VIEWS     Final result                    Impression:    IMPRESSION: No acute osseous abnormality.                 Narrative:    CLINICAL HISTORY: Injury status post fall. [place] view    COMMENT:  2 images were obtained of the left shoulder.  COMPARISON: 7/17/2025    The patient is status post reverse left shoulder arthroplasty. The prosthesis  appears well seated and intact. No acute fracture nor dislocation is seen.                                       US venous leg, RL extremity (Final result)  Result time 07/17/25 23:13:00      Final result                   Impression:    IMPRESSION:  No evidence for acute deep venous thrombosis right lower  extremity.      Interpreted by: Shahid Sebastian MD Jul 18 2025 12:34AM EST                   Narrative:    Final Radiology Report        CLINICAL HISTORY: leg swelling    COMMENT:      COMPARISON: No prior.    TECHNIQUE: Ultrasound examination of the right lower  extremity.  Color and spectral Doppler imaging was  performed of the veins of the lower extremity, along with  compression and venous augmentation.    FINDINGS:  Right common femoral vein, femoral vein, popliteal vein,  and visualized calf veins are patent and compressible.  Tikb-ss-hxek color Doppler flow. Expected flow alterations  with augmentation.  Venous waveforms unremarkable.                                         CT HEAD WITHOUT IV CONTRAST (Final result)  Result time 07/17/25 22:56:00      Final result                   Impression:    IMPRESSION:  1.  No acute intracranial abnormality.      CT CSPINE  FINDINGS:  Alignment: Straightening of normal cervical lordosis.  Otherwise, anatomic.    Vertebral bodies: No acute fracture or dislocation. Body  heights are maintained.    Discs:  Multilevel degenerative changes with  intervertebral disc space narrowing, endplate spurring,  facet hypertrophy, and uncovertebral hypertrophy.    Canal/foramina:  Varying degrees of central canal and  neural foraminal stenosis, including multilevel  moderate  spinal canal stenosis at C4/C5, C5/C6 and C6/C7 levels.    Soft tissues: Unremarkable.    IMPRESSION:  1.  No acute fracture or dislocation.  2.  Advanced multilevel degenerative disc disease.          Interpreted by: Shahid Sebastian MD Jul 17 2025 11:37PM EST                   Narrative:    Final Radiology Report        CLINICAL HISTORY: Head trauma, minor (Age >= 65y)    COMMENT:      COMPARISON: No prior.    TECHNIQUE: CT examination of the brain and cervical spine  was performed without contrast. Sagittal and coronal  reconstructions were obtained.    CT DOSE:  One or more dose reduction techniques  (e.g. automated exposure control, adjustment of the mA  and/or kV according to patient size, use of iterative  reconstruction technique)  was utilized for this examination.    FINDINGS:  CT HEAD  FINDINGS:  Chronic microvascular ischemic changes.  No recent cortical infarct.  No mass effect, midline shift, or hydrocephalus.  No acute hemorrhage.    No calvarial fracture.                                       CT CERVICAL SPINE WITHOUT IV CONTRAST (Final result)  Result time 07/17/25 22:56:00      Final result                   Impression:    IMPRESSION:  1.  No acute intracranial abnormality.      CT CSPINE  FINDINGS:  Alignment: Straightening of normal cervical lordosis.  Otherwise, anatomic.    Vertebral bodies: No acute fracture or dislocation. Body  heights are maintained.    Discs:  Multilevel degenerative changes with  intervertebral disc space narrowing, endplate spurring,  facet hypertrophy, and uncovertebral hypertrophy.    Canal/foramina:  Varying degrees of central canal and  neural foraminal stenosis, including multilevel moderate  spinal canal stenosis at C4/C5, C5/C6 and C6/C7 levels.    Soft tissues: Unremarkable.    IMPRESSION:  1.  No acute fracture or dislocation.  2.  Advanced multilevel degenerative disc disease.          Interpreted by: Shahid Sebastian MD Jul 17 2025 11:37PM EST                    Narrative:    Final Radiology Report        CLINICAL HISTORY: Head trauma, minor (Age >= 65y)    COMMENT:      COMPARISON: No prior.    TECHNIQUE: CT examination of the brain and cervical spine  was performed without contrast. Sagittal and coronal  reconstructions were obtained.    CT DOSE:  One or more dose reduction techniques  (e.g. automated exposure control, adjustment of the mA  and/or kV according to patient size, use of iterative  reconstruction technique)  was utilized for this examination.    FINDINGS:  CT HEAD  FINDINGS:  Chronic microvascular ischemic changes.  No recent cortical infarct.  No mass effect, midline shift, or hydrocephalus.  No acute hemorrhage.    No calvarial fracture.                                       X-RAY SHOULDER LEFT 2+ VIEWS (Final result)  Result time 07/18/25 08:54:32      Final result                   Impression:    IMPRESSION:  Right shoulder prosthesis.  No acute osseous abnormality in the remainder of the humerus.               Narrative:    CLINICAL HISTORY: Pain after fall, s/p left shoulder replacement on 7/8    COMMENT:  3 images were obtained of the left shoulder.  4 images were obtained of the left humerus.  COMPARISON: 10/23/2023    The patient is status post interval reverse left shoulder arthroplasty. The  prosthesis appears well seated and intact. No acute fracture nor dislocation is  seen.  Joint spaces are preserved.                        Preliminary Interpretation    Displaced shoulder replacement interpreted by meDr. Price                                      X-RAY HUMERUS LEFT (Final result)  Result time 07/18/25 08:54:32      Final result                   Impression:    IMPRESSION:  Right shoulder prosthesis.  No acute osseous abnormality in the remainder of the humerus.               Narrative:    CLINICAL HISTORY: Pain after fall, s/p left shoulder replacement on 7/8    COMMENT:  3 images were obtained of the left shoulder.  4 images were obtained  of the left humerus.  COMPARISON: 10/23/2023    The patient is status post interval reverse left shoulder arthroplasty. The  prosthesis appears well seated and intact. No acute fracture nor dislocation is  seen.  Joint spaces are preserved.                        Preliminary Interpretation    Displaced shoulder replacement, interpreted by Dr Albert avila                                     X-RAY SACRUM AND COCCYX (Final result)  Result time 07/18/25 08:57:31      Final result                   Impression:    IMPRESSION: No acute osseous abnormality. See comment.                 Narrative:    CLINICAL HISTORY: Pain, s/p fall    COMMENT:  3 images were obtained of the sacrum and coccyx.  COMPARISON: 2/10/2025    Lumbosacral fusion is present. Visualized fusion hardware is intact. Pelvic ring  is intact.  No acute sacral deformity is seen although evaluation is limited due to  osteopenia and bowel gas.                        Preliminary Interpretation    No acute fracture interpreted by Dr. Albert avila                                     No orders to display       Scoring tools                                  ED Course & MDM   MDM / ED COURSE / CLINICAL IMPRESSION / DISPO     Medical Decision Making  Problems Addressed:  Acute pain of left shoulder: acute illness or injury  Fall, initial encounter: acute illness or injury  Right leg swelling: acute illness or injury    Amount and/or Complexity of Data Reviewed  Independent Historian: spouse  External Data Reviewed: labs, radiology, ECG and notes.  Radiology: ordered and independent interpretation performed. Decision-making details documented in ED Course.  Discussion of management or test interpretation with external provider(s): Orthopedics    Risk  Prescription drug management.        ED Course as of 07/19/25 0211   Thu Jul 17, 2025   2250 Page to ortho [AS]   2340 CT HEAD WITHOUT IV CONTRAST  IMPRESSION:  1.  No acute intracranial abnormality.      [AS]   2341 CT  CERVICAL SPINE WITHOUT IV CONTRAST  IMPRESSION:  1.  No acute fracture or dislocation.  2.  Advanced multilevel degenerative disc disease.   [AS]   Fri Jul 18, 2025   0014 US venous leg, RL extremity  My independent interpretation, no acute DVT [AS]   0015 X-RAY SACRUM AND COCCYX  No acute fracture interpreted by Dr. Albert avila  Interpreted by Mark Price MD on 7/18/2025 00:12   [AS]   0015 X-RAY HUMERUS LEFT  Displaced shoulder replacement, interpreted by Dr Albert avila  Interpreted by Mark Price MD on 7/18/2025 00:11   [AS]   0015 Ortho at bedside  [AS]   0035 US venous leg, RL extremity  IMPRESSION:  No evidence for acute deep venous thrombosis right lower  extremity.        Interpreted by: Shahid Sebastian MD Jul 18 2025 12:34AM EST   [AS]   0134 Per orthopedics patient is stable for discharge and they recommended that she follow up with surgeon, Dr. Castrejon for reevaluation     Recommended repeat ultrasound in approximately 5 to 7 days.    Patient is now stable for discharge.  Return precautions discussed, patient verbalizes understanding.  Questions answered prior to discharge.   [AS]      ED Course User Index  [AS] Ange Leyva PA C     Clinical Impression      Fall, initial encounter   Acute pain of left shoulder   Right leg swelling     _________________       ED Disposition   Discharge                       Ange Leyva PA C  07/19/25 021

## 2025-07-19 ASSESSMENT — ENCOUNTER SYMPTOMS
MYALGIAS: 1
WOUND: 1
ARTHRALGIAS: 1

## 2025-07-20 NOTE — ED ATTESTATION NOTE
Physician Attestation:      I have personally seen and examined the patient, participated in the management, and agree with the findings in the above note except as where stated.       I have personally performed the key components of the encounter and provided a substantive portion of the care and medical decision making.     The Physician Assistant and I discussed  the case, workup, and disposition.          Chief Complaint  Chief Complaint   Patient presents with    Upper Extremity Issue          My focused history, examination, assessment, and plan of care is as follows:        History  76 y/o F s/p left total shoulder replacement presenting for eval after a fall   She bent down to pull out some weeds and fell to the ground onto her left shoulder  Attempted to stand and struck her head  Denies LOC  Endorsing severe left shoulder discomfort  Also noted unilateral right lower extremity swelling when she lay on the bed in the ED  Denies CP or abdominal pain  Denies nausea or vomiting  Uses 81mg asa daily        Physical  Vital signs reviewed   AAOx3 resting comfortably in no acute distress  Heart is regular rate and rhythm normal S1-S2  Lungs are clear to auscultation bilaterally  Abdomen nondistended, soft, nontender in all quadrants  Head is atraumatic normocephalic aside from abrasion to nose  (-) barrios sign or raccoon eyes  (-) cervical or paracervical tenderness (-) spinal or paraspinal tenderness (-) stepoff  (-) chest wall tenderness  Left shoulder tenderness diffusely  2+ radial pulses  5/5  strength   RLE 1+ pitting edema, no edema to LLE          MDM / Plan  -Patient historian/Independent historians: patient and   -Prior records reviewed: notes  -Plan: imaging to r/o acute traumatic injuries     -The patient's chief complaint is an acute problem.     *Refer to ED Workup tab and PA chart for further documentation.               Mark Price MD  07/20/25 7814

## 2025-07-24 ENCOUNTER — HOSPITAL ENCOUNTER (OUTPATIENT)
Dept: RADIOLOGY | Age: 75
Discharge: HOME | End: 2025-07-24
Attending: ORTHOPAEDIC SURGERY
Payer: MEDICARE

## 2025-07-24 DIAGNOSIS — M19.012 ARTHRITIS OF LEFT SHOULDER REGION: ICD-10-CM

## 2025-07-24 PROCEDURE — 73200 CT UPPER EXTREMITY W/O DYE: CPT | Mod: LT

## (undated) DEVICE — TRAY URINE METER FOLEY NON-SILVER

## (undated) DEVICE — LIGASURE IMPACT INSTRUMENT

## (undated) DEVICE — DEVICE BONE MILL DUAL BLADE DISP

## (undated) DEVICE — SYRINGE DISP LUER-LOK  3 CC

## (undated) DEVICE — MANIFOLD FOUR PORT NEPTUNE

## (undated) DEVICE — ***USE 138486*** SUTURE ETHILON  2-0   664G

## (undated) DEVICE — *T* 4.0MM PRECISION ROUND BUR

## (undated) DEVICE — Device

## (undated) DEVICE — DRAIN FLAT 10MM

## (undated) DEVICE — SUTURE VICRYL 2-0  J762D CR CP-2 18IN

## (undated) DEVICE — SURGIFLO HEMOSTATIC MATRIX 8ML

## (undated) DEVICE — ***USE 57698*** SLEEVE FLOWTRON DVT CALF SINGLE USE

## (undated) DEVICE — ***USE 138520*** SUTURE SILK  3-0   C013D

## (undated) DEVICE — DRESSING SPONGE ALL GAUZE 4X4 STER 10PK

## (undated) DEVICE — PACK OR TOWEL

## (undated) DEVICE — COVER BACK TABLE REINFORCED

## (undated) DEVICE — DRESSING SPONGE 4X4 SOF-WICK

## (undated) DEVICE — PACK BASIC I

## (undated) DEVICE — ***USE 138720*** SUTURE VICRYL 1 J468H CP-1 27IN VIOLET

## (undated) DEVICE — PAD GROUND ELECTROSURGICAL W/CORD

## (undated) DEVICE — ***USE 57035*** STAPLER PROXIMATE TX LINEAR RELOADS 60MM 3.5

## (undated) DEVICE — SOLN IRRIG STER WATER 1000ML

## (undated) DEVICE — DRAIN SINGLE ROUND W/TROCAR 3/16

## (undated) DEVICE — PACK SPINE LUMBAR

## (undated) DEVICE — PENEVAC1 NONSTICK SMOKE EVAC

## (undated) DEVICE — RESERVOIR DRAIN 100ML

## (undated) DEVICE — TRAY URINE METER FOLEY SILVER 16 FR 5CC 2 W

## (undated) DEVICE — ***USE 138862*** SUTURE PDS 1 ZB371 CTX-B 36IN VIOLET

## (undated) DEVICE — TUBING SMOKE EVAC PENCIL COATED

## (undated) DEVICE — GLOVE SZ 7.5 LINER PROTEXIS PI BL

## (undated) DEVICE — BLADE BONE MILL DISP MEDIUM

## (undated) DEVICE — SOLN IRRIG .9%SOD 1000ML

## (undated) DEVICE — SOLN DURAPREP WAND

## (undated) DEVICE — APPLICATOR CHLORAPREP 26ML ORANGE TINT

## (undated) DEVICE — SUTURE CHROMIC GUT 2-0 L113G REEL

## (undated) DEVICE — SEALER AQUAMANTYS MALLEABLE BIPOLAR

## (undated) DEVICE — SUTURE SILK 0 K834H

## (undated) DEVICE — ***USE 138518*** SUTURE SILK 0 A306H 30IN TIES

## (undated) DEVICE — EVACUATOR RELIAVAC STRL MD 100CC

## (undated) DEVICE — ***USE 138517*** SUTURE SILK 2-0 A305H 30IN TIES

## (undated) DEVICE — ***USE 38783*** STAPLER LINEAR RELOADABLE 60MM 3.5

## (undated) DEVICE — MANIFOLD SINGLE PORT NEPTUNE

## (undated) DEVICE — GLOVE SZ 7.5 PROTEXIS PI

## (undated) DEVICE — CATH LEG STRAP MEDC

## (undated) DEVICE — ***USE 138043*** STAPLER CIRCULAR 29MM CURVED

## (undated) DEVICE — SPINE TABLE COVER ULTRA COMFORT MEDIUM

## (undated) DEVICE — *T* 3.0MM MATCH HEAD PRECISION NEURO BUR

## (undated) DEVICE — DRAPE LARGE REVERSE FOLD

## (undated) DEVICE — ***USE 57029*** CUTTER RELOADS LINEAR 75MM  TCR75

## (undated) DEVICE — ***USE 56995*** SUTURE CHROMIC GUT 2-0 G123H

## (undated) DEVICE — ***USE 138470*** SUTURE ETHILON 3-0 1673BH

## (undated) DEVICE — CATH FOLEY SILICONE 24FR 30CC 3WAY

## (undated) DEVICE — ***USE 56856*** CUTTER LINEAR 75MM    TLC75

## (undated) DEVICE — SUTURE CHROMIC GUT 0 L114G

## (undated) DEVICE — GAUZE XEROFORM 1X8 NON-STERILE PACKET

## (undated) DEVICE — DRAPE WARMER 66X44